# Patient Record
Sex: MALE | Race: WHITE | HISPANIC OR LATINO | Employment: FULL TIME | URBAN - METROPOLITAN AREA
[De-identification: names, ages, dates, MRNs, and addresses within clinical notes are randomized per-mention and may not be internally consistent; named-entity substitution may affect disease eponyms.]

---

## 2018-02-07 ENCOUNTER — HOSPITAL ENCOUNTER (EMERGENCY)
Facility: MEDICAL CENTER | Age: 23
End: 2018-02-07

## 2018-02-07 VITALS
OXYGEN SATURATION: 95 % | BODY MASS INDEX: 42.66 KG/M2 | RESPIRATION RATE: 18 BRPM | DIASTOLIC BLOOD PRESSURE: 57 MMHG | WEIGHT: 315 LBS | HEIGHT: 72 IN | HEART RATE: 140 BPM | TEMPERATURE: 97 F | SYSTOLIC BLOOD PRESSURE: 172 MMHG

## 2018-02-07 PROCEDURE — 302449 STATCHG TRIAGE ONLY (STATISTIC)

## 2018-02-07 ASSESSMENT — PAIN SCALES - GENERAL: PAINLEVEL_OUTOF10: 7

## 2018-02-08 NOTE — ED TRIAGE NOTES
"Sammy Hardin  22 y.o.  Chief Complaint   Patient presents with   • GLF   • Knee Pain     RIGHT     Patient to triage via wheelchair for above. States that he was bowling and tripped and fell towards his left side. Tried to catch his balance with his right leg - heard a crack and leg collapsed. Currently unable to bear weight on R knee. Denies tinging/numbness to RLE. Patient states \"I think I broke my leg.\"    Denies hitting head. - LOC.     Triage process explained to patient, apologized for wait time, and returned to lobby.  "

## 2019-05-31 ENCOUNTER — HOSPITAL ENCOUNTER (EMERGENCY)
Facility: MEDICAL CENTER | Age: 24
End: 2019-05-31
Attending: EMERGENCY MEDICINE

## 2019-05-31 VITALS
SYSTOLIC BLOOD PRESSURE: 138 MMHG | OXYGEN SATURATION: 97 % | BODY MASS INDEX: 42.66 KG/M2 | RESPIRATION RATE: 16 BRPM | HEART RATE: 88 BPM | WEIGHT: 315 LBS | TEMPERATURE: 97.8 F | DIASTOLIC BLOOD PRESSURE: 89 MMHG | HEIGHT: 72 IN

## 2019-05-31 DIAGNOSIS — M54.16 LUMBAR RADICULOPATHY: ICD-10-CM

## 2019-05-31 PROCEDURE — 96372 THER/PROPH/DIAG INJ SC/IM: CPT

## 2019-05-31 PROCEDURE — 700102 HCHG RX REV CODE 250 W/ 637 OVERRIDE(OP): Performed by: EMERGENCY MEDICINE

## 2019-05-31 PROCEDURE — A9270 NON-COVERED ITEM OR SERVICE: HCPCS | Performed by: EMERGENCY MEDICINE

## 2019-05-31 PROCEDURE — 700111 HCHG RX REV CODE 636 W/ 250 OVERRIDE (IP): Performed by: EMERGENCY MEDICINE

## 2019-05-31 PROCEDURE — 99284 EMERGENCY DEPT VISIT MOD MDM: CPT

## 2019-05-31 RX ORDER — CYCLOBENZAPRINE HCL 10 MG
10 TABLET ORAL ONCE
Status: COMPLETED | OUTPATIENT
Start: 2019-05-31 | End: 2019-05-31

## 2019-05-31 RX ORDER — KETOROLAC TROMETHAMINE 30 MG/ML
60 INJECTION, SOLUTION INTRAMUSCULAR; INTRAVENOUS ONCE
Status: COMPLETED | OUTPATIENT
Start: 2019-05-31 | End: 2019-05-31

## 2019-05-31 RX ORDER — CYCLOBENZAPRINE HCL 10 MG
10 TABLET ORAL 3 TIMES DAILY PRN
Qty: 30 TAB | Refills: 0 | Status: SHIPPED | OUTPATIENT
Start: 2019-05-31 | End: 2019-06-14

## 2019-05-31 RX ORDER — METHYLPREDNISOLONE 4 MG/1
TABLET ORAL
Qty: 1 KIT | Refills: 0 | Status: SHIPPED | OUTPATIENT
Start: 2019-05-31 | End: 2019-06-14

## 2019-05-31 RX ADMIN — CYCLOBENZAPRINE HYDROCHLORIDE 10 MG: 10 TABLET, FILM COATED ORAL at 12:47

## 2019-05-31 RX ADMIN — KETOROLAC TROMETHAMINE 60 MG: 30 INJECTION, SOLUTION INTRAMUSCULAR; INTRAVENOUS at 12:44

## 2019-05-31 ASSESSMENT — PAIN DESCRIPTION - DESCRIPTORS: DESCRIPTORS: SHARP

## 2019-05-31 NOTE — ED TRIAGE NOTES
"Sammy Edmonds  Chief Complaint   Patient presents with   • Leg Pain     (B) knees and thighs,  x 2 weeks     Pt ambulatory to triage with above complaint.  VSS.  No acute distress noted.   Pt states increasing pain to (B) knees and thighs,  Making it painful to walk, also has had 2 episodes of waking up in extreme pain \"like legs are cramping\".    Pt returned to [facility], educated on triage process, and to inform staff of any changes or concerns.     "

## 2019-05-31 NOTE — ED NOTES
Pt discharged home. Explained discharge and medication instructions. Questions and comments addressed. Pt verbalized understanding of instructions. Pt advised to follow-up with PCP as needed or return to ED for any new or worsening of symptoms. Pt is ambulating well and steady on feet. VS stable. Pt's parents at bedside and will be driving pt home.

## 2019-05-31 NOTE — ED PROVIDER NOTES
"ED Provider Note    Scribed for Cuba Gilmore M.D. by Montserrat Brown. 5/31/2019  11:32 AM    Primary care provider: Pcp Pt States None  Means of arrival: Walk-in  History obtained from: Patient  History limited by: None    CHIEF COMPLAINT  Chief Complaint   Patient presents with   • Leg Pain     (B) knees and thighs,  x 2 weeks       HPI  Sammy Edmonds is a 23 y.o. male who presents to the Emergency Department for evaluation of right leg pain onset two weeks ago. The patient was doing no unusual activity at the time of onset. The patient states his pain is primarily located to his knee and thigh. His pain is exacerbated by ambulation and leaning down. The patient described his pain as \"zinging\". The patient also affirms two episodes of right leg cramping. The patient affirms hip pain as well. The patient's pain is not exacerbating by palpation. The patient denies any back pain, swelling, or redness.     REVIEW OF SYSTEMS  Pertinent positives include Right leg pain, cramping, and hip pain. Pertinent negatives include no back pain, swelling, or redness.      PAST MEDICAL HISTORY   has a past medical history of Morbid obesity (HCC).    SURGICAL HISTORY  patient denies any surgical history    SOCIAL HISTORY  Social History   Substance Use Topics   • Smoking status: Former Smoker     Packs/day: 0.25     Types: Cigarettes     Quit date: 12/28/2018   • Smokeless tobacco: Never Used   • Alcohol use Yes      Comment: twice weekly      History   Drug Use   • Types: Inhaled     Comment: occasional marijuana       FAMILY HISTORY  History reviewed. No pertinent family history.    CURRENT MEDICATIONS  Home Medications     Reviewed by Obdulia Boyd R.N. (Registered Nurse) on 05/31/19 at 1102  Med List Status: Complete   Medication Last Dose Status        Patient Umair Taking any Medications                       ALLERGIES  No Known Allergies    PHYSICAL EXAM  VITAL SIGNS: /92   Pulse (!) 106   Temp 36.2 " °C (97.1 °F) (Temporal)   Resp 14   Ht 1.829 m (6')   Wt (!) 155.3 kg (342 lb 6 oz)   SpO2 95%   BMI 46.43 kg/m²     Constitutional: Well developed, Morbidly obese, No distress, Non-toxic appearance.   HENT: Normocephalic, Atraumatic.  Oropharynx moist.   Eyes: PERRL, EOMI, Conjunctiva normal, No discharge.   CV: Good pulses  Thorax & Lungs: No respiratory distress.   Skin: Warm, Dry, No erythema, No rash.    Musculoskeletal: Tenderness to the bilateral gluteal and lumbar area. No midline tenderness or lumbar spine tenderness. Nontender along bilateral lower extremities. Muscle strength is 5/5. No major deformities noted.   Neurologic: Awake, alert. Moves all extremities spontaneously.  Psychiatric: Affect normal, Mood normal.    COURSE & MEDICAL DECISION MAKING  Nursing notes, VS, PMSFHx reviewed in chart.    11:32 AM - Patient seen and examined at bedside. I discussed with the patient that because he is nontender to palpation and movement of his leg itself I do not suspect muscle or bone related etiology of his symptoms. I informed the patient that I suspect his symptoms are spinal nerve pain. I discussed that obesity is a risk factor for this type of pain. I informed the patient of my plan to treat the patient with a muscle relaxer and a steroid to decrease inflammation. Patient verbalizes understanding and support with my plan of care. Patient will be treated with 60 mg Toradol and 10 mg Plexeril. The patient verbalizes they feel comfortable going home. The patient is stable for discharge at this time and will return for any new or worsening symptoms. I discussed plan for discharge and follow up as outlined below. Patient verbalizes understanding and support with my plan for discharge.     Decision Making:  Patient is morbidly obese with right lateral thigh pain primarily.  It is somewhat positional, I believe it is likely lumbar radiculopathy, will put the patient on a Medrol Dosepak, Flexeril.  We will  give the patient IM shot of Toradol, have the patient return with worsening symptoms, follow-up with a primary care physician as an outpatient.     The patient will return for new or worsening symptoms and is stable at the time of discharge.    The patient is referred to a primary physician for blood pressure management, diabetic screening, and for all other preventative health concerns.    DISPOSITION:  Patient will be discharged home in stable condition.    FOLLOW UP:  [facility], Emergency Dept  [address] [id number] [phone]    If symptoms worsen    [address] [id number] [phone]          OUTPATIENT MEDICATIONS:  New Prescriptions    CYCLOBENZAPRINE (FLEXERIL) 10 MG TAB    Take 1 Tab by mouth 3 times a day as needed.    METHYLPREDNISOLONE (MEDROL DOSEPAK) 4 MG TABLET THERAPY PACK    Use as directed         FINAL IMPRESSION  1. Lumbar radiculopathy          Montserrat RHOADES (Snehal), am scribing for, and in the presence of, Cuba Gilmore M.D..    Electronically signed by: Montserrat Brown (Snehal), 5/31/2019    Cuba RHOADES M.D. personally performed the services described in this documentation, as scribed by Montserrat Brown in my presence, and it is both accurate and complete.    The note accurately reflects work and decisions made by me.  Cuba Gilmore  5/31/2019  1:21 PM

## 2019-06-14 ENCOUNTER — APPOINTMENT (OUTPATIENT)
Dept: RADIOLOGY | Facility: MEDICAL CENTER | Age: 24
DRG: 519 | End: 2019-06-14
Attending: EMERGENCY MEDICINE

## 2019-06-14 ENCOUNTER — HOSPITAL ENCOUNTER (INPATIENT)
Facility: MEDICAL CENTER | Age: 24
LOS: 6 days | DRG: 519 | End: 2019-06-25
Attending: EMERGENCY MEDICINE | Admitting: HOSPITALIST

## 2019-06-14 DIAGNOSIS — R29.898 LEFT LEG WEAKNESS: ICD-10-CM

## 2019-06-14 DIAGNOSIS — M48.061 SPINAL STENOSIS OF LUMBAR REGION WITHOUT NEUROGENIC CLAUDICATION: ICD-10-CM

## 2019-06-14 DIAGNOSIS — M54.5 ACUTE BILATERAL LOW BACK PAIN, WITH SCIATICA PRESENCE UNSPECIFIED: ICD-10-CM

## 2019-06-14 DIAGNOSIS — R00.0 TACHYCARDIA: ICD-10-CM

## 2019-06-14 DIAGNOSIS — M54.16 LUMBAR RADICULOPATHY: ICD-10-CM

## 2019-06-14 LAB
AMPHET UR QL SCN: NEGATIVE
ANION GAP SERPL CALC-SCNC: 11 MMOL/L (ref 0–11.9)
BARBITURATES UR QL SCN: NEGATIVE
BASOPHILS # BLD AUTO: 0.4 % (ref 0–1.8)
BASOPHILS # BLD: 0.04 K/UL (ref 0–0.12)
BENZODIAZ UR QL SCN: NEGATIVE
BUN SERPL-MCNC: 10 MG/DL (ref 8–22)
BZE UR QL SCN: NEGATIVE
CALCIUM SERPL-MCNC: 9.8 MG/DL (ref 8.5–10.5)
CANNABINOIDS UR QL SCN: POSITIVE
CHLORIDE SERPL-SCNC: 103 MMOL/L (ref 96–112)
CK SERPL-CCNC: 321 U/L (ref 0–154)
CO2 SERPL-SCNC: 26 MMOL/L (ref 20–33)
CREAT SERPL-MCNC: 0.84 MG/DL (ref 0.5–1.4)
EOSINOPHIL # BLD AUTO: 0.15 K/UL (ref 0–0.51)
EOSINOPHIL NFR BLD: 1.6 % (ref 0–6.9)
ERYTHROCYTE [DISTWIDTH] IN BLOOD BY AUTOMATED COUNT: 40.9 FL (ref 35.9–50)
GLUCOSE SERPL-MCNC: 106 MG/DL (ref 65–99)
HCT VFR BLD AUTO: 50.4 % (ref 42–52)
HGB BLD-MCNC: 15.5 G/DL (ref 14–18)
IMM GRANULOCYTES # BLD AUTO: 0.02 K/UL (ref 0–0.11)
IMM GRANULOCYTES NFR BLD AUTO: 0.2 % (ref 0–0.9)
LYMPHOCYTES # BLD AUTO: 3.37 K/UL (ref 1–4.8)
LYMPHOCYTES NFR BLD: 34.9 % (ref 22–41)
MCH RBC QN AUTO: 26.6 PG (ref 27–33)
MCHC RBC AUTO-ENTMCNC: 30.8 G/DL (ref 33.7–35.3)
MCV RBC AUTO: 86.4 FL (ref 81.4–97.8)
METHADONE UR QL SCN: NEGATIVE
MONOCYTES # BLD AUTO: 0.86 K/UL (ref 0–0.85)
MONOCYTES NFR BLD AUTO: 8.9 % (ref 0–13.4)
NEUTROPHILS # BLD AUTO: 5.23 K/UL (ref 1.82–7.42)
NEUTROPHILS NFR BLD: 54 % (ref 44–72)
NRBC # BLD AUTO: 0 K/UL
NRBC BLD-RTO: 0 /100 WBC
OPIATES UR QL SCN: NEGATIVE
OXYCODONE UR QL SCN: POSITIVE
PCP UR QL SCN: NEGATIVE
PLATELET # BLD AUTO: 351 K/UL (ref 164–446)
PMV BLD AUTO: 8.9 FL (ref 9–12.9)
POTASSIUM SERPL-SCNC: 3.7 MMOL/L (ref 3.6–5.5)
PROPOXYPH UR QL SCN: NEGATIVE
RBC # BLD AUTO: 5.83 M/UL (ref 4.7–6.1)
SODIUM SERPL-SCNC: 140 MMOL/L (ref 135–145)
WBC # BLD AUTO: 9.7 K/UL (ref 4.8–10.8)

## 2019-06-14 PROCEDURE — 72148 MRI LUMBAR SPINE W/O DYE: CPT

## 2019-06-14 PROCEDURE — 85025 COMPLETE CBC W/AUTO DIFF WBC: CPT

## 2019-06-14 PROCEDURE — 80048 BASIC METABOLIC PNL TOTAL CA: CPT

## 2019-06-14 PROCEDURE — 96375 TX/PRO/DX INJ NEW DRUG ADDON: CPT

## 2019-06-14 PROCEDURE — 80307 DRUG TEST PRSMV CHEM ANLYZR: CPT

## 2019-06-14 PROCEDURE — 99285 EMERGENCY DEPT VISIT HI MDM: CPT

## 2019-06-14 PROCEDURE — 700105 HCHG RX REV CODE 258: Performed by: EMERGENCY MEDICINE

## 2019-06-14 PROCEDURE — 96376 TX/PRO/DX INJ SAME DRUG ADON: CPT

## 2019-06-14 PROCEDURE — 700111 HCHG RX REV CODE 636 W/ 250 OVERRIDE (IP): Performed by: EMERGENCY MEDICINE

## 2019-06-14 PROCEDURE — 36415 COLL VENOUS BLD VENIPUNCTURE: CPT

## 2019-06-14 PROCEDURE — 82550 ASSAY OF CK (CPK): CPT

## 2019-06-14 PROCEDURE — 72100 X-RAY EXAM L-S SPINE 2/3 VWS: CPT

## 2019-06-14 PROCEDURE — 96374 THER/PROPH/DIAG INJ IV PUSH: CPT

## 2019-06-14 RX ORDER — SODIUM CHLORIDE 9 MG/ML
1000 INJECTION, SOLUTION INTRAVENOUS ONCE
Status: COMPLETED | OUTPATIENT
Start: 2019-06-14 | End: 2019-06-14

## 2019-06-14 RX ORDER — NAPROXEN 500 MG/1
500 TABLET ORAL 2 TIMES DAILY WITH MEALS
Qty: 60 TAB | Refills: 0 | Status: SHIPPED | OUTPATIENT
Start: 2019-06-14 | End: 2019-06-25

## 2019-06-14 RX ORDER — ONDANSETRON 2 MG/ML
4 INJECTION INTRAMUSCULAR; INTRAVENOUS ONCE
Status: COMPLETED | OUTPATIENT
Start: 2019-06-14 | End: 2019-06-14

## 2019-06-14 RX ORDER — KETOROLAC TROMETHAMINE 30 MG/ML
30 INJECTION, SOLUTION INTRAMUSCULAR; INTRAVENOUS ONCE
Status: COMPLETED | OUTPATIENT
Start: 2019-06-14 | End: 2019-06-14

## 2019-06-14 RX ORDER — CYCLOBENZAPRINE HCL 10 MG
10 TABLET ORAL 3 TIMES DAILY PRN
Qty: 30 TAB | Refills: 0 | Status: SHIPPED | OUTPATIENT
Start: 2019-06-14 | End: 2019-06-25

## 2019-06-14 RX ORDER — HYDROCODONE BITARTRATE AND ACETAMINOPHEN 5; 325 MG/1; MG/1
1-2 TABLET ORAL EVERY 6 HOURS PRN
Qty: 15 TAB | Refills: 0 | Status: SHIPPED | OUTPATIENT
Start: 2019-06-14 | End: 2019-06-25

## 2019-06-14 RX ORDER — MORPHINE SULFATE 4 MG/ML
4 INJECTION, SOLUTION INTRAMUSCULAR; INTRAVENOUS ONCE
Status: COMPLETED | OUTPATIENT
Start: 2019-06-14 | End: 2019-06-14

## 2019-06-14 RX ORDER — MIDAZOLAM HYDROCHLORIDE 1 MG/ML
2 INJECTION INTRAMUSCULAR; INTRAVENOUS ONCE
Status: COMPLETED | OUTPATIENT
Start: 2019-06-14 | End: 2019-06-14

## 2019-06-14 RX ADMIN — ONDANSETRON 4 MG: 2 INJECTION INTRAMUSCULAR; INTRAVENOUS at 17:40

## 2019-06-14 RX ADMIN — MORPHINE SULFATE 4 MG: 4 INJECTION INTRAVENOUS at 17:40

## 2019-06-14 RX ADMIN — SODIUM CHLORIDE 1000 ML: 9 INJECTION, SOLUTION INTRAVENOUS at 21:36

## 2019-06-14 RX ADMIN — SODIUM CHLORIDE 1000 ML: 9 INJECTION, SOLUTION INTRAVENOUS at 15:25

## 2019-06-14 RX ADMIN — KETOROLAC TROMETHAMINE 30 MG: 30 INJECTION, SOLUTION INTRAMUSCULAR; INTRAVENOUS at 15:06

## 2019-06-14 RX ADMIN — MORPHINE SULFATE 4 MG: 4 INJECTION INTRAVENOUS at 19:01

## 2019-06-14 NOTE — ED PROVIDER NOTES
ED Provider Note    CHIEF COMPLAINT  Chief Complaint   Patient presents with   • Knee Pain   • Leg Pain   • Fall       HPI  Sammy Edmonds is a 23 y.o. male who presents with bilateral leg pain.  Worse that he has had pain in both of his thighs laterally and anteriorly for the last 2 weeks.  He does not recall any specific injury, but it just seems to get worse.  He describes shooting pain down his thighs laterally.  He denies weakness constantly in the legs, but he states that they will periodically just give out and he has had a fall to the ground.  Periodically will feel some popping in his right knee.  Pain is worsened with certain movements.  Is worse when he lays down flat at night.  He has not had a fever or chills.  No bowel or bladder dysfunction or saddle anesthesia.  No injection drug use.    REVIEW OF SYSTEMS  As per HPI, otherwise a 10 point review of systems is negative    PAST MEDICAL HISTORY  Past Medical History:   Diagnosis Date   • Morbid obesity (HCC)        SOCIAL HISTORY  Social History   Substance Use Topics   • Smoking status: Former Smoker     Packs/day: 0.25     Types: Cigarettes     Quit date: 12/28/2018   • Smokeless tobacco: Never Used   • Alcohol use Yes      Comment: twice weekly       SURGICAL HISTORY  History reviewed. No pertinent surgical history.    CURRENT MEDICATIONS  Home Medications     Reviewed by Mini Birch R.N. (Registered Nurse) on 06/14/19 at 1222  Med List Status: Complete   Medication Last Dose Status        Patient Umair Taking any Medications                       ALLERGIES  No Known Allergies    PHYSICAL EXAM  VITAL SIGNS: /86   Pulse (!) 131   Temp 36 °C (96.8 °F) (Temporal)   Resp 16   Wt (!) 155 kg (341 lb 11.4 oz)   SpO2 94%   BMI 46.34 kg/m²    Constitutional: Awake and alert  HENT:  Atraumatic, Normocephalic.Oropharynx dry mucus membranes, Nose normal inspection.   Eyes: Normal inspection  Neck: Supple  Cardiovascular: Normal heart  rate, Normal rhythm.  Symmetric peripheral pulses.   Thorax & Lungs: No respiratory distress, No wheezing, No rales, No rhonchi, No chest tenderness.   Abdomen: Bowel sounds normal, soft, non-distended, nontender, no mass  Skin: Warm, Dry, No rash.   Back: Mild tenderness lower lumbar spine bilaterally.  Extremities: Mild tenderness of the lateral thighs.  Neurologic: Neurologic: Alert & oriented x 3, Normal motor function, Normal sensory function, No focal deficits noted. Normal reflexes.  Normal Cranial Nerves.    RADIOLOGY/PROCEDURES  DX-LUMBAR SPINE-2 OR 3 VIEWS   Final Result      1.  There is no acute bony process or malalignment.   2.  Question of lower spinal stenosis possibly due to congenitally short pedicles.           Imaging is interpreted by radiologist    Labs:  Results for orders placed or performed during the hospital encounter of 06/14/19   CBC WITH DIFFERENTIAL   Result Value Ref Range    WBC 9.7 4.8 - 10.8 K/uL    RBC 5.83 4.70 - 6.10 M/uL    Hemoglobin 15.5 14.0 - 18.0 g/dL    Hematocrit 50.4 42.0 - 52.0 %    MCV 86.4 81.4 - 97.8 fL    MCH 26.6 (L) 27.0 - 33.0 pg    MCHC 30.8 (L) 33.7 - 35.3 g/dL    RDW 40.9 35.9 - 50.0 fL    Platelet Count 351 164 - 446 K/uL    MPV 8.9 (L) 9.0 - 12.9 fL    Neutrophils-Polys 54.00 44.00 - 72.00 %    Lymphocytes 34.90 22.00 - 41.00 %    Monocytes 8.90 0.00 - 13.40 %    Eosinophils 1.60 0.00 - 6.90 %    Basophils 0.40 0.00 - 1.80 %    Immature Granulocytes 0.20 0.00 - 0.90 %    Nucleated RBC 0.00 /100 WBC    Neutrophils (Absolute) 5.23 1.82 - 7.42 K/uL    Lymphs (Absolute) 3.37 1.00 - 4.80 K/uL    Monos (Absolute) 0.86 (H) 0.00 - 0.85 K/uL    Eos (Absolute) 0.15 0.00 - 0.51 K/uL    Baso (Absolute) 0.04 0.00 - 0.12 K/uL    Immature Granulocytes (abs) 0.02 0.00 - 0.11 K/uL    NRBC (Absolute) 0.00 K/uL   BASIC METABOLIC PANEL   Result Value Ref Range    Sodium 140 135 - 145 mmol/L    Potassium 3.7 3.6 - 5.5 mmol/L    Chloride 103 96 - 112 mmol/L    Co2 26 20 - 33  mmol/L    Glucose 106 (H) 65 - 99 mg/dL    Bun 10 8 - 22 mg/dL    Creatinine 0.84 0.50 - 1.40 mg/dL    Calcium 9.8 8.5 - 10.5 mg/dL    Anion Gap 11.0 0.0 - 11.9   CREATINE KINASE   Result Value Ref Range    CPK Total 321 (H) 0 - 154 U/L   ESTIMATED GFR   Result Value Ref Range    GFR If African American >60 >60 mL/min/1.73 m 2    GFR If Non African American >60 >60 mL/min/1.73 m 2       Medications   NS infusion 1,000 mL (not administered)   ketorolac (TORADOL) injection 30 mg (30 mg Intravenous Given 6/14/19 4306)       HYDRATION: Based on the patient's presentation of Tachycardia the patient was given IV fluids. IV Hydration was used because oral hydration was not as rapid as required. Upon recheck following hydration, the patient was Stable.    COURSE & MEDICAL DECISION MAKING  Patient presents with back pain and anterior thigh pain.  He describes that he has periodic weakness in his extremities.  He has no findings on his exam neurologic exam.  He was recently here with the same and reports that he has had symptoms for several weeks.  I obtained laboratory data and ordered an x-ray.  He has a mild elevation of the CPK of unclear significance.  X-ray was abnormal as above.    I consulted Dr. crenshaw and discussed the case.  He requested that the patient be able to get an MRI but he would like to see the patient in the office.  The patient does not have any insurance or means to an outpatient basis and therefore Dr. crenshaw recommended obtaining it today through the ER otherwise the patient would just have to be sent back for MRI.  I therefore ordered an MRI.    The patient was very anxious clinically.  He arrived quite tachycardic.  This was a narrow complex regular sinus tachycardia on the monitor.  Toradol was given for discomfort and he remained tachycardic I therefore ordered a liter of saline.    At this point MRI is pending.  The patient is feeling better, but with some ongoing discomfort.  I ordered morphine.   If MRI does not show any emergent cord compression the patient will be discharged to follow-up with Dr. crenshaw.  If remarkable abnormalities, Dr. Li will dictate further.  I have written prescriptions for Norco, naproxen, Flexeril.    FINAL IMPRESSION  1.  Lumbar radiculopathy      This dictation was created using voice recognition software. The accuracy of the dictation is limited to the abilities of the software.  The nursing notes were reviewed and certain aspects of this information were incorporated into this note.      Electronically signed by: Huy Maki, 6/14/2019 2:38 PM

## 2019-06-14 NOTE — ED TRIAGE NOTES
"Pt comes in complaining of bilateral knee pain and R upper leg pain. Pt seen here but getting worse. Pt reporting frequent falls because \"his legs give out\"  "

## 2019-06-15 ENCOUNTER — APPOINTMENT (OUTPATIENT)
Dept: RADIOLOGY | Facility: MEDICAL CENTER | Age: 24
DRG: 519 | End: 2019-06-15
Attending: NURSE PRACTITIONER

## 2019-06-15 PROBLEM — R00.0 SINUS TACHYCARDIA: Status: ACTIVE | Noted: 2019-06-15

## 2019-06-15 PROBLEM — M54.50 LOW BACK PAIN: Status: ACTIVE | Noted: 2019-06-15

## 2019-06-15 LAB
APTT PPP: 28.2 SEC (ref 24.7–36)
EKG IMPRESSION: NORMAL
TSH SERPL DL<=0.005 MIU/L-ACNC: 1.28 UIU/ML (ref 0.38–5.33)

## 2019-06-15 PROCEDURE — 99220 PR INITIAL OBSERVATION CARE,LEVL III: CPT | Performed by: HOSPITALIST

## 2019-06-15 PROCEDURE — 93005 ELECTROCARDIOGRAM TRACING: CPT | Performed by: EMERGENCY MEDICINE

## 2019-06-15 PROCEDURE — 700102 HCHG RX REV CODE 250 W/ 637 OVERRIDE(OP): Performed by: HOSPITALIST

## 2019-06-15 PROCEDURE — G0378 HOSPITAL OBSERVATION PER HR: HCPCS

## 2019-06-15 PROCEDURE — 36415 COLL VENOUS BLD VENIPUNCTURE: CPT

## 2019-06-15 PROCEDURE — 71046 X-RAY EXAM CHEST 2 VIEWS: CPT

## 2019-06-15 PROCEDURE — 85730 THROMBOPLASTIN TIME PARTIAL: CPT

## 2019-06-15 PROCEDURE — 84443 ASSAY THYROID STIM HORMONE: CPT

## 2019-06-15 PROCEDURE — 700102 HCHG RX REV CODE 250 W/ 637 OVERRIDE(OP): Performed by: NURSE PRACTITIONER

## 2019-06-15 PROCEDURE — A9270 NON-COVERED ITEM OR SERVICE: HCPCS | Performed by: NURSE PRACTITIONER

## 2019-06-15 PROCEDURE — 700105 HCHG RX REV CODE 258: Performed by: HOSPITALIST

## 2019-06-15 PROCEDURE — A9270 NON-COVERED ITEM OR SERVICE: HCPCS | Performed by: HOSPITALIST

## 2019-06-15 RX ORDER — BISACODYL 10 MG
10 SUPPOSITORY, RECTAL RECTAL
Status: DISCONTINUED | OUTPATIENT
Start: 2019-06-15 | End: 2019-06-17

## 2019-06-15 RX ORDER — PROMETHAZINE HYDROCHLORIDE 25 MG/1
12.5-25 TABLET ORAL EVERY 4 HOURS PRN
Status: DISCONTINUED | OUTPATIENT
Start: 2019-06-15 | End: 2019-06-25 | Stop reason: HOSPADM

## 2019-06-15 RX ORDER — AMOXICILLIN 250 MG
2 CAPSULE ORAL 2 TIMES DAILY
Status: DISCONTINUED | OUTPATIENT
Start: 2019-06-15 | End: 2019-06-17

## 2019-06-15 RX ORDER — ONDANSETRON 4 MG/1
4 TABLET, ORALLY DISINTEGRATING ORAL EVERY 4 HOURS PRN
Status: DISCONTINUED | OUTPATIENT
Start: 2019-06-15 | End: 2019-06-25 | Stop reason: HOSPADM

## 2019-06-15 RX ORDER — SODIUM CHLORIDE 9 MG/ML
INJECTION, SOLUTION INTRAVENOUS CONTINUOUS
Status: DISCONTINUED | OUTPATIENT
Start: 2019-06-15 | End: 2019-06-23

## 2019-06-15 RX ORDER — ACETAMINOPHEN 325 MG/1
650 TABLET ORAL EVERY 6 HOURS PRN
Status: DISCONTINUED | OUTPATIENT
Start: 2019-06-15 | End: 2019-06-17

## 2019-06-15 RX ORDER — PROMETHAZINE HYDROCHLORIDE 12.5 MG/1
12.5-25 SUPPOSITORY RECTAL EVERY 4 HOURS PRN
Status: DISCONTINUED | OUTPATIENT
Start: 2019-06-15 | End: 2019-06-25 | Stop reason: HOSPADM

## 2019-06-15 RX ORDER — POLYETHYLENE GLYCOL 3350 17 G/17G
1 POWDER, FOR SOLUTION ORAL
Status: DISCONTINUED | OUTPATIENT
Start: 2019-06-15 | End: 2019-06-17

## 2019-06-15 RX ORDER — ONDANSETRON 2 MG/ML
4 INJECTION INTRAMUSCULAR; INTRAVENOUS EVERY 4 HOURS PRN
Status: DISCONTINUED | OUTPATIENT
Start: 2019-06-15 | End: 2019-06-17

## 2019-06-15 RX ADMIN — SODIUM CHLORIDE: 9 INJECTION, SOLUTION INTRAVENOUS at 13:28

## 2019-06-15 RX ADMIN — MAGNESIUM HYDROXIDE 30 ML: 400 SUSPENSION ORAL at 13:57

## 2019-06-15 RX ADMIN — ACETAMINOPHEN 650 MG: 325 TABLET, FILM COATED ORAL at 21:12

## 2019-06-15 RX ADMIN — SODIUM CHLORIDE: 9 INJECTION, SOLUTION INTRAVENOUS at 02:10

## 2019-06-15 RX ADMIN — SENNOSIDES,DOCUSATE SODIUM 2 TABLET: 8.6; 5 TABLET, FILM COATED ORAL at 16:26

## 2019-06-15 ASSESSMENT — ENCOUNTER SYMPTOMS
SORE THROAT: 0
MYALGIAS: 0
BACK PAIN: 1
DEPRESSION: 0
VOMITING: 0
DIZZINESS: 0
FEVER: 0
PALPITATIONS: 0
NERVOUS/ANXIOUS: 0
DOUBLE VISION: 0
SHORTNESS OF BREATH: 0
FALLS: 1
CHILLS: 0
NAUSEA: 0
COUGH: 0
HEADACHES: 0
BLURRED VISION: 0

## 2019-06-15 ASSESSMENT — LIFESTYLE VARIABLES
EVER_SMOKED: NEVER
ALCOHOL_USE: NO

## 2019-06-15 ASSESSMENT — PATIENT HEALTH QUESTIONNAIRE - PHQ9
1. LITTLE INTEREST OR PLEASURE IN DOING THINGS: NOT AT ALL
SUM OF ALL RESPONSES TO PHQ9 QUESTIONS 1 AND 2: 0
2. FEELING DOWN, DEPRESSED, IRRITABLE, OR HOPELESS: NOT AT ALL
SUM OF ALL RESPONSES TO PHQ9 QUESTIONS 1 AND 2: 0
1. LITTLE INTEREST OR PLEASURE IN DOING THINGS: NOT AT ALL
2. FEELING DOWN, DEPRESSED, IRRITABLE, OR HOPELESS: NOT AT ALL

## 2019-06-15 ASSESSMENT — COPD QUESTIONNAIRES
IN THE PAST 12 MONTHS DO YOU DO LESS THAN YOU USED TO BECAUSE OF YOUR BREATHING PROBLEMS: DISAGREE/UNSURE
COPD SCREENING SCORE: 0
DO YOU EVER COUGH UP ANY MUCUS OR PHLEGM?: NO/ONLY WITH OCCASIONAL COLDS OR INFECTIONS
DURING THE PAST 4 WEEKS HOW MUCH DID YOU FEEL SHORT OF BREATH: NONE/LITTLE OF THE TIME
HAVE YOU SMOKED AT LEAST 100 CIGARETTES IN YOUR ENTIRE LIFE: NO/DON'T KNOW

## 2019-06-15 NOTE — CONSULTS
DATE OF SERVICE:  06/15/2019    HISTORY REASON FOR CONSULTATION:  Severe low back pain and leg pain in a   23-year-old male with multiple emergency room visits for the same.    CLINICAL HISTORY:  The patient is a 23-year-old right-handed male who works as   an  for the [employer].  The patient is 6 feet tall and weighs   339 pounds.  The patient states that approximately 1 month ago, he developed   horrible shooting pain in his low back, which has been unrelenting.  He has   tried anti-inflammatories and tincture of time.  Surprisingly, his MRI shows   severe critical stenosis with an element of congenital spinal stenosis with   disk herniation as well.  The patient is clearly a surgical candidate given   the severity of the MRI findings.    PHYSICAL EXAMINATION:  NEUROLOGIC:  The patient is awake, alert, and oriented x3.  He is in moderate   acute distress.  The patient has tenderness in his low back as would be   expected.  The patient has good strength in the legs and good sensation in the   legs.  The patient's cerebellar exam shows no appendicular tremor.    IMPRESSION:  Severe critical stenosis in a 23-year-old male who is morbidly   obese.  Unfortunately, the patient will not improve without surgery.  We   discussed options such as epidural steroid injections, further tincture of   time, physical therapy.  However, given the severity of the stenosis and MRI   findings, surgery is inevitable.  The patient would like to proceed with   surgery.  The earliest that I can set him up for surgery is Monday.  I will   discuss things further with our hospitalists who have been kind enough to   admit the patient.  We will consent the patient for surgery on Monday for   L2-S1 laminectomy and diskectomy without fusion.       ____________________________________     MD MATTHEW WILSON / JESSICA    DD:  06/15/2019 07:54:48  DT:  06/15/2019 10:46:38    D#:  [id number]  Job#:  [id number]

## 2019-06-15 NOTE — H&P
Hospital Medicine History & Physical Note    Date of Service  6/15/2019    Primary Care Physician  Pcp Pt States None    Consultants  Dr. crenshaw with neurosurgery    Code Status  Full code    Chief Complaint  Back pain, knee pain    History of Presenting Illness  23 y.o. male who presented 6/14/2019 with knee pain and back pain.  Patient reports that he has had the symptoms for a couple of weeks now but is much worse over the past few days.  He was seen in the hospital approximately 1 week ago and was treated with muscle relaxants and steroids.  Despite this he continues to have pain.  Imaging in the emergency department shows severe spinal stenosis.  He denies any bowel or bladder incontinence.  He denies any numbness or tingling.  He reports 2 or 3 falls secondary to pain. his only complaint is that of pain which improves with sitting or bending.  He was also found to have tachycardia with his heart rate was persistently in the 120s.    Review of Systems  Review of Systems   Constitutional: Negative for chills and fever.   HENT: Negative for hearing loss and sore throat.    Eyes: Negative for blurred vision and double vision.   Respiratory: Negative for cough and shortness of breath.    Cardiovascular: Negative for chest pain and palpitations.   Gastrointestinal: Negative for nausea and vomiting.   Genitourinary: Negative for dysuria and frequency.   Musculoskeletal: Positive for back pain and falls. Negative for myalgias.   Skin: Negative for itching and rash.   Neurological: Negative for dizziness and headaches.   Psychiatric/Behavioral: Negative for depression. The patient is not nervous/anxious.        Past Medical History   has a past medical history of Morbid obesity (HCC).    Surgical History   has no past surgical history on file.  He denies any prior surgeries    Family History  family history is not on file.  Has been reviewed and is not pertinent to his current presentation    Social History   reports  that he quit smoking about 5 months ago. His smoking use included Cigarettes. He smoked 0.25 packs per day. He has never used smokeless tobacco. He reports that he drinks alcohol. He reports that he uses drugs, including Inhaled.    Allergies  No Known Allergies    Medications  None       Physical Exam  Temp:  [36 °C (96.8 °F)-37 °C (98.6 °F)] 37 °C (98.6 °F)  Pulse:  [103-131] 121  Resp:  [16-21] 18  BP: (107-135)/(64-86) 107/64  SpO2:  [89 %-95 %] 90 %    Physical Exam   Constitutional: He is oriented to person, place, and time. He appears well-developed and well-nourished.   Morbidly obese   HENT:   Head: Normocephalic and atraumatic.   Eyes: Pupils are equal, round, and reactive to light. Conjunctivae and EOM are normal.   Neck: Normal range of motion. Neck supple.   Cardiovascular: Regular rhythm.  Tachycardia present.    No murmur heard.  Pulmonary/Chest: Effort normal and breath sounds normal. No respiratory distress.   Abdominal: Soft. Bowel sounds are normal. He exhibits no distension.   Musculoskeletal: He exhibits no edema or tenderness.   Decreased range of motion bilateral lower extremities greater on the right secondary to pain  Positive straight leg raise bilaterally   Neurological: He is alert and oriented to person, place, and time.   Skin: Skin is warm and dry.   Psychiatric: He has a normal mood and affect. His behavior is normal.   Nursing note and vitals reviewed.      Laboratory:  Recent Labs      06/14/19   1341   WBC  9.7   RBC  5.83   HEMOGLOBIN  15.5   HEMATOCRIT  50.4   MCV  86.4   MCH  26.6*   MCHC  30.8*   RDW  40.9   PLATELETCT  351   MPV  8.9*     Recent Labs      06/14/19   1341   SODIUM  140   POTASSIUM  3.7   CHLORIDE  103   CO2  26   GLUCOSE  106*   BUN  10   CREATININE  0.84   CALCIUM  9.8     Recent Labs      06/14/19   1341   GLUCOSE  106*                 No results for input(s): TROPONINI in the last 72 hours.    Urinalysis:    No results found     Imaging:  MR-LUMBAR SPINE-W/O    Final Result      1.  Congenital short pedicles      2.  L4-5 severe spinal stenosis due to a combination of factors and short pedicles      3.  Moderate-severe spinal stenosis at L2-3      4.  Mild-moderate spinal stenosis at L3-4      5.  Foraminal stenoses at L2-3, L3-4, L4-5, and L5-S1      6.  Multilevel annular disc bulge, facet arthropathy, and ligament hypertrophy         DX-LUMBAR SPINE-2 OR 3 VIEWS   Final Result      1.  There is no acute bony process or malalignment.   2.  Question of lower spinal stenosis possibly due to congenitally short pedicles.            Assessment/Plan:  I anticipate this patient is appropriate for observation status at this time.    Sinus tachycardia- (present on admission)   Assessment & Plan    Telemetry monitoring  IV fluids  Check TSH  Sinus tachycardia  Asymptomatic     Low back pain- (present on admission)   Assessment & Plan    Has severe spinal stenosis on imaging  Dr. crenshaw with the neurosurgery to consult  Pain control  PT OT  Fall precautions         VTE prophylaxis: scds

## 2019-06-15 NOTE — PROGRESS NOTES
Pt arrived to unit via wheelchair telemonitored ST with ACLS RN at 0130. Pt oriented to room, unit, and plan of care. Tele-monitor placed.SR. Admit profile and assessment completed. ST at 112 otherwise VSS. AOx4, calm demeanor. 2/10 leg pain at this time, only during movement no tx needed at this time. Unlabored and even breathing RA. Swallow eval passed, regular diet. Skin intact. Labs drawn and sent to lab. All questions answered at this time. Call light within reach; fall precautions in place. MD notifed of patients arrival.

## 2019-06-15 NOTE — PROGRESS NOTES
Assessment completed.  Pt A&Ox4.  Respirations even, unlabored on room air.  Pt denies any pain at this time.  Sinus tachycardia noted on telemetry monitor, pt asymptomatic. POC discussed: awaiting transfer, PT&OT, planning for laminectomy on 6/17.  Communication board updated.  IVF infusing per MAR.   Call light and belongings within reach.  Needs met, will continue to monitor

## 2019-06-15 NOTE — PROGRESS NOTES
24 y/o male admitted with low back pain.  Found to have severe spinal stenosis on MRI.  Neurosurgery consulted.  Planning for laminectomy on 6/17.  Transfer to neurosurgery unit.  Continue pain control.  PT/OT.

## 2019-06-15 NOTE — CONSULTS
Patient seen and examined this a.m.  I will schedule the patient for lumbar laminectomy on Monday.  Risks, benefits, and options were discussed.  The patient is eager to proceed with back surgery.  I will kindly ask the hospitalist to help with the admission.  I appreciate nursing and hospitalist help.  I dictated the note.

## 2019-06-15 NOTE — ASSESSMENT & PLAN NOTE
Persistent ;Telemetry monitoring, however afebrile  continue IV fluids  TSH has been normal, no signs are stable  EKG during admission reviewed by me personally showed sinus tach no ST or T wave abnormalities noted.  Asymptomatic  Repeat EKG showing sinus tachycardia  Echo is still pending however I ordered it 2 days ago  Infectious causes r/o  Blood cultures pending

## 2019-06-15 NOTE — ED PROVIDER NOTES
ED Provider Note    7:58 PM  Patient signed out to me by Dr. Maki pending MRI results.    MR-LUMBAR SPINE-W/O   Final Result      1.  Congenital short pedicles      2.  L4-5 severe spinal stenosis due to a combination of factors and short pedicles      3.  Moderate-severe spinal stenosis at L2-3      4.  Mild-moderate spinal stenosis at L3-4      5.  Foraminal stenoses at L2-3, L3-4, L4-5, and L5-S1      6.  Multilevel annular disc bulge, facet arthropathy, and ligament hypertrophy         DX-LUMBAR SPINE-2 OR 3 VIEWS   Final Result      1.  There is no acute bony process or malalignment.   2.  Question of lower spinal stenosis possibly due to congenitally short pedicles.          HYDRATION: Based on the patient's presentation of Tachycardia the patient was given IV fluids. IV Hydration was used because oral hydration was not as rapid as required. Upon recheck following hydration, the patient was unchanged.     I spoke with Dr. crenshaw and reviewed MRI results with him.  At this point as long as his pain is controlled he can follow-up as an outpatient.  I evaluated the patient and talk to the patient and he has not severe pain while he is resting.  He feels that he is able to manage his pain at home and will follow up with Dr. crenshaw as an outpatient.    12:00 AM  Patient reevaluated he is persistently tachycardic.  His pain is well controlled at this time.  He was given IV fluids for tachycardia but has not resolved.  He has been persistently tachycardic since he has been here almost 12 hours now.  Given this I do think he should be admitted for this persistent tachycardia.  It is unclear the etiology and in reviewing prior records it does not appear that he has been this tachycardic in the past.    I will speak with the on-call hospitalist for admission.    Patient will be admitted to the hospital service in stable condition.    The note accurately reflects work and decisions made by me.  Jennifer Ruggiero  6/15/2019   12:01 AM

## 2019-06-15 NOTE — ASSESSMENT & PLAN NOTE
Had severe spinal stenosis on imaging  Dr. crenshaw with the neurosurgery : L4-5 severe spinal stenosis due to a combination of factors and short pedicles, moderate-severe spinal stenosis at L2-3,  Mild-moderate spinal stenosis at L3-4, 5.  Foraminal stenoses at L2-3, L3-4, L4-5, and L5-S1  S/p LUMBAR 4 - SACRAL 1 LAMINECTOMY AND DISCECTOMY   Pain control  PT Ot recs   fall precautions

## 2019-06-16 ENCOUNTER — APPOINTMENT (OUTPATIENT)
Dept: RADIOLOGY | Facility: MEDICAL CENTER | Age: 24
DRG: 519 | End: 2019-06-16
Attending: HOSPITALIST

## 2019-06-16 PROBLEM — M48.061 SPINAL STENOSIS OF LUMBAR REGION WITHOUT NEUROGENIC CLAUDICATION: Status: ACTIVE | Noted: 2019-06-16

## 2019-06-16 LAB
ANION GAP SERPL CALC-SCNC: 10 MMOL/L (ref 0–11.9)
BASOPHILS # BLD AUTO: 0.4 % (ref 0–1.8)
BASOPHILS # BLD: 0.04 K/UL (ref 0–0.12)
BUN SERPL-MCNC: 9 MG/DL (ref 8–22)
CALCIUM SERPL-MCNC: 9.5 MG/DL (ref 8.5–10.5)
CHLORIDE SERPL-SCNC: 105 MMOL/L (ref 96–112)
CO2 SERPL-SCNC: 25 MMOL/L (ref 20–33)
CREAT SERPL-MCNC: 0.64 MG/DL (ref 0.5–1.4)
EOSINOPHIL # BLD AUTO: 0.23 K/UL (ref 0–0.51)
EOSINOPHIL NFR BLD: 2.4 % (ref 0–6.9)
ERYTHROCYTE [DISTWIDTH] IN BLOOD BY AUTOMATED COUNT: 38.5 FL (ref 35.9–50)
GLUCOSE SERPL-MCNC: 108 MG/DL (ref 65–99)
HCT VFR BLD AUTO: 44.7 % (ref 42–52)
HGB BLD-MCNC: 14.4 G/DL (ref 14–18)
IMM GRANULOCYTES # BLD AUTO: 0.03 K/UL (ref 0–0.11)
IMM GRANULOCYTES NFR BLD AUTO: 0.3 % (ref 0–0.9)
INR PPP: 0.99 (ref 0.87–1.13)
LYMPHOCYTES # BLD AUTO: 3.65 K/UL (ref 1–4.8)
LYMPHOCYTES NFR BLD: 37.6 % (ref 22–41)
MCH RBC QN AUTO: 26.6 PG (ref 27–33)
MCHC RBC AUTO-ENTMCNC: 32.2 G/DL (ref 33.7–35.3)
MCV RBC AUTO: 82.6 FL (ref 81.4–97.8)
MONOCYTES # BLD AUTO: 0.71 K/UL (ref 0–0.85)
MONOCYTES NFR BLD AUTO: 7.3 % (ref 0–13.4)
NEUTROPHILS # BLD AUTO: 5.04 K/UL (ref 1.82–7.42)
NEUTROPHILS NFR BLD: 52 % (ref 44–72)
NRBC # BLD AUTO: 0 K/UL
NRBC BLD-RTO: 0 /100 WBC
PLATELET # BLD AUTO: 311 K/UL (ref 164–446)
PMV BLD AUTO: 8.9 FL (ref 9–12.9)
POTASSIUM SERPL-SCNC: 3.8 MMOL/L (ref 3.6–5.5)
PROTHROMBIN TIME: 13.3 SEC (ref 12–14.6)
RBC # BLD AUTO: 5.41 M/UL (ref 4.7–6.1)
SODIUM SERPL-SCNC: 140 MMOL/L (ref 135–145)
WBC # BLD AUTO: 9.7 K/UL (ref 4.8–10.8)

## 2019-06-16 PROCEDURE — 85610 PROTHROMBIN TIME: CPT

## 2019-06-16 PROCEDURE — A9270 NON-COVERED ITEM OR SERVICE: HCPCS | Performed by: HOSPITALIST

## 2019-06-16 PROCEDURE — 36415 COLL VENOUS BLD VENIPUNCTURE: CPT

## 2019-06-16 PROCEDURE — 80048 BASIC METABOLIC PNL TOTAL CA: CPT

## 2019-06-16 PROCEDURE — 85025 COMPLETE CBC W/AUTO DIFF WBC: CPT

## 2019-06-16 PROCEDURE — G0378 HOSPITAL OBSERVATION PER HR: HCPCS

## 2019-06-16 PROCEDURE — 700102 HCHG RX REV CODE 250 W/ 637 OVERRIDE(OP): Performed by: HOSPITALIST

## 2019-06-16 PROCEDURE — 99226 PR SUBSEQUENT OBSERVATION CARE,LEVEL III: CPT | Performed by: HOSPITALIST

## 2019-06-16 RX ADMIN — ACETAMINOPHEN 650 MG: 325 TABLET, FILM COATED ORAL at 20:14

## 2019-06-16 ASSESSMENT — COGNITIVE AND FUNCTIONAL STATUS - GENERAL
SUGGESTED CMS G CODE MODIFIER MOBILITY: CI
SUGGESTED CMS G CODE MODIFIER DAILY ACTIVITY: CH
CLIMB 3 TO 5 STEPS WITH RAILING: A LITTLE
MOBILITY SCORE: 23
DAILY ACTIVITIY SCORE: 24

## 2019-06-16 ASSESSMENT — LIFESTYLE VARIABLES: SUBSTANCE_ABUSE: 0

## 2019-06-16 ASSESSMENT — ENCOUNTER SYMPTOMS
SEIZURES: 0
SPUTUM PRODUCTION: 0
WEAKNESS: 1
CHILLS: 0
SINUS PAIN: 0
ABDOMINAL PAIN: 0
MYALGIAS: 1
PHOTOPHOBIA: 0
FALLS: 1
COUGH: 0
DIZZINESS: 0
HEADACHES: 0
FEVER: 0
SHORTNESS OF BREATH: 0
DEPRESSION: 0
BACK PAIN: 1
TINGLING: 1
CLAUDICATION: 0
LOSS OF CONSCIOUSNESS: 0
FOCAL WEAKNESS: 0
NAUSEA: 0
PALPITATIONS: 0
VOMITING: 0
DOUBLE VISION: 0

## 2019-06-16 ASSESSMENT — PATIENT HEALTH QUESTIONNAIRE - PHQ9
SUM OF ALL RESPONSES TO PHQ9 QUESTIONS 1 AND 2: 0
2. FEELING DOWN, DEPRESSED, IRRITABLE, OR HOPELESS: NOT AT ALL
1. LITTLE INTEREST OR PLEASURE IN DOING THINGS: NOT AT ALL

## 2019-06-16 NOTE — PROGRESS NOTES
1718- report given by day RN, POC discussed, pt received awake and alert sitting up in bed, no distress, complaints of pain with movement, scd's on, hourly rounding in place

## 2019-06-16 NOTE — PROGRESS NOTES
Neurosurgery Progress Note    Subjective:  Able to walk short distances, the further he walks unbearable pain in (b) lateral thighs and knees, then radiating circumferentially below knees  Not taking narcotics    Exam:  NM and NS intact, no bowel or bladder dysfunction per patient and chart review, had 3 BM's   remainder of exam WNL    BP  Min: 137/82  Max: 149/72  Pulse  Av  Min: 100  Max: 115  Resp  Av.8  Min: 16  Max: 18  Temp  Av.7 °C (98 °F)  Min: 36.2 °C (97.1 °F)  Max: 37.1 °C (98.8 °F)  SpO2  Av.2 %  Min: 95 %  Max: 99 %    No Data Recorded    Recent Labs      19   1341  19   0308   WBC  9.7  9.7   RBC  5.83  5.41   HEMOGLOBIN  15.5  14.4   HEMATOCRIT  50.4  44.7   MCV  86.4  82.6   MCH  26.6*  26.6*   MCHC  30.8*  32.2*   RDW  40.9  38.5   PLATELETCT  351  311   MPV  8.9*  8.9*     Recent Labs      19   1341  19   0308   SODIUM  140  140   POTASSIUM  3.7  3.8   CHLORIDE  103  105   CO2  26  25   GLUCOSE  106*  108*   BUN  10  9   CREATININE  0.84  0.64   CALCIUM  9.8  9.5     Recent Labs      06/15/19   1635  19   0308   APTT  28.2   --    INR   --   0.99           Intake/Output       06/15/19 0700 - 19 0659 19 0700 - 19 0659       Total  Total       Intake    P.O.  --  50 50  280  -- 280    P.O. -- 50 50 280 -- 280    Total Intake -- 50 50 280 -- 280       Output    Urine  --  -- --  --  -- --    Number of Times Voided -- 1 x 1 x -- -- --    Stool  --  -- --  --  -- --    Number of Times Stooled 2 x 1 x 3 x -- -- --    Total Output -- -- -- -- -- --       Net I/O     -- 50 50 280 -- 280            Intake/Output Summary (Last 24 hours) at 19 1027  Last data filed at 19 0800   Gross per 24 hour   Intake              330 ml   Output                0 ml   Net              330 ml            • senna-docusate  2 Tab BID    And   • polyethylene glycol/lytes  1 Packet QDAY PRN    And   • bisacodyl   10 mg QDAY PRN   • NS   Continuous   • acetaminophen  650 mg Q6HRS PRN   • ondansetron  4 mg Q4HRS PRN   • ondansetron  4 mg Q4HRS PRN   • promethazine  12.5-25 mg Q4HRS PRN   • promethazine  12.5-25 mg Q4HRS PRN   • prochlorperazine  5-10 mg Q4HRS PRN   • magnesium hydroxide  30 mL AFTER BREAKFAST       Assessment and Plan:  Hospital day # 3, severe (b) radiculopathy, low back pain    L4-5 severe spinal stenosis due to a combination of factors and short pedicles, moderate-severe spinal stenosis at L2-3,  Mild-moderate spinal stenosis at L3-4, 5.  Foraminal stenoses at L2-3, L3-4, L4-5, and L5-S1    Prophylactic anticoagulation: yes         Start date/time: today , stop after today's dose     Intermittent sinus tachycardia per : ECG  W/o ST or T wave abnormalities,  TSH wnl,     poor venous acces: had gauge 20 heplock placed with US per IR: may need 2nd line prior surgery, defer to anesthesiology    Needs L2-S1 laminectomy, preop work up satisfactory except sinus tachycardia, surgical planning in progress for tomorrow, current time around 13:00 to 14:00 PM    risks of anesthesia, surgery d/w patient: including MI, stroke, allergic reaction, pneumonia, infection, hemorrhage, CSF leak, nerve injury with paralysis, need for further surgery, pain, DVT, PE    Questions answered: patient eager to proceed    NPO after midnight,   D/w Dr. Ruelas

## 2019-06-16 NOTE — CARE PLAN
Problem: Infection  Goal: Will remain free from infection  Outcome: PROGRESSING AS EXPECTED  Reinforced teaching about call light usage and hand hygiene importance

## 2019-06-16 NOTE — THERAPY
OT orders received. Per chart, pt with plans for laminectomy on 6/17. Will hold OT eval until post-op.    Debby Beltran, OTR/L  Pager: [phone]

## 2019-06-16 NOTE — PROGRESS NOTES
Surgery patient?: No  Date of surgery:   Ambulated 50 ft on day of surgery? (N/A if today is not date of surgery): yes  Number of times ambulated 50 feet or greater today: 1  Patient has been up to chair, edge of bed or HOB 90 degrees for all meals?: yes  Goal met? (goal is ambulating at least 50 feet 2 times on day shift, one time on night shift): yes  If patient did not meet mobility goal, why?:

## 2019-06-16 NOTE — CARE PLAN
Problem: Safety  Goal: Will remain free from injury  Outcome: PROGRESSING AS EXPECTED  Bed in lowest position and locked, pt educated about need to call and complies

## 2019-06-16 NOTE — DIETARY
NUTRITION SERVICES: BMI - Pt with BMI >40 (=BMI is 45.68 based on admit wt of 152.8 kg per stand up scale on 6/14), class III (extreme) obesity. Weight loss counseling not appropriate in acute care setting. RECOMMEND - Referral to outpatient nutrition services for weight management after D/C. RD available PRN.

## 2019-06-16 NOTE — PROGRESS NOTES
[facility] Medicine Daily Progress Note    Date of Service  6/16/2019    Chief Complaint  23 y.o. male admitted 6/14/2019 with severe spinal stenosis    Hospital Course    23 y.o. male who presented 6/14/2019 with knee pain and back pain.  Patient reports that he has had the symptoms for a couple of weeks now but is much worse over the past few days.  He was seen in the hospital approximately 1 week ago and was treated with muscle relaxants and steroids.  Despite this he continues to have pain.  Imaging in the emergency department shows severe spinal stenosis.  He denies any bowel or bladder incontinence.  He denies any numbness or tingling.  He reports 2 or 3 falls secondary to pain. his only complaint is that of pain which improves with sitting or bending.  He was also found to have tachycardia with his heart rate was persistently in the 120s.      Interval Problem Update  Patient seen and examined this morning he is on a bed to the bathroom.  When asked about his back pain he says it is severe 9 out of 10 without any pain meds.  No nausea no vomiting.  He denies any bowel or bladder dysfunction or incontinence.  States he is able to walk short distances however pain can be unbearable at times to his thighs and knees and then radiating down to his feet    Consultants/Specialty  Neurosurgery    Code Status  Full code    Disposition  Possible OR tomorrow    Review of Systems  Review of Systems   Constitutional: Positive for malaise/fatigue. Negative for chills and fever.   HENT: Negative for ear discharge, hearing loss and sinus pain.    Eyes: Negative for double vision and photophobia.   Respiratory: Negative for cough, sputum production and shortness of breath.    Cardiovascular: Negative for chest pain, palpitations and claudication.   Gastrointestinal: Negative for abdominal pain, nausea and vomiting.   Genitourinary: Negative for dysuria, hematuria and urgency.   Musculoskeletal: Positive for back pain, falls and  myalgias.   Skin: Negative for itching.   Neurological: Positive for tingling and weakness. Negative for dizziness, focal weakness, seizures, loss of consciousness and headaches.   Psychiatric/Behavioral: Negative for depression, substance abuse and suicidal ideas.        Physical Exam  Temp:  [36.2 °C (97.1 °F)-37.1 °C (98.8 °F)] 36.7 °C (98.1 °F)  Pulse:  [100-115] 100  Resp:  [16-18] 16  BP: (137-149)/(72-99) 144/83  SpO2:  [95 %-99 %] 95 %    Physical Exam   Constitutional: He is oriented to person, place, and time. He appears well-developed and well-nourished. No distress.   HENT:   Head: Normocephalic and atraumatic.   Mouth/Throat: Oropharynx is clear and moist. No oropharyngeal exudate.   Eyes: Pupils are equal, round, and reactive to light. Conjunctivae and EOM are normal. No scleral icterus.   Neck: Normal range of motion. Neck supple. No JVD present. No thyromegaly present.   Cardiovascular: Regular rhythm.    Tachycardia   Pulmonary/Chest: Effort normal and breath sounds normal. No respiratory distress. He has no rales.   Abdominal: Soft. Bowel sounds are normal. He exhibits no distension. There is no tenderness.   Musculoskeletal: He exhibits no edema or tenderness.   Limited range of motion secondary to acute pain   Neurological: He is alert and oriented to person, place, and time. He has normal reflexes. He displays normal reflexes. No cranial nerve deficit. He exhibits normal muscle tone. Coordination normal.   Skin: Skin is warm and dry. No rash noted. No erythema.   Psychiatric: He has a normal mood and affect.       Fluids    Intake/Output Summary (Last 24 hours) at 06/16/19 1153  Last data filed at 06/16/19 0800   Gross per 24 hour   Intake              330 ml   Output                0 ml   Net              330 ml       Laboratory  Recent Labs      06/14/19   1341  06/16/19   0308   WBC  9.7  9.7   RBC  5.83  5.41   HEMOGLOBIN  15.5  14.4   HEMATOCRIT  50.4  44.7   MCV  86.4  82.6   MCH  26.6*   26.6*   MCHC  30.8*  32.2*   RDW  40.9  38.5   PLATELETCT  351  311   MPV  8.9*  8.9*     Recent Labs      06/14/19   1341  06/16/19   0308   SODIUM  140  140   POTASSIUM  3.7  3.8   CHLORIDE  103  105   CO2  26  25   GLUCOSE  106*  108*   BUN  10  9   CREATININE  0.84  0.64   CALCIUM  9.8  9.5     Recent Labs      06/15/19   1635  06/16/19   0308   APTT  28.2   --    INR   --   0.99               Imaging  IR-US GUIDED PIV   Final Result    Ultrasound-guided PERIPHERAL IV INSERTION performed by    qualified nursing staff as above.            DX-CHEST-2 VIEWS   Final Result      No acute cardiopulmonary process is identified.      MR-LUMBAR SPINE-W/O   Final Result      1.  Congenital short pedicles      2.  L4-5 severe spinal stenosis due to a combination of factors and short pedicles      3.  Moderate-severe spinal stenosis at L2-3      4.  Mild-moderate spinal stenosis at L3-4      5.  Foraminal stenoses at L2-3, L3-4, L4-5, and L5-S1      6.  Multilevel annular disc bulge, facet arthropathy, and ligament hypertrophy         DX-LUMBAR SPINE-2 OR 3 VIEWS   Final Result      1.  There is no acute bony process or malalignment.   2.  Question of lower spinal stenosis possibly due to congenitally short pedicles.           Assessment/Plan  * Spinal stenosis of lumbar region without neurogenic claudication   Assessment & Plan    Noted to be severe on imaging and symptomatic therefore neurosurgery has been consulted and plan to take him to the OR tomorrow possibly for L2-S1 laminectomy  At this time we will try to control his pain with IV pain meds and monitor  He will need PT OT after his surgery.     Sinus tachycardia- (present on admission)   Assessment & Plan    Telemetry monitoring, continues to have sinus tachycardia  At this time continue IV fluids  TSH has been normal, no signs are stable  EKG during admission reviewed by me personally showed sinus tach no ST or T wave abnormalities noted.  Asymptomatic     Low  back pain- (present on admission)   Assessment & Plan    Has severe spinal stenosis on imaging  Dr. crenshaw with the neurosurgery : L4-5 severe spinal stenosis due to a combination of factors and short pedicles, moderate-severe spinal stenosis at L2-3,  Mild-moderate spinal stenosis at L3-4, 5.  Foraminal stenoses at L2-3, L3-4, L4-5, and L5-S1  He will need L2-S1 laminectomy plans for the OR tomorrow per neurosurgery  Pain control  PT OT  Fall precautions          VTE prophylaxis: ambulating

## 2019-06-16 NOTE — PROGRESS NOTES
1:16 AM         []Hide copied text  RN MOBILITY NOTE     Surgery patient?: NO  Date of surgery: N/A  Ambulated 50 ft on day of surgery? (N/A if today is not date of surgery):   Number of times ambulated 50 feet or greater today: 3  Patient has been up to chair, edge of bed or HOB 90 degrees for all meals?: yes  Goal met? (goal is ambulating at least 50 feet 2 times on day shift, one time on night shift): yes  If patient did not meet mobility goal, why?:

## 2019-06-16 NOTE — ASSESSMENT & PLAN NOTE
Noted to be severe on imaging and symptomatic therefore neurosurgery has been consulted now s/p LUMBAR 4 - SACRAL 1 LAMINECTOMY AND DISCECTOMY   At this time we will try to control his pain with IV pain meds and monitor  Pt/ot pending now for dc planning    Had some numbness and weakness in LLE  MRI C and L spine obtained- evaluated by NSG  POD # 2  L4-S1 laminectomy, aborted L2-L4 laminectomies r/t intraop loss of neuromonitoring signals     Post op LLE proximal weakness, pain with flexion: improving, possibly from positioning and compression of thigh/nerve  intraop loss of signals, transient c/o UE N/T: MRI cervical spine benign per report, numbness resolved

## 2019-06-17 ENCOUNTER — APPOINTMENT (OUTPATIENT)
Dept: RADIOLOGY | Facility: MEDICAL CENTER | Age: 24
DRG: 519 | End: 2019-06-17
Attending: NEUROLOGICAL SURGERY

## 2019-06-17 ENCOUNTER — ANESTHESIA EVENT (OUTPATIENT)
Dept: SURGERY | Facility: MEDICAL CENTER | Age: 24
DRG: 519 | End: 2019-06-17

## 2019-06-17 ENCOUNTER — ANESTHESIA (OUTPATIENT)
Dept: SURGERY | Facility: MEDICAL CENTER | Age: 24
DRG: 519 | End: 2019-06-17

## 2019-06-17 PROCEDURE — 160035 HCHG PACU - 1ST 60 MINS PHASE I: Performed by: NEUROLOGICAL SURGERY

## 2019-06-17 PROCEDURE — 700111 HCHG RX REV CODE 636 W/ 250 OVERRIDE (IP): Performed by: PHYSICIAN ASSISTANT

## 2019-06-17 PROCEDURE — A9270 NON-COVERED ITEM OR SERVICE: HCPCS | Performed by: INTERNAL MEDICINE

## 2019-06-17 PROCEDURE — 160048 HCHG OR STATISTICAL LEVEL 1-5: Performed by: NEUROLOGICAL SURGERY

## 2019-06-17 PROCEDURE — 99226 PR SUBSEQUENT OBSERVATION CARE,LEVEL III: CPT | Performed by: HOSPITALIST

## 2019-06-17 PROCEDURE — 95925 SOMATOSENSORY TESTING: CPT | Performed by: NEUROLOGICAL SURGERY

## 2019-06-17 PROCEDURE — 160041 HCHG SURGERY MINUTES - EA ADDL 1 MIN LEVEL 4: Performed by: NEUROLOGICAL SURGERY

## 2019-06-17 PROCEDURE — 95861 NEEDLE EMG 2 EXTREMITIES: CPT | Performed by: NEUROLOGICAL SURGERY

## 2019-06-17 PROCEDURE — 501838 HCHG SUTURE GENERAL: Performed by: NEUROLOGICAL SURGERY

## 2019-06-17 PROCEDURE — 700111 HCHG RX REV CODE 636 W/ 250 OVERRIDE (IP): Performed by: ANESTHESIOLOGY

## 2019-06-17 PROCEDURE — 700102 HCHG RX REV CODE 250 W/ 637 OVERRIDE(OP): Performed by: INTERNAL MEDICINE

## 2019-06-17 PROCEDURE — 700101 HCHG RX REV CODE 250: Performed by: ANESTHESIOLOGY

## 2019-06-17 PROCEDURE — G0378 HOSPITAL OBSERVATION PER HR: HCPCS

## 2019-06-17 PROCEDURE — 160009 HCHG ANES TIME/MIN: Performed by: NEUROLOGICAL SURGERY

## 2019-06-17 PROCEDURE — 500331 HCHG COTTONOID, SURG PATTIE: Performed by: NEUROLOGICAL SURGERY

## 2019-06-17 PROCEDURE — A9270 NON-COVERED ITEM OR SERVICE: HCPCS | Performed by: ANESTHESIOLOGY

## 2019-06-17 PROCEDURE — 96365 THER/PROPH/DIAG IV INF INIT: CPT

## 2019-06-17 PROCEDURE — 00NY0ZZ RELEASE LUMBAR SPINAL CORD, OPEN APPROACH: ICD-10-PCS | Performed by: NEUROLOGICAL SURGERY

## 2019-06-17 PROCEDURE — 72020 X-RAY EXAM OF SPINE 1 VIEW: CPT

## 2019-06-17 PROCEDURE — 500885 HCHG PACK, JACKSON TABLE: Performed by: NEUROLOGICAL SURGERY

## 2019-06-17 PROCEDURE — 700102 HCHG RX REV CODE 250 W/ 637 OVERRIDE(OP): Performed by: PHYSICIAN ASSISTANT

## 2019-06-17 PROCEDURE — 95937 NEUROMUSCULAR JUNCTION TEST: CPT | Performed by: NEUROLOGICAL SURGERY

## 2019-06-17 PROCEDURE — 700105 HCHG RX REV CODE 258: Performed by: PHYSICIAN ASSISTANT

## 2019-06-17 PROCEDURE — 160002 HCHG RECOVERY MINUTES (STAT): Performed by: NEUROLOGICAL SURGERY

## 2019-06-17 PROCEDURE — 700102 HCHG RX REV CODE 250 W/ 637 OVERRIDE(OP): Performed by: ANESTHESIOLOGY

## 2019-06-17 PROCEDURE — 160029 HCHG SURGERY MINUTES - 1ST 30 MINS LEVEL 4: Performed by: NEUROLOGICAL SURGERY

## 2019-06-17 PROCEDURE — 95940 IONM IN OPERATNG ROOM 15 MIN: CPT | Performed by: NEUROLOGICAL SURGERY

## 2019-06-17 PROCEDURE — 500367 HCHG DRAIN KIT, HEMOVAC: Performed by: NEUROLOGICAL SURGERY

## 2019-06-17 PROCEDURE — A6222 GAUZE <=16 IN NO W/SAL W/O B: HCPCS | Performed by: NEUROLOGICAL SURGERY

## 2019-06-17 PROCEDURE — 4A11X4G MONITORING OF PERIPHERAL NERVOUS ELECTRICAL ACTIVITY, INTRAOPERATIVE, EXTERNAL APPROACH: ICD-10-PCS | Performed by: NEUROLOGICAL SURGERY

## 2019-06-17 PROCEDURE — 160036 HCHG PACU - EA ADDL 30 MINS PHASE I: Performed by: NEUROLOGICAL SURGERY

## 2019-06-17 PROCEDURE — 700111 HCHG RX REV CODE 636 W/ 250 OVERRIDE (IP): Performed by: HOSPITALIST

## 2019-06-17 PROCEDURE — 01NB0ZZ RELEASE LUMBAR NERVE, OPEN APPROACH: ICD-10-PCS | Performed by: NEUROLOGICAL SURGERY

## 2019-06-17 PROCEDURE — A9270 NON-COVERED ITEM OR SERVICE: HCPCS | Performed by: PHYSICIAN ASSISTANT

## 2019-06-17 PROCEDURE — 700105 HCHG RX REV CODE 258: Performed by: ANESTHESIOLOGY

## 2019-06-17 PROCEDURE — 500444 HCHG HEMOSTAT, SURGICEL 2X3: Performed by: NEUROLOGICAL SURGERY

## 2019-06-17 RX ORDER — BUPIVACAINE HYDROCHLORIDE AND EPINEPHRINE 5; 5 MG/ML; UG/ML
INJECTION, SOLUTION EPIDURAL; INTRACAUDAL; PERINEURAL
Status: DISCONTINUED | OUTPATIENT
Start: 2019-06-17 | End: 2019-06-17 | Stop reason: HOSPADM

## 2019-06-17 RX ORDER — HYDROMORPHONE HYDROCHLORIDE 1 MG/ML
0.2 INJECTION, SOLUTION INTRAMUSCULAR; INTRAVENOUS; SUBCUTANEOUS
Status: DISCONTINUED | OUTPATIENT
Start: 2019-06-17 | End: 2019-06-17 | Stop reason: HOSPADM

## 2019-06-17 RX ORDER — SODIUM CHLORIDE, SODIUM LACTATE, POTASSIUM CHLORIDE, CALCIUM CHLORIDE 600; 310; 30; 20 MG/100ML; MG/100ML; MG/100ML; MG/100ML
INJECTION, SOLUTION INTRAVENOUS
Status: DISCONTINUED | OUTPATIENT
Start: 2019-06-17 | End: 2019-06-17 | Stop reason: SURG

## 2019-06-17 RX ORDER — DIPHENHYDRAMINE HYDROCHLORIDE 50 MG/ML
12.5 INJECTION INTRAMUSCULAR; INTRAVENOUS
Status: DISCONTINUED | OUTPATIENT
Start: 2019-06-17 | End: 2019-06-17 | Stop reason: HOSPADM

## 2019-06-17 RX ORDER — OXYCODONE HCL 5 MG/5 ML
10 SOLUTION, ORAL ORAL
Status: COMPLETED | OUTPATIENT
Start: 2019-06-17 | End: 2019-06-17

## 2019-06-17 RX ORDER — HYDROMORPHONE HYDROCHLORIDE 1 MG/ML
0.4 INJECTION, SOLUTION INTRAMUSCULAR; INTRAVENOUS; SUBCUTANEOUS
Status: DISCONTINUED | OUTPATIENT
Start: 2019-06-17 | End: 2019-06-17 | Stop reason: HOSPADM

## 2019-06-17 RX ORDER — CEFAZOLIN SODIUM 2 G/100ML
2 INJECTION, SOLUTION INTRAVENOUS EVERY 8 HOURS
Status: COMPLETED | OUTPATIENT
Start: 2019-06-17 | End: 2019-06-18

## 2019-06-17 RX ORDER — SODIUM CHLORIDE 9 MG/ML
INJECTION, SOLUTION INTRAVENOUS CONTINUOUS
Status: DISCONTINUED | OUTPATIENT
Start: 2019-06-17 | End: 2019-06-23

## 2019-06-17 RX ORDER — POLYETHYLENE GLYCOL 3350 17 G/17G
1 POWDER, FOR SOLUTION ORAL 2 TIMES DAILY PRN
Status: DISCONTINUED | OUTPATIENT
Start: 2019-06-17 | End: 2019-06-21

## 2019-06-17 RX ORDER — OXYCODONE HCL 5 MG/5 ML
5 SOLUTION, ORAL ORAL
Status: COMPLETED | OUTPATIENT
Start: 2019-06-17 | End: 2019-06-17

## 2019-06-17 RX ORDER — ONDANSETRON 2 MG/ML
4 INJECTION INTRAMUSCULAR; INTRAVENOUS
Status: DISCONTINUED | OUTPATIENT
Start: 2019-06-17 | End: 2019-06-17 | Stop reason: HOSPADM

## 2019-06-17 RX ORDER — CEFAZOLIN SODIUM 1 G/3ML
INJECTION, POWDER, FOR SOLUTION INTRAMUSCULAR; INTRAVENOUS PRN
Status: DISCONTINUED | OUTPATIENT
Start: 2019-06-17 | End: 2019-06-17 | Stop reason: SURG

## 2019-06-17 RX ORDER — OXYCODONE HYDROCHLORIDE 5 MG/1
5 TABLET ORAL EVERY 4 HOURS PRN
Status: DISCONTINUED | OUTPATIENT
Start: 2019-06-17 | End: 2019-06-25 | Stop reason: HOSPADM

## 2019-06-17 RX ORDER — GABAPENTIN 300 MG/1
300 CAPSULE ORAL ONCE
Status: COMPLETED | OUTPATIENT
Start: 2019-06-17 | End: 2019-06-17

## 2019-06-17 RX ORDER — HYDROMORPHONE HYDROCHLORIDE 1 MG/ML
0.1 INJECTION, SOLUTION INTRAMUSCULAR; INTRAVENOUS; SUBCUTANEOUS
Status: DISCONTINUED | OUTPATIENT
Start: 2019-06-17 | End: 2019-06-17 | Stop reason: HOSPADM

## 2019-06-17 RX ORDER — OXYCODONE HYDROCHLORIDE 10 MG/1
10 TABLET ORAL EVERY 4 HOURS PRN
Status: DISCONTINUED | OUTPATIENT
Start: 2019-06-17 | End: 2019-06-25 | Stop reason: HOSPADM

## 2019-06-17 RX ORDER — BACITRACIN 50000 [IU]/1
INJECTION, POWDER, FOR SOLUTION INTRAMUSCULAR
Status: DISCONTINUED | OUTPATIENT
Start: 2019-06-17 | End: 2019-06-17 | Stop reason: HOSPADM

## 2019-06-17 RX ORDER — ACETAMINOPHEN 500 MG
1000 TABLET ORAL ONCE
Status: COMPLETED | OUTPATIENT
Start: 2019-06-17 | End: 2019-06-17

## 2019-06-17 RX ORDER — ACETAMINOPHEN 500 MG
500 TABLET ORAL EVERY 6 HOURS PRN
Status: DISCONTINUED | OUTPATIENT
Start: 2019-06-17 | End: 2019-06-25 | Stop reason: HOSPADM

## 2019-06-17 RX ORDER — CALCIUM CARBONATE 500 MG/1
500 TABLET, CHEWABLE ORAL 2 TIMES DAILY
Status: DISCONTINUED | OUTPATIENT
Start: 2019-06-17 | End: 2019-06-25 | Stop reason: HOSPADM

## 2019-06-17 RX ORDER — SODIUM CHLORIDE, SODIUM LACTATE, POTASSIUM CHLORIDE, AND CALCIUM CHLORIDE .6; .31; .03; .02 G/100ML; G/100ML; G/100ML; G/100ML
IRRIGANT IRRIGATION
Status: DISCONTINUED | OUTPATIENT
Start: 2019-06-17 | End: 2019-06-17 | Stop reason: HOSPADM

## 2019-06-17 RX ORDER — HALOPERIDOL 5 MG/ML
1 INJECTION INTRAMUSCULAR
Status: DISCONTINUED | OUTPATIENT
Start: 2019-06-17 | End: 2019-06-17 | Stop reason: HOSPADM

## 2019-06-17 RX ORDER — CYCLOBENZAPRINE HCL 10 MG
10 TABLET ORAL EVERY 8 HOURS PRN
Status: DISCONTINUED | OUTPATIENT
Start: 2019-06-17 | End: 2019-06-25 | Stop reason: HOSPADM

## 2019-06-17 RX ORDER — LABETALOL HYDROCHLORIDE 5 MG/ML
INJECTION, SOLUTION INTRAVENOUS PRN
Status: DISCONTINUED | OUTPATIENT
Start: 2019-06-17 | End: 2019-06-17 | Stop reason: SURG

## 2019-06-17 RX ORDER — SODIUM CHLORIDE, SODIUM LACTATE, POTASSIUM CHLORIDE, CALCIUM CHLORIDE 600; 310; 30; 20 MG/100ML; MG/100ML; MG/100ML; MG/100ML
INJECTION, SOLUTION INTRAVENOUS CONTINUOUS
Status: DISCONTINUED | OUTPATIENT
Start: 2019-06-17 | End: 2019-06-17 | Stop reason: HOSPADM

## 2019-06-17 RX ORDER — LABETALOL HYDROCHLORIDE 5 MG/ML
10 INJECTION, SOLUTION INTRAVENOUS
Status: DISCONTINUED | OUTPATIENT
Start: 2019-06-17 | End: 2019-06-17 | Stop reason: HOSPADM

## 2019-06-17 RX ORDER — ONDANSETRON 2 MG/ML
4 INJECTION INTRAMUSCULAR; INTRAVENOUS EVERY 4 HOURS PRN
Status: DISCONTINUED | OUTPATIENT
Start: 2019-06-17 | End: 2019-06-25 | Stop reason: HOSPADM

## 2019-06-17 RX ORDER — DIPHENHYDRAMINE HYDROCHLORIDE 50 MG/ML
25 INJECTION INTRAMUSCULAR; INTRAVENOUS EVERY 6 HOURS PRN
Status: DISCONTINUED | OUTPATIENT
Start: 2019-06-17 | End: 2019-06-25 | Stop reason: HOSPADM

## 2019-06-17 RX ADMIN — LABETALOL HYDROCHLORIDE 10 MG: 5 INJECTION, SOLUTION INTRAVENOUS at 15:10

## 2019-06-17 RX ADMIN — OXYCODONE HYDROCHLORIDE 10 MG: 10 TABLET ORAL at 21:04

## 2019-06-17 RX ADMIN — ROCURONIUM BROMIDE 50 MG: 10 INJECTION, SOLUTION INTRAVENOUS at 14:31

## 2019-06-17 RX ADMIN — PROPOFOL 120 MCG/KG/MIN: 10 INJECTION, EMULSION INTRAVENOUS at 16:10

## 2019-06-17 RX ADMIN — OXYCODONE HYDROCHLORIDE 10 MG: 5 SOLUTION ORAL at 17:26

## 2019-06-17 RX ADMIN — CEFAZOLIN 3 G: 330 INJECTION, POWDER, FOR SOLUTION INTRAMUSCULAR; INTRAVENOUS at 14:31

## 2019-06-17 RX ADMIN — HYDROMORPHONE HYDROCHLORIDE 0.4 MG: 1 INJECTION, SOLUTION INTRAMUSCULAR; INTRAVENOUS; SUBCUTANEOUS at 18:26

## 2019-06-17 RX ADMIN — LABETALOL HYDROCHLORIDE 10 MG: 5 INJECTION, SOLUTION INTRAVENOUS at 18:13

## 2019-06-17 RX ADMIN — LABETALOL HYDROCHLORIDE 10 MG: 5 INJECTION, SOLUTION INTRAVENOUS at 18:22

## 2019-06-17 RX ADMIN — HYDROMORPHONE HYDROCHLORIDE 0.2 MG: 1 INJECTION, SOLUTION INTRAMUSCULAR; INTRAVENOUS; SUBCUTANEOUS at 17:43

## 2019-06-17 RX ADMIN — SODIUM CHLORIDE: 9 INJECTION, SOLUTION INTRAVENOUS at 20:28

## 2019-06-17 RX ADMIN — FENTANYL CITRATE 25 MCG: 50 INJECTION INTRAMUSCULAR; INTRAVENOUS at 17:28

## 2019-06-17 RX ADMIN — FENTANYL CITRATE 25 MCG: 50 INJECTION INTRAMUSCULAR; INTRAVENOUS at 18:18

## 2019-06-17 RX ADMIN — FENTANYL CITRATE 100 MCG: 50 INJECTION, SOLUTION INTRAMUSCULAR; INTRAVENOUS at 14:55

## 2019-06-17 RX ADMIN — SODIUM CHLORIDE, POTASSIUM CHLORIDE, SODIUM LACTATE AND CALCIUM CHLORIDE: 600; 310; 30; 20 INJECTION, SOLUTION INTRAVENOUS at 14:31

## 2019-06-17 RX ADMIN — CEFAZOLIN SODIUM 2 G: 2 INJECTION, SOLUTION INTRAVENOUS at 21:12

## 2019-06-17 RX ADMIN — MIDAZOLAM HYDROCHLORIDE 2 MG: 1 INJECTION, SOLUTION INTRAMUSCULAR; INTRAVENOUS at 14:31

## 2019-06-17 RX ADMIN — ACETAMINOPHEN 1000 MG: 500 TABLET ORAL at 13:44

## 2019-06-17 RX ADMIN — GABAPENTIN 300 MG: 300 CAPSULE ORAL at 13:44

## 2019-06-17 RX ADMIN — CYCLOBENZAPRINE 10 MG: 10 TABLET, FILM COATED ORAL at 20:26

## 2019-06-17 RX ADMIN — ANTACID TABLETS 500 MG: 500 TABLET, CHEWABLE ORAL at 20:26

## 2019-06-17 RX ADMIN — PROPOFOL 180 MG: 10 INJECTION, EMULSION INTRAVENOUS at 14:31

## 2019-06-17 RX ADMIN — FENTANYL CITRATE 50 MCG: 50 INJECTION INTRAMUSCULAR; INTRAVENOUS at 17:50

## 2019-06-17 RX ADMIN — SODIUM CHLORIDE, POTASSIUM CHLORIDE, SODIUM LACTATE AND CALCIUM CHLORIDE: 600; 310; 30; 20 INJECTION, SOLUTION INTRAVENOUS at 15:20

## 2019-06-17 RX ADMIN — FENTANYL CITRATE 100 MCG: 50 INJECTION, SOLUTION INTRAMUSCULAR; INTRAVENOUS at 14:31

## 2019-06-17 RX ADMIN — HYDROMORPHONE HYDROCHLORIDE 0.4 MG: 1 INJECTION, SOLUTION INTRAMUSCULAR; INTRAVENOUS; SUBCUTANEOUS at 17:57

## 2019-06-17 RX ADMIN — ONDANSETRON 4 MG: 2 INJECTION INTRAMUSCULAR; INTRAVENOUS at 14:31

## 2019-06-17 ASSESSMENT — ENCOUNTER SYMPTOMS
WEAKNESS: 1
PHOTOPHOBIA: 0
SHORTNESS OF BREATH: 0
BACK PAIN: 1
CHILLS: 0
FEVER: 0
TINGLING: 1
DOUBLE VISION: 0
COUGH: 0
SEIZURES: 0
NAUSEA: 0
ABDOMINAL PAIN: 0
HEADACHES: 0
CLAUDICATION: 0
MYALGIAS: 1
VOMITING: 0
PALPITATIONS: 0
DEPRESSION: 0
SPUTUM PRODUCTION: 0
SINUS PAIN: 0
DIZZINESS: 0
LOSS OF CONSCIOUSNESS: 0
FOCAL WEAKNESS: 0
FALLS: 1

## 2019-06-17 ASSESSMENT — LIFESTYLE VARIABLES: SUBSTANCE_ABUSE: 0

## 2019-06-17 ASSESSMENT — PAIN SCALES - GENERAL: PAIN_LEVEL: 4

## 2019-06-17 NOTE — PROGRESS NOTES
SBAR received from ROBERTO CARLOS Demarco. Pt yennifer, denies needs. A&O per report. RA. NPO for Laminectomy. Up ad tato. POC: pain management; laminectomy

## 2019-06-17 NOTE — CARE PLAN
Problem: Safety  Goal: Will remain free from injury  Outcome: PROGRESSING AS EXPECTED      Problem: Pain Management  Goal: Pain level will decrease to patient's comfort goal  Outcome: PROGRESSING SLOWER THAN EXPECTED      Problem: Knowledge Deficit  Goal: Knowledge of disease process/condition, treatment plan, diagnostic tests, and medications will improve  Outcome: PROGRESSING AS EXPECTED

## 2019-06-17 NOTE — PROGRESS NOTES
[facility] Medicine Daily Progress Note    Date of Service  6/17/2019    Chief Complaint  23 y.o. male admitted 6/14/2019 with severe spinal stenosis    Hospital Course    23 y.o. male who presented 6/14/2019 with knee pain and back pain.  Patient reports that he has had the symptoms for a couple of weeks now but is much worse over the past few days.  He was seen in the hospital approximately 1 week ago and was treated with muscle relaxants and steroids.  Despite this he continues to have pain.  Imaging in the emergency department shows severe spinal stenosis.  He denies any bowel or bladder incontinence.  He denies any numbness or tingling.  He reports 2 or 3 falls secondary to pain. his only complaint is that of pain which improves with sitting or bending.  He was also found to have tachycardia with his heart rate was persistently in the 120s.      Interval Problem Update  Patient seen and examined this morning he is on a bed to the bathroom.  When asked about his back pain he says it is severe 9 out of 10 without any pain meds.  No nausea no vomiting.  He denies any bowel or bladder dysfunction or incontinence.  States he is able to walk short distances however pain can be unbearable at times to his thighs and knees and then radiating down to his feet    6/17: seen and examined this morning he is quite calm and laying in bed.  He states that he starts having pain with movement or sitting up however if he is laying still he does not have any significant pain.  Denies any bowel or bladder incontinence.    Consultants/Specialty  Neurosurgery    Code Status  Full code    Disposition  Or today then pt/ot eval for dc planning    Review of Systems  Review of Systems   Constitutional: Positive for malaise/fatigue. Negative for chills and fever.   HENT: Negative for ear discharge, hearing loss and sinus pain.    Eyes: Negative for double vision and photophobia.   Respiratory: Negative for cough, sputum production and  shortness of breath.    Cardiovascular: Negative for chest pain, palpitations and claudication.   Gastrointestinal: Negative for abdominal pain, nausea and vomiting.   Genitourinary: Negative for dysuria, hematuria and urgency.   Musculoskeletal: Positive for back pain, falls and myalgias.   Skin: Negative for itching.   Neurological: Positive for tingling and weakness. Negative for dizziness, focal weakness, seizures, loss of consciousness and headaches.   Psychiatric/Behavioral: Negative for depression, substance abuse and suicidal ideas.        Physical Exam  Temp:  [36.3 °C (97.3 °F)-36.7 °C (98.1 °F)] 36.6 °C (97.8 °F)  Pulse:  [] 103  Resp:  [16-18] 16  BP: (127-155)/(80-92) 127/81  SpO2:  [93 %-98 %] 98 %    Physical Exam   Constitutional: He is oriented to person, place, and time. He appears well-developed and well-nourished. No distress.   HENT:   Head: Normocephalic and atraumatic.   Mouth/Throat: Oropharynx is clear and moist. No oropharyngeal exudate.   Eyes: Pupils are equal, round, and reactive to light. Conjunctivae and EOM are normal. No scleral icterus.   Neck: Normal range of motion. Neck supple. No JVD present. No thyromegaly present.   Cardiovascular: Regular rhythm.    Tachycardia   Pulmonary/Chest: Effort normal and breath sounds normal. No respiratory distress. He has no rales.   Abdominal: Soft. Bowel sounds are normal. He exhibits no distension. There is no tenderness.   Musculoskeletal: He exhibits no edema or tenderness.   Limited range of motion secondary to acute pain   Neurological: He is alert and oriented to person, place, and time. He has normal reflexes. No cranial nerve deficit. He exhibits normal muscle tone. Coordination normal.   Skin: Skin is warm and dry. No rash noted. No erythema.   Psychiatric: He has a normal mood and affect.       Fluids    Intake/Output Summary (Last 24 hours) at 06/17/19 1444  Last data filed at 06/17/19 1357   Gross per 24 hour   Intake               280 ml   Output              900 ml   Net             -620 ml       Laboratory  Recent Labs      06/16/19   0308   WBC  9.7   RBC  5.41   HEMOGLOBIN  14.4   HEMATOCRIT  44.7   MCV  82.6   MCH  26.6*   MCHC  32.2*   RDW  38.5   PLATELETCT  311   MPV  8.9*     Recent Labs      06/16/19   0308   SODIUM  140   POTASSIUM  3.8   CHLORIDE  105   CO2  25   GLUCOSE  108*   BUN  9   CREATININE  0.64   CALCIUM  9.5     Recent Labs      06/15/19   1635  06/16/19   0308   APTT  28.2   --    INR   --   0.99               Imaging  IR-US GUIDED PIV   Final Result    Ultrasound-guided PERIPHERAL IV INSERTION performed by    qualified nursing staff as above.            DX-CHEST-2 VIEWS   Final Result      No acute cardiopulmonary process is identified.      MR-LUMBAR SPINE-W/O   Final Result      1.  Congenital short pedicles      2.  L4-5 severe spinal stenosis due to a combination of factors and short pedicles      3.  Moderate-severe spinal stenosis at L2-3      4.  Mild-moderate spinal stenosis at L3-4      5.  Foraminal stenoses at L2-3, L3-4, L4-5, and L5-S1      6.  Multilevel annular disc bulge, facet arthropathy, and ligament hypertrophy         DX-LUMBAR SPINE-2 OR 3 VIEWS   Final Result      1.  There is no acute bony process or malalignment.   2.  Question of lower spinal stenosis possibly due to congenitally short pedicles.      DX-PORTABLE FLUORO > 1 HOUR    (Results Pending)   DX-SPINE-ANY ONE VIEW    (Results Pending)        Assessment/Plan  * Spinal stenosis of lumbar region without neurogenic claudication   Assessment & Plan    Noted to be severe on imaging and symptomatic therefore neurosurgery has been consulted and plan to take him to the OR tomorrow possibly for L2-S1 laminectomy  At this time we will try to control his pain with IV pain meds and monitor  He will need PT OT after his surgery.     Sinus tachycardia- (present on admission)   Assessment & Plan    Persistent ;Telemetry monitoring  continue  IV fluids  TSH has been normal, no signs are stable  EKG during admission reviewed by me personally showed sinus tach no ST or T wave abnormalities noted.  Asymptomatic     Low back pain- (present on admission)   Assessment & Plan    Has severe spinal stenosis on imaging  Dr. crenshaw with the neurosurgery : L4-5 severe spinal stenosis due to a combination of factors and short pedicles, moderate-severe spinal stenosis at L2-3,  Mild-moderate spinal stenosis at L3-4, 5.  Foraminal stenoses at L2-3, L3-4, L4-5, and L5-S1  OR today  Pain control  PT OT after OR   fall precautions          VTE prophylaxis: ambulating

## 2019-06-17 NOTE — PROGRESS NOTES
"Pt found in room yelling for help, pt stated \" I slipped from the water on the floor from the shower.\" pt denied hitting his head. Or having any injuries.   "

## 2019-06-17 NOTE — PROGRESS NOTES
RN MOBILITY NOTE     Surgery patient?: NO  Date of surgery: N/A  Ambulated 50 ft on day of surgery? (N/A if today is not date of surgery):   Number of times ambulated 50 feet or greater today: 3  Patient has been up to chair, edge of bed or HOB 90 degrees for all meals?: yes  Goal met? (goal is ambulating at least 50 feet 2 times on day shift, one time on night shift): yes  If patient did not meet mobility goal, why?:    69y F with PMH of asthma on steroids, CVA (no residual deficits), pulmonary HTN, HLD, GERD, ulcerative colitis, fatty pancreas, impaired glucose tolerance, anxiety, and depression who presented on 7/26/2018 with AMS, rigors, and urinary frequency. Of note, recent hospitalization for a right femoral neck fracture secondary to osteoporosis s/p hemiarthroplasty on 6/22/2018. Pt has been progressively more agitated, confused, and weak, also with c/o new onset low back pain and left hip pain.

## 2019-06-17 NOTE — ANESTHESIA PROCEDURE NOTES
Airway  Date/Time: 6/17/2019 2:38 PM  Performed by: SYDNI GUTIERRES  Authorized by: SYDNI GUTIERRES     Location:  OR  Urgency:  Elective  Indications for Airway Management:  Anesthesia  Spontaneous Ventilation: absent    Sedation Level:  Deep  Preoxygenated: Yes    Patient Position:  Sniffing  Final Airway Type:  Endotracheal airway  Final Endotracheal Airway:  ETT  Cuffed: Yes    Technique Used for Successful ETT Placement:  Direct laryngoscopy  Insertion Site:  Oral  Blade Type:  Torres  Laryngoscope Blade/Videolaryngoscope Blade Size:  2  ETT Size (mm):  8.0  Measured from:  Teeth  ETT to Teeth (cm):  23  Placement Verified by: auscultation and capnometry    Cormack-Lehane Classification:  Grade I - full view of glottis  Number of Attempts at Approach:  1

## 2019-06-17 NOTE — PROGRESS NOTES
Neurosurgery Progress Note    Subjective:  Able to walk short distances, the further he walks unbearable pain in (b) lateral thighs and knees, then radiating circumferentially below knees  C/o numbness inferior to patella no radicular complaints   Denies bowel/bladder dysfunction     Exam:  VSS  A&Ox4  Obese   NM: 5/5 hip flexion, knee extension, knee flexion, plantar flexion, dorsiflexion, EHL  Sensation intact and equal throughout all four extremities. L2-S1 intact   Abdomen: soft, non-tender  Non-labored breathing on room air, normal respiratory effort      BP  Min: 127/81  Max: 155/87  Pulse  Av  Min: 97  Max: 120  Resp  Av.6  Min: 16  Max: 18  Temp  Av.6 °C (97.8 °F)  Min: 36.3 °C (97.3 °F)  Max: 36.7 °C (98.1 °F)  SpO2  Av.8 %  Min: 93 %  Max: 98 %    No Data Recorded    Recent Labs      19   0308   WBC  9.7   RBC  5.41   HEMOGLOBIN  14.4   HEMATOCRIT  44.7   MCV  82.6   MCH  26.6*   MCHC  32.2*   RDW  38.5   PLATELETCT  311   MPV  8.9*     Recent Labs      19   0308   SODIUM  140   POTASSIUM  3.8   CHLORIDE  105   CO2  25   GLUCOSE  108*   BUN  9   CREATININE  0.64   CALCIUM  9.5     Recent Labs      06/15/19   1635  19   0308   APTT  28.2   --    INR   --   0.99           Intake/Output       19 0700 - 19 0659 19 0700 - 19 0659      [id number] 2146-6433 Total 5056-8894 9010-2849 Total       Intake    P.O.  840  -- 840  0  -- 0    P.O. 840 -- 840 0 -- 0    Total Intake 840 -- 840 0 -- 0       Output    Urine  --  -- --  900  -- 900    Number of Times Voided -- -- -- 1 x -- 1 x    Urine Void (mL) -- -- -- 900 -- 900    Stool  --  -- --  --  -- --    Number of Times Stooled 3 x -- 3 x -- -- --    Total Output -- -- -- 900 -- 900       Net I/O     840 -- 840 -900 -- -900            Intake/Output Summary (Last 24 hours) at 19 1412  Last data filed at 19 1357   Gross per 24 hour   Intake              280 ml   Output              900 ml   Net              -620 ml            • [MAR Hold] senna-docusate  2 Tab BID    And   • [MAR Hold] polyethylene glycol/lytes  1 Packet QDAY PRN    And   • [MAR Hold] bisacodyl  10 mg QDAY PRN   • NS   Continuous   • [MAR Hold] acetaminophen  650 mg Q6HRS PRN   • [MAR Hold] ondansetron  4 mg Q4HRS PRN   • [MAR Hold] ondansetron  4 mg Q4HRS PRN   • [MAR Hold] promethazine  12.5-25 mg Q4HRS PRN   • [MAR Hold] promethazine  12.5-25 mg Q4HRS PRN   • [MAR Hold] prochlorperazine  5-10 mg Q4HRS PRN       Assessment and Plan:  Hospital day #4 severe (b) radiculopathy, low back pain    L4-5 severe spinal stenosis due to a combination of factors and short pedicles, moderate-severe spinal stenosis at L2-3,  Mild-moderate spinal stenosis at L3-4, 5.  Foraminal stenosis at L2-3, L3-4, L4-5, and L5-S1    Prophylactic anticoagulation: no     Intermittent sinus tachycardia per : ECG  W/o ST or T wave abnormalities,  TSH wnl,     risks of anesthesia, surgery d/w patient: including MI, stroke, allergic reaction, pneumonia, infection, hemorrhage, CSF leak, nerve injury with paralysis, need for further surgery, pain, DVT, PE.   Risks, benefits, alternatives to surgery discussed. Patient eager to proceed with L2-S1 laminectomy  Will need PT/OT clearance prior to d/c home     Questions answered: patient eager to proceed    D/w Dr. Ruelas

## 2019-06-18 ENCOUNTER — APPOINTMENT (OUTPATIENT)
Dept: RADIOLOGY | Facility: MEDICAL CENTER | Age: 24
DRG: 519 | End: 2019-06-18
Attending: PHYSICIAN ASSISTANT

## 2019-06-18 PROBLEM — R20.0 LEFT SIDED NUMBNESS: Status: ACTIVE | Noted: 2019-06-18

## 2019-06-18 PROBLEM — R53.1 LEFT-SIDED WEAKNESS: Status: ACTIVE | Noted: 2019-06-18

## 2019-06-18 PROBLEM — R29.898 RIGHT LEG WEAKNESS: Status: ACTIVE | Noted: 2019-06-18

## 2019-06-18 LAB
ANION GAP SERPL CALC-SCNC: 10 MMOL/L (ref 0–11.9)
BUN SERPL-MCNC: 8 MG/DL (ref 8–22)
CALCIUM SERPL-MCNC: 8.9 MG/DL (ref 8.5–10.5)
CHLORIDE SERPL-SCNC: 106 MMOL/L (ref 96–112)
CO2 SERPL-SCNC: 24 MMOL/L (ref 20–33)
CREAT SERPL-MCNC: 0.68 MG/DL (ref 0.5–1.4)
ERYTHROCYTE [DISTWIDTH] IN BLOOD BY AUTOMATED COUNT: 40.5 FL (ref 35.9–50)
GLUCOSE SERPL-MCNC: 114 MG/DL (ref 65–99)
HCT VFR BLD AUTO: 44.6 % (ref 42–52)
HGB BLD-MCNC: 14.8 G/DL (ref 14–18)
MCH RBC QN AUTO: 28 PG (ref 27–33)
MCHC RBC AUTO-ENTMCNC: 33.2 G/DL (ref 33.7–35.3)
MCV RBC AUTO: 84.5 FL (ref 81.4–97.8)
PLATELET # BLD AUTO: 317 K/UL (ref 164–446)
PMV BLD AUTO: 9.1 FL (ref 9–12.9)
POTASSIUM SERPL-SCNC: 3.5 MMOL/L (ref 3.6–5.5)
RBC # BLD AUTO: 5.28 M/UL (ref 4.7–6.1)
SODIUM SERPL-SCNC: 140 MMOL/L (ref 135–145)
WBC # BLD AUTO: 13.5 K/UL (ref 4.8–10.8)

## 2019-06-18 PROCEDURE — A9270 NON-COVERED ITEM OR SERVICE: HCPCS | Performed by: HOSPITALIST

## 2019-06-18 PROCEDURE — A9270 NON-COVERED ITEM OR SERVICE: HCPCS | Performed by: INTERNAL MEDICINE

## 2019-06-18 PROCEDURE — 36415 COLL VENOUS BLD VENIPUNCTURE: CPT

## 2019-06-18 PROCEDURE — 99226 PR SUBSEQUENT OBSERVATION CARE,LEVEL III: CPT | Performed by: HOSPITALIST

## 2019-06-18 PROCEDURE — 700102 HCHG RX REV CODE 250 W/ 637 OVERRIDE(OP): Performed by: PHYSICIAN ASSISTANT

## 2019-06-18 PROCEDURE — 80048 BASIC METABOLIC PNL TOTAL CA: CPT

## 2019-06-18 PROCEDURE — 96366 THER/PROPH/DIAG IV INF ADDON: CPT

## 2019-06-18 PROCEDURE — 85027 COMPLETE CBC AUTOMATED: CPT

## 2019-06-18 PROCEDURE — A9270 NON-COVERED ITEM OR SERVICE: HCPCS | Performed by: PHYSICIAN ASSISTANT

## 2019-06-18 PROCEDURE — 700102 HCHG RX REV CODE 250 W/ 637 OVERRIDE(OP): Performed by: HOSPITALIST

## 2019-06-18 PROCEDURE — 700102 HCHG RX REV CODE 250 W/ 637 OVERRIDE(OP): Performed by: INTERNAL MEDICINE

## 2019-06-18 PROCEDURE — 700111 HCHG RX REV CODE 636 W/ 250 OVERRIDE (IP): Performed by: PHYSICIAN ASSISTANT

## 2019-06-18 PROCEDURE — 700105 HCHG RX REV CODE 258: Performed by: PHYSICIAN ASSISTANT

## 2019-06-18 PROCEDURE — 96375 TX/PRO/DX INJ NEW DRUG ADDON: CPT

## 2019-06-18 PROCEDURE — 96376 TX/PRO/DX INJ SAME DRUG ADON: CPT

## 2019-06-18 PROCEDURE — G0378 HOSPITAL OBSERVATION PER HR: HCPCS

## 2019-06-18 RX ORDER — POTASSIUM CHLORIDE 20 MEQ/1
40 TABLET, EXTENDED RELEASE ORAL DAILY
Status: DISCONTINUED | OUTPATIENT
Start: 2019-06-18 | End: 2019-06-25 | Stop reason: HOSPADM

## 2019-06-18 RX ORDER — MORPHINE SULFATE 4 MG/ML
4 INJECTION, SOLUTION INTRAMUSCULAR; INTRAVENOUS
Status: COMPLETED | OUTPATIENT
Start: 2019-06-18 | End: 2019-06-18

## 2019-06-18 RX ORDER — LORAZEPAM 2 MG/ML
1 INJECTION INTRAMUSCULAR
Status: COMPLETED | OUTPATIENT
Start: 2019-06-18 | End: 2019-06-18

## 2019-06-18 RX ADMIN — LORAZEPAM 1 MG: 2 INJECTION INTRAMUSCULAR; INTRAVENOUS at 12:02

## 2019-06-18 RX ADMIN — MORPHINE SULFATE 4 MG: 4 INJECTION INTRAVENOUS at 15:15

## 2019-06-18 RX ADMIN — CYCLOBENZAPRINE 10 MG: 10 TABLET, FILM COATED ORAL at 05:56

## 2019-06-18 RX ADMIN — CEFAZOLIN SODIUM 2 G: 2 INJECTION, SOLUTION INTRAVENOUS at 05:53

## 2019-06-18 RX ADMIN — ACETAMINOPHEN 500 MG: 500 TABLET ORAL at 16:26

## 2019-06-18 RX ADMIN — ANTACID TABLETS 500 MG: 500 TABLET, CHEWABLE ORAL at 16:27

## 2019-06-18 RX ADMIN — VITAMIN D, TAB 1000IU (100/BT) 5000 UNITS: 25 TAB at 05:53

## 2019-06-18 RX ADMIN — CYCLOBENZAPRINE 10 MG: 10 TABLET, FILM COATED ORAL at 16:23

## 2019-06-18 RX ADMIN — ANTACID TABLETS 500 MG: 500 TABLET, CHEWABLE ORAL at 05:53

## 2019-06-18 RX ADMIN — OXYCODONE HYDROCHLORIDE 10 MG: 10 TABLET ORAL at 16:27

## 2019-06-18 RX ADMIN — OXYCODONE HYDROCHLORIDE 10 MG: 10 TABLET ORAL at 02:40

## 2019-06-18 RX ADMIN — SODIUM CHLORIDE: 9 INJECTION, SOLUTION INTRAVENOUS at 15:20

## 2019-06-18 RX ADMIN — OXYCODONE HYDROCHLORIDE 10 MG: 10 TABLET ORAL at 11:56

## 2019-06-18 RX ADMIN — ACETAMINOPHEN 500 MG: 500 TABLET ORAL at 09:58

## 2019-06-18 RX ADMIN — OXYCODONE HYDROCHLORIDE 10 MG: 10 TABLET ORAL at 06:36

## 2019-06-18 RX ADMIN — POTASSIUM CHLORIDE 40 MEQ: 1500 TABLET, EXTENDED RELEASE ORAL at 09:57

## 2019-06-18 RX ADMIN — CEFAZOLIN SODIUM 2 G: 2 INJECTION, SOLUTION INTRAVENOUS at 14:02

## 2019-06-18 ASSESSMENT — ENCOUNTER SYMPTOMS
HEADACHES: 0
FOCAL WEAKNESS: 1
DEPRESSION: 0
CHILLS: 0
FEVER: 0
PALPITATIONS: 0
SPUTUM PRODUCTION: 0
BACK PAIN: 1
DOUBLE VISION: 0
FALLS: 1
SHORTNESS OF BREATH: 0
SINUS PAIN: 0
WEAKNESS: 1
TINGLING: 1
DIZZINESS: 0
MYALGIAS: 1
PHOTOPHOBIA: 0
CLAUDICATION: 0
COUGH: 0
NAUSEA: 0
SEIZURES: 0
LOSS OF CONSCIOUSNESS: 0
VOMITING: 0
ABDOMINAL PAIN: 0

## 2019-06-18 ASSESSMENT — LIFESTYLE VARIABLES: SUBSTANCE_ABUSE: 0

## 2019-06-18 NOTE — ANESTHESIA TIME REPORT
Anesthesia Start and Stop Event Times     Date Time Event    6/17/2019 1431 Anesthesia Start     1715 Anesthesia Stop        Responsible Staff  06/17/19    Name Role Begin End    Ethan Potts M.D. Anesth 1431 1715        Preop Diagnosis (Free Text):  Pre-op Diagnosis     Severe critical lumbar stenosis         Preop Diagnosis (Codes):  Diagnosis Information     Diagnosis Code(s):         Post op Diagnosis  Back pain      Premium Reason  A. 3PM - 7AM    Comments:

## 2019-06-18 NOTE — ANESTHESIA QCDR
2019 [facility] Clinical Data Registry (for Quality Improvement)     Postoperative nausea/vomiting risk protocol (Adult = 18 yrs and Pediatric 3-17 yrs)- (430 and 463)  General inhalation anesthetic (NOT TIVA) with PONV risk factors: No  Provision of anti-emetic therapy with at least 2 different classes of agents: N/A  Patient DID NOT receive anti-emetic therapy and reason is documented in Medical Record: N/A    Multimodal Pain Management- (AQI59)  Patient undergoing Elective Surgery (i.e. Outpatient, or ASC, or Prescheduled Surgery prior to Hospital Admission): Yes  Use of Multimodal Pain Management, two or more drugs and/or interventions, NOT including systemic opioids: Yes   Exception: Documented allergy to multiple classes of analgesics:  N/A    PACU assessment of acute postoperative pain prior to Anesthesia Care End- Applies to Patients Age = 18- (ABG7)  Initial PACU pain score is which of the following: < 7/10  Patient unable to report pain score: N/A    Post-anesthetic transfer of care checklist/protocol to PACU/ICU- (426 and 427)  Upon conclusion of case, patient transferred to which of the following locations: PACU/Non-ICU  Use of transfer checklist/protocol: Yes  Exclusion: Service Performed in Patient Hospital Room (and thus did not require transfer): N/A    PACU Reintubation- (AQI31)  General anesthesia requiring endotracheal intubation (ETT) along with subsequent extubation in OR or PACU: No  Required reintubation in the PACU: N/A  Extubation was a planned trial documented in the medical record prior to removal of the original airway device: N/A    Unplanned admission to ICU related to anesthesia service up through end of PACU care- (MD51)  Unplanned admission to ICU (not initially anticipated at anesthesia start time): No

## 2019-06-18 NOTE — OR NURSING
Jeannine at bedside to complete neuro assessment. Pt withdrawing from painful stimuli, she stated that it was okay to give medications now. Dr Ruelas at bedside to re-assess pt and verified pt's neuro assessment was appropriate. Pt has some numbness on BL feet but is able to move feet and toes appropriately and does feel pressure in that area. Pt also complaining of numbness in left knee. Pt is complaining of severe pain, medicated per MAR when appropriate.  Updated pt's father, Lawrence, on pt's status and let him know that pt will be going back to the same room S151.

## 2019-06-18 NOTE — OR SURGEON
Immediate Post OP Note    PreOp Diagnosis: L4,5 severe stenosis, disc herniation, L2,3 mod to severe stenosis, L5,S1 and L2,3 moderate stenosis, morbid obesity, and intractable pain with two ER visists    PostOp Diagnosis: same    Procedure(s):  LUMBAR 4 - SACRAL 1 LAMINECTOMY AND DISCECTOMY - Wound Class: Clean with Drain    Surgeon(s):  Samir Ruelas M.D.    Anesthesiologist/Type of Anesthesia:  Anesthesiologist: Ethan Potts M.D./General    Surgical Staff:  Assistant: Zeinab Paez P.A.-C.  Circulator: Lorri Thao RJESSE  Relief Circulator: Brenda Palafox R.N.  Relief Scrub: Romario Dai R.N.  Scrub Person: Haley Lopez  Radiology Technologist: Waldo Chaidez    Specimens removed if any:  * No specimens in log *    Estimated Blood Loss: < 200 cc    Findings: severe stenosis at L4,5, loss of ssep's in the right arm and right leg, L2/3/4 laminectomies aborted    Complications: loss of ssep's right arm and right leg despite repositioning by anesthesia and trouble shooting by NMA technician and remote neurologist.        6/17/2019 5:22 PM Samir Ruelas M.D.

## 2019-06-18 NOTE — OP REPORT
DATE OF SERVICE:  06/17/2019    PREOPERATIVE DIAGNOSES:  1.  L4-L5 severe stenosis with disk herniation and L2-L3 moderate-to-severe   stenosis and L5-S1 moderate-to-severe stenosis with morbid obesity and   intractable pain with 2 ER visits and admission for intractable pain.  2.  Congenital spinal stenosis.    POSTOPERATIVE DIAGNOSES:  1.  L4-L5 severe stenosis with disk herniation and L2-L3 moderate-to-severe   stenosis and L5-S1 moderate-to-severe stenosis with morbid obesity and   intractable pain with 2 ER visits and admission for intractable pain.  2.  Congenital spinal stenosis.    PRINCIPAL PROCEDURE PERFORMED:  L4 and L5 and S1 decompressive laminectomy,   bilateral medial facetectomies and bilateral foraminotomies with aborted L2-L3   and L3-L4 laminectomies.    SURGEON:  Samir Ruelas MD    ASSISTANT:  Zeinab Paez PA-C    ANESTHESIA:  Procedure was performed under general anesthesia.    ANESTHESIOLOGIST:  Ethan Potts MD    Please note that a 22 modifier was added given the patient's BMI of 46.34.  He   is 6 feet tall and he weighs 155 kilograms.  This is 341 pounds and 11.4   ounces.    IV FLUIDS:  Please see the anesthesia record.    URINE OUTPUT:  Please see the anesthesia record.    ESTIMATED BLOOD LOSS:  Please see the anesthesia record.    DISPOSITION:  The patient will be extubated and brought to the recovery room.    COMPLICATIONS:  Include loss of SSEPs in the right arm and right leg despite   repositioning by anesthesia and troubleshooting by the NMA technician and a   remote neurologist.    CLINICAL HISTORY:  The patient is a 23-year-old male who presents with severe   back pain and intractable shooting leg pain.  His MRI showed significant   severe findings.  He had presented twice to the emergency room within a month   because of these intractable symptoms.  We discussed risks, benefits, options.    The patient had severe critical stenosis at L4-L5.  Given the patient's    congenital spinal stenosis and his morbid obesity and his young age of 23, I   did not feel that the patient would improve without surgery.  Risks, benefits,   and options including surgical versus nonsurgical options were discussed.    The patient signed a written informed consent.  He was brought to the   operating room and placed under general anesthesia.  Needle electrodes were   placed for baseline neuro monitoring signals.    DESCRIPTION OF PROCEDURE:  The patient was turned prone atop a radiolucent OSI   table.  Please note that every step involved in the procedure required extra   time and expertise including just positioning the patient.  This required   extra staff and extra time and extra care.  All pressure points were padded.    The back was shaved, prepped and draped in the usual sterile fashion.  Local   anesthetic was infiltrated in the skin after a timeout.  A midline skin   incision was centered over L2-S1.  The thoracolumbar fascia was incised.  Deep   retractors were used just to get to the fascia; 100 mm blades were required   on the Versa-Trac retractor to dissect the soft tissues.  Intraoperative   fluoroscopy demonstrated the correct level.  I removed the inferior spinous   process of L2 and the spinous processes of L3, L4, L5 and S1.  I created   gutters just medial to the facet complexes at L2, L3, L4, L5, and S1 and I   thinned down the lamina with an AM-8 drill bit.  I started decompressive   laminectomies on the right at L4-L5 and on the left at L4-L5.  However, we   started to loose signals in the right arm and the right leg.  Given the   severity of the stenosis, I continued while the anesthesiologist and the   neuromonitoring tech trouble shot and repositioned the arms.  Also, guidance   was given from the remote monitoring neurologist.  Ultimately, with complete   loss of signals and with no obvious technical issue, after I completed   bilateral decompressive laminectomies,  bilateral medial facetectomies and   bilateral foraminotomies at L4, L5 and S1 to decompress bilateral L4, L5 and   S1 nerve roots, I aborted the rest of the decompression due to the patient's   safety.  The wound was closed in anatomic layers and a sterile dressing was   applied.  The patient was extubated and brought to the recovery room.  The   patient will be examined and a separate note will be filed.       ____________________________________     MD MATTHEW WILSON / JESSICA    DD:  06/17/2019 17:29:50  DT:  06/17/2019 17:44:31    D#:  [id number]  Job#:  [id number]

## 2019-06-18 NOTE — PROGRESS NOTES
Report received from ROBERTO CARLOS Lawler. Pt c/o pain-medicated in PACU per eMAR. Numbness bilateral feet and L knee. Will give report to Bettie AZEVEDO.

## 2019-06-18 NOTE — THERAPY
OT eval attempted. Pt awaiting MRI due to onset of R sided numbness. RN requesting hold eval. Will attempt back tomorrow.

## 2019-06-18 NOTE — OP REPORT
DATE OF SERVICE:  06/14/2019    PREOPERATIVE DIAGNOSES:  1.  L4-L5 severe stenosis with disk herniation and L2-L3 moderate-to-severe   stenosis and L5-S1 moderate-to-severe stenosis with morbid obesity and   intractable pain with 2 ER visits and admission for intractable pain.  2.  Congenital spinal stenosis.    POSTOPERATIVE DIAGNOSES:  1.  L4-L5 severe stenosis with disk herniation and L2-L3 moderate-to-severe   stenosis and L5-S1 moderate-to-severe stenosis with morbid obesity and   intractable pain with 2 ER visits and admission for intractable pain.  2.  Congenital spinal stenosis.    PRINCIPAL PROCEDURE PERFORMED:  L4 and L5 and S1 decompressive laminectomy,   bilateral medial facetectomies and bilateral foraminotomies with aborted L2-L3   and L3-L4 laminectomies.    SURGEON:  Samir Ruelas MD    ASSISTANT:  Zeinab Paez PA-C    ANESTHESIA:  Procedure was performed under general anesthesia.    ANESTHESIOLOGIST:  Ethan Potts MD    Please note that a 22 modifier was added given the patient's BMI of 46.34.  He   is 6 feet tall and he weighs 155 kilograms.  This is 341 pounds and 11.4   ounces.    IV FLUIDS:  Please see the anesthesia record.    URINE OUTPUT:  Please see the anesthesia record.    ESTIMATED BLOOD LOSS:  Please see the anesthesia record.    DISPOSITION:  The patient will be extubated and brought to the recovery room.    COMPLICATIONS:  Include loss of SSEPs in the right arm and right leg despite   repositioning by anesthesia and troubleshooting by the NMA technician and a   remote neurologist.    CLINICAL HISTORY:  The patient is a 23-year-old male who presents with severe   back pain and intractable shooting leg pain.  His MRI showed significant   severe findings.  He had presented twice to the emergency room within a month   because of these intractable symptoms.  We discussed risks, benefits, options.    The patient had severe critical stenosis at L4-L5.  Given the patient's    congenital spinal stenosis and his morbid obesity and his young age of 23, I   did not feel that the patient would improve without surgery.  Risks, benefits,   and options including surgical versus nonsurgical options were discussed.    The patient signed a written informed consent.  He was brought to the   operating room and placed under general anesthesia.  Needle electrodes were   placed for baseline neuro monitoring signals.    DESCRIPTION OF PROCEDURE:  The patient was turned prone atop a radiolucent OSI   table.  Please note that every step involved in the procedure required extra   time and expertise including just positioning the patient.  This required   extra staff and extra time and extra care.  All pressure points were padded.    The back was shaved, prepped and draped in the usual sterile fashion.  Local   anesthetic was infiltrated in the skin after a timeout.  A midline skin   incision was centered over L2-S1.  The thoracolumbar fascia was incised.  Deep   retractors were used just to get to the fascia; 100 mm blades were required   on the Versa-Trac retractor to dissect the soft tissues.  Intraoperative   fluoroscopy demonstrated the correct level.  I removed the inferior spinous   process of L2 and the spinous processes of L3, L4, L5 and S1.  I created   gutters just medial to the facet complexes at L2, L3, L4, L5, and S1 and I   thinned down the lamina with an AM-8 drill bit.  I started decompressive   laminectomies on the right at L4-L5 and on the left at L4-L5.  However, we   started to loose signals in the right arm and the right leg.  Given the   severity of the stenosis, I continued while the anesthesiologist and the   neuromonitoring tech trouble shot and repositioned the arms.  Also, guidance   was given from the remote monitoring neurologist.  Ultimately, with complete   loss of signals and with no obvious technical issue, after I completed   bilateral decompressive laminectomies,  bilateral medial facetectomies and   bilateral foraminotomies at L4, L5 and S1 to decompress bilateral L4, L5 and   S1 nerve roots, I aborted the rest of the decompression due to the patient's   safety.  The wound was closed in anatomic layers and a sterile dressing was   applied.  The patient was extubated and brought to the recovery room.  The   patient will be examined and a separate note will be filed.       ____________________________________     MD MATTHEW WILSON / JESSICA    DD:  06/17/2019 17:29:50  DT:  06/17/2019 17:44:31    D#:  [id number]  Job#:  [id number]

## 2019-06-18 NOTE — ASSESSMENT & PLAN NOTE
POD # 2  L4-S1 laminectomy, aborted L2-L4 laminectomies r/t intraop loss of neuromonitoring signals     Post op LLE proximal weakness, pain with flexion: improving, possibly from positioning and compression of thigh/nerve  intraop loss of signals, transient c/o UE N/T: MRI cervical spine benign per report, numbness resolved

## 2019-06-18 NOTE — ASSESSMENT & PLAN NOTE
R/o cervical stenosis  Will obtain MRI C spine  He is s/p LUMBAR 4 - SACRAL 1 LAMINECTOMY AND DISCECTOMY   monitor

## 2019-06-18 NOTE — ANESTHESIA POSTPROCEDURE EVALUATION
Patient: Sammy Edmonds    Procedure Summary     Date:  06/17/19 Room / Location:  [facility] OR 07 / SURGERY [facility]    Anesthesia Start:  1431 Anesthesia Stop:  1715    Procedure:  LUMBAR 2 - SACRAL 1 LAMINECTOMY AND DISCECTOMY (N/A Spine Lumbar) Diagnosis:  (Severe critical lumbar stenosis )    Surgeon:  Samir Ruelas M.D. Responsible Provider:  Ethan Potts M.D.    Anesthesia Type:  general ASA Status:  3          Final Anesthesia Type: general  Last vitals  BP   Blood Pressure: 127/81, NIBP: 108/61    Temp   36.6 °C (97.8 °F)    Pulse   Pulse: (!) 105, Heart Rate (Monitored): (!) 105   Resp   13    SpO2   97 %      Anesthesia Post Evaluation    Patient location during evaluation: PACU  Patient participation: complete - patient participated (limited neuro exam performed, A & O x2, HODGES, motor function grossly intact, intact to light touch and pain in all extremities. detailed neuro exam deferred to Dr. Ruelas.)  Level of consciousness: awake and alert  Pain score: 4    Airway patency: patent  Anesthetic complications: no  Cardiovascular status: hemodynamically stable  Respiratory status: acceptable  Hydration status: euvolemic    PONV: none           Nurse Pain Score: 0 (NPRS)

## 2019-06-18 NOTE — PROGRESS NOTES
[facility] Medicine Daily Progress Note    Date of Service  6/18/2019    Chief Complaint  23 y.o. male admitted 6/14/2019 with severe spinal stenosis    Hospital Course    23 y.o. male who presented 6/14/2019 with knee pain and back pain.  Patient reports that he has had the symptoms for a couple of weeks now but is much worse over the past few days.  He was seen in the hospital approximately 1 week ago and was treated with muscle relaxants and steroids.  Despite this he continues to have pain.  Imaging in the emergency department shows severe spinal stenosis.  He denies any bowel or bladder incontinence.  He denies any numbness or tingling.  He reports 2 or 3 falls secondary to pain. his only complaint is that of pain which improves with sitting or bending.  He was also found to have tachycardia with his heart rate was persistently in the 120s.      Interval Problem Update  Patient seen and examined this morning he is on a bed to the bathroom.  When asked about his back pain he says it is severe 9 out of 10 without any pain meds.  No nausea no vomiting.  He denies any bowel or bladder dysfunction or incontinence.  States he is able to walk short distances however pain can be unbearable at times to his thighs and knees and then radiating down to his feet    6/17: seen and examined this morning he is quite calm and laying in bed.  He states that he starts having pain with movement or sitting up however if he is laying still he does not have any significant pain.  Denies any bowel or bladder incontinence.    6/18: seen and examined today, he is c/o lle weakness with movement and the right sided numbness all the way to abd region and neck  Otherwise pain is controlled.   vss except tachycardia persists    Obtain MRI c and L spine STAT per NSG    Consultants/Specialty  Neurosurgery    Code Status  Full code    Disposition  Mri and pt/ot    Review of Systems  Review of Systems   Constitutional: Positive for  malaise/fatigue. Negative for chills and fever.   HENT: Negative for ear discharge, hearing loss and sinus pain.    Eyes: Negative for double vision and photophobia.   Respiratory: Negative for cough, sputum production and shortness of breath.    Cardiovascular: Negative for chest pain, palpitations and claudication.   Gastrointestinal: Negative for abdominal pain, nausea and vomiting.   Genitourinary: Negative for dysuria, hematuria and urgency.   Musculoskeletal: Positive for back pain, falls and myalgias.   Skin: Negative for itching.   Neurological: Positive for tingling, focal weakness and weakness. Negative for dizziness, seizures, loss of consciousness and headaches.   Psychiatric/Behavioral: Negative for depression, substance abuse and suicidal ideas.        Physical Exam  Temp:  [36.1 °C (97 °F)-36.7 °C (98 °F)] 36.6 °C (97.8 °F)  Pulse:  [100-116] 114  Resp:  [7-37] 18  BP: (129-141)/(79-87) 135/85  SpO2:  [93 %-99 %] 98 %    Physical Exam   Constitutional: He is oriented to person, place, and time. He appears well-developed and well-nourished. No distress.   HENT:   Head: Normocephalic and atraumatic.   Mouth/Throat: Oropharynx is clear and moist. No oropharyngeal exudate.   Eyes: Pupils are equal, round, and reactive to light. Conjunctivae and EOM are normal. No scleral icterus.   Neck: Normal range of motion. Neck supple. No JVD present. No thyromegaly present.   Cardiovascular: Regular rhythm.    Tachycardia   Pulmonary/Chest: Effort normal and breath sounds normal. No respiratory distress. He has no rales.   Abdominal: Soft. Bowel sounds are normal. He exhibits no distension. There is no tenderness.   Musculoskeletal: He exhibits no edema or tenderness.   Limited range of motion secondary to acute pain   Neurological: He is alert and oriented to person, place, and time. No cranial nerve deficit. He exhibits normal muscle tone. Coordination normal.   Left leg weakness  Right sided numbness all the way  up to neck region   Skin: Skin is warm and dry. No rash noted. No erythema.   Psychiatric: He has a normal mood and affect.       Fluids    Intake/Output Summary (Last 24 hours) at 06/18/19 1357  Last data filed at 06/18/19 1200   Gross per 24 hour   Intake             1450 ml   Output             1805 ml   Net             -355 ml       Laboratory  Recent Labs      06/16/19   0308  06/18/19   0240   WBC  9.7  13.5*   RBC  5.41  5.28   HEMOGLOBIN  14.4  14.8   HEMATOCRIT  44.7  44.6   MCV  82.6  84.5   MCH  26.6*  28.0   MCHC  32.2*  33.2*   RDW  38.5  40.5   PLATELETCT  311  317   MPV  8.9*  9.1     Recent Labs      06/16/19   0308  06/18/19   0240   SODIUM  140  140   POTASSIUM  3.8  3.5*   CHLORIDE  105  106   CO2  25  24   GLUCOSE  108*  114*   BUN  9  8   CREATININE  0.64  0.68   CALCIUM  9.5  8.9     Recent Labs      06/15/19   1635  06/16/19   0308   APTT  28.2   --    INR   --   0.99               Imaging  DX-PORTABLE FLUORO > 1 HOUR   Final Result         Portable fluoroscopy utilized for 3 seconds.      DX-SPINE-ANY ONE VIEW   Final Result      Limited intraoperative image showing localization of the L4-5 level.      IR-US GUIDED PIV   Final Result    Ultrasound-guided PERIPHERAL IV INSERTION performed by    qualified nursing staff as above.            DX-CHEST-2 VIEWS   Final Result      No acute cardiopulmonary process is identified.      MR-LUMBAR SPINE-W/O   Final Result      1.  Congenital short pedicles      2.  L4-5 severe spinal stenosis due to a combination of factors and short pedicles      3.  Moderate-severe spinal stenosis at L2-3      4.  Mild-moderate spinal stenosis at L3-4      5.  Foraminal stenoses at L2-3, L3-4, L4-5, and L5-S1      6.  Multilevel annular disc bulge, facet arthropathy, and ligament hypertrophy         DX-LUMBAR SPINE-2 OR 3 VIEWS   Final Result      1.  There is no acute bony process or malalignment.   2.  Question of lower spinal stenosis possibly due to congenitally  short pedicles.      MR-CERVICAL SPINE-W/O    (Results Pending)   MR-LUMBAR SPINE-WITH & W/O    (Results Pending)        Assessment/Plan  * Spinal stenosis of lumbar region without neurogenic claudication   Assessment & Plan    Noted to be severe on imaging and symptomatic therefore neurosurgery has been consulted now s/p LUMBAR 4 - SACRAL 1 LAMINECTOMY AND DISCECTOMY   At this time we will try to control his pain with IV pain meds and monitor  Pt/ot pending now for dc planning     Left sided numbness   Assessment & Plan    R/o cervical stenosis  Will obtain MRI C spine  He is s/p LUMBAR 4 - SACRAL 1 LAMINECTOMY AND DISCECTOMY   monitor     Left leg weakness   Assessment & Plan    S/p LUMBAR 4 - SACRAL 1 LAMINECTOMY AND DISCECTOMY   Now having left sided weakness, he can move his left leg however unable to lift it   Will obtain MRI c and lumbar spine      Sinus tachycardia- (present on admission)   Assessment & Plan    Persistent ;Telemetry monitoring, however afebrile  continue IV fluids  TSH has been normal, no signs are stable  EKG during admission reviewed by me personally showed sinus tach no ST or T wave abnormalities noted.  Asymptomatic     Low back pain- (present on admission)   Assessment & Plan    Has severe spinal stenosis on imaging  Dr. crenshaw with the neurosurgery : L4-5 severe spinal stenosis due to a combination of factors and short pedicles, moderate-severe spinal stenosis at L2-3,  Mild-moderate spinal stenosis at L3-4, 5.  Foraminal stenoses at L2-3, L3-4, L4-5, and L5-S1  S/p LUMBAR 4 - SACRAL 1 LAMINECTOMY AND DISCECTOMY   Pain control  PT Ot recs   fall precautions          VTE prophylaxis: ambulating

## 2019-06-18 NOTE — PROGRESS NOTES
Patient was given PRN Oxycodone and PRN Ativan before going down for MRI, MRI  Tech called that patient was in severe pain, Dr Samir Ruelas team was paged for more possible pain med, waiting a call back, patient back to the floor, MRI to be completed later.

## 2019-06-18 NOTE — PROGRESS NOTES
Neurosurgery Progress Note    Subjective:  Not OOB bed this AM  Voiding  C/o numbness and tingling in RLE, RUE, LUE, entire right side of body including abdomen. States numbness and tingling is new since surgery yesterday but improved overnight, still has ongoing residual complaints  C/o new LLE weakness states he has difficulty mobilizing LLE in bed   Denies bowel/bladder dysfunction     Exam:  VSS  A&Ox4  Obese   NM: 5/5 hip flexion, knee extension, knee flexion, plantar flexion, dorsiflexion, EHL RLE  LUE: 5/5 EHL, TA, DF, 4- hamstring, 3-/5 quad and IP, able to flex knee and hip in bed but unable to hold antigravity quad/IP   Sensation intact and equal throughout all four extremities. L2-S1 intact, able to differentiate between sharp/dull stimuli, proprioception   + Henson's L  No ankle clonus   5/5 strength UEs bilaterally except 4+ right bicep     BP  Min: 127/81  Max: 141/81  Pulse  Av.2  Min: 100  Max: 116  Resp  Av.8  Min: 7  Max: 37  Temp  Av.4 °C (97.5 °F)  Min: 36.1 °C (97 °F)  Max: 36.7 °C (98 °F)  SpO2  Av %  Min: 93 %  Max: 99 %    No Data Recorded    Recent Labs      19   0308  19   0240   WBC  9.7  13.5*   RBC  5.41  5.28   HEMOGLOBIN  14.4  14.8   HEMATOCRIT  44.7  44.6   MCV  82.6  84.5   MCH  26.6*  28.0   MCHC  32.2*  33.2*   RDW  38.5  40.5   PLATELETCT  311  317   MPV  8.9*  9.1     Recent Labs      19   0308  19   0240   SODIUM  140  140   POTASSIUM  3.8  3.5*   CHLORIDE  105  106   CO2  25  24   GLUCOSE  108*  114*   BUN  9  8   CREATININE  0.64  0.68   CALCIUM  9.5  8.9     Recent Labs      06/15/19   1635  19   0308   APTT  28.2   --    INR   --   0.99           Intake/Output       19 0700 - 19 0659 06/700 - 19 Total  Total       Intake    P.O.  0  -- 0  --  -- --    P.O. 0 -- 0 -- -- --    I.V.  1450  -- 1450  --  -- --    Volume (mL) (lactated ringers infusion)  1450 -- 1450 -- -- --    Total Intake 1450 -- 1450 -- -- --       Output    Urine  900  600 1500  --  -- --    Number of Times Voided 1 x 1 x 2 x -- -- --    Urine Void (mL)  -- -- --    Drains  --  155 155  --  -- --    Output (mL) (Closed/Suction Drain Back Hemovac) -- 155 155 -- -- --    Blood  200  -- 200  --  -- --    Est. Blood Loss 200 -- 200 -- -- --    Total Output 2650 517 3060 -- -- --       Net I/O     350 -755 -405 -- -- --            Intake/Output Summary (Last 24 hours) at 06/18/19 0904  Last data filed at 06/18/19 0430   Gross per 24 hour   Intake             1450 ml   Output             1855 ml   Net             -405 ml            • potassium chloride SA  40 mEq DAILY   • Pharmacy Consult Request  1 Each PHARMACY TO DOSE   • MD ALERT...DO NOT ADMINISTER NSAIDS or ASPIRIN unless ORDERED By Neurosurgery  1 Each PRN   • polyethylene glycol/lytes  1 Packet BID PRN   • magnesium hydroxide  30 mL QDAY PRN   • NS   Continuous   • acetaminophen  500 mg Q6HRS PRN   • ceFAZolin  2 g Q8HR   • diphenhydrAMINE  25 mg Q6HRS PRN   • cyclobenzaprine  10 mg Q8HRS PRN   • vitamin D  5,000 Units DAILY   • calcium carbonate  500 mg BID   • benzocaine-menthol  1 Lozenge Q2HRS PRN   • ondansetron  4 mg Q4HRS PRN   • oxyCODONE immediate-release  5 mg Q4HRS PRN    Or   • oxyCODONE immediate-release  10 mg Q4HRS PRN   • NS   Continuous   • ondansetron  4 mg Q4HRS PRN   • promethazine  12.5-25 mg Q4HRS PRN   • promethazine  12.5-25 mg Q4HRS PRN   • prochlorperazine  5-10 mg Q4HRS PRN       Assessment and Plan:  Hospital day #5 severe LE radiculopathy, low back pain  POD #1 L4-S1 laminectomy, aborted L2-L4 laminectomies   Ordered stat MRI C-spine to examine for cervical myelopathy due to loss of signals intra-op and new complaints of RUE/LE numbness, LLE weakness    D/c drain when meets criteria   Ambulate, work with therapies   Continue incentive spirometry Q1H  Continue pain control  Continue stool softeners to  encourage BM    No Lovenox or DVT Ppx until 24 hours s/p drain d/c     D/w Dr. Ruelas

## 2019-06-19 ENCOUNTER — APPOINTMENT (OUTPATIENT)
Dept: RADIOLOGY | Facility: MEDICAL CENTER | Age: 24
DRG: 519 | End: 2019-06-19
Attending: PHYSICIAN ASSISTANT

## 2019-06-19 ENCOUNTER — ANESTHESIA (OUTPATIENT)
Dept: RADIOLOGY | Facility: MEDICAL CENTER | Age: 24
End: 2019-06-19

## 2019-06-19 ENCOUNTER — ANESTHESIA EVENT (OUTPATIENT)
Dept: RADIOLOGY | Facility: MEDICAL CENTER | Age: 24
End: 2019-06-19

## 2019-06-19 ENCOUNTER — APPOINTMENT (OUTPATIENT)
Dept: RADIOLOGY | Facility: MEDICAL CENTER | Age: 24
DRG: 519 | End: 2019-06-19
Attending: HOSPITALIST

## 2019-06-19 PROBLEM — R65.10 SIRS (SYSTEMIC INFLAMMATORY RESPONSE SYNDROME) (HCC): Status: ACTIVE | Noted: 2019-06-19

## 2019-06-19 LAB
ANION GAP SERPL CALC-SCNC: 8 MMOL/L (ref 0–11.9)
APPEARANCE UR: CLEAR
BILIRUB UR QL STRIP.AUTO: NEGATIVE
BUN SERPL-MCNC: 7 MG/DL (ref 8–22)
CALCIUM SERPL-MCNC: 9.1 MG/DL (ref 8.5–10.5)
CHLORIDE SERPL-SCNC: 103 MMOL/L (ref 96–112)
CO2 SERPL-SCNC: 25 MMOL/L (ref 20–33)
COLOR UR: YELLOW
CREAT SERPL-MCNC: 0.64 MG/DL (ref 0.5–1.4)
EKG IMPRESSION: NORMAL
ERYTHROCYTE [DISTWIDTH] IN BLOOD BY AUTOMATED COUNT: 40.5 FL (ref 35.9–50)
GLUCOSE SERPL-MCNC: 105 MG/DL (ref 65–99)
GLUCOSE UR STRIP.AUTO-MCNC: NEGATIVE MG/DL
HCT VFR BLD AUTO: 42.4 % (ref 42–52)
HGB BLD-MCNC: 13.7 G/DL (ref 14–18)
KETONES UR STRIP.AUTO-MCNC: NEGATIVE MG/DL
LEUKOCYTE ESTERASE UR QL STRIP.AUTO: NEGATIVE
MCH RBC QN AUTO: 26.9 PG (ref 27–33)
MCHC RBC AUTO-ENTMCNC: 32.3 G/DL (ref 33.7–35.3)
MCV RBC AUTO: 83.1 FL (ref 81.4–97.8)
MICRO URNS: NORMAL
NITRITE UR QL STRIP.AUTO: NEGATIVE
PH UR STRIP.AUTO: 5.5 [PH]
PLATELET # BLD AUTO: 297 K/UL (ref 164–446)
PMV BLD AUTO: 9.1 FL (ref 9–12.9)
POTASSIUM SERPL-SCNC: 3.9 MMOL/L (ref 3.6–5.5)
PROT UR QL STRIP: NEGATIVE MG/DL
RBC # BLD AUTO: 5.1 M/UL (ref 4.7–6.1)
RBC UR QL AUTO: NEGATIVE
SODIUM SERPL-SCNC: 136 MMOL/L (ref 135–145)
SP GR UR STRIP.AUTO: 1.01
UROBILINOGEN UR STRIP.AUTO-MCNC: 0.2 MG/DL
WBC # BLD AUTO: 14.1 K/UL (ref 4.8–10.8)

## 2019-06-19 PROCEDURE — 93010 ELECTROCARDIOGRAM REPORT: CPT | Performed by: INTERNAL MEDICINE

## 2019-06-19 PROCEDURE — A9585 GADOBUTROL INJECTION: HCPCS | Performed by: PHYSICIAN ASSISTANT

## 2019-06-19 PROCEDURE — 80048 BASIC METABOLIC PNL TOTAL CA: CPT

## 2019-06-19 PROCEDURE — 700105 HCHG RX REV CODE 258: Performed by: ANESTHESIOLOGY

## 2019-06-19 PROCEDURE — 36415 COLL VENOUS BLD VENIPUNCTURE: CPT

## 2019-06-19 PROCEDURE — 81003 URINALYSIS AUTO W/O SCOPE: CPT

## 2019-06-19 PROCEDURE — 700117 HCHG RX CONTRAST REV CODE 255: Performed by: PHYSICIAN ASSISTANT

## 2019-06-19 PROCEDURE — 160002 HCHG RECOVERY MINUTES (STAT)

## 2019-06-19 PROCEDURE — 96376 TX/PRO/DX INJ SAME DRUG ADON: CPT

## 2019-06-19 PROCEDURE — 700111 HCHG RX REV CODE 636 W/ 250 OVERRIDE (IP)

## 2019-06-19 PROCEDURE — 99232 SBSQ HOSP IP/OBS MODERATE 35: CPT | Performed by: HOSPITALIST

## 2019-06-19 PROCEDURE — 700102 HCHG RX REV CODE 250 W/ 637 OVERRIDE(OP): Performed by: HOSPITALIST

## 2019-06-19 PROCEDURE — 72158 MRI LUMBAR SPINE W/O & W/DYE: CPT

## 2019-06-19 PROCEDURE — 71045 X-RAY EXAM CHEST 1 VIEW: CPT

## 2019-06-19 PROCEDURE — 700102 HCHG RX REV CODE 250 W/ 637 OVERRIDE(OP): Performed by: PHYSICIAN ASSISTANT

## 2019-06-19 PROCEDURE — 87040 BLOOD CULTURE FOR BACTERIA: CPT | Mod: 91

## 2019-06-19 PROCEDURE — A9270 NON-COVERED ITEM OR SERVICE: HCPCS | Performed by: INTERNAL MEDICINE

## 2019-06-19 PROCEDURE — 72156 MRI NECK SPINE W/O & W/DYE: CPT

## 2019-06-19 PROCEDURE — 93005 ELECTROCARDIOGRAM TRACING: CPT | Performed by: HOSPITALIST

## 2019-06-19 PROCEDURE — 85027 COMPLETE CBC AUTOMATED: CPT

## 2019-06-19 PROCEDURE — A9270 NON-COVERED ITEM OR SERVICE: HCPCS | Performed by: HOSPITALIST

## 2019-06-19 PROCEDURE — 700111 HCHG RX REV CODE 636 W/ 250 OVERRIDE (IP): Performed by: NURSE PRACTITIONER

## 2019-06-19 PROCEDURE — 700102 HCHG RX REV CODE 250 W/ 637 OVERRIDE(OP): Performed by: INTERNAL MEDICINE

## 2019-06-19 PROCEDURE — 700105 HCHG RX REV CODE 258: Performed by: PHYSICIAN ASSISTANT

## 2019-06-19 PROCEDURE — 770001 HCHG ROOM/CARE - MED/SURG/GYN PRIV*

## 2019-06-19 PROCEDURE — A9270 NON-COVERED ITEM OR SERVICE: HCPCS | Performed by: PHYSICIAN ASSISTANT

## 2019-06-19 RX ORDER — OXYCODONE HYDROCHLORIDE AND ACETAMINOPHEN 5; 325 MG/1; MG/1
2 TABLET ORAL
Status: DISCONTINUED | OUTPATIENT
Start: 2019-06-19 | End: 2019-06-19 | Stop reason: HOSPADM

## 2019-06-19 RX ORDER — SODIUM CHLORIDE, SODIUM LACTATE, POTASSIUM CHLORIDE, CALCIUM CHLORIDE 600; 310; 30; 20 MG/100ML; MG/100ML; MG/100ML; MG/100ML
INJECTION, SOLUTION INTRAVENOUS CONTINUOUS
Status: DISCONTINUED | OUTPATIENT
Start: 2019-06-19 | End: 2019-06-23

## 2019-06-19 RX ORDER — GADOBUTROL 604.72 MG/ML
15 INJECTION INTRAVENOUS ONCE
Status: COMPLETED | OUTPATIENT
Start: 2019-06-19 | End: 2019-06-19

## 2019-06-19 RX ORDER — OXYCODONE HYDROCHLORIDE AND ACETAMINOPHEN 5; 325 MG/1; MG/1
1 TABLET ORAL
Status: DISCONTINUED | OUTPATIENT
Start: 2019-06-19 | End: 2019-06-19 | Stop reason: HOSPADM

## 2019-06-19 RX ORDER — HALOPERIDOL 5 MG/ML
1 INJECTION INTRAMUSCULAR
Status: DISCONTINUED | OUTPATIENT
Start: 2019-06-19 | End: 2019-06-19 | Stop reason: HOSPADM

## 2019-06-19 RX ORDER — MIDAZOLAM HYDROCHLORIDE 1 MG/ML
1 INJECTION INTRAMUSCULAR; INTRAVENOUS
Status: DISCONTINUED | OUTPATIENT
Start: 2019-06-19 | End: 2019-06-19 | Stop reason: HOSPADM

## 2019-06-19 RX ORDER — KETOROLAC TROMETHAMINE 30 MG/ML
30 INJECTION, SOLUTION INTRAMUSCULAR; INTRAVENOUS EVERY 6 HOURS
Status: COMPLETED | OUTPATIENT
Start: 2019-06-19 | End: 2019-06-19

## 2019-06-19 RX ADMIN — KETOROLAC TROMETHAMINE 30 MG: 30 INJECTION, SOLUTION INTRAMUSCULAR; INTRAVENOUS at 11:21

## 2019-06-19 RX ADMIN — ANTACID TABLETS 500 MG: 500 TABLET, CHEWABLE ORAL at 18:09

## 2019-06-19 RX ADMIN — OXYCODONE HYDROCHLORIDE 10 MG: 10 TABLET ORAL at 23:06

## 2019-06-19 RX ADMIN — GADOBUTROL 15 ML: 604.72 INJECTION INTRAVENOUS at 16:52

## 2019-06-19 RX ADMIN — VITAMIN D, TAB 1000IU (100/BT) 5000 UNITS: 25 TAB at 05:55

## 2019-06-19 RX ADMIN — SODIUM CHLORIDE: 9 INJECTION, SOLUTION INTRAVENOUS at 00:58

## 2019-06-19 RX ADMIN — OXYCODONE HYDROCHLORIDE 10 MG: 10 TABLET ORAL at 05:55

## 2019-06-19 RX ADMIN — OXYCODONE HYDROCHLORIDE 10 MG: 10 TABLET ORAL at 18:09

## 2019-06-19 RX ADMIN — OXYCODONE HYDROCHLORIDE 10 MG: 10 TABLET ORAL at 00:53

## 2019-06-19 RX ADMIN — KETOROLAC TROMETHAMINE 30 MG: 30 INJECTION, SOLUTION INTRAMUSCULAR; INTRAVENOUS at 18:06

## 2019-06-19 RX ADMIN — POTASSIUM CHLORIDE 40 MEQ: 1500 TABLET, EXTENDED RELEASE ORAL at 06:00

## 2019-06-19 RX ADMIN — SODIUM CHLORIDE, POTASSIUM CHLORIDE, SODIUM LACTATE AND CALCIUM CHLORIDE: 600; 310; 30; 20 INJECTION, SOLUTION INTRAVENOUS at 18:27

## 2019-06-19 RX ADMIN — SODIUM CHLORIDE: 9 INJECTION, SOLUTION INTRAVENOUS at 12:34

## 2019-06-19 RX ADMIN — ANTACID TABLETS 500 MG: 500 TABLET, CHEWABLE ORAL at 05:56

## 2019-06-19 ASSESSMENT — ENCOUNTER SYMPTOMS
COUGH: 0
SEIZURES: 0
VOMITING: 0
PALPITATIONS: 0
FALLS: 1
LOSS OF CONSCIOUSNESS: 0
HEADACHES: 0
PHOTOPHOBIA: 0
TINGLING: 1
SPUTUM PRODUCTION: 0
FEVER: 0
DIZZINESS: 0
FOCAL WEAKNESS: 1
NAUSEA: 0
SHORTNESS OF BREATH: 0
DOUBLE VISION: 0
ABDOMINAL PAIN: 0
DEPRESSION: 0
SINUS PAIN: 0
MYALGIAS: 1
CHILLS: 0
BACK PAIN: 1
CLAUDICATION: 0
WEAKNESS: 1

## 2019-06-19 ASSESSMENT — LIFESTYLE VARIABLES: SUBSTANCE_ABUSE: 0

## 2019-06-19 NOTE — CARE PLAN
Problem: Safety  Goal: Will remain free from falls  Outcome: PROGRESSING AS EXPECTED  Rounded on pt every hour, nonskid socks, bed in lowest position, side rails up, call light within reach

## 2019-06-19 NOTE — PROGRESS NOTES
[facility] Medicine Daily Progress Note    Date of Service  6/19/2019    Chief Complaint  23 y.o. male admitted 6/14/2019 with severe spinal stenosis    Hospital Course    23 y.o. male who presented 6/14/2019 with knee pain and back pain.  Patient reports that he has had the symptoms for a couple of weeks now but is much worse over the past few days.  He was seen in the hospital approximately 1 week ago and was treated with muscle relaxants and steroids.  Despite this he continues to have pain.  Imaging in the emergency department shows severe spinal stenosis.  He denies any bowel or bladder incontinence.  He denies any numbness or tingling.  He reports 2 or 3 falls secondary to pain. his only complaint is that of pain which improves with sitting or bending.  He was also found to have tachycardia with his heart rate was persistently in the 120s.      Interval Problem Update  6/19: seen and examined this morning, states he did not sleep well last night because of all the nursing checks. He still c/o LLE weakness and numbness on right side   Pain is controlled however    He was suppose to get MRI C and L spine STAT yesterday but did not?  Consultants/Specialty  Neurosurgery    Code Status  Full code    Disposition  Mri pending then pt/ot    Review of Systems  Review of Systems   Constitutional: Positive for malaise/fatigue. Negative for chills and fever.   HENT: Negative for ear discharge, hearing loss and sinus pain.    Eyes: Negative for double vision and photophobia.   Respiratory: Negative for cough, sputum production and shortness of breath.    Cardiovascular: Negative for chest pain, palpitations and claudication.   Gastrointestinal: Negative for abdominal pain, nausea and vomiting.   Genitourinary: Negative for dysuria, hematuria and urgency.   Musculoskeletal: Positive for back pain, falls and myalgias.   Skin: Negative for itching.   Neurological: Positive for tingling, focal weakness (lle) and weakness.  Negative for dizziness, seizures, loss of consciousness and headaches.   Psychiatric/Behavioral: Negative for depression, substance abuse and suicidal ideas.        Physical Exam  Temp:  [36.6 °C (97.8 °F)-36.8 °C (98.2 °F)] 36.6 °C (97.8 °F)  Pulse:  [110-120] 120  Resp:  [16-19] 19  BP: (120-139)/(78-87) 120/78  SpO2:  [95 %-98 %] 96 %    Physical Exam   Constitutional: He is oriented to person, place, and time. He appears well-developed and well-nourished. No distress.   HENT:   Head: Normocephalic and atraumatic.   Mouth/Throat: Oropharynx is clear and moist. No oropharyngeal exudate.   Eyes: Pupils are equal, round, and reactive to light. Conjunctivae and EOM are normal. No scleral icterus.   Neck: Normal range of motion. Neck supple. No JVD present. No thyromegaly present.   Cardiovascular: Regular rhythm.    Tachycardia   Pulmonary/Chest: Effort normal and breath sounds normal. No respiratory distress. He has no rales.   Abdominal: Soft. Bowel sounds are normal. He exhibits no distension. There is no tenderness.   Musculoskeletal: He exhibits no edema or tenderness.   Limited range of motion secondary to acute pain   Neurological: He is alert and oriented to person, place, and time. No cranial nerve deficit. He exhibits normal muscle tone. Coordination normal.   Left leg weakness, unable to lift of the bed, is able to move it and bend leg  Right sided numbness all the way up to neck region   Skin: Skin is warm and dry. No rash noted. No erythema.   Psychiatric: He has a normal mood and affect.       Fluids    Intake/Output Summary (Last 24 hours) at 06/19/19 1126  Last data filed at 06/19/19 0800   Gross per 24 hour   Intake              240 ml   Output              630 ml   Net             -390 ml       Laboratory  Recent Labs      06/18/19   0240  06/19/19   0406   WBC  13.5*  14.1*   RBC  5.28  5.10   HEMOGLOBIN  14.8  13.7*   HEMATOCRIT  44.6  42.4   MCV  84.5  83.1   MCH  28.0  26.9*   MCHC  33.2*  32.3*    RDW  40.5  40.5   PLATELETCT  317  297   MPV  9.1  9.1     Recent Labs      06/18/19   0240  06/19/19   0406   SODIUM  140  136   POTASSIUM  3.5*  3.9   CHLORIDE  106  103   CO2  24  25   GLUCOSE  114*  105*   BUN  8  7*   CREATININE  0.68  0.64   CALCIUM  8.9  9.1                   Imaging  DX-PORTABLE FLUORO > 1 HOUR   Final Result         Portable fluoroscopy utilized for 3 seconds.      DX-SPINE-ANY ONE VIEW   Final Result      Limited intraoperative image showing localization of the L4-5 level.      IR-US GUIDED PIV   Final Result    Ultrasound-guided PERIPHERAL IV INSERTION performed by    qualified nursing staff as above.            DX-CHEST-2 VIEWS   Final Result      No acute cardiopulmonary process is identified.      MR-LUMBAR SPINE-W/O   Final Result      1.  Congenital short pedicles      2.  L4-5 severe spinal stenosis due to a combination of factors and short pedicles      3.  Moderate-severe spinal stenosis at L2-3      4.  Mild-moderate spinal stenosis at L3-4      5.  Foraminal stenoses at L2-3, L3-4, L4-5, and L5-S1      6.  Multilevel annular disc bulge, facet arthropathy, and ligament hypertrophy         DX-LUMBAR SPINE-2 OR 3 VIEWS   Final Result      1.  There is no acute bony process or malalignment.   2.  Question of lower spinal stenosis possibly due to congenitally short pedicles.      MR-LUMBAR SPINE-WITH & W/O    (Results Pending)   DX-CHEST-PORTABLE (1 VIEW)    (Results Pending)   MR-CERVICAL SPINE-WITH & W/O    (Results Pending)        Assessment/Plan  * Spinal stenosis of lumbar region without neurogenic claudication   Assessment & Plan    Noted to be severe on imaging and symptomatic therefore neurosurgery has been consulted now s/p LUMBAR 4 - SACRAL 1 LAMINECTOMY AND DISCECTOMY   At this time we will try to control his pain with IV pain meds and monitor  Pt/ot pending now for dc planning     SIRS (systemic inflammatory response syndrome) ([facility])   Assessment & Plan    Patient is  tachycardic with , and now has a white count, although I believe the white count is reactive from surgery. I will obtain and r/o infectious causes at this time  Obtain UA, CXR, Blood cultures  I can't think of any infectious source from his history and physical     Left sided numbness   Assessment & Plan    R/o cervical stenosis  Will obtain MRI C spine  He is s/p LUMBAR 4 - SACRAL 1 LAMINECTOMY AND DISCECTOMY   monitor     Left leg weakness   Assessment & Plan    S/p LUMBAR 4 - SACRAL 1 LAMINECTOMY AND DISCECTOMY   Now having left sided weakness, he can move his left leg however unable to lift it   Will obtain MRI c and lumbar spine      Sinus tachycardia- (present on admission)   Assessment & Plan    Persistent ;Telemetry monitoring, however afebrile  continue IV fluids  TSH has been normal, no signs are stable  EKG during admission reviewed by me personally showed sinus tach no ST or T wave abnormalities noted.  Asymptomatic  Has a whit count now, likely reactive from surgery but will obtain another EKG  Ordering Echo  Labs, UA, CXR, blood cultures     Low back pain- (present on admission)   Assessment & Plan    Has severe spinal stenosis on imaging  Dr. crenshaw with the neurosurgery : L4-5 severe spinal stenosis due to a combination of factors and short pedicles, moderate-severe spinal stenosis at L2-3,  Mild-moderate spinal stenosis at L3-4, 5.  Foraminal stenoses at L2-3, L3-4, L4-5, and L5-S1  S/p LUMBAR 4 - SACRAL 1 LAMINECTOMY AND DISCECTOMY   Pain control  PT Ot recs   fall precautions          VTE prophylaxis: ambulating    MRI C/L spine pending  Ongoing workup  SIRS+ now  Ordering above labs and workup  Echo  Blood cultures

## 2019-06-19 NOTE — PROGRESS NOTES
Neurosurgery Progress Note    Subjective:  Back pain, and difficulty bearing weight with left leg    Exam:  RLE: 5/5  LLE: hip flexion 3/5, quad 4/5 DF/PF 5/5    BP  Min: 120/78  Max: 139/87  Pulse  Av.5  Min: 110  Max: 120  Resp  Av.8  Min: 16  Max: 19  Temp  Av.7 °C (98 °F)  Min: 36.6 °C (97.8 °F)  Max: 36.8 °C (98.2 °F)  SpO2  Av.3 %  Min: 95 %  Max: 98 %    No Data Recorded    Recent Labs      19   0240  19   0406   WBC  13.5*  14.1*   RBC  5.28  5.10   HEMOGLOBIN  14.8  13.7*   HEMATOCRIT  44.6  42.4   MCV  84.5  83.1   MCH  28.0  26.9*   MCHC  33.2*  32.3*   RDW  40.5  40.5   PLATELETCT  317  297   MPV  9.1  9.1     Recent Labs      19   0240  19   0406   SODIUM  140  136   POTASSIUM  3.5*  3.9   CHLORIDE  106  103   CO2  24  25   GLUCOSE  114*  105*   BUN  8  7*   CREATININE  0.68  0.64   CALCIUM  8.9  9.1               Intake/Output       19 07 - 19 0659 19 07 - 1959      [id number] [id number] Total 0-0659 Total       Intake    P.O.  240  -- 240  --  -- --    P.O. 240 -- 240 -- -- --    Total Intake 240 -- 240 -- -- --       Output    Urine  800  -- 800  550  -- 550    Number of Times Voided -- -- -- 1 x -- 1 x    Urine Void (mL) 800 -- 800 550 -- 550    Drains  50  30 80  --  -- --    Output (mL) (Closed/Suction Drain Back Hemovac) 50 30 80 -- -- --    Total Output 850 30 880 550 -- 550       Net I/O     -610 -30 -640 -550 -- -550            Intake/Output Summary (Last 24 hours) at 19 0942  Last data filed at 19 0800   Gross per 24 hour   Intake              240 ml   Output             1430 ml   Net            -1190 ml            • potassium chloride SA  40 mEq DAILY   • Pharmacy Consult Request  1 Each PHARMACY TO DOSE   • MD ALERT...DO NOT ADMINISTER NSAIDS or ASPIRIN unless ORDERED By Neurosurgery  1 Each PRN   • polyethylene glycol/lytes  1 Packet BID PRN   • magnesium hydroxide  30 mL QDAY PRN   • NS    Continuous   • acetaminophen  500 mg Q6HRS PRN   • diphenhydrAMINE  25 mg Q6HRS PRN   • cyclobenzaprine  10 mg Q8HRS PRN   • vitamin D  5,000 Units DAILY   • calcium carbonate  500 mg BID   • benzocaine-menthol  1 Lozenge Q2HRS PRN   • ondansetron  4 mg Q4HRS PRN   • oxyCODONE immediate-release  5 mg Q4HRS PRN    Or   • oxyCODONE immediate-release  10 mg Q4HRS PRN   • NS   Continuous   • ondansetron  4 mg Q4HRS PRN   • promethazine  12.5-25 mg Q4HRS PRN   • promethazine  12.5-25 mg Q4HRS PRN   • prochlorperazine  5-10 mg Q4HRS PRN       Assessment and Plan:  Hopital day # 6  POD #1 L4-S1 laminectomy, aborted L2-L4 laminectomies   LLE weakness, numbness, pain with flexion: try Toradol  intraop loss of signals, transient c/o UE N/T: MRI cervical spine to be completed, ordered stat yesterday    D/w Dr. Ruelas

## 2019-06-19 NOTE — ASSESSMENT & PLAN NOTE
Patient is still tachycardic with , white count 14.5 stable as yesterday, although I believe the white count is reactive from surgery.  I obtained infectious workup which has been neg thus far including UA, CXR, Blood cultures pending  I can't think of any infectious source from his history and physical

## 2019-06-19 NOTE — CARE PLAN
Problem: Psychosocial Needs:  Goal: Level of anxiety will decrease  Outcome: PROGRESSING AS EXPECTED  Encourage patient's family to visit to help reduce anxiety.

## 2019-06-19 NOTE — DISCHARGE PLANNING
Anticipated Discharge Disposition:   TBD    Action:   Discussed with IDT :MD indicated that pt is not medically cleared yet. MRI is pending and as per CN, pt is scheduled for the MRI at 1630.   PT/OT eval and recommendations pending.     Barriers to Discharge:  Medical clearance  PT/OT recommendations.    Plan:   Follow up with MD and PT/OT  RN kindly notify CM if there are any discharge concerns.

## 2019-06-19 NOTE — THERAPY
Pt is still awaiting stat MRI for cervical and thoracic 2/2 acute onset of RUE/LE numbness post op. Will continue to await neuro POC prior to initiating OT eval, RN aware and verbalized pt is in significant pain with slightest movement in bed.

## 2019-06-19 NOTE — DISCHARGE PLANNING
Anticipated Discharge Disposition:   Home with help from father    Action:    Spoke to patient.  He stated he lives with his father. He was independent with ADLs and IADLs.   He works at [employer].  He declined health insurance.  His monthly income is $2,500/month.  He does not qualify for Medicaid.    Barriers to Discharge:    Hemovac  Neurosurgery clearance  Medical clearance    Plan:    Assist with disposition as needed.    Care Transition Team Assessment    Information Source  Orientation : Oriented x 4  Information Given By: Patient  Informant's Name: Sammy Edmonds  Who is responsible for making decisions for patient? : Patient    Readmission Evaluation  Is this a readmission?: Yes - unplanned readmission    Elopement Risk  Legal Hold: No  Ambulatory or Self Mobile in Wheelchair: Yes  Disoriented: No  Psychiatric Symptoms: None  History of Wandering: No  Elopement this Admit: No  Vocalizing Wanting to Leave: No  Displays Behaviors, Body Language Wanting to Leave: No-Not at Risk for Elopement  Elopement Risk: Not at Risk for Elopement    Interdisciplinary Discharge Planning  Does Admitting Nurse Feel This Could be a Complex Discharge?: No  Lives with - Patient's Self Care Capacity: Parents  Patient or legal guardian wants to designate a caregiver (see row info): No  Support Systems: Family Member(s), Parent  Housing / Facility: 2 Story Apartment / Condo  Do You Take your Prescribed Medications Regularly: Yes  Able to Return to Previous ADL's: Yes  Mobility Issues: Yes  Prior Services: None  Patient Expects to be Discharged to:: home  Assistance Needed: No  Durable Medical Equipment: Not Applicable    Discharge Preparedness  What is your plan after discharge?: Home with help  What are your discharge supports?: Parent  Prior Functional Level: Ambulatory, Independent with Activities of Daily Living, Drives Self, Independent with Medication Management  Difficulity with ADLs: Bathing, Dressing,  Toileting, Walking  Difficulity with IADLs: Cooking, Driving, Laundry, Shopping    Functional Assesment  Prior Functional Level: Ambulatory, Independent with Activities of Daily Living, Drives Self, Independent with Medication Management    Finances  Financial Barriers to Discharge: No  Prescription Coverage: No    Vision / Hearing Impairment  Vision Impairment : Yes  Right Eye Vision: Impaired, Wears Glasses  Left Eye Vision: Impaired, Wears Glasses  Hearing Impairment : No         Advance Directive  Advance Directive?: None    Domestic Abuse  Have you ever been the victim of abuse or violence?: No  Physical Abuse or Sexual Abuse: No  Verbal Abuse or Emotional Abuse: No  Possible Abuse Reported to:: Not Applicable         Discharge Risks or Barriers  Discharge risks or barriers?: No PCP, Uninsured / underinsured    Anticipated Discharge Information  Anticipated discharge disposition: Home  Discharge Address: [address] [place] [address] [address] [place] [id number]  Discharge Contact Phone Number: [phone]

## 2019-06-19 NOTE — OR NURSING
"1700 Pt over from procedure post MRI with anaesthesia. Pt awakens to voice, denies nausea, denies pain other than \"i'm hot\" VSS, cool pack applied to forehead.   1719 tolerating orals  1724 Pt awake and alert, denies pain, moving all extremities, criteria met   1726 Report called to ROBERTO CARLOS Avendano  7320 Pt transferred to [facility] with transport with all belongings without incident.   "

## 2019-06-20 PROBLEM — R20.0 LEFT SIDED NUMBNESS: Status: RESOLVED | Noted: 2019-06-18 | Resolved: 2019-06-20

## 2019-06-20 LAB
BASOPHILS # BLD AUTO: 0.3 % (ref 0–1.8)
BASOPHILS # BLD: 0.04 K/UL (ref 0–0.12)
EOSINOPHIL # BLD AUTO: 0.16 K/UL (ref 0–0.51)
EOSINOPHIL NFR BLD: 1.1 % (ref 0–6.9)
ERYTHROCYTE [DISTWIDTH] IN BLOOD BY AUTOMATED COUNT: 41.1 FL (ref 35.9–50)
HCT VFR BLD AUTO: 43.1 % (ref 42–52)
HGB BLD-MCNC: 13.8 G/DL (ref 14–18)
IMM GRANULOCYTES # BLD AUTO: 0.07 K/UL (ref 0–0.11)
IMM GRANULOCYTES NFR BLD AUTO: 0.5 % (ref 0–0.9)
LYMPHOCYTES # BLD AUTO: 2.38 K/UL (ref 1–4.8)
LYMPHOCYTES NFR BLD: 16.4 % (ref 22–41)
MCH RBC QN AUTO: 27.1 PG (ref 27–33)
MCHC RBC AUTO-ENTMCNC: 32 G/DL (ref 33.7–35.3)
MCV RBC AUTO: 84.7 FL (ref 81.4–97.8)
MONOCYTES # BLD AUTO: 0.74 K/UL (ref 0–0.85)
MONOCYTES NFR BLD AUTO: 5.1 % (ref 0–13.4)
NEUTROPHILS # BLD AUTO: 11.1 K/UL (ref 1.82–7.42)
NEUTROPHILS NFR BLD: 76.6 % (ref 44–72)
NRBC # BLD AUTO: 0 K/UL
NRBC BLD-RTO: 0 /100 WBC
PLATELET # BLD AUTO: 311 K/UL (ref 164–446)
PMV BLD AUTO: 9 FL (ref 9–12.9)
RBC # BLD AUTO: 5.09 M/UL (ref 4.7–6.1)
WBC # BLD AUTO: 14.5 K/UL (ref 4.8–10.8)

## 2019-06-20 PROCEDURE — A9270 NON-COVERED ITEM OR SERVICE: HCPCS | Performed by: NURSE PRACTITIONER

## 2019-06-20 PROCEDURE — 700102 HCHG RX REV CODE 250 W/ 637 OVERRIDE(OP): Performed by: INTERNAL MEDICINE

## 2019-06-20 PROCEDURE — 97162 PT EVAL MOD COMPLEX 30 MIN: CPT

## 2019-06-20 PROCEDURE — 99233 SBSQ HOSP IP/OBS HIGH 50: CPT | Performed by: HOSPITALIST

## 2019-06-20 PROCEDURE — A9270 NON-COVERED ITEM OR SERVICE: HCPCS | Performed by: PHYSICIAN ASSISTANT

## 2019-06-20 PROCEDURE — 97166 OT EVAL MOD COMPLEX 45 MIN: CPT

## 2019-06-20 PROCEDURE — 770001 HCHG ROOM/CARE - MED/SURG/GYN PRIV*

## 2019-06-20 PROCEDURE — 700102 HCHG RX REV CODE 250 W/ 637 OVERRIDE(OP): Performed by: HOSPITALIST

## 2019-06-20 PROCEDURE — 36415 COLL VENOUS BLD VENIPUNCTURE: CPT

## 2019-06-20 PROCEDURE — A9270 NON-COVERED ITEM OR SERVICE: HCPCS | Performed by: HOSPITALIST

## 2019-06-20 PROCEDURE — A9270 NON-COVERED ITEM OR SERVICE: HCPCS | Performed by: INTERNAL MEDICINE

## 2019-06-20 PROCEDURE — 700102 HCHG RX REV CODE 250 W/ 637 OVERRIDE(OP): Performed by: PHYSICIAN ASSISTANT

## 2019-06-20 PROCEDURE — 85025 COMPLETE CBC W/AUTO DIFF WBC: CPT

## 2019-06-20 PROCEDURE — 700102 HCHG RX REV CODE 250 W/ 637 OVERRIDE(OP): Performed by: NURSE PRACTITIONER

## 2019-06-20 RX ADMIN — ANTACID TABLETS 500 MG: 500 TABLET, CHEWABLE ORAL at 05:11

## 2019-06-20 RX ADMIN — CYCLOBENZAPRINE 10 MG: 10 TABLET, FILM COATED ORAL at 09:35

## 2019-06-20 RX ADMIN — OXYCODONE HYDROCHLORIDE 10 MG: 10 TABLET ORAL at 09:34

## 2019-06-20 RX ADMIN — OXYCODONE HYDROCHLORIDE 10 MG: 10 TABLET ORAL at 17:40

## 2019-06-20 RX ADMIN — MAGNESIUM CITRATE 148 ML: 1.75 LIQUID ORAL at 10:00

## 2019-06-20 RX ADMIN — OXYCODONE HYDROCHLORIDE 10 MG: 10 TABLET ORAL at 13:37

## 2019-06-20 RX ADMIN — VITAMIN D, TAB 1000IU (100/BT) 5000 UNITS: 25 TAB at 05:12

## 2019-06-20 RX ADMIN — OXYCODONE HYDROCHLORIDE 10 MG: 10 TABLET ORAL at 05:11

## 2019-06-20 RX ADMIN — POTASSIUM CHLORIDE 40 MEQ: 1500 TABLET, EXTENDED RELEASE ORAL at 05:12

## 2019-06-20 RX ADMIN — ANTACID TABLETS 500 MG: 500 TABLET, CHEWABLE ORAL at 17:40

## 2019-06-20 RX ADMIN — CYCLOBENZAPRINE 10 MG: 10 TABLET, FILM COATED ORAL at 17:40

## 2019-06-20 RX ADMIN — OXYCODONE HYDROCHLORIDE 10 MG: 10 TABLET ORAL at 20:51

## 2019-06-20 ASSESSMENT — ENCOUNTER SYMPTOMS
DOUBLE VISION: 0
FOCAL WEAKNESS: 1
FEVER: 0
HEADACHES: 0
FALLS: 0
DEPRESSION: 0
WEAKNESS: 1
NAUSEA: 0
SPUTUM PRODUCTION: 0
SHORTNESS OF BREATH: 0
DIZZINESS: 0
SEIZURES: 0
PHOTOPHOBIA: 0
SINUS PAIN: 0
ABDOMINAL PAIN: 0
CLAUDICATION: 0
TINGLING: 1
LOSS OF CONSCIOUSNESS: 0
MYALGIAS: 0
CHILLS: 0
COUGH: 0
BACK PAIN: 1
VOMITING: 0
PALPITATIONS: 0

## 2019-06-20 ASSESSMENT — COGNITIVE AND FUNCTIONAL STATUS - GENERAL
MOVING TO AND FROM BED TO CHAIR: UNABLE
MOBILITY SCORE: 6
HELP NEEDED FOR BATHING: A LOT
SUGGESTED CMS G CODE MODIFIER DAILY ACTIVITY: CK
CLIMB 3 TO 5 STEPS WITH RAILING: TOTAL
WALKING IN HOSPITAL ROOM: TOTAL
TURNING FROM BACK TO SIDE WHILE IN FLAT BAD: UNABLE
SUGGESTED CMS G CODE MODIFIER MOBILITY: CN
DRESSING REGULAR LOWER BODY CLOTHING: A LOT
DAILY ACTIVITIY SCORE: 16
TOILETING: A LOT
MOVING FROM LYING ON BACK TO SITTING ON SIDE OF FLAT BED: UNABLE
STANDING UP FROM CHAIR USING ARMS: TOTAL
PERSONAL GROOMING: A LITTLE
DRESSING REGULAR UPPER BODY CLOTHING: A LITTLE

## 2019-06-20 ASSESSMENT — ACTIVITIES OF DAILY LIVING (ADL): TOILETING: INDEPENDENT

## 2019-06-20 ASSESSMENT — LIFESTYLE VARIABLES: SUBSTANCE_ABUSE: 0

## 2019-06-20 ASSESSMENT — PATIENT HEALTH QUESTIONNAIRE - PHQ9
SUM OF ALL RESPONSES TO PHQ9 QUESTIONS 1 AND 2: 0
1. LITTLE INTEREST OR PLEASURE IN DOING THINGS: NOT AT ALL

## 2019-06-20 ASSESSMENT — GAIT ASSESSMENTS: GAIT LEVEL OF ASSIST: UNABLE TO PARTICIPATE

## 2019-06-20 NOTE — DISCHARGE PLANNING
Anticipated Discharge Disposition:   Possible need for SNF or IRF    Action:   Message sent to Lalo and Jose at IRF if pt would qualify for their program. ALTON noted that pt is maximum assist per PT. Also noted that pt does not have insurance at this time.     Asked MD for physiatry order.   Asked Lalo if possible ALEJANDRA    Barriers to Discharge:   Pt has no insurance -medical financial hardship  Medical clearance  PT recommends placement/skilled PT    Plan:   Follow up with PT tomorrow   Talk to pt about work insurance.

## 2019-06-20 NOTE — PROGRESS NOTES
Neurosurgery Progress Note    Subjective:  Feeling better overall, no UE numbness, Left leg numbness with improved strength    Exam:  Supine in bed:  RLE: 5/5  LLE 4/5, quad 4/5 limited by back pain, DF/PF 5/5    Incision: CDI    MRI C spine clear per report, unable to view images    MRI LS spine: (unable to view images)     Moderate residual central canal stenosis at the L2-3 and L5-S1 level secondary to residual facet arthropathy.    Moderate central disc extrusion the L5-S1 level markedly indenting the ventral surface of thecal sac similar to preoperative appearance.    Minimal to moderate mild multilevel lumbar spondylotic change.     Moderate disc bulging into the inferior aspect of the right-sided neural foramen at the L4-5 level abutting the exiting nerve root.      BP  Min: 120/78  Max: 142/94  Pulse  Av.5  Min: 117  Max: 138  Resp  Av.9  Min: 14  Max: 20  Temp  Av.8 °C (98.2 °F)  Min: 36.4 °C (97.5 °F)  Max: 37.1 °C (98.7 °F)  SpO2  Av.5 %  Min: 95 %  Max: 100 %    No Data Recorded    Recent Labs      19   0240  19   0406   WBC  13.5*  14.1*   RBC  5.28  5.10   HEMOGLOBIN  14.8  13.7*   HEMATOCRIT  44.6  42.4   MCV  84.5  83.1   MCH  28.0  26.9*   MCHC  33.2*  32.3*   RDW  40.5  40.5   PLATELETCT  317  297   MPV  9.1  9.1     Recent Labs      19   0240  19   0406   SODIUM  140  136   POTASSIUM  3.5*  3.9   CHLORIDE  106  103   CO2  24  25   GLUCOSE  114*  105*   BUN  8  7*   CREATININE  0.68  0.64   CALCIUM  8.9  9.1               Intake/Output       19 - 1959 19 - 1959       Total  Total       Intake    Total Intake -- -- -- -- -- --       Output    Urine  1170  -- 1170  --  -- --    Number of Times Voided 1 x 5 x 6 x -- -- --    Urine Void (mL) 1170 -- 1170 -- -- --    Drains  10  -- 10  --  -- --    Output (mL) ([REMOVED] Closed/Suction Drain Back Hemovac) 10 -- 10 -- -- --    Total  Output 1180 -- 1180 -- -- --       Net I/O     -1180 -- -1180 -- -- --            Intake/Output Summary (Last 24 hours) at 06/20/19 0931  Last data filed at 06/19/19 1315   Gross per 24 hour   Intake                0 ml   Output              630 ml   Net             -630 ml            • LR   Continuous   • potassium chloride SA  40 mEq DAILY   • Pharmacy Consult Request  1 Each PHARMACY TO DOSE   • MD ALERT...DO NOT ADMINISTER NSAIDS or ASPIRIN unless ORDERED By Neurosurgery  1 Each PRN   • polyethylene glycol/lytes  1 Packet BID PRN   • magnesium hydroxide  30 mL QDAY PRN   • NS   Continuous   • acetaminophen  500 mg Q6HRS PRN   • diphenhydrAMINE  25 mg Q6HRS PRN   • cyclobenzaprine  10 mg Q8HRS PRN   • vitamin D  5,000 Units DAILY   • calcium carbonate  500 mg BID   • benzocaine-menthol  1 Lozenge Q2HRS PRN   • ondansetron  4 mg Q4HRS PRN   • oxyCODONE immediate-release  5 mg Q4HRS PRN    Or   • oxyCODONE immediate-release  10 mg Q4HRS PRN   • NS   Continuous   • ondansetron  4 mg Q4HRS PRN   • promethazine  12.5-25 mg Q4HRS PRN   • promethazine  12.5-25 mg Q4HRS PRN   • prochlorperazine  5-10 mg Q4HRS PRN       Ass[facility] and Plan:      [facility]  L4-S1 laminectomy, aborted L2-L4 laminectomies r/t intraop loss of neuromonitoring signals    Post op LLE proximal weakness, pain with flexion: improving, possibly partially r/t positioning and compression of thigh/femoral nerve, no postoperative complication per MRI report, residual stenosis as expected given limtations of surgery due to reasons above    intraop loss of signals, transient c/o UE N/T: MRI cervical spine benign per report, numbness resolved      DVT prophylaxis: start 24 hrs after drain removal.    Mobilize, PT, OT eval.     D/w Dr. Ruelas

## 2019-06-20 NOTE — PROGRESS NOTES
Hospital Medicine Daily Progress Note    Date of Service  6/20/2019    Chief Complaint  23 y.o. male admitted 6/14/2019 with severe spinal stenosis    Hospital Course    23 y.o. male who presented 6/14/2019 with knee pain and back pain.  Patient reports that he has had the symptoms for a couple of weeks now but is much worse over the past few days.  He was seen in the hospital approximately 1 week ago and was treated with muscle relaxants and steroids.  Despite this he continues to have pain.  Imaging in the emergency department shows severe spinal stenosis.  He denies any bowel or bladder incontinence.  He denies any numbness or tingling.  He reports 2 or 3 falls secondary to pain. his only complaint is that of pain which improves with sitting or bending.  He was also found to have tachycardia with his heart rate was persistently in the 120s.      Interval Problem Update  6/19: seen and examined this morning, states he did not sleep well last night because of all the nursing checks. He still c/o LLE weakness and numbness on right side   Pain is controlled however    He was suppose to get MRI C and L spine STAT yesterday but did not?    6/20: seen and examined, sates his numbness is totally gone.LLE is still weak but improving however unable to ambulate at all, he is able to lift it now, although now his left leg is totally numb, otherwise no bowel or urine retention or incontinence  Imaging and labs have been reviewed in detail by me  Still tachycardic- echo and blood cultures pending, otherwise infectious workup is neg    Consultants/Specialty  Neurosurgery    Code Status  Full code    Disposition   pt/ot    Review of Systems  Review of Systems   Constitutional: Positive for malaise/fatigue. Negative for chills and fever.   HENT: Negative for ear discharge, hearing loss and sinus pain.    Eyes: Negative for double vision and photophobia.   Respiratory: Negative for cough, sputum production and shortness of breath.     Cardiovascular: Negative for chest pain, palpitations and claudication.   Gastrointestinal: Negative for abdominal pain, nausea and vomiting.   Genitourinary: Negative for dysuria, hematuria and urgency.   Musculoskeletal: Positive for back pain. Negative for falls and myalgias.   Skin: Negative for itching.   Neurological: Positive for tingling, focal weakness (lle) and weakness. Negative for dizziness, seizures, loss of consciousness and headaches.   Psychiatric/Behavioral: Negative for depression, substance abuse and suicidal ideas.        Physical Exam  Temp:  [36.4 °C (97.5 °F)-37.1 °C (98.7 °F)] 36.5 °C (97.7 °F)  Pulse:  [117-138] 120  Resp:  [14-20] 18  BP: (120-142)/(72-94) 141/81  SpO2:  [94 %-100 %] 94 %    Physical Exam   Constitutional: He is oriented to person, place, and time. He appears well-developed and well-nourished. No distress.   HENT:   Head: Normocephalic and atraumatic.   Mouth/Throat: Oropharynx is clear and moist. No oropharyngeal exudate.   Eyes: Pupils are equal, round, and reactive to light. Conjunctivae and EOM are normal. No scleral icterus.   Neck: Normal range of motion. Neck supple. No JVD present. No thyromegaly present.   Cardiovascular: Regular rhythm.    Tachycardia   Pulmonary/Chest: Effort normal and breath sounds normal. No respiratory distress. He has no rales.   Abdominal: Soft. Bowel sounds are normal. He exhibits no distension. There is no tenderness.   Musculoskeletal: He exhibits no edema or tenderness.   Limited range of motion secondary to acute pain  Unable to ambulate now   Neurological: He is alert and oriented to person, place, and time. No cranial nerve deficit. He exhibits normal muscle tone. Coordination normal.   Left leg weakness improving, now with numbness   Skin: Skin is warm and dry. No rash noted. No erythema.   Psychiatric: He has a normal mood and affect.       Fluids    Intake/Output Summary (Last 24 hours) at 06/20/19 1234  Last data filed at  06/20/19 0700   Gross per 24 hour   Intake              500 ml   Output              620 ml   Net             -120 ml       Laboratory  Recent Labs      06/18/19   0240  06/19/19   0406  06/20/19   1051   WBC  13.5*  14.1*  14.5*   RBC  5.28  5.10  5.09   HEMOGLOBIN  14.8  13.7*  13.8*   HEMATOCRIT  44.6  42.4  43.1   MCV  84.5  83.1  84.7   MCH  28.0  26.9*  27.1   MCHC  33.2*  32.3*  32.0*   RDW  40.5  40.5  41.1   PLATELETCT  317  297  311   MPV  9.1  9.1  9.0     Recent Labs      06/18/19   0240  06/19/19   0406   SODIUM  140  136   POTASSIUM  3.5*  3.9   CHLORIDE  106  103   CO2  24  25   GLUCOSE  114*  105*   BUN  8  7*   CREATININE  0.68  0.64   CALCIUM  8.9  9.1                   Imaging  MR-LUMBAR SPINE-WITH & W/O   Final Result         1.  Recent extensive laminectomy from the L2-3 level to the sacrum.      2.  Moderate residual central canal stenosis at the L2-3 and L5-S1 level secondary to residual facet arthropathy.      3.  Moderate central disc extrusion the L5-S1 level markedly indenting the ventral surface of thecal sac similar to preoperative appearance.      4.  Minimal to moderate mild multilevel lumbar spondylotic change.      5.  Moderate disc bulging into the inferior aspect of the right-sided neural foramen at the L4-5 level abutting the exiting nerve root.      MR-CERVICAL SPINE-WITH & W/O   Final Result         1. MRI OF THE CERVICAL SPINE WITHOUT AND WITH CONTRAST WITHIN NORMAL LIMITS.      DX-CHEST-PORTABLE (1 VIEW)   Final Result      No acute cardiac or pulmonary abnormality is noted. Shallow inspiration.      DX-PORTABLE FLUORO > 1 HOUR   Final Result         Portable fluoroscopy utilized for 3 seconds.      DX-SPINE-ANY ONE VIEW   Final Result      Limited intraoperative image showing localization of the L4-5 level.      IR-US GUIDED PIV   Final Result    Ultrasound-guided PERIPHERAL IV INSERTION performed by    qualified nursing staff as above.            DX-CHEST-2 VIEWS   Final  Result      No acute cardiopulmonary process is identified.      MR-LUMBAR SPINE-W/O   Final Result      1.  Congenital short pedicles      2.  L4-5 severe spinal stenosis due to a combination of factors and short pedicles      3.  Moderate-severe spinal stenosis at L2-3      4.  Mild-moderate spinal stenosis at L3-4      5.  Foraminal stenoses at L2-3, L3-4, L4-5, and L5-S1      6.  Multilevel annular disc bulge, facet arthropathy, and ligament hypertrophy         DX-LUMBAR SPINE-2 OR 3 VIEWS   Final Result      1.  There is no acute bony process or malalignment.   2.  Question of lower spinal stenosis possibly due to congenitally short pedicles.      EC-ECHOCARDIOGRAM COMPLETE W/ CONT    (Results Pending)        Assessment/Plan  * Spinal stenosis of lumbar region without neurogenic claudication   Assessment & Plan    Noted to be severe on imaging and symptomatic therefore neurosurgery has been consulted now s/p LUMBAR 4 - SACRAL 1 LAMINECTOMY AND DISCECTOMY   At this time we will try to control his pain with IV pain meds and monitor  Pt/ot pending now for dc planning    Had some numbness and weakness in LLE  MRI C and L spine obtained- evaluated by NSG  POD # 2  L4-S1 laminectomy, aborted L2-L4 laminectomies r/t intraop loss of neuromonitoring signals     Post op LLE proximal weakness, pain with flexion: improving, possibly from positioning and compression of thigh/nerve  intraop loss of signals, transient c/o UE N/T: MRI cervical spine benign per report, numbness resolved         SIRS (systemic inflammatory response syndrome) (HCC)   Assessment & Plan    Patient is still tachycardic with , white count 14.5 stable as yesterday, although I believe the white count is reactive from surgery.  I obtained infectious workup which has been neg thus far including UA, CXR, Blood cultures pending  I can't think of any infectious source from his history and physical     Left leg weakness   Assessment & Plan      POD # 2   L4-S1 laminectomy, aborted L2-L4 laminectomies r/t intraop loss of neuromonitoring signals     Post op LLE proximal weakness, pain with flexion: improving, possibly from positioning and compression of thigh/nerve  intraop loss of signals, transient c/o UE N/T: MRI cervical spine benign per report, numbness resolved      Not ambulating, NSG likely to perform another surgery given above symptoms     Sinus tachycardia- (present on admission)   Assessment & Plan    Persistent ;Telemetry monitoring, however afebrile  continue IV fluids  TSH has been normal, no signs are stable  EKG during admission reviewed by me personally showed sinus tach no ST or T wave abnormalities noted.  Asymptomatic  Repeat EKG showing sinus tachycardia  Echo is still pending however I ordered it 2 days ago  Infectious causes r/o  Blood cultures pending       Low back pain- (present on admission)   Assessment & Plan    Had severe spinal stenosis on imaging  Dr. crenshaw with the neurosurgery : L4-5 severe spinal stenosis due to a combination of factors and short pedicles, moderate-severe spinal stenosis at L2-3,  Mild-moderate spinal stenosis at L3-4, 5.  Foraminal stenoses at L2-3, L3-4, L4-5, and L5-S1  S/p LUMBAR 4 - SACRAL 1 LAMINECTOMY AND DISCECTOMY   Pain control  PT Ot recs   fall precautions          VTE prophylaxis: ambulating    Discussed plan of care with patient, nursing and NSG today; likely another surgery by NSG given loss if intraop signals and decision to abort the procedure before  Pt/ot pending  Echo and blood cultures pending

## 2019-06-20 NOTE — THERAPY
"Physical Therapy Evaluation completed.   Bed Mobility:  Supine to Sit: Maximal Assist  Transfers: Sit to Stand: Maximal Assist (x2; essntially total A)  Gait: Level Of Assist: Unable to Participate  Plan of Care: Will benefit from Physical Therapy 5 times per week  Discharge Recommendations: Equipment: Will Continue to Assess for Equipment Needs. See below    Pt presents to PT with impaired functional strength, balance, gait, motor control, and general locomotion associated with recent lumbar surgery. He wsa able to demonstrate bed mobility with max A and use of bed features as well as sit<>stands with max A x2/total A. He was unable to effectively weightshift/initiate LE clearance for pre-gait activities. He will benefit from continued acute PT while here and would currently require continued skilled PT/placement prior to medical dc to home given current objective findings, age, I PLOF, current co-morbidities, environmental barriers (including flight of stairs to enter home), and limited social supports to assist with current level needed (max A x2/total A to stand). Will continue to visit.     See \"Rehab Therapy-Acute\" Patient Summary Report for complete documentation.     "

## 2019-06-20 NOTE — PROGRESS NOTES
Report given by day rn, poc discussed, pt received awake and alert sitting up in bed, no complaints of pain or distress, bed alarm on locked and in lowest position, hourly rounding in place

## 2019-06-20 NOTE — CARE PLAN
Problem: Safety  Goal: Will remain free from injury  Outcome: PROGRESSING AS EXPECTED  Call light reinforced and within reach, pt dad at bedside but instructed pt to call for assistance with staff as well, bed alarm on locked and I lowest position     Problem: Skin Integrity  Goal: Risk for impaired skin integrity will decrease  Outcome: PROGRESSING AS EXPECTED  Pt educated about the importance of turning q2hrs and reminded during hourly rounding

## 2019-06-20 NOTE — PROGRESS NOTES
RN MOBILITY NOTE     Surgery patient?: yes  Date of surgery: 6/17  Ambulated 50 ft on day of surgery? (N/A if today is not date of surgery): n/a  Number of times ambulated 50 feet or greater today: 0  Patient has been up to chair, edge of bed or HOB 90 degrees for all meals?: yes  Goal met? (goal is ambulating at least 50 feet 2 times on day shift, one time on night shift): no  If patient did not meet mobility goal, why?: pt unable to ambulate d/t pain and weakness

## 2019-06-20 NOTE — PROGRESS NOTES
Pt aaox4.  in use. Educated on IS. HODGES/LLE 4/5. Tingling to LLE. Up w/ x2 assist. Pt c/o back pain, Oxy and muscle relaxer given with + results. +BS, +flatus. No BM- pt drinking prune juice and coffee this AM- states if doesn't work willing to take scheduled mag citrate. Good appetite. Voiding w/o difficulty via urinal. Reviewed poc with pt-verbalized understanding. Bed alarm in use. Call light in reach.

## 2019-06-20 NOTE — THERAPY
"Occupational Therapy Evaluation completed.   Functional Status:  Mod A supine to sit with HOB raised slightly.  Max A LB dressing.  Pt stood with max A x2 for 2 attempts.  Pt unable to take any steps or stand much longer than ~15 seconds.  Pt left EOB post session.  Plan of Care: Will benefit from Occupational Therapy 4 times per week  Discharge Recommendations:  Equipment: Will Continue to Assess for Equipment Needs. Recommend post-acute placement for additional occupational therapy services prior to discharge home. Patient can tolerate post-acute therapies at a 5x/week frequency.    Pt is 22 y/o M seen for OT evaluation. Pt admitted with back pain. Pt found to have severe stenosis and is now s/p lumbar laminectomy. Pt has had tachycardia since sx. Pt provided education on spinal precautions. Pt able to get to EOB but currently only able to stand with max A x2. Pt with significantly limited functional mobility and self-care at this time. Pt will continue to benefit from acute OT services.      See \"Rehab Therapy-Acute\" Patient Summary Report for complete documentation.    "

## 2019-06-21 ENCOUNTER — APPOINTMENT (OUTPATIENT)
Dept: RADIOLOGY | Facility: MEDICAL CENTER | Age: 24
DRG: 519 | End: 2019-06-21
Attending: HOSPITALIST

## 2019-06-21 ENCOUNTER — APPOINTMENT (OUTPATIENT)
Dept: CARDIOLOGY | Facility: MEDICAL CENTER | Age: 24
DRG: 519 | End: 2019-06-21
Attending: HOSPITALIST

## 2019-06-21 LAB
ANION GAP SERPL CALC-SCNC: 14 MMOL/L (ref 0–11.9)
BASOPHILS # BLD AUTO: 0.3 % (ref 0–1.8)
BASOPHILS # BLD: 0.05 K/UL (ref 0–0.12)
BUN SERPL-MCNC: 11 MG/DL (ref 8–22)
CALCIUM SERPL-MCNC: 10 MG/DL (ref 8.5–10.5)
CHLORIDE SERPL-SCNC: 100 MMOL/L (ref 96–112)
CO2 SERPL-SCNC: 22 MMOL/L (ref 20–33)
CREAT SERPL-MCNC: 0.76 MG/DL (ref 0.5–1.4)
EOSINOPHIL # BLD AUTO: 0.17 K/UL (ref 0–0.51)
EOSINOPHIL NFR BLD: 1.1 % (ref 0–6.9)
ERYTHROCYTE [DISTWIDTH] IN BLOOD BY AUTOMATED COUNT: 40 FL (ref 35.9–50)
GLUCOSE SERPL-MCNC: 148 MG/DL (ref 65–99)
HCT VFR BLD AUTO: 45.1 % (ref 42–52)
HGB BLD-MCNC: 14.8 G/DL (ref 14–18)
IMM GRANULOCYTES # BLD AUTO: 0.05 K/UL (ref 0–0.11)
IMM GRANULOCYTES NFR BLD AUTO: 0.3 % (ref 0–0.9)
LV EJECT FRACT  99904: 60
LV EJECT FRACT MOD 4C 99902: 51.81
LYMPHOCYTES # BLD AUTO: 2.38 K/UL (ref 1–4.8)
LYMPHOCYTES NFR BLD: 14.9 % (ref 22–41)
MCH RBC QN AUTO: 27.6 PG (ref 27–33)
MCHC RBC AUTO-ENTMCNC: 32.8 G/DL (ref 33.7–35.3)
MCV RBC AUTO: 84 FL (ref 81.4–97.8)
MONOCYTES # BLD AUTO: 0.7 K/UL (ref 0–0.85)
MONOCYTES NFR BLD AUTO: 4.4 % (ref 0–13.4)
NEUTROPHILS # BLD AUTO: 12.64 K/UL (ref 1.82–7.42)
NEUTROPHILS NFR BLD: 79 % (ref 44–72)
NRBC # BLD AUTO: 0 K/UL
NRBC BLD-RTO: 0 /100 WBC
PLATELET # BLD AUTO: 388 K/UL (ref 164–446)
PMV BLD AUTO: 9.1 FL (ref 9–12.9)
POTASSIUM SERPL-SCNC: 4.1 MMOL/L (ref 3.6–5.5)
RBC # BLD AUTO: 5.37 M/UL (ref 4.7–6.1)
SODIUM SERPL-SCNC: 136 MMOL/L (ref 135–145)
WBC # BLD AUTO: 16 K/UL (ref 4.8–10.8)

## 2019-06-21 PROCEDURE — 93306 TTE W/DOPPLER COMPLETE: CPT | Mod: 26 | Performed by: INTERNAL MEDICINE

## 2019-06-21 PROCEDURE — 700102 HCHG RX REV CODE 250 W/ 637 OVERRIDE(OP): Performed by: INTERNAL MEDICINE

## 2019-06-21 PROCEDURE — 97530 THERAPEUTIC ACTIVITIES: CPT

## 2019-06-21 PROCEDURE — 770020 HCHG ROOM/CARE - TELE (206)

## 2019-06-21 PROCEDURE — 93306 TTE W/DOPPLER COMPLETE: CPT

## 2019-06-21 PROCEDURE — 80048 BASIC METABOLIC PNL TOTAL CA: CPT

## 2019-06-21 PROCEDURE — 99233 SBSQ HOSP IP/OBS HIGH 50: CPT | Performed by: HOSPITALIST

## 2019-06-21 PROCEDURE — 700102 HCHG RX REV CODE 250 W/ 637 OVERRIDE(OP): Performed by: NURSE PRACTITIONER

## 2019-06-21 PROCEDURE — A9270 NON-COVERED ITEM OR SERVICE: HCPCS | Performed by: NURSE PRACTITIONER

## 2019-06-21 PROCEDURE — A9270 NON-COVERED ITEM OR SERVICE: HCPCS | Performed by: HOSPITALIST

## 2019-06-21 PROCEDURE — A9270 NON-COVERED ITEM OR SERVICE: HCPCS | Performed by: PHYSICIAN ASSISTANT

## 2019-06-21 PROCEDURE — 700102 HCHG RX REV CODE 250 W/ 637 OVERRIDE(OP): Performed by: HOSPITALIST

## 2019-06-21 PROCEDURE — 85025 COMPLETE CBC W/AUTO DIFF WBC: CPT

## 2019-06-21 PROCEDURE — 700102 HCHG RX REV CODE 250 W/ 637 OVERRIDE(OP): Performed by: PHYSICIAN ASSISTANT

## 2019-06-21 PROCEDURE — 700111 HCHG RX REV CODE 636 W/ 250 OVERRIDE (IP): Performed by: NURSE PRACTITIONER

## 2019-06-21 PROCEDURE — A9270 NON-COVERED ITEM OR SERVICE: HCPCS | Performed by: INTERNAL MEDICINE

## 2019-06-21 PROCEDURE — 700117 HCHG RX CONTRAST REV CODE 255: Performed by: HOSPITALIST

## 2019-06-21 RX ORDER — POLYETHYLENE GLYCOL 3350 17 G/17G
1 POWDER, FOR SOLUTION ORAL DAILY
Status: DISCONTINUED | OUTPATIENT
Start: 2019-06-21 | End: 2019-06-25 | Stop reason: HOSPADM

## 2019-06-21 RX ORDER — METOPROLOL SUCCINATE 25 MG/1
25 TABLET, EXTENDED RELEASE ORAL
Status: DISCONTINUED | OUTPATIENT
Start: 2019-06-21 | End: 2019-06-25 | Stop reason: HOSPADM

## 2019-06-21 RX ADMIN — MAGNESIUM HYDROXIDE 30 ML: 400 SUSPENSION ORAL at 08:39

## 2019-06-21 RX ADMIN — ANTACID TABLETS 500 MG: 500 TABLET, CHEWABLE ORAL at 04:32

## 2019-06-21 RX ADMIN — POTASSIUM CHLORIDE 40 MEQ: 1500 TABLET, EXTENDED RELEASE ORAL at 04:32

## 2019-06-21 RX ADMIN — METOPROLOL SUCCINATE 25 MG: 25 TABLET, EXTENDED RELEASE ORAL at 10:21

## 2019-06-21 RX ADMIN — HUMAN ALBUMIN MICROSPHERES AND PERFLUTREN 1.5 ML: 10; .22 INJECTION, SOLUTION INTRAVENOUS at 11:45

## 2019-06-21 RX ADMIN — ANTACID TABLETS 500 MG: 500 TABLET, CHEWABLE ORAL at 17:34

## 2019-06-21 RX ADMIN — OXYCODONE HYDROCHLORIDE 10 MG: 10 TABLET ORAL at 04:32

## 2019-06-21 RX ADMIN — POLYETHYLENE GLYCOL 3350 1 PACKET: 17 POWDER, FOR SOLUTION ORAL at 09:19

## 2019-06-21 RX ADMIN — VITAMIN D, TAB 1000IU (100/BT) 5000 UNITS: 25 TAB at 04:32

## 2019-06-21 RX ADMIN — ENOXAPARIN SODIUM 40 MG: 100 INJECTION SUBCUTANEOUS at 09:18

## 2019-06-21 ASSESSMENT — PATIENT HEALTH QUESTIONNAIRE - PHQ9
2. FEELING DOWN, DEPRESSED, IRRITABLE, OR HOPELESS: NOT AT ALL
SUM OF ALL RESPONSES TO PHQ9 QUESTIONS 1 AND 2: 0
1. LITTLE INTEREST OR PLEASURE IN DOING THINGS: NOT AT ALL

## 2019-06-21 ASSESSMENT — ENCOUNTER SYMPTOMS
SINUS PAIN: 0
FALLS: 0
SHORTNESS OF BREATH: 0
PALPITATIONS: 0
WEAKNESS: 1
FOCAL WEAKNESS: 1
NAUSEA: 0
VOMITING: 0
SPUTUM PRODUCTION: 0
DIZZINESS: 0
TINGLING: 1
ABDOMINAL PAIN: 0
DOUBLE VISION: 0
MYALGIAS: 0
PHOTOPHOBIA: 0
HEADACHES: 0
CHILLS: 0
FEVER: 0
SEIZURES: 0
BACK PAIN: 1
COUGH: 0
CLAUDICATION: 0
DEPRESSION: 0
LOSS OF CONSCIOUSNESS: 0

## 2019-06-21 ASSESSMENT — COGNITIVE AND FUNCTIONAL STATUS - GENERAL
SUGGESTED CMS G CODE MODIFIER MOBILITY: CL
MOVING TO AND FROM BED TO CHAIR: A LOT
TOILETING: A LOT
PERSONAL GROOMING: A LITTLE
DRESSING REGULAR LOWER BODY CLOTHING: A LOT
TURNING FROM BACK TO SIDE WHILE IN FLAT BAD: A LOT
MOVING FROM LYING ON BACK TO SITTING ON SIDE OF FLAT BED: A LOT
DAILY ACTIVITIY SCORE: 16
SUGGESTED CMS G CODE MODIFIER DAILY ACTIVITY: CK
STANDING UP FROM CHAIR USING ARMS: A LOT
DRESSING REGULAR UPPER BODY CLOTHING: A LITTLE
CLIMB 3 TO 5 STEPS WITH RAILING: TOTAL
MOBILITY SCORE: 11
WALKING IN HOSPITAL ROOM: A LOT
HELP NEEDED FOR BATHING: A LOT

## 2019-06-21 ASSESSMENT — LIFESTYLE VARIABLES: SUBSTANCE_ABUSE: 0

## 2019-06-21 ASSESSMENT — GAIT ASSESSMENTS: GAIT LEVEL OF ASSIST: UNABLE TO PARTICIPATE

## 2019-06-21 NOTE — CARE PLAN
Problem: Pain Management  Goal: Pain level will decrease to patient's comfort goal    Intervention: Educate and implement non-pharmacologic comfort measures. Examples: relaxation, distration, play therapy, activity therapy, massage, etc.  Pain regimen followed, pt encouraged to call when in pain throughout the night to keep on top of it, also encouraged pt to find other ways to cope with pain such as ice or distraction or relaxation techniques which he will try       Problem: Infection  Goal: Will remain free from infection  Outcome: PROGRESSING AS EXPECTED  Pt educated about the importance of hand hygiene by staff and any visitors, demonstrated understanding

## 2019-06-21 NOTE — PROGRESS NOTES
RN MOBILITY NOTE     Surgery patient?: yes  Date of surgery: 6/17  Ambulated 50 ft on day of surgery? (N/A if today is not date of surgery): n/a  Number of times ambulated 50 feet or greater today: 0  Patient has been up to chair, edge of bed or HOB 90 degrees for all meals?: yes  Goal met? (goal is ambulating at least 50 feet 2 times on day shift, one time on night shift): no  If patient did not meet mobility goal, why?: pt max assist, unable to ambulate d/t pain and weakness in legs

## 2019-06-21 NOTE — PROGRESS NOTES
Neurosurgery Progress Note    Subjective:  Unable to walk r/t proximal weakness left leg, circumferential  Numbness slightly improved, right leg paresthesia only with attempted weight bearing yesterday, then resolved, no saddle anesthesia    Exam:  Incision intact, dry  At edge of bed  RLE 5/5  LLE I: almost 3/5 active flexion, can hold at 4/5, quad 4+/5 rest 5/5, altered sensation to light touch anterior/lateral thigh, less distally,   No edema      BP  Min: 137/91  Max: 145/80  Pulse  Av.7  Min: 120  Max: 126  Resp  Av  Min: 18  Max: 18  Temp  Av.2 °C (97.2 °F)  Min: 36 °C (96.8 °F)  Max: 36.4 °C (97.5 °F)  SpO2  Av.3 %  Min: 94 %  Max: 97 %    No Data Recorded    Recent Labs      19   0406  19   1051   WBC  14.1*  14.5*   RBC  5.10  5.09   HEMOGLOBIN  13.7*  13.8*   HEMATOCRIT  42.4  43.1   MCV  83.1  84.7   MCH  26.9*  27.1   MCHC  32.3*  32.0*   RDW  40.5  41.1   PLATELETCT  297  311   MPV  9.1  9.0     Recent Labs      19   0406   SODIUM  136   POTASSIUM  3.9   CHLORIDE  103   CO2  25   GLUCOSE  105*   BUN  7*   CREATININE  0.64   CALCIUM  9.1               Intake/Output       19 0700 - 19 0659 19 0700 - 19 0659      1900-0659 Total  0260-3471 Total       Intake    P.O.  1220  100 1320  --  -- --    P.O. 7049 873 9208 -- -- --    Total Intake 4508 300 3538 -- -- --       Output    Urine  550  1000 1550  --  -- --    Number of Times Voided 3 x -- 3 x -- -- --    Urine Void (mL) 550 1000 1550 -- -- --    Stool  --  -- --  --  -- --    Number of Times Stooled -- -- -- 1 x -- 1 x    Total Output 550 1000 1550 -- -- --       Net I/O     670 -900 -230 -- -- --            Intake/Output Summary (Last 24 hours) at 19 0877  Last data filed at 19 0500   Gross per 24 hour   Intake              820 ml   Output             1550 ml   Net             -730 ml            • enoxaparin (LOVENOX) injection  40 mg DAILY   • LR   Continuous    • potassium chloride SA  40 mEq DAILY   • Pharmacy Consult Request  1 Each PHARMACY TO DOSE   • MD ALERT...DO NOT ADMINISTER NSAIDS or ASPIRIN unless ORDERED By Neurosurgery  1 Each PRN   • polyethylene glycol/lytes  1 Packet BID PRN   • magnesium hydroxide  30 mL QDAY PRN   • NS   Continuous   • acetaminophen  500 mg Q6HRS PRN   • diphenhydrAMINE  25 mg Q6HRS PRN   • cyclobenzaprine  10 mg Q8HRS PRN   • vitamin D  5,000 Units DAILY   • calcium carbonate  500 mg BID   • benzocaine-menthol  1 Lozenge Q2HRS PRN   • ondansetron  4 mg Q4HRS PRN   • oxyCODONE immediate-release  5 mg Q4HRS PRN    Or   • oxyCODONE immediate-release  10 mg Q4HRS PRN   • NS   Continuous   • ondansetron  4 mg Q4HRS PRN   • promethazine  12.5-25 mg Q4HRS PRN   • promethazine  12.5-25 mg Q4HRS PRN   • prochlorperazine  5-10 mg Q4HRS PRN       Assessment and Plan:    Hopital day # 8  POD # 3  L4-S1 laminectomy, aborted L2-L4 laminectomies r/t intraop loss of neuromonitoring signals     intraop loss of signals, transient c/o UE N/T: MRI cervical spine clear as reviewed with Dr. Ruelas     Post op LLE proximal weakness, pain with flexion, paresthesia: improving, residual LS multilevel stenosis pn post op MRI LS as expected given limited surgery as summarized above     DVT prophylaxis: Lovenox started    Persistent tachycardia: average , TSH wnl, as per hospitalist team, pain controlled, echocardiogram pending as per hospitalist    Continue to mobilize, PT, OT working with patient,   May need further surgery, patient agrees if indicated.    D/w Dr. Ruelas

## 2019-06-21 NOTE — PROGRESS NOTES
Hospital Medicine Daily Progress Note    Date of Service  6/21/2019    Chief Complaint  23 y.o. male admitted 6/14/2019 with severe spinal stenosis    Hospital Course    23 y.o. male who presented 6/14/2019 with knee pain and back pain.  Patient reports that he has had the symptoms for a couple of weeks now but is much worse over the past few days.  He was seen in the hospital approximately 1 week ago and was treated with muscle relaxants and steroids.  Despite this he continues to have pain.  Imaging in the emergency department shows severe spinal stenosis.  He denies any bowel or bladder incontinence.  He denies any numbness or tingling.  He reports 2 or 3 falls secondary to pain. his only complaint is that of pain which improves with sitting or bending.  He was also found to have tachycardia with his heart rate was persistently in the 120s.      Interval Problem Update  6/19: seen and examined this morning, states he did not sleep well last night because of all the nursing checks. He still c/o LLE weakness and numbness on right side   Pain is controlled however    He was suppose to get MRI C and L spine STAT yesterday but did not?    6/20: seen and examined, sates his numbness is totally gone.LLE is still weak but improving, he is able to lift it now, although now his left leg is totally numb, otherwise no bowel or urine retention or incontinence  Imaging and labs have been reviewed in detail by me  Still tachycardic- echo and blood cultures pending, otherwise infectious workup is neg      6/21: seen and examined this morning, he is still weak in LE and uable to ambulate, family at bedside, he was updated that he might need additional surgery during this hospital stay, patient agreeable. I discussed in detail with patient and his family today    Persistently tachycardic 130 now,   White 16k  bcx neg so far    Consultants/Specialty  Neurosurgery    Code Status  Full code    Disposition   pt/ot  Needs additional  NSG    Review of Systems  Review of Systems   Constitutional: Positive for malaise/fatigue. Negative for chills and fever.   HENT: Negative for ear discharge, hearing loss and sinus pain.    Eyes: Negative for double vision and photophobia.   Respiratory: Negative for cough, sputum production and shortness of breath.    Cardiovascular: Negative for chest pain, palpitations and claudication.   Gastrointestinal: Negative for abdominal pain, nausea and vomiting.   Genitourinary: Negative for dysuria, hematuria and urgency.   Musculoskeletal: Positive for back pain. Negative for falls and myalgias.   Skin: Negative for itching.   Neurological: Positive for tingling, focal weakness (b/l LE) and weakness. Negative for dizziness, seizures, loss of consciousness and headaches.   Psychiatric/Behavioral: Negative for depression, substance abuse and suicidal ideas.        Physical Exam  Temp:  [36 °C (96.8 °F)-36.4 °C (97.5 °F)] 36.2 °C (97.1 °F)  Pulse:  [120-131] 130  Resp:  [18] 18  BP: (129-148)/(71-91) 129/71  SpO2:  [93 %-97 %] 94 %    Physical Exam   Constitutional: He is oriented to person, place, and time. He appears well-developed and well-nourished. No distress.   HENT:   Head: Normocephalic and atraumatic.   Mouth/Throat: Oropharynx is clear and moist. No oropharyngeal exudate.   Eyes: Pupils are equal, round, and reactive to light. Conjunctivae and EOM are normal. No scleral icterus.   Neck: Normal range of motion. Neck supple. No JVD present. No thyromegaly present.   Cardiovascular: Regular rhythm.    Tachycardia   Pulmonary/Chest: Effort normal and breath sounds normal. No respiratory distress. He has no rales.   Abdominal: Soft. Bowel sounds are normal. He exhibits no distension. There is no tenderness.   Musculoskeletal: He exhibits no edema or tenderness.   Limited range of motion secondary to acute pain   Neurological: He is alert and oriented to person, place, and time. No cranial nerve deficit. He  exhibits normal muscle tone. Coordination normal.   Left leg weakness improving however still unable to ambulate, requires 4 people max assist   Skin: Skin is warm and dry. No rash noted. No erythema.   Psychiatric: He has a normal mood and affect.       Fluids    Intake/Output Summary (Last 24 hours) at 06/21/19 1519  Last data filed at 06/21/19 0500   Gross per 24 hour   Intake              600 ml   Output             1550 ml   Net             -950 ml       Laboratory  Recent Labs      06/19/19   0406  06/20/19   1051  06/21/19   0949   WBC  14.1*  14.5*  16.0*   RBC  5.10  5.09  5.37   HEMOGLOBIN  13.7*  13.8*  14.8   HEMATOCRIT  42.4  43.1  45.1   MCV  83.1  84.7  84.0   MCH  26.9*  27.1  27.6   MCHC  32.3*  32.0*  32.8*   RDW  40.5  41.1  40.0   PLATELETCT  297  311  388   MPV  9.1  9.0  9.1     Recent Labs      06/19/19   0406  06/21/19   0949   SODIUM  136  136   POTASSIUM  3.9  4.1   CHLORIDE  103  100   CO2  25  22   GLUCOSE  105*  148*   BUN  7*  11   CREATININE  0.64  0.76   CALCIUM  9.1  10.0                   Imaging  EC-ECHOCARDIOGRAM COMPLETE W/ CONT   Final Result      IR-US GUIDED PIV   Final Result    Ultrasound-guided PERIPHERAL IV INSERTION performed by    qualified nursing staff as above.            MR-LUMBAR SPINE-WITH & W/O   Final Result         1.  Recent extensive laminectomy from the L2-3 level to the sacrum.      2.  Moderate residual central canal stenosis at the L2-3 and L5-S1 level secondary to residual facet arthropathy.      3.  Moderate central disc extrusion the L5-S1 level markedly indenting the ventral surface of thecal sac similar to preoperative appearance.      4.  Minimal to moderate mild multilevel lumbar spondylotic change.      5.  Moderate disc bulging into the inferior aspect of the right-sided neural foramen at the L4-5 level abutting the exiting nerve root.      MR-CERVICAL SPINE-WITH & W/O   Final Result         1. MRI OF THE CERVICAL SPINE WITHOUT AND WITH CONTRAST  WITHIN NORMAL LIMITS.      DX-CHEST-PORTABLE (1 VIEW)   Final Result      No acute cardiac or pulmonary abnormality is noted. Shallow inspiration.      DX-PORTABLE FLUORO > 1 HOUR   Final Result         Portable fluoroscopy utilized for 3 seconds.      DX-SPINE-ANY ONE VIEW   Final Result      Limited intraoperative image showing localization of the L4-5 level.      IR-US GUIDED PIV   Final Result    Ultrasound-guided PERIPHERAL IV INSERTION performed by    qualified nursing staff as above.            DX-CHEST-2 VIEWS   Final Result      No acute cardiopulmonary process is identified.      MR-LUMBAR SPINE-W/O   Final Result      1.  Congenital short pedicles      2.  L4-5 severe spinal stenosis due to a combination of factors and short pedicles      3.  Moderate-severe spinal stenosis at L2-3      4.  Mild-moderate spinal stenosis at L3-4      5.  Foraminal stenoses at L2-3, L3-4, L4-5, and L5-S1      6.  Multilevel annular disc bulge, facet arthropathy, and ligament hypertrophy         DX-LUMBAR SPINE-2 OR 3 VIEWS   Final Result      1.  There is no acute bony process or malalignment.   2.  Question of lower spinal stenosis possibly due to congenitally short pedicles.           Assessment/Plan  * Spinal stenosis of lumbar region without neurogenic claudication   Assessment & Plan    Noted to be severe on imaging and symptomatic therefore neurosurgery has been consulted now s/p LUMBAR 4 - SACRAL 1 LAMINECTOMY AND DISCECTOMY   At this time we will try to control his pain with IV pain meds and monitor  Pt/ot pending now for dc planning    Had some numbness and weakness in LLE  MRI C and L spine obtained- evaluated by NSG  POD # 2  L4-S1 laminectomy, aborted L2-L4 laminectomies r/t intraop loss of neuromonitoring signals per NSG he may need to undergo further surgical procedure and he is in agreement to it. He is unable to ambulate at all now. High risk patient     Post op LLE proximal weakness, pain with flexion:  improving, possibly from positioning and compression of thigh/nerve  intraop loss of signals, transient c/o UE N/T: MRI cervical spine benign per report, numbness resolved, weakness persists         SIRS (systemic inflammatory response syndrome) (HCC)   Assessment & Plan    Patient is still tachycardic with , now even more , asymptomatic, I have ordered an EKG, start metoprolol and monitor    WBC even higher 16K, reactive? No infectious cause has been found  I obtained infectious workup which has been neg thus far including UA, CXR, Blood cultures NGTD       Left leg weakness   Assessment & Plan    Persists, unable to ambulate, he is max 4 ppl assist  POD # 2  L4-S1 laminectomy, aborted L2-L4 laminectomies r/t intraop loss of neuromonitoring signals     Post op LLE proximal weakness, pain with flexion: improving, possibly from positioning and compression of thigh/nerve  intraop loss of signals, transient c/o UE N/T: MRI cervical spine benign per report, numbness resolved      Will need to go back to surgery per NSG during this hospitalization     Sinus tachycardia- (present on admission)   Assessment & Plan    Persistent ; orderedTelemetry monitoring, however afebrile  continue IV fluids  TSH has been normal, no signs are stable  EKG during admission reviewed by me personally showed sinus tach no ST or T wave abnormalities noted.  Asymptomatic  Repeat EKG showing sinus tachycardia  Echo noted to be normal LVEF 60%  Infectious causes r/o  Blood cultures NGTD     Low back pain- (present on admission)   Assessment & Plan    Had severe spinal stenosis on imaging  Dr. crenshaw with the neurosurgery : L4-5 severe spinal stenosis due to a combination of factors and short pedicles, moderate-severe spinal stenosis at L2-3,  Mild-moderate spinal stenosis at L3-4, 5.  Foraminal stenoses at L2-3, L3-4, L4-5, and L5-S1  S/p LUMBAR 4 - SACRAL 1 LAMINECTOMY AND DISCECTOMY   Pain control  PT Ot recs   fall  precautions          VTE prophylaxis: ambulating    Discussed plan of care with patient, nursing and NSG today, unable to ambulate totally now, NSG had to abort the surgery due loss of intraop signals. Will need to go back in to operate    HR continues to rise, I added metoprolol, cardiac tele. ekg and echo are normal  Infectious workup neg    High Complex patient

## 2019-06-21 NOTE — THERAPY
"Physical Therapy Treatment completed.   Transfers: Sit to Stand: Moderate Assist (from raised bed->FWW w/hand rail support). Supervision for sit pivot transfers bed<->BSC  Gait: Level Of Assist: Unable to Participate @ this time.     Plan of Care: Will benefit from Physical Therapy 5 times per week  Discharge Recommendations: Equipment: Will Continue to Assess for Equipment Needs. Post-acute therapy Discharge to a transitional care facility for continued skilled therapy services.     See \"Rehab Therapy-Acute\" Patient Summary Report for complete documentation.       "

## 2019-06-21 NOTE — PROGRESS NOTES
Report given by day RN, POC discussed, pt received awake and alert sitting on commode, no distress, bed alarm on locked and in lowest position, hourly rounding in place.

## 2019-06-21 NOTE — DISCHARGE PLANNING
Anticipated Discharge Disposition:   SNF vs IRF    Action:   Discussed with IDT :  CN stated that pt requires 4 person assistance to get out of bed and pt is unable to ambulate. Dr. Mayfield wants clarification as to when pt would have second surgery.     CM talked to Arlyn FINK. Dr. Ruelas will decide when pt will have follow up surgery and will be during this hospitalization.     Barriers to Discharge:   Pending surgery  Pt is non-ambulatory which will require pt to possibly need placement in acute rehab.     Plan:   Follow up with MD  Follow up with IRF  Discuss with CM leaders.

## 2019-06-22 PROCEDURE — 97116 GAIT TRAINING THERAPY: CPT

## 2019-06-22 PROCEDURE — A9270 NON-COVERED ITEM OR SERVICE: HCPCS | Performed by: PHYSICIAN ASSISTANT

## 2019-06-22 PROCEDURE — 700111 HCHG RX REV CODE 636 W/ 250 OVERRIDE (IP): Performed by: NURSE PRACTITIONER

## 2019-06-22 PROCEDURE — 700102 HCHG RX REV CODE 250 W/ 637 OVERRIDE(OP): Performed by: HOSPITALIST

## 2019-06-22 PROCEDURE — 770020 HCHG ROOM/CARE - TELE (206)

## 2019-06-22 PROCEDURE — 99232 SBSQ HOSP IP/OBS MODERATE 35: CPT | Performed by: HOSPITALIST

## 2019-06-22 PROCEDURE — A9270 NON-COVERED ITEM OR SERVICE: HCPCS | Performed by: HOSPITALIST

## 2019-06-22 PROCEDURE — 700102 HCHG RX REV CODE 250 W/ 637 OVERRIDE(OP): Performed by: PHYSICIAN ASSISTANT

## 2019-06-22 PROCEDURE — 97530 THERAPEUTIC ACTIVITIES: CPT

## 2019-06-22 RX ADMIN — METOPROLOL SUCCINATE 25 MG: 25 TABLET, EXTENDED RELEASE ORAL at 04:38

## 2019-06-22 RX ADMIN — ANTACID TABLETS 500 MG: 500 TABLET, CHEWABLE ORAL at 16:19

## 2019-06-22 RX ADMIN — POTASSIUM CHLORIDE 40 MEQ: 1500 TABLET, EXTENDED RELEASE ORAL at 04:38

## 2019-06-22 RX ADMIN — ENOXAPARIN SODIUM 40 MG: 100 INJECTION SUBCUTANEOUS at 04:38

## 2019-06-22 RX ADMIN — VITAMIN D, TAB 1000IU (100/BT) 5000 UNITS: 25 TAB at 04:38

## 2019-06-22 RX ADMIN — ANTACID TABLETS 500 MG: 500 TABLET, CHEWABLE ORAL at 04:38

## 2019-06-22 ASSESSMENT — COGNITIVE AND FUNCTIONAL STATUS - GENERAL
MOVING FROM LYING ON BACK TO SITTING ON SIDE OF FLAT BED: A LOT
CLIMB 3 TO 5 STEPS WITH RAILING: TOTAL
TURNING FROM BACK TO SIDE WHILE IN FLAT BAD: A LOT
SUGGESTED CMS G CODE MODIFIER MOBILITY: CL
MOVING TO AND FROM BED TO CHAIR: A LOT
WALKING IN HOSPITAL ROOM: A LOT
STANDING UP FROM CHAIR USING ARMS: A LOT
MOBILITY SCORE: 11

## 2019-06-22 ASSESSMENT — GAIT ASSESSMENTS
GAIT LEVEL OF ASSIST: MODERATE ASSIST
ASSISTIVE DEVICE: FRONT WHEEL WALKER
DISTANCE (FEET): 40
DEVIATION: BRADYKINETIC;SHUFFLED GAIT

## 2019-06-22 ASSESSMENT — ENCOUNTER SYMPTOMS
FALLS: 0
SHORTNESS OF BREATH: 0
PALPITATIONS: 0
FEVER: 0
SEIZURES: 0
DOUBLE VISION: 0
HEADACHES: 0
SINUS PAIN: 0
SPUTUM PRODUCTION: 0
TINGLING: 0
ABDOMINAL PAIN: 0
FOCAL WEAKNESS: 1
WEAKNESS: 1
CLAUDICATION: 0
DEPRESSION: 0
NAUSEA: 0
LOSS OF CONSCIOUSNESS: 0
DIZZINESS: 0
CHILLS: 0
BACK PAIN: 1
PHOTOPHOBIA: 0
MYALGIAS: 0
VOMITING: 0
COUGH: 0

## 2019-06-22 ASSESSMENT — LIFESTYLE VARIABLES: SUBSTANCE_ABUSE: 0

## 2019-06-22 NOTE — THERAPY
"Occupational Therapy Treatment completed with focus on ADLs and ADL transfers.  Functional Status:  Min A supine to sit.  Max A LB dressing.  Pt able to stand x3 attempts with bed height raised and min A.  Pt able to take a few steps forward/backward using bariatric FWW.  Pt returned to bed with min A.  Plan of Care: Will benefit from Occupational Therapy 4 times per week  Discharge Recommendations:  Equipment Will Continue to Assess for Equipment Needs. Recommend post-acute placement for additional occupational therapy services prior to discharge home. Patient can tolerate post-acute therapies at a 5x/week frequency.    Pt seen for OT tx. Pt with improved functional mobility today and is motivated to improve. Pt continues to require assist with self-care. Pt may have another back surgery. Pt will continue to benefit from acute OT services.      See \"Rehab Therapy-Acute\" Patient Summary Report for complete documentation.   "

## 2019-06-22 NOTE — THERAPY
"Physical Therapy Treatment completed.   Bed Mobility:  Supine to Sit: Minimal Assist  Transfers: Sit to Stand: Minimal Assist  Gait: Level Of Assist: Moderate Assist with Front-Wheel Walker       Plan of Care: Will benefit from Physical Therapy 5 times per week  Discharge Recommendations: Equipment: Will Continue to Assess for Equipment Needs.    See \"Rehab Therapy-Acute\" Patient Summary Report for complete documentation.     Pt was pleasant and motivated to participate in therapy session. Pt is gradually progressing with all therapy. Reporting minimal pain with activity. Performed sit pivot with use of bed features despite encouragement and education on log rolling technique. He was able to complete sit to stands with Theo for lift off and stability. He was able to ambualte 40ft with modA for fww management, balance and chair follow for safety. He presents with short shuffling steps, heavy reliance on BUE with fww but no knee buckling noted. Pt educated on energy conservation as he was planning on showering after therapy. He will benefit from continued acute skilled therapy to progress his mobility while in house.   "

## 2019-06-22 NOTE — PROGRESS NOTES
Neurosurgery Progress Note    Subjective:  Unable to walk r/t proximal weakness left leg, circumferential    Numbness improved c/o LUE 1, 2 digit numbness  No saddle anesthesia  Voiding  OOB and ambulatory in room with walker       Exam:  Incision intact, dry  At edge of bed  RLE 5/5  LLE: IP 3/5 active flexion, quad 4-/5 rest 5/5  Incision CDI, margins well approximated no drainage   No edema in LEs      BP  Min: 114/75  Max: 145/86  Pulse  Av.4  Min: 100  Max: 130  Resp  Av  Min: 18  Max: 18  Temp  Av.6 °C (97.9 °F)  Min: 36.2 °C (97.1 °F)  Max: 36.8 °C (98.3 °F)  SpO2  Av.4 %  Min: 91 %  Max: 95 %    No Data Recorded    Recent Labs      19   1051  19   0949   WBC  14.5*  16.0*   RBC  5.09  5.37   HEMOGLOBIN  13.8*  14.8   HEMATOCRIT  43.1  45.1   MCV  84.7  84.0   MCH  27.1  27.6   MCHC  32.0*  32.8*   RDW  41.1  40.0   PLATELETCT  311  388   MPV  9.0  9.1     Recent Labs      19   0949   SODIUM  136   POTASSIUM  4.1   CHLORIDE  100   CO2  22   GLUCOSE  148*   BUN  11   CREATININE  0.76   CALCIUM  10.0               Intake/Output       19 0700 - 19 0659 19 0700 - 19 0659       Total  Total       Intake    Total Intake -- -- -- -- -- --       Output    Urine  --  -- --  800  -- 800    Urine Void (mL) -- -- -- 800 -- 800    Stool  --  -- --  --  -- --    Number of Times Stooled 5 x 5 x 10 x -- -- --    Total Output -- -- -- 800 -- 800       Net I/O     -- -- -- -800 -- -800            Intake/Output Summary (Last 24 hours) at 19 1042  Last data filed at 19 1038   Gross per 24 hour   Intake                0 ml   Output              800 ml   Net             -800 ml            • enoxaparin (LOVENOX) injection  40 mg DAILY   • polyethylene glycol/lytes  1 Packet DAILY   • metoprolol SR  25 mg Q DAY   • LR   Continuous   • potassium chloride SA  40 mEq DAILY   • Pharmacy Consult Request  1 Each PHARMACY TO  DOSE   • MD ALERT...DO NOT ADMINISTER NSAIDS or ASPIRIN unless ORDERED By Neurosurgery  1 Each PRN   • magnesium hydroxide  30 mL QDAY PRN   • NS   Continuous   • acetaminophen  500 mg Q6HRS PRN   • diphenhydrAMINE  25 mg Q6HRS PRN   • cyclobenzaprine  10 mg Q8HRS PRN   • vitamin D  5,000 Units DAILY   • calcium carbonate  500 mg BID   • benzocaine-menthol  1 Lozenge Q2HRS PRN   • ondansetron  4 mg Q4HRS PRN   • oxyCODONE immediate-release  5 mg Q4HRS PRN    Or   • oxyCODONE immediate-release  10 mg Q4HRS PRN   • NS   Continuous   • ondansetron  4 mg Q4HRS PRN   • promethazine  12.5-25 mg Q4HRS PRN   • promethazine  12.5-25 mg Q4HRS PRN   • prochlorperazine  5-10 mg Q4HRS PRN       Assessment and Plan:  Hopital day #9  POD # 4  L4-S1 laminectomy, aborted L2-L4 laminectomies r/t intraop loss of neuromonitoring signals     intraop loss of signals, transient c/o UE N/T: MRI cervical spine clear as reviewed with Dr. Ruelas     Post op LLE proximal weakness, pain with flexion, paresthesia: improving, residual LS multilevel stenosis pn post op MRI LS as expected given limited surgery as summarized above     DVT prophylaxis: Lovenox started    Continue to mobilize, PT, OT working with patient,   No plans for further neurosurgery during this hospital admit. At this point patient making slow improvements and I recommend rehab placement for LE weakness, generalized deconditioning. D/w pt he may need more surgery in the future but not this hospital stay. Would recommend weight loss prior to further elective surgery.     D/w Dr. Ruelas  OK to d/c to rehab when medically cleared

## 2019-06-22 NOTE — CARE PLAN
Problem: Pain Management  Goal: Pain level will decrease to patient's comfort goal  Outcome: PROGRESSING AS EXPECTED      Problem: Psychosocial Needs:  Goal: Level of anxiety will decrease  Outcome: PROGRESSING AS EXPECTED      Problem: Mobility  Goal: Risk for activity intolerance will decrease  Outcome: PROGRESSING AS EXPECTED

## 2019-06-23 LAB — UFH PPP CHRO-ACNC: <0.1 U/ML

## 2019-06-23 PROCEDURE — 700102 HCHG RX REV CODE 250 W/ 637 OVERRIDE(OP): Performed by: PHYSICIAN ASSISTANT

## 2019-06-23 PROCEDURE — A9270 NON-COVERED ITEM OR SERVICE: HCPCS | Performed by: HOSPITALIST

## 2019-06-23 PROCEDURE — 700111 HCHG RX REV CODE 636 W/ 250 OVERRIDE (IP): Performed by: HOSPITALIST

## 2019-06-23 PROCEDURE — 85520 HEPARIN ASSAY: CPT

## 2019-06-23 PROCEDURE — 99232 SBSQ HOSP IP/OBS MODERATE 35: CPT | Performed by: HOSPITALIST

## 2019-06-23 PROCEDURE — 700111 HCHG RX REV CODE 636 W/ 250 OVERRIDE (IP): Performed by: NURSE PRACTITIONER

## 2019-06-23 PROCEDURE — 770020 HCHG ROOM/CARE - TELE (206)

## 2019-06-23 PROCEDURE — 700102 HCHG RX REV CODE 250 W/ 637 OVERRIDE(OP): Performed by: NURSE PRACTITIONER

## 2019-06-23 PROCEDURE — 700102 HCHG RX REV CODE 250 W/ 637 OVERRIDE(OP): Performed by: HOSPITALIST

## 2019-06-23 PROCEDURE — 36415 COLL VENOUS BLD VENIPUNCTURE: CPT

## 2019-06-23 PROCEDURE — A9270 NON-COVERED ITEM OR SERVICE: HCPCS | Performed by: NURSE PRACTITIONER

## 2019-06-23 PROCEDURE — A9270 NON-COVERED ITEM OR SERVICE: HCPCS | Performed by: PHYSICIAN ASSISTANT

## 2019-06-23 RX ADMIN — ANTACID TABLETS 500 MG: 500 TABLET, CHEWABLE ORAL at 05:19

## 2019-06-23 RX ADMIN — MAGNESIUM HYDROXIDE 30 ML: 400 SUSPENSION ORAL at 12:09

## 2019-06-23 RX ADMIN — METOPROLOL SUCCINATE 25 MG: 25 TABLET, EXTENDED RELEASE ORAL at 05:20

## 2019-06-23 RX ADMIN — POTASSIUM CHLORIDE 40 MEQ: 1500 TABLET, EXTENDED RELEASE ORAL at 05:20

## 2019-06-23 RX ADMIN — ENOXAPARIN SODIUM 40 MG: 100 INJECTION SUBCUTANEOUS at 05:21

## 2019-06-23 RX ADMIN — VITAMIN D, TAB 1000IU (100/BT) 5000 UNITS: 25 TAB at 05:19

## 2019-06-23 RX ADMIN — ENOXAPARIN SODIUM 40 MG: 100 INJECTION SUBCUTANEOUS at 17:40

## 2019-06-23 RX ADMIN — POLYETHYLENE GLYCOL 3350 1 PACKET: 17 POWDER, FOR SOLUTION ORAL at 05:19

## 2019-06-23 RX ADMIN — ANTACID TABLETS 500 MG: 500 TABLET, CHEWABLE ORAL at 17:38

## 2019-06-23 ASSESSMENT — ENCOUNTER SYMPTOMS
DEPRESSION: 0
SPUTUM PRODUCTION: 0
BACK PAIN: 1
CLAUDICATION: 0
FALLS: 0
CHILLS: 0
DOUBLE VISION: 0
DIZZINESS: 0
SEIZURES: 0
VOMITING: 0
WEAKNESS: 1
COUGH: 0
HEADACHES: 0
TINGLING: 0
SHORTNESS OF BREATH: 0
PALPITATIONS: 0
FOCAL WEAKNESS: 1
LOSS OF CONSCIOUSNESS: 0
FEVER: 0
ABDOMINAL PAIN: 0
NAUSEA: 0
PHOTOPHOBIA: 0
MYALGIAS: 0
SINUS PAIN: 0

## 2019-06-23 ASSESSMENT — LIFESTYLE VARIABLES: SUBSTANCE_ABUSE: 0

## 2019-06-23 NOTE — PROGRESS NOTES
Hospital Medicine Daily Progress Note    Date of Service  6/23/2019    Chief Complaint  23 y.o. male admitted 6/14/2019 with severe spinal stenosis    Hospital Course    23 y.o. male who presented 6/14/2019 with knee pain and back pain.  Patient reports that he has had the symptoms for a couple of weeks now but is much worse over the past few days.  He was seen in the hospital approximately 1 week ago and was treated with muscle relaxants and steroids.  Despite this he continues to have pain.  Imaging in the emergency department shows severe spinal stenosis.  He denies any bowel or bladder incontinence.  He denies any numbness or tingling.  He reports 2 or 3 falls secondary to pain. his only complaint is that of pain which improves with sitting or bending.  He was also found to have tachycardia with his heart rate was persistently in the 120s.      Interval Problem Update  6/19: seen and examined this morning, states he did not sleep well last night because of all the nursing checks. He still c/o LLE weakness and numbness on right side   Pain is controlled however    He was suppose to get MRI C and L spine STAT yesterday but did not?    6/20: seen and examined, states his numbness is totally gone.LLE is still weak but improving, he is able to lift it now, although now his left leg is totally numb, otherwise no bowel or urine retention or incontinence  Imaging and labs have been reviewed in detail by me  Still tachycardic- echo and blood cultures pending, otherwise infectious workup is neg      6/21: seen and examined this morning, he is still weak in LE and uable to ambulate, family at bedside, he was updated that he might need additional surgery during this hospital stay, patient agreeable. I discussed in detail with patient and his family today    Persistently tachycardic 130 now,   White 16k  bcx neg so far    6/22: seen and examined this morning, patient states his numbness and tingling is completely resolved.  He is having some LE weakness still. No plans for further neurosurgery during this hospital admit. At this point patient making slow improvements and I recommend rehab placement for LE weakness, generalized deconditioning. D/w pt he may need more surgery in the future but not this hospital stay    6/23: seen and examined this morning, patient states he is tried and sleepy, did not sleep well last night, did ambulate yesterday with pt  Numbness improved c/o LLE 1, 2 digit numbness  No saddle anesthesia  Voiding, passing gas  Tolerating diet  Consultants/Specialty  Neurosurgery    Code Status  Full code    Disposition   patient has no insurance, unsure if he will be able to go to SNF  Continue pt/ot here for now    Review of Systems  Review of Systems   Constitutional: Positive for malaise/fatigue. Negative for chills and fever.   HENT: Negative for ear discharge, hearing loss and sinus pain.    Eyes: Negative for double vision and photophobia.   Respiratory: Negative for cough, sputum production and shortness of breath.    Cardiovascular: Negative for chest pain, palpitations and claudication.   Gastrointestinal: Negative for abdominal pain, nausea and vomiting.   Genitourinary: Negative for dysuria, hematuria and urgency.   Musculoskeletal: Positive for back pain. Negative for falls and myalgias.   Skin: Negative for itching.   Neurological: Positive for focal weakness (b/l LE L>R improving) and weakness. Negative for dizziness, tingling, seizures, loss of consciousness and headaches.   Psychiatric/Behavioral: Negative for depression, substance abuse and suicidal ideas.        Physical Exam  Temp:  [36.6 °C (97.8 °F)-36.9 °C (98.4 °F)] 36.6 °C (97.9 °F)  Pulse:  [107-116] 107  Resp:  [18] 18  BP: (114-152)/(68-95) 145/91  SpO2:  [92 %-95 %] 95 %    Physical Exam   Constitutional: He is oriented to person, place, and time. He appears well-developed and well-nourished. No distress.   HENT:   Head: Normocephalic and  atraumatic.   Mouth/Throat: Oropharynx is clear and moist. No oropharyngeal exudate.   Eyes: Pupils are equal, round, and reactive to light. Conjunctivae and EOM are normal. No scleral icterus.   Neck: Normal range of motion. Neck supple. No JVD present. No thyromegaly present.   Cardiovascular: Regular rhythm.    Tachycardia    Pulmonary/Chest: Effort normal and breath sounds normal. No respiratory distress. He has no rales.   Abdominal: Soft. Bowel sounds are normal. He exhibits no distension. There is no tenderness.   Musculoskeletal: He exhibits no edema or tenderness.   Limited range of motion secondary to acute pain   Neurological: He is alert and oriented to person, place, and time. No cranial nerve deficit. He exhibits normal muscle tone. Coordination normal.   Left leg weakness but improving 3/5 strength   Skin: Skin is warm and dry. No rash noted. No erythema.   Psychiatric: He has a normal mood and affect.       Fluids    Intake/Output Summary (Last 24 hours) at 06/23/19 1216  Last data filed at 06/23/19 0800   Gross per 24 hour   Intake              950 ml   Output              500 ml   Net              450 ml       Laboratory  Recent Labs      06/21/19   0949   WBC  16.0*   RBC  5.37   HEMOGLOBIN  14.8   HEMATOCRIT  45.1   MCV  84.0   MCH  27.6   MCHC  32.8*   RDW  40.0   PLATELETCT  388   MPV  9.1     Recent Labs      06/21/19   0949   SODIUM  136   POTASSIUM  4.1   CHLORIDE  100   CO2  22   GLUCOSE  148*   BUN  11   CREATININE  0.76   CALCIUM  10.0                   Imaging  EC-ECHOCARDIOGRAM COMPLETE W/ CONT   Final Result      IR-US GUIDED PIV   Final Result    Ultrasound-guided PERIPHERAL IV INSERTION performed by    qualified nursing staff as above.            MR-LUMBAR SPINE-WITH & W/O   Final Result         1.  Recent extensive laminectomy from the L2-3 level to the sacrum.      2.  Moderate residual central canal stenosis at the L2-3 and L5-S1 level secondary to residual facet  arthropathy.      3.  Moderate central disc extrusion the L5-S1 level markedly indenting the ventral surface of thecal sac similar to preoperative appearance.      4.  Minimal to moderate mild multilevel lumbar spondylotic change.      5.  Moderate disc bulging into the inferior aspect of the right-sided neural foramen at the L4-5 level abutting the exiting nerve root.      MR-CERVICAL SPINE-WITH & W/O   Final Result         1. MRI OF THE CERVICAL SPINE WITHOUT AND WITH CONTRAST WITHIN NORMAL LIMITS.      DX-CHEST-PORTABLE (1 VIEW)   Final Result      No acute cardiac or pulmonary abnormality is noted. Shallow inspiration.      DX-PORTABLE FLUORO > 1 HOUR   Final Result         Portable fluoroscopy utilized for 3 seconds.      DX-SPINE-ANY ONE VIEW   Final Result      Limited intraoperative image showing localization of the L4-5 level.      IR-US GUIDED PIV   Final Result    Ultrasound-guided PERIPHERAL IV INSERTION performed by    qualified nursing staff as above.            DX-CHEST-2 VIEWS   Final Result      No acute cardiopulmonary process is identified.      MR-LUMBAR SPINE-W/O   Final Result      1.  Congenital short pedicles      2.  L4-5 severe spinal stenosis due to a combination of factors and short pedicles      3.  Moderate-severe spinal stenosis at L2-3      4.  Mild-moderate spinal stenosis at L3-4      5.  Foraminal stenoses at L2-3, L3-4, L4-5, and L5-S1      6.  Multilevel annular disc bulge, facet arthropathy, and ligament hypertrophy         DX-LUMBAR SPINE-2 OR 3 VIEWS   Final Result      1.  There is no acute bony process or malalignment.   2.  Question of lower spinal stenosis possibly due to congenitally short pedicles.           Assessment/Plan  * Spinal stenosis of lumbar region without neurogenic claudication   Assessment & Plan    Noted to be severe on imaging and symptomatic therefore neurosurgery has been consulted now s/p LUMBAR 4 - SACRAL 1 LAMINECTOMY AND DISCECTOMY   At this time we  will try to control his pain with IV pain meds and monitor  Pt/ot pending now for dc planning    Had some numbness and weakness in LLE  MRI C and L spine obtained- evaluated by NSG  s/p  L4-S1 laminectomy, aborted L2-L4 laminectomies r/t intraop loss of neuromonitoring signals per NSG, stat imaging obtained post surgery unconcerning, he is able to ambulate now however still significantly weak       Post op LLE proximal weakness,improving, working with PT/OT.   NSG following  No insurance therefore will need to rehab here.         SIRS (systemic inflammatory response syndrome) (HCC)   Assessment & Plan    Tachycardia improving, asymptomatic,   On BB    bp 145/91  Ekg; sinus tachycardia  WBC16K; reactive? No infectious cause has been found  I obtained infectious workup which has been neg thus far including UA, CXR, Blood cultures NGTD    He is not septic at this time       Left leg weakness   Assessment & Plan    Persists, however improving, numbness has resolved, weakness improving, working with pt.ot    S/p L4-S1 laminectomy, aborted L2-L4 laminectomies r/t intraop loss of neuromonitoring signals     Continue working with Pt/ot     Sinus tachycardia- (present on admission)   Assessment & Plan    Persistent but improved,Telemetry monitoring, however afebrile  Continue bb  TSH has been normal, no signs are stable  EKG : sinus tachycardia.  Asymptomatic  Repeat EKG showing sinus tachycardia  Echo noted to be normal LVEF 60%  Infectious causes r/o  Blood cultures NGTD  Not septic     Low back pain- (present on admission)   Assessment & Plan    Had severe spinal stenosis on imaging  Dr. crenshaw with the neurosurgery : L4-5 severe spinal stenosis due to a combination of factors and short pedicles, moderate-severe spinal stenosis at L2-3,  Mild-moderate spinal stenosis at L3-4, 5.  Foraminal stenoses at L2-3, L3-4, L4-5, and L5-S1  S/p LUMBAR 4 - SACRAL 1 LAMINECTOMY AND DISCECTOMY   Pain control  PT Ot recs   fall  precautions          VTE prophylaxis: ambulating    No plans for further surgery per neurosurgery during this hospital admit.making improvements, no insurance therefore unlikely to go to rehab, will need to do acute pt/ot here

## 2019-06-23 NOTE — PROGRESS NOTES
Bedside report recieved, assumed care.   Pt is A&Ox4, denies pain, denies numbness/tingling. Reports muscle spasms in LE. Voiding in urinal.   Plan of care discussed. All needs met at this time.   Bed locked and in bed in low position, call light and belongings within reach, upper rails up. Treaded socks on.

## 2019-06-23 NOTE — CARE PLAN
Problem: Pain Management  Goal: Pain level will decrease to patient's comfort goal  Outcome: PROGRESSING AS EXPECTED  Pt denies pain at this time. Reports muscle spasms in LE but declines any medication.     Problem: Mobility  Goal: Risk for activity intolerance will decrease  Outcome: PROGRESSING AS EXPECTED  Pt ambulated short distance this morning, became fatigued. Limited by muscle spasms.

## 2019-06-23 NOTE — PROGRESS NOTES
Neurosurgery Progress Note    Subjective:  Ambulatory yesterday   Numbness improved c/o LLE 1, 2 digit numbness  No saddle anesthesia  Voiding, passing gas  OOB and ambulatory in room with walker       Exam:  Incision intact, dry  At edge of bed  RLE 5/5  LLE: IP 3/5 active flexion, quad 4-/5 rest 5/5  Incision CDI, margins well approximated no drainage   No edema in LEs      BP  Min: 114/68  Max: 161/94  Pulse  Av.2  Min: 107  Max: 118  Resp  Av  Min: 18  Max: 18  Temp  Av.7 °C (98 °F)  Min: 36.6 °C (97.8 °F)  Max: 36.9 °C (98.4 °F)  SpO2  Av %  Min: 92 %  Max: 95 %    No Data Recorded    Recent Labs      19   0949   WBC  16.0*   RBC  5.37   HEMOGLOBIN  14.8   HEMATOCRIT  45.1   MCV  84.0   MCH  27.6   MCHC  32.8*   RDW  40.0   PLATELETCT  388   MPV  9.1     Recent Labs      19   0949   SODIUM  136   POTASSIUM  4.1   CHLORIDE  100   CO2  22   GLUCOSE  148*   BUN  11   CREATININE  0.76   CALCIUM  10.0               Intake/Output       19 0700 - 19 0659 19 0700 - 19 0659      [id number] Total 6269-8993 3176-1407 Total       Intake    P.O.  360  590 950  240  -- 240    P.O. 360 590 950 240 -- 240    Total Intake 360 590 950 240 -- 240       Output    Urine  800  -- 800  500  -- 500    Number of Times Voided -- -- -- 1 x -- 1 x    Urine Void (mL) 800 -- 800 500 -- 500    Total Output 800 -- 800 500 -- 500       Net I/O     -440 590 150 -260 -- -260            Intake/Output Summary (Last 24 hours) at 19 1151  Last data filed at 19 0800   Gross per 24 hour   Intake              950 ml   Output              500 ml   Net              450 ml            • enoxaparin (LOVENOX) injection  40 mg DAILY   • polyethylene glycol/lytes  1 Packet DAILY   • metoprolol SR  25 mg Q DAY   • LR   Continuous   • potassium chloride SA  40 mEq DAILY   • Pharmacy Consult Request  1 Each PHARMACY TO DOSE   • MD ALERT...DO NOT ADMINISTER NSAIDS or ASPIRIN unless  ORDERED By Neurosurgery  1 Each PRN   • magnesium hydroxide  30 mL QDAY PRN   • NS   Continuous   • acetaminophen  500 mg Q6HRS PRN   • diphenhydrAMINE  25 mg Q6HRS PRN   • cyclobenzaprine  10 mg Q8HRS PRN   • vitamin D  5,000 Units DAILY   • calcium carbonate  500 mg BID   • benzocaine-menthol  1 Lozenge Q2HRS PRN   • ondansetron  4 mg Q4HRS PRN   • oxyCODONE immediate-release  5 mg Q4HRS PRN    Or   • oxyCODONE immediate-release  10 mg Q4HRS PRN   • NS   Continuous   • ondansetron  4 mg Q4HRS PRN   • promethazine  12.5-25 mg Q4HRS PRN   • promethazine  12.5-25 mg Q4HRS PRN   • prochlorperazine  5-10 mg Q4HRS PRN       Assessment and Plan:  Hopital day #10  POD #5  L4-S1 laminectomy, aborted L2-L4 laminectomies r/t intraop loss of neuromonitoring signals     intraop loss of signals, transient c/o UE N/T: MRI cervical spine clear as reviewed with Dr. Reulas     Post op LLE proximal weakness, pain with flexion, paresthesia: improving, residual LS multilevel stenosis pn post op MRI LS as expected given limited surgery as summarized above     DVT prophylaxis: Lovenox started    Continue to mobilize, PT, OT working with patient,   No plans for further neurosurgery during this hospital admit. At this point patient making slow improvements and I recommend rehab placement for LE weakness, generalized deconditioning. D/w pt he may need more surgery in the future but not this hospital stay. Would recommend weight loss prior to further elective surgery.      D/w Dr. Ruelas  Rehab consult ordered

## 2019-06-24 LAB
BACTERIA BLD CULT: NORMAL
BACTERIA BLD CULT: NORMAL
SIGNIFICANT IND 70042: NORMAL
SIGNIFICANT IND 70042: NORMAL
SITE SITE: NORMAL
SITE SITE: NORMAL
SOURCE SOURCE: NORMAL
SOURCE SOURCE: NORMAL

## 2019-06-24 PROCEDURE — 97530 THERAPEUTIC ACTIVITIES: CPT

## 2019-06-24 PROCEDURE — 97116 GAIT TRAINING THERAPY: CPT

## 2019-06-24 PROCEDURE — A9270 NON-COVERED ITEM OR SERVICE: HCPCS | Performed by: PHYSICIAN ASSISTANT

## 2019-06-24 PROCEDURE — A9270 NON-COVERED ITEM OR SERVICE: HCPCS | Performed by: HOSPITALIST

## 2019-06-24 PROCEDURE — 99232 SBSQ HOSP IP/OBS MODERATE 35: CPT | Performed by: HOSPITALIST

## 2019-06-24 PROCEDURE — 700102 HCHG RX REV CODE 250 W/ 637 OVERRIDE(OP): Performed by: NURSE PRACTITIONER

## 2019-06-24 PROCEDURE — 700102 HCHG RX REV CODE 250 W/ 637 OVERRIDE(OP): Performed by: HOSPITALIST

## 2019-06-24 PROCEDURE — A9270 NON-COVERED ITEM OR SERVICE: HCPCS | Performed by: NURSE PRACTITIONER

## 2019-06-24 PROCEDURE — 700111 HCHG RX REV CODE 636 W/ 250 OVERRIDE (IP): Performed by: HOSPITALIST

## 2019-06-24 PROCEDURE — 700102 HCHG RX REV CODE 250 W/ 637 OVERRIDE(OP): Performed by: PHYSICIAN ASSISTANT

## 2019-06-24 PROCEDURE — 770020 HCHG ROOM/CARE - TELE (206)

## 2019-06-24 RX ADMIN — ENOXAPARIN SODIUM 40 MG: 100 INJECTION SUBCUTANEOUS at 04:58

## 2019-06-24 RX ADMIN — METOPROLOL SUCCINATE 25 MG: 25 TABLET, EXTENDED RELEASE ORAL at 04:56

## 2019-06-24 RX ADMIN — ANTACID TABLETS 500 MG: 500 TABLET, CHEWABLE ORAL at 04:56

## 2019-06-24 RX ADMIN — VITAMIN D, TAB 1000IU (100/BT) 5000 UNITS: 25 TAB at 04:56

## 2019-06-24 RX ADMIN — POLYETHYLENE GLYCOL 3350 1 PACKET: 17 POWDER, FOR SOLUTION ORAL at 04:56

## 2019-06-24 RX ADMIN — ANTACID TABLETS 500 MG: 500 TABLET, CHEWABLE ORAL at 17:03

## 2019-06-24 RX ADMIN — ENOXAPARIN SODIUM 40 MG: 100 INJECTION SUBCUTANEOUS at 17:03

## 2019-06-24 RX ADMIN — POTASSIUM CHLORIDE 40 MEQ: 1500 TABLET, EXTENDED RELEASE ORAL at 04:58

## 2019-06-24 ASSESSMENT — ENCOUNTER SYMPTOMS
FALLS: 0
DOUBLE VISION: 0
MYALGIAS: 0
TINGLING: 0
PALPITATIONS: 0
COUGH: 0
CLAUDICATION: 0
SEIZURES: 0
HEADACHES: 0
DEPRESSION: 0
SINUS PAIN: 0
NAUSEA: 0
VOMITING: 0
BACK PAIN: 1
FOCAL WEAKNESS: 0
PHOTOPHOBIA: 0
CHILLS: 0
DIZZINESS: 0
FEVER: 0
SHORTNESS OF BREATH: 0
WEAKNESS: 1
SPUTUM PRODUCTION: 0
ABDOMINAL PAIN: 0
LOSS OF CONSCIOUSNESS: 0

## 2019-06-24 ASSESSMENT — GAIT ASSESSMENTS
DEVIATION: BRADYKINETIC;SHUFFLED GAIT
ASSISTIVE DEVICE: FRONT WHEEL WALKER
GAIT LEVEL OF ASSIST: MINIMAL ASSIST
DISTANCE (FEET): 100

## 2019-06-24 ASSESSMENT — LIFESTYLE VARIABLES: SUBSTANCE_ABUSE: 0

## 2019-06-24 ASSESSMENT — COGNITIVE AND FUNCTIONAL STATUS - GENERAL
MOBILITY SCORE: 18
WALKING IN HOSPITAL ROOM: A LITTLE
SUGGESTED CMS G CODE MODIFIER MOBILITY: CK
MOVING FROM LYING ON BACK TO SITTING ON SIDE OF FLAT BED: A LITTLE
STANDING UP FROM CHAIR USING ARMS: A LITTLE
CLIMB 3 TO 5 STEPS WITH RAILING: A LITTLE
TURNING FROM BACK TO SIDE WHILE IN FLAT BAD: A LITTLE
MOVING TO AND FROM BED TO CHAIR: A LITTLE

## 2019-06-24 NOTE — DISCHARGE PLANNING
Anticipated Discharge Disposition:   IRF vs Homehealth    Action:   Rounding done. Per pt, second surgery will not be done so pt hopeful to go home however he still has mobility problems and he lives in a second story home with about 14 steps. Discussed with Dr. Barone who is willing to look into case. Per Ophelia Maki will need PT/OT notes so bedside RN notified. Pt said that his employer is trying to work out insurance issues.     Needs: possibly need FWW and an ALEJANDRA with homehealth vs ALEJANDRA with IRF.     Barriers to Discharge:   Surgical clearance  Acceptance by Dr. Barone and needs for ALEJANDRA approval.     Plan:   Follow up with Dr. Barone and CM leaders  Follow up with pt.

## 2019-06-24 NOTE — DISCHARGE PLANNING
note:  (late entry fpr 06/21/2019)   Talked to pt about insurance. Pt said that he could not apply for health insurance because when it was offerred to him by his employer, he had refused it. He tried called his HR to apply when he was admitted but they denied his request. He does not qualify for Medicaid since he is over income for $2500 a month.     Addendum:  Ask Jose May and Ophelia at [facility] to review case.    Addendum:  Talked to Jose about possible referral to IRF. He would like a new referral sent after second surgery and updated PT/OT notes post surgery.

## 2019-06-24 NOTE — PROGRESS NOTES
Pt A/Ox4. Reports minimal to no pain at this time. Reports numbness in his left toes which he states is an improvement from previous. Sx site C/D/I, BERTO. No complaints at this time. Call light within reach. No complaints at this time. Pt ambulated to bathroom w/ FWW, needs reinforcement on spinal precautions.

## 2019-06-24 NOTE — DISCHARGE PLANNING
Aware of PMR referral from Zeinab Paez PA-C. POD #7 L4-S1 laminectomy and discectomy. Would appreciate PT/OT updates to reflect current mobility/funcitonal status. No Physiatry consult ordered per protocol at this time. TCC following. Thank you for the referral.

## 2019-06-24 NOTE — PROGRESS NOTES
Hospital Medicine Daily Progress Note    Date of Service  6/24/2019    Chief Complaint  23 y.o. male admitted 6/14/2019 with severe spinal stenosis    Hospital Course    23 y.o. male who presented 6/14/2019 with knee pain and back pain.  Patient reports that he has had the symptoms for a couple of weeks now but is much worse over the past few days.  He was seen in the hospital approximately 1 week ago and was treated with muscle relaxants and steroids.  Despite this he continues to have pain.  Imaging in the emergency department shows severe spinal stenosis.  He denies any bowel or bladder incontinence.  He denies any numbness or tingling.  He reports 2 or 3 falls secondary to pain. his only complaint is that of pain which improves with sitting or bending.  He was also found to have tachycardia with his heart rate was persistently in the 120s.      Interval Problem Update  6/19: seen and examined this morning, states he did not sleep well last night because of all the nursing checks. He still c/o LLE weakness and numbness on right side   Pain is controlled however    He was suppose to get MRI C and L spine STAT yesterday but did not?    6/20: seen and examined, states his numbness is totally gone.LLE is still weak but improving, he is able to lift it now, although now his left leg is totally numb, otherwise no bowel or urine retention or incontinence  Imaging and labs have been reviewed in detail by me  Still tachycardic- echo and blood cultures pending, otherwise infectious workup is neg      6/21: seen and examined this morning, he is still weak in LE and uable to ambulate, family at bedside, he was updated that he might need additional surgery during this hospital stay, patient agreeable. I discussed in detail with patient and his family today    Persistently tachycardic 130 now,   White 16k  bcx neg so far    6/22: seen and examined this morning, patient states his numbness and tingling is completely resolved.  He is having some LE weakness still. No plans for further neurosurgery during this hospital admit. At this point patient making slow improvements and I recommend rehab placement for LE weakness, generalized deconditioning. D/w pt he may need more surgery in the future but not this hospital stay    6/23: seen and examined this morning, patient states he is tried and sleepy, did not sleep well last night, did ambulate yesterday with pt  Numbness improved c/o LLE 1, 2 digit numbness  No saddle anesthesia  Voiding, passing gas  Tolerating diet    6/24: seen and examined this morning, doing well, thinks he can go home, however he has stairs to get to his apartment and even in his apt. He denies any further numbness, weakness is improving however still needs a walker.   Otherwise denies any c/o    Consultants/Specialty  Neurosurgery    Code Status  Full code    Disposition   patient has no insurance, unsure if he will be able to go to SNF  Continue pt/ot here for now; may be able to go home with home health if he continues to work with pt  sw to send ALEJANDRA for IRF    Review of Systems  Review of Systems   Constitutional: Positive for malaise/fatigue. Negative for chills and fever.   HENT: Negative for ear discharge, hearing loss and sinus pain.    Eyes: Negative for double vision and photophobia.   Respiratory: Negative for cough, sputum production and shortness of breath.    Cardiovascular: Negative for chest pain, palpitations and claudication.   Gastrointestinal: Negative for abdominal pain, nausea and vomiting.   Genitourinary: Negative for dysuria, hematuria and urgency.   Musculoskeletal: Positive for back pain. Negative for falls and myalgias.   Skin: Negative for itching.   Neurological: Positive for weakness. Negative for dizziness, tingling, focal weakness, seizures, loss of consciousness and headaches.   Psychiatric/Behavioral: Negative for depression, substance abuse and suicidal ideas.        Physical  Exam  Temp:  [36.8 °C (98.3 °F)-36.9 °C (98.5 °F)] 36.8 °C (98.3 °F)  Pulse:  [] 110  Resp:  [16-18] 18  BP: (121-134)/(73-87) 121/75  SpO2:  [95 %-96 %] 95 %    Physical Exam   Constitutional: He is oriented to person, place, and time. He appears well-developed and well-nourished. No distress.   HENT:   Head: Normocephalic and atraumatic.   Mouth/Throat: Oropharynx is clear and moist. No oropharyngeal exudate.   Eyes: Pupils are equal, round, and reactive to light. Conjunctivae and EOM are normal. No scleral icterus.   Neck: Normal range of motion. Neck supple. No JVD present. No thyromegaly present.   Cardiovascular: Regular rhythm.    Tachycardia    Pulmonary/Chest: Effort normal and breath sounds normal. No respiratory distress. He has no rales.   Abdominal: Soft. Bowel sounds are normal. He exhibits no distension. There is no tenderness.   Musculoskeletal: He exhibits no edema or tenderness.   Limited range of motion secondary to acute pain   Neurological: He is alert and oriented to person, place, and time. No cranial nerve deficit. He exhibits normal muscle tone. Coordination normal.   Left leg weakness but improving 3/5 strength   Skin: Skin is warm and dry. No rash noted. No erythema.   Psychiatric: He has a normal mood and affect.       Fluids    Intake/Output Summary (Last 24 hours) at 06/24/19 1548  Last data filed at 06/23/19 1800   Gross per 24 hour   Intake              240 ml   Output                0 ml   Net              240 ml       Laboratory                        Imaging  EC-ECHOCARDIOGRAM COMPLETE W/ CONT   Final Result      IR-US GUIDED PIV   Final Result    Ultrasound-guided PERIPHERAL IV INSERTION performed by    qualified nursing staff as above.            MR-LUMBAR SPINE-WITH & W/O   Final Result         1.  Recent extensive laminectomy from the L2-3 level to the sacrum.      2.  Moderate residual central canal stenosis at the L2-3 and L5-S1 level secondary to residual facet  arthropathy.      3.  Moderate central disc extrusion the L5-S1 level markedly indenting the ventral surface of thecal sac similar to preoperative appearance.      4.  Minimal to moderate mild multilevel lumbar spondylotic change.      5.  Moderate disc bulging into the inferior aspect of the right-sided neural foramen at the L4-5 level abutting the exiting nerve root.      MR-CERVICAL SPINE-WITH & W/O   Final Result         1. MRI OF THE CERVICAL SPINE WITHOUT AND WITH CONTRAST WITHIN NORMAL LIMITS.      DX-CHEST-PORTABLE (1 VIEW)   Final Result      No acute cardiac or pulmonary abnormality is noted. Shallow inspiration.      DX-PORTABLE FLUORO > 1 HOUR   Final Result         Portable fluoroscopy utilized for 3 seconds.      DX-SPINE-ANY ONE VIEW   Final Result      Limited intraoperative image showing localization of the L4-5 level.      IR-US GUIDED PIV   Final Result    Ultrasound-guided PERIPHERAL IV INSERTION performed by    qualified nursing staff as above.            DX-CHEST-2 VIEWS   Final Result      No acute cardiopulmonary process is identified.      MR-LUMBAR SPINE-W/O   Final Result      1.  Congenital short pedicles      2.  L4-5 severe spinal stenosis due to a combination of factors and short pedicles      3.  Moderate-severe spinal stenosis at L2-3      4.  Mild-moderate spinal stenosis at L3-4      5.  Foraminal stenoses at L2-3, L3-4, L4-5, and L5-S1      6.  Multilevel annular disc bulge, facet arthropathy, and ligament hypertrophy         DX-LUMBAR SPINE-2 OR 3 VIEWS   Final Result      1.  There is no acute bony process or malalignment.   2.  Question of lower spinal stenosis possibly due to congenitally short pedicles.           Assessment/Plan  * Spinal stenosis of lumbar region without neurogenic claudication   Assessment & Plan    Noted to be severe on imaging and symptomatic therefore neurosurgery has been consulted now s/p LUMBAR 4 - SACRAL 1 LAMINECTOMY AND DISCECTOMY   At this time we  will try to control his pain with IV pain meds and monitor  Pt/ot pending now for dc planning    Had some numbness and weakness in LLE ( improving)  MRI C and L spine obtained- evaluated by NSG  s/p  L4-S1 laminectomy, aborted L2-L4 laminectomies r/t intraop loss of neuromonitoring signals per NSG, stat imaging obtained post surgery unconcerning, he is able to ambulate now however still significantly weak       Post op LLE proximal weakness,improving, working with PT/OT.   NSG following  No insurance therefore will need to rehab here. Vs ALEJANDRA sent for IRF        SIRS (systemic inflammatory response syndrome) ([facility])   Assessment & Plan    Tachycardia improving, asymptomatic, ; I suspect he is severly deconditioned  On BB  Ekg; sinus tachycardia  WBC16K; reactive? No infectious cause has been found  I obtained infectious workup which has been neg thus far including UA, CXR, Blood cultures NGTD    He is not septic at this time       Left leg weakness   Assessment & Plan    Persists, however improving, numbness has resolved, weakness improving, working with pt.ot    S/p L4-S1 laminectomy, aborted L2-L4 laminectomies r/t intraop loss of neuromonitoring signals     Continue working with Pt/ot     Sinus tachycardia- (present on admission)   Assessment & Plan    Persistent but improved,Telemetry monitoring, however afebrile; I suspect he is severely deconditioned  Continue bb  TSH has been normal, no signs are stable  EKG : sinus tachycardia.  Asymptomatic  Repeat EKG showing sinus tachycardia  Echo noted to be normal LVEF 60%  Infectious causes r/o  Blood cultures NGTD  Not septic     Low back pain- (present on admission)   Assessment & Plan    Had severe spinal stenosis on imaging  Dr. crenshaw with the neurosurgery : L4-5 severe spinal stenosis due to a combination of factors and short pedicles, moderate-severe spinal stenosis at L2-3,  Mild-moderate spinal stenosis at L3-4, 5.  Foraminal stenoses at L2-3, L3-4, L4-5,  and L5-S1  S/p LUMBAR 4 - SACRAL 1 LAMINECTOMY AND DISCECTOMY   Pain control  PT Ot recs   fall precautions    improving          VTE prophylaxis: ambulating    No plans for further surgery per neurosurgery during this hospital admit.making improvements, no insurance therefore need to get ALEJANDRA for IRF, vs HH after working with PT

## 2019-06-24 NOTE — THERAPY
Physical Therapy Treatment completed.   Bed Mobility:  Supine to Sit: Supervised  Transfers: Sit to Stand: Supervised (from EOB->FWW)  Gait: Level Of Assist: Minimal Assist with bariatric FWW     Plan of Care: Will benefit from Physical Therapy 5 times per week  Discharge Recommendations: Equipment: Bariatric FWW. Recommend post acute therapy.

## 2019-06-24 NOTE — CARE PLAN
Problem: Infection  Goal: Will remain free from infection  Outcome: PROGRESSING AS EXPECTED  Patient educated regarding infection control including hand hygiene, personal cares. Patient educated regarding s/s of infection including pain, fever, heat and redness at incision site. Patient encouraged to discuss any concerns with RN or care team. Patient verbalized understanding.

## 2019-06-24 NOTE — PROGRESS NOTES
Neurosurgery Progress Note    Subjective:  Ambulatory yesterday   Numbness improved c/o LLE 1, 2 digit numbness  No saddle anesthesia, no new pain, tingling, numbness, weakness   Voiding, passing gas  Ambulatory yesterday   Eating breakfast at OOB    Exam:  Incision intact, dry  At edge of bed  RLE 5/5  LLE: IP 3/5 active flexion, quad 4-/5 rest 5/5  Incision CDI, margins well approximated no drainage   No edema in LEs    BP  Min: 126/81  Max: 134/87  Pulse  Av.7  Min: 99  Max: 123  Resp  Av  Min: 16  Max: 18  Temp  Av.9 °C (98.4 °F)  Min: 36.8 °C (98.3 °F)  Max: 36.9 °C (98.5 °F)  SpO2  Av.7 %  Min: 95 %  Max: 96 %    No Data Recorded    Recent Labs      19   0949   WBC  16.0*   RBC  5.37   HEMOGLOBIN  14.8   HEMATOCRIT  45.1   MCV  84.0   MCH  27.6   MCHC  32.8*   RDW  40.0   PLATELETCT  388   MPV  9.1     Recent Labs      19   0949   SODIUM  136   POTASSIUM  4.1   CHLORIDE  100   CO2  22   GLUCOSE  148*   BUN  11   CREATININE  0.76   CALCIUM  10.0               Intake/Output       19 0700 - 19 0659 19 07 - 19 0659      [id number] 3500-6283 Total 3512-4215 2595-0135 Total       Intake    P.O.  480  -- 480  --  -- --    P.O. 480 -- 480 -- -- --    Total Intake 480 -- 480 -- -- --       Output    Urine  500  -- 500  --  -- --    Number of Times Voided 3 x -- 3 x -- -- --    Urine Void (mL) 500 -- 500 -- -- --    Stool  --  -- --  --  -- --    Number of Times Stooled 2 x 0 x 2 x -- -- --    Total Output 500 -- 500 -- -- --       Net I/O     -20 -- -20 -- -- --            Intake/Output Summary (Last 24 hours) at 19 0858  Last data filed at 19 1800   Gross per 24 hour   Intake              240 ml   Output                0 ml   Net              240 ml            • enoxaparin (LOVENOX) injection  40 mg Q12HRS   • polyethylene glycol/lytes  1 Packet DAILY   • metoprolol SR  25 mg Q DAY   • potassium chloride SA  40 mEq DAILY   • Pharmacy Consult Request   1 Each PHARMACY TO DOSE   • MD ALERT...DO NOT ADMINISTER NSAIDS or ASPIRIN unless ORDERED By Neurosurgery  1 Each PRN   • magnesium hydroxide  30 mL QDAY PRN   • acetaminophen  500 mg Q6HRS PRN   • diphenhydrAMINE  25 mg Q6HRS PRN   • cyclobenzaprine  10 mg Q8HRS PRN   • vitamin D  5,000 Units DAILY   • calcium carbonate  500 mg BID   • benzocaine-menthol  1 Lozenge Q2HRS PRN   • ondansetron  4 mg Q4HRS PRN   • oxyCODONE immediate-release  5 mg Q4HRS PRN    Or   • oxyCODONE immediate-release  10 mg Q4HRS PRN   • ondansetron  4 mg Q4HRS PRN   • promethazine  12.5-25 mg Q4HRS PRN   • promethazine  12.5-25 mg Q4HRS PRN   • prochlorperazine  5-10 mg Q4HRS PRN       Assessment and Plan:  Hopital day #11  POD #6  L4-S1 laminectomy, aborted L2-L4 laminectomies r/t intraop loss of neuromonitoring signals     Re: intraop loss of signals, transient c/o UE N/T: MRI cervical spine clear as reviewed with Dr. Ruelas       Post op LLE proximal weakness, pain with flexion, paresthesia: improving, residual LS multilevel stenosis pn post op MRI LS as expected given limited surgery as summarized above     Chem DVT prophylaxis: Lovenox started    Continue to mobilize, PT, OT working with patient,     No plans for further neurosurgery during this hospital admit.   Continued however slow improvements rehab placement recommended and prev ordered for LE weakness, generalized deconditioning. Pt aware he may require additional spine surgery in the future but not this hospital stay. Strogly recommend weight loss prior to further elective surgery.    Rehab consult previously ordered/pending     D/w Dr. Ruelas  Patient agrees with treatment plan.   Case discussed with Dr. Ruelas.

## 2019-06-24 NOTE — PROGRESS NOTES
Telemetry Summary  Rhythm:  Sinus tachycardia  HR:  100-115  MS:  0.16  QRS:  0.10  QT:  0.34  Ectopy:NA

## 2019-06-24 NOTE — CARE PLAN
Problem: Safety  Goal: Will remain free from falls  Outcome: PROGRESSING SLOWER THAN EXPECTED  Patient educated regarding fall risks. Environmental hazards reviewed such as SCDs, spills, items in path. Patient directed to use call light for assistance before getting out of bed or ambulating. Patient verbalized understanding.     Intervention: Assess risk factors for falls   06/23/19 2050   OTHER   Mobility Status Assessment 1-1 Healthcare Provider Required for Assistance with Ambulation & Transfer   History of fall 2   Date of Last Fall 06/16/19   Pt Calls for Assistance Yes     Intervention: Implement fall precautions   06/23/19 2050   OTHER   Environmental Precautions Treaded Slipper Socks on Patient;Personal Belongings, Wastebasket, Call Bell etc. in Easy Reach;Report Given to Other Health Care Providers Regarding Fall Risk;Bed in Low Position;Mobility Assessed & Appropriate Sign Placed   Bedrails Bedrails Closest to Bathroom Down   Chair/Bed Strip Alarm Yes - Alarm On   Bed Alarm Yes - Alarm On

## 2019-06-25 ENCOUNTER — PATIENT OUTREACH (OUTPATIENT)
Dept: HEALTH INFORMATION MANAGEMENT | Facility: OTHER | Age: 24
End: 2019-06-25

## 2019-06-25 VITALS
HEIGHT: 72 IN | OXYGEN SATURATION: 95 % | BODY MASS INDEX: 42.66 KG/M2 | DIASTOLIC BLOOD PRESSURE: 74 MMHG | RESPIRATION RATE: 17 BRPM | TEMPERATURE: 97 F | WEIGHT: 315 LBS | HEART RATE: 118 BPM | SYSTOLIC BLOOD PRESSURE: 122 MMHG

## 2019-06-25 PROBLEM — R65.10 SIRS (SYSTEMIC INFLAMMATORY RESPONSE SYNDROME) (HCC): Status: RESOLVED | Noted: 2019-06-19 | Resolved: 2019-06-25

## 2019-06-25 LAB
ANION GAP SERPL CALC-SCNC: 11 MMOL/L (ref 0–11.9)
BUN SERPL-MCNC: 12 MG/DL (ref 8–22)
CALCIUM SERPL-MCNC: 9.9 MG/DL (ref 8.5–10.5)
CHLORIDE SERPL-SCNC: 104 MMOL/L (ref 96–112)
CO2 SERPL-SCNC: 23 MMOL/L (ref 20–33)
CREAT SERPL-MCNC: 0.75 MG/DL (ref 0.5–1.4)
GLUCOSE SERPL-MCNC: 101 MG/DL (ref 65–99)
POTASSIUM SERPL-SCNC: 4.4 MMOL/L (ref 3.6–5.5)
SODIUM SERPL-SCNC: 138 MMOL/L (ref 135–145)

## 2019-06-25 PROCEDURE — A9270 NON-COVERED ITEM OR SERVICE: HCPCS | Performed by: HOSPITALIST

## 2019-06-25 PROCEDURE — 700102 HCHG RX REV CODE 250 W/ 637 OVERRIDE(OP): Performed by: PHYSICIAN ASSISTANT

## 2019-06-25 PROCEDURE — A9270 NON-COVERED ITEM OR SERVICE: HCPCS | Performed by: PHYSICIAN ASSISTANT

## 2019-06-25 PROCEDURE — 97535 SELF CARE MNGMENT TRAINING: CPT

## 2019-06-25 PROCEDURE — 99239 HOSP IP/OBS DSCHRG MGMT >30: CPT | Performed by: HOSPITALIST

## 2019-06-25 PROCEDURE — 700102 HCHG RX REV CODE 250 W/ 637 OVERRIDE(OP): Performed by: HOSPITALIST

## 2019-06-25 PROCEDURE — A9270 NON-COVERED ITEM OR SERVICE: HCPCS | Performed by: NURSE PRACTITIONER

## 2019-06-25 PROCEDURE — 700102 HCHG RX REV CODE 250 W/ 637 OVERRIDE(OP): Performed by: NURSE PRACTITIONER

## 2019-06-25 PROCEDURE — 97530 THERAPEUTIC ACTIVITIES: CPT

## 2019-06-25 PROCEDURE — 97116 GAIT TRAINING THERAPY: CPT

## 2019-06-25 PROCEDURE — 36415 COLL VENOUS BLD VENIPUNCTURE: CPT

## 2019-06-25 PROCEDURE — 80048 BASIC METABOLIC PNL TOTAL CA: CPT

## 2019-06-25 RX ORDER — METOPROLOL SUCCINATE 25 MG/1
25 TABLET, EXTENDED RELEASE ORAL DAILY
Qty: 30 TAB | Refills: 0 | Status: SHIPPED | OUTPATIENT
Start: 2019-06-26 | End: 2019-08-17

## 2019-06-25 RX ADMIN — VITAMIN D, TAB 1000IU (100/BT) 5000 UNITS: 25 TAB at 05:49

## 2019-06-25 RX ADMIN — POLYETHYLENE GLYCOL 3350 1 PACKET: 17 POWDER, FOR SOLUTION ORAL at 05:51

## 2019-06-25 RX ADMIN — ANTACID TABLETS 500 MG: 500 TABLET, CHEWABLE ORAL at 05:49

## 2019-06-25 RX ADMIN — POTASSIUM CHLORIDE 40 MEQ: 1500 TABLET, EXTENDED RELEASE ORAL at 05:51

## 2019-06-25 RX ADMIN — METOPROLOL SUCCINATE 25 MG: 25 TABLET, EXTENDED RELEASE ORAL at 05:49

## 2019-06-25 ASSESSMENT — COGNITIVE AND FUNCTIONAL STATUS - GENERAL
WALKING IN HOSPITAL ROOM: A LITTLE
SUGGESTED CMS G CODE MODIFIER DAILY ACTIVITY: CI
HELP NEEDED FOR BATHING: A LITTLE
MOBILITY SCORE: 18
MOVING TO AND FROM BED TO CHAIR: A LITTLE
MOVING FROM LYING ON BACK TO SITTING ON SIDE OF FLAT BED: A LITTLE
TURNING FROM BACK TO SIDE WHILE IN FLAT BAD: A LITTLE
DAILY ACTIVITIY SCORE: 23
SUGGESTED CMS G CODE MODIFIER MOBILITY: CK
CLIMB 3 TO 5 STEPS WITH RAILING: A LITTLE
STANDING UP FROM CHAIR USING ARMS: A LITTLE

## 2019-06-25 ASSESSMENT — GAIT ASSESSMENTS
ASSISTIVE DEVICE: FRONT WHEEL WALKER
GAIT LEVEL OF ASSIST: SUPERVISED
DISTANCE (FEET): 125

## 2019-06-25 NOTE — THERAPY
"Occupational Therapy Treatment completed with focus on ADLs, ADL transfers and patient education.  Functional Status:  Pt seen for OT tx. Up in chair upon arrival. Pt able to verbalize precautions during session. Also able to maintain precautions during ADLs and ADL transfers. Pt given compensatory strategies to assist w/ ADLs and ADL transfers. Pt able to complete LB dressing w/ extra time while maintaining spinal precautions. Discussed possible DME/AE needed for home to assist w/ ADLs and ADL transfers. Pt receptive to education.   Plan of Care: Will benefit from Occupational Therapy 4 times per week  Discharge Recommendations:  Equipment Will Continue to Assess for Equipment Needs.     See \"Rehab Therapy-Acute\" Patient Summary Report for complete documentation.   "

## 2019-06-25 NOTE — THERAPY
"Physical Therapy Treatment completed.   Bed Mobility:  Supine to Sit: Supervised  Transfers: Sit to Stand: Supervised (from EOB->FWW. )  Gait: Level Of Assist: Supervised with bariatric FWW    Plan of Care: Will benefit from Physical Therapy 5 times per week  Discharge Recommendations: Equipment: Bariatric FWW and HH PT/OT. See \"Rehab Therapy-Acute\" Patient Summary Report for complete documentation.       "

## 2019-06-25 NOTE — PROGRESS NOTES
Neurosurgery Progress Note    Subjective:  Ambulatory yesterday   Numbness improved c/o LLE 1, 2 digit numbness  No saddle anesthesia, no pain, tingling, numbness except as above, no new weakness   States he was able to walk up stairs with PT yesterday  Voiding, passing gas  Eager to d/c home rather than SNF/Rehab     Exam:  Incision intact, dry, margins well-approximated   NM: 5/5 hip flexion, knee extension, knee flexion, plantar flexion, dorsiflexion, EHL   Sensation intact and equal throughout all four extremities. Numbness 1,2 digit LLE  Abdomen: soft, non-tender, obese  Non-labored breathing on room air, normal respiratory effort  No LE edema, erythema, cyanosis, clubbing  Calves non-tender to compression bilat, SCDs on        BP  Min: 121/75  Max: 140/108  Pulse  Av  Min: 110  Max: 124  Resp  Av.7  Min: 16  Max: 18  Temp  Av.9 °C (98.5 °F)  Min: 36.7 °C (98.1 °F)  Max: 37.3 °C (99.1 °F)  SpO2  Av.3 %  Min: 94 %  Max: 95 %    No Data Recorded                      Intake/Output       19 - 19 0619 - 19 0659       Total  Total       Intake    Total Intake -- -- -- -- -- --       Output    Urine  300  400 700  --  -- --    Number of Times Voided 1 x 8 x 9 x -- -- --    Urine Void (mL) 300 400 700 -- -- --    Stool  --  -- --  --  -- --    Number of Times Stooled 1 x 1 x 2 x -- -- --    Total Output 300 400 700 -- -- --       Net I/O     -300 -400 -700 -- -- --            Intake/Output Summary (Last 24 hours) at 19 0807  Last data filed at 19 8435   Gross per 24 hour   Intake                0 ml   Output              700 ml   Net             -700 ml            • enoxaparin (LOVENOX) injection  40 mg Q12HRS   • polyethylene glycol/lytes  1 Packet DAILY   • metoprolol SR  25 mg Q DAY   • potassium chloride SA  40 mEq DAILY   • Pharmacy Consult Request  1 Each PHARMACY TO DOSE   • MD ALERT...DO NOT ADMINISTER  NSAIDS or ASPIRIN unless ORDERED By Neurosurgery  1 Each PRN   • magnesium hydroxide  30 mL QDAY PRN   • acetaminophen  500 mg Q6HRS PRN   • diphenhydrAMINE  25 mg Q6HRS PRN   • cyclobenzaprine  10 mg Q8HRS PRN   • vitamin D  5,000 Units DAILY   • calcium carbonate  500 mg BID   • benzocaine-menthol  1 Lozenge Q2HRS PRN   • ondansetron  4 mg Q4HRS PRN   • oxyCODONE immediate-release  5 mg Q4HRS PRN    Or   • oxyCODONE immediate-release  10 mg Q4HRS PRN   • ondansetron  4 mg Q4HRS PRN   • promethazine  12.5-25 mg Q4HRS PRN   • promethazine  12.5-25 mg Q4HRS PRN   • prochlorperazine  5-10 mg Q4HRS PRN       Assessment and Plan:  Hopital day #12  POD #7  L4-S1 laminectomy, aborted L2-L4 laminectomies r/t intraop loss of neuromonitoring signals     Re: intraop loss of signals, transient c/o UE N/T: MRI cervical spine clear as reviewed with Dr. Ruelas       Post op LLE proximal weakness, pain with flexion, paresthesia: improving, residual LS multilevel stenosis pn post op MRI LS as expected given limited surgery as summarized above     Chem DVT prophylaxis: Lovenox started    Continue to mobilize, PT, OT working with patient,     No plans for further neurosurgery during this hospital admit.   Continued however slow improvements rehab placement recommended and prev ordered for LE weakness, generalized deconditioning. Pt aware he may require additional spine surgery in the future but not this hospital stay. Strogly recommend weight loss prior to further elective surgery.      OK to d/c to rehab from neurosurgery standpoint, OK to d/c to home with HH if cleared by therapies   Pt will need 2 week f/u appt in office after d/c, gave contact information       Patient agrees with treatment plan.   Case discussed with Dr. Ruelas.

## 2019-06-25 NOTE — PROGRESS NOTES
Pt A/Ox4. Reports numbness/tingling left toes, baseline. PIV present and patent. No pain at this time. Pt expressing he wants to go home today, educated on POC and plan for therapy. Beckie, PT notified, will work w/ pt today and assess needs.

## 2019-06-25 NOTE — FACE TO FACE
Face to Face Note  -  Durable Medical Equipment    Andressa Ta M.D. - NPI: [id number]  I certify that this patient is under my care and that they have had a durable medical equipment(DME)face to face encounter by myself that meets the physician DME face-to-face encounter requirements with this patient on:    Date of encounter:   Patient:                    MRN:                       YOB: 2018  Sammy Edmonds  [id number]  1995     The encounter with the patient was in whole, or in part, for the following medical condition, which is the primary reason for durable medical equipment:  Post-Op Surgery    I certify that, based on my findings, the following durable medical equipment is medically necessary:  Walkers.    HOME O2 Saturation Measurements:(Values must be present for Home Oxygen orders)         ,     ,         My Clinical findings support the need for the above equipment due to:  Abnormal Gait    Supporting Symptoms: post laminectomy, incomplete, LE weakness     ------------------------------------------------------------------------------------------------------------------    Face to Face Supporting Documentation - Home Health    The encounter with this patient was in whole or in part the primary reason for home health admission.    Date of encounter:   Patient:                    MRN:                       YOB: 2018  Sammy Sahu Grey  [id number]  1995     Home health to see patient for:  Wound Care, Physical Therapy evaluation and treatment and Occupational therapy evaluation and treatment    Skilled need for:  Surgical Aftercare pt/ot/surgical site bandage    Skilled nursing interventions to include:  Wound Care    Homebound evidenced status by:  Need the aid of supportive devices such as crutches, canes, wheelchairs or walkers. Leaving home must require a considerable and taxing effort. There must exist a normal inability to leave the  home.    Community Physician to provide follow up care: Pcp Pt States None     Optional Interventions    Wound information & treatment:    Home Infusion Therapy orders:    Line/Drain/Airway:    I certify the face to face encounter for this home care referral meets the CMS requirements and the encounter/clinical assessment with the patient was, in whole, or in part, for the medical condition(s) listed above, which is the primary reason for home health care. Based on my clinical findings: the service(s) are medically necessary, support the need for home health care, and the homebound criteria are met.  I certify that this patient has had a face to face encounter by myself.  Andressa Ta M.D. - NPI: [id number]    *Debility, frailty and advanced age in the absence of an acute deterioration or exacerbation of a condition do not qualify a patient for home health.

## 2019-06-25 NOTE — DISCHARGE INSTRUCTIONS
Discharge Instructions    Discharged to home by car with relative. Discharged via wheelchair, hospital escort: Yes.  Special equipment needed: Walker    Be sure to schedule a follow-up appointment with your primary care doctor or any specialists as instructed.     Discharge Plan:   Diet Plan: Discussed  Activity Level: Discussed  Confirmed Follow up Appointment: Patient to Call and Schedule Appointment  Confirmed Symptoms Management: Discussed  Medication Reconciliation Updated: Yes  Influenza Vaccine Indication: Not indicated: Previously immunized this influenza season and > 8 years of age    I understand that a diet low in cholesterol, fat, and sodium is recommended for good health. Unless I have been given specific instructions below for another diet, I accept this instruction as my diet prescription.   Other diet: Low Fat    Special Instructions:    Dx:  Severe spinal stenosis; status post lumbar 4-sacral 1 laminectomy and discectomy  Condition:  Stable  Activity:  Continue walking daily with front wheel walker, guard rails for stairs  Meds:  Metoprolol bid  F/U:  [facility], call for appt; Dr. Ruelas in 2 weeks, call for appt  Return to ER if any falls, increased weakness, urinary/bowel incontinence, retention  · Is patient discharged on Warfarin / Coumadin?   No     Metoprolol tablets  What is this medicine?  METOPROLOL (me TOE proe lole) is a beta-blocker. Beta-blockers reduce the workload on the heart and help it to beat more regularly. This medicine is used to treat high blood pressure and to prevent chest pain. It is also used to after a heart attack and to prevent an additional heart attack from occurring.  This medicine may be used for other purposes; ask your health care provider or pharmacist if you have questions.  COMMON BRAND NAME(S): Lopressor  What should I tell my health care provider before I take this medicine?  They need to know if you have any of these  conditions:  -diabetes  -heart or vessel disease like slow heart rate, worsening heart failure, heart block, sick sinus syndrome or Raynaud's disease  -kidney disease  -liver disease  -lung or breathing disease, like asthma or emphysema  -pheochromocytoma  -thyroid disease  -an unusual or allergic reaction to metoprolol, other beta-blockers, medicines, foods, dyes, or preservatives  -pregnant or trying to get pregnant  -breast-feeding  How should I use this medicine?  Take this medicine by mouth with a drink of water. Follow the directions on the prescription label. Take this medicine immediately after meals. Take your doses at regular intervals. Do not take more medicine than directed. Do not stop taking this medicine suddenly. This could lead to serious heart-related effects.  Talk to your pediatrician regarding the use of this medicine in children. Special care may be needed.  Overdosage: If you think you have taken too much of this medicine contact a poison control center or emergency room at once.  NOTE: This medicine is only for you. Do not share this medicine with others.  What if I miss a dose?  If you miss a dose, take it as soon as you can. If it is almost time for your next dose, take only that dose. Do not take double or extra doses.  What may interact with this medicine?  This medicine may interact with the following medications:  -certain medicines for blood pressure, heart disease, irregular heart beat  -certain medicines for depression like monoamine oxidase (MAO) inhibitors, fluoxetine, or paroxetine  -clonidine  -dobutamine  -epinephrine  -isoproterenol  -reserpine  This list may not describe all possible interactions. Give your health care provider a list of all the medicines, herbs, non-prescription drugs, or dietary supplements you use. Also tell them if you smoke, drink alcohol, or use illegal drugs. Some items may interact with your medicine.  What should I watch for while using this  medicine?  Visit your doctor or health care professional for regular check ups. Contact your doctor right away if your symptoms worsen. Check your blood pressure and pulse rate regularly. Ask your health care professional what your blood pressure and pulse rate should be, and when you should contact them.  You may get drowsy or dizzy. Do not drive, use machinery, or do anything that needs mental alertness until you know how this medicine affects you. Do not sit or stand up quickly, especially if you are an older patient. This reduces the risk of dizzy or fainting spells. Contact your doctor if these symptoms continue. Alcohol may interfere with the effect of this medicine. Avoid alcoholic drinks.  What side effects may I notice from receiving this medicine?  Side effects that you should report to your doctor or health care professional as soon as possible:  -allergic reactions like skin rash, itching or hives  -cold or numb hands or feet  -depression  -difficulty breathing  -faint  -fever with sore throat  -irregular heartbeat, chest pain  -rapid weight gain  -swollen legs or ankles  Side effects that usually do not require medical attention (report to your doctor or health care professional if they continue or are bothersome):  -anxiety or nervousness  -change in sex drive or performance  -dry skin  -headache  -nightmares or trouble sleeping  -short term memory loss  -stomach upset or diarrhea  -unusually tired  This list may not describe all possible side effects. Call your doctor for medical advice about side effects. You may report side effects to FDA at 3-212-FDA-6603.  Where should I keep my medicine?  Keep out of the reach of children.  Store at room temperature between 15 and 30 degrees C (59 and 86 degrees F). Throw away any unused medicine after the expiration date.  NOTE: This sheet is a summary. It may not cover all possible information. If you have questions about this medicine, talk to your doctor,  pharmacist, or health care provider.  © 2018 [employer]/Gold Standard (2014-08-22 14:40:36)    Depression / Suicide Risk    As you are discharged from this [facility] facility, it is important to learn how to keep safe from harming yourself.    Recognize the warning signs:  · Abrupt changes in personality, positive or negative- including increase in energy   · Giving away possessions  · Change in eating patterns- significant weight changes-  positive or negative  · Change in sleeping patterns- unable to sleep or sleeping all the time   · Unwillingness or inability to communicate  · Depression  · Unusual sadness, discouragement and loneliness  · Talk of wanting to die  · Neglect of personal appearance   · Rebelliousness- reckless behavior  · Withdrawal from people/activities they love  · Confusion- inability to concentrate     If you or a loved one observes any of these behaviors or has concerns about self-harm, here's what you can do:  · Talk about it- your feelings and reasons for harming yourself  · Remove any means that you might use to hurt yourself (examples: pills, rope, extension cords, firearm)  · Get professional help from the community (Mental Health, Substance Abuse, psychological counseling)  · Do not be alone:Call your Safe Contact- someone whom you trust who will be there for you.  · Call your local CRISIS HOTLINE 697-0709 or 429-092-3390  · Call your local Children's Mobile Crisis Response Team [place] (786) 699-1675 or www.WebVisible  · Call the toll free National Suicide Prevention Hotlines   · National Suicide Prevention Lifeline 000-161-IQMX (2366)  · National Hope Line Network 800-SUICIDE (029-7490)

## 2019-06-26 NOTE — PROGRESS NOTES
Monitor Summary: SR/ST , with one occurrence tachycardia to 140 per strip from monitor room. D/c'd 1250

## 2019-06-26 NOTE — DISCHARGE SUMMARY
Discharge Summary    CHIEF COMPLAINT ON ADMISSION  Chief Complaint   Patient presents with   • Knee Pain   • Leg Pain   • Fall       Reason for Admission  knee pain     Admission Date  6/14/2019    CODE STATUS  FULL    HPI & HOSPITAL COURSE  This is a 23 y.o. male who presented 6/14/2019 with knee pain and back pain.  Patient reported that he had had the symptoms for a couple of weeks now but was much worse over the next few days.  He was seen in the hospital approximately 1 week prior and was treated with muscle relaxants and steroids.  Despite this he continued to have pain.  Imaging in the emergency department showed severe spinal stenosis.  He denied any bowel or bladder incontinence, numbness or tingling.  He reported 2 or 3 falls secondary to pain. His only complaint is that of pain which improved with sitting or bending.  He was also found to have tachycardia with his heart rate was persistently in the 120s.  He was seen by Dr. Ruelas, who performed a decompressive laminectomy, however this was aborted as they lost the neurosignaling during the surgery.  The patient actually improved quite well over the following week, gaining strength with walking every day.  He was able to climb a flight of stairs using the handrails, and worked well with physical therapy.  We wanted to get him home health on discharge, but he did not have insurance to support this service.  On day of dc, I did have him get up and walk with me, with his father present as well.  They were both very comfortable with him going home with his father's help.      He will need to follow up with Dr. Ruelas regarding further surgery.  He is also encouraged significant weight loss, which will help his symptoms.         Therefore, he is discharged in good and stable condition to home with close outpatient follow-up.    The patient met 2-midnight criteria for an inpatient stay at the time of discharge.    Discharge Date  6/25/2019    FOLLOW UP ITEMS POST  DISCHARGE  F/U:  [facility], call for appt; Dr. Ruelas in 2 weeks, call for appt  Return to ER if any falls, increased weakness, urinary/bowel incontinence, retention    DISCHARGE DIAGNOSES  Principal Problem:    Spinal stenosis of lumbar region without neurogenic claudication POA: Yes  Active Problems:    Low back pain POA: Yes    Sinus tachycardia POA: Yes    Left leg weakness POA: Yes  Resolved Problems:    Left sided numbness POA: Unknown    SIRS (systemic inflammatory response syndrome) (HCC) POA: Yes      FOLLOW UP  No future appointments.  Samir Ruelas M.D.  [id number] [address]  [phone]    Schedule an appointment as soon as possible for a visit   Call for outpatient appointment    [facility], Emergency Dept  [address] [facility] [address] [phone]    Return for worsening pain, weakness or other concerns    [address] [address] [phone]    Please call to schedule your appointment to establish with a Primary Care Physician. They do have a sliding fee scale based on income. Thank you     Pcp Pt States None            MEDICATIONS ON DISCHARGE     Medication List      START taking these medications      Instructions   metoprolol SR 25 MG Tb24  Commonly known as:  TOPROL XL   Take 1 Tab by mouth every day for 30 days.  Dose:  25 mg            Allergies  No Known Allergies    DIET  No orders of the defined types were placed in this encounter.      ACTIVITY  As tolerated.  Weight bearing as tolerated    CONSULTATIONS  Dr. Ruelas, neurosurgery    PROCEDURES  L4 and L5 and S1 decompressive laminectomy,   bilateral medial facetectomies and bilateral foraminotomies with aborted L2-L3   and L3-L4 laminectomies.    LABORATORY  Lab Results   Component Value Date    SODIUM 138 06/25/2019    POTASSIUM 4.4 06/25/2019    CHLORIDE 104 06/25/2019    CO2 23 06/25/2019    GLUCOSE 101 (H)  06/25/2019    BUN 12 06/25/2019    CREATININE 0.75 06/25/2019        Lab Results   Component Value Date    WBC 16.0 (H) 06/21/2019    HEMOGLOBIN 14.8 06/21/2019    HEMATOCRIT 45.1 06/21/2019    PLATELETCT 388 06/21/2019        Total time of the discharge process exceeds 43 minutes.

## 2019-07-05 ENCOUNTER — APPOINTMENT (OUTPATIENT)
Dept: RADIOLOGY | Facility: MEDICAL CENTER | Age: 24
DRG: 519 | End: 2019-07-05
Attending: EMERGENCY MEDICINE
Payer: COMMERCIAL

## 2019-07-05 ENCOUNTER — HOSPITAL ENCOUNTER (INPATIENT)
Facility: MEDICAL CENTER | Age: 24
LOS: 15 days | DRG: 519 | End: 2019-07-22
Attending: EMERGENCY MEDICINE | Admitting: INTERNAL MEDICINE
Payer: COMMERCIAL

## 2019-07-05 DIAGNOSIS — M48.062 SPINAL STENOSIS OF LUMBAR REGION WITH NEUROGENIC CLAUDICATION: ICD-10-CM

## 2019-07-05 DIAGNOSIS — R20.2 PARESTHESIAS: ICD-10-CM

## 2019-07-05 DIAGNOSIS — R20.0 NUMBNESS: ICD-10-CM

## 2019-07-05 PROBLEM — I10 ESSENTIAL HYPERTENSION: Status: ACTIVE | Noted: 2019-07-05

## 2019-07-05 LAB
ALBUMIN SERPL BCP-MCNC: 4 G/DL (ref 3.2–4.9)
ALBUMIN/GLOB SERPL: 1.2 G/DL
ALP SERPL-CCNC: 71 U/L (ref 30–99)
ALT SERPL-CCNC: 43 U/L (ref 2–50)
ANION GAP SERPL CALC-SCNC: 10 MMOL/L (ref 0–11.9)
AST SERPL-CCNC: 24 U/L (ref 12–45)
BASOPHILS # BLD AUTO: 0.3 % (ref 0–1.8)
BASOPHILS # BLD: 0.03 K/UL (ref 0–0.12)
BILIRUB SERPL-MCNC: 0.4 MG/DL (ref 0.1–1.5)
BUN SERPL-MCNC: 10 MG/DL (ref 8–22)
CALCIUM SERPL-MCNC: 9.7 MG/DL (ref 8.5–10.5)
CHLORIDE SERPL-SCNC: 104 MMOL/L (ref 96–112)
CO2 SERPL-SCNC: 26 MMOL/L (ref 20–33)
CREAT SERPL-MCNC: 0.6 MG/DL (ref 0.5–1.4)
CRP SERPL HS-MCNC: 2.26 MG/DL (ref 0–0.75)
EOSINOPHIL # BLD AUTO: 0.11 K/UL (ref 0–0.51)
EOSINOPHIL NFR BLD: 1.1 % (ref 0–6.9)
ERYTHROCYTE [DISTWIDTH] IN BLOOD BY AUTOMATED COUNT: 40 FL (ref 35.9–50)
ERYTHROCYTE [SEDIMENTATION RATE] IN BLOOD BY WESTERGREN METHOD: 41 MM/HOUR (ref 0–15)
GLOBULIN SER CALC-MCNC: 3.4 G/DL (ref 1.9–3.5)
GLUCOSE SERPL-MCNC: 95 MG/DL (ref 65–99)
HCT VFR BLD AUTO: 45.8 % (ref 42–52)
HGB BLD-MCNC: 15 G/DL (ref 14–18)
IMM GRANULOCYTES # BLD AUTO: 0.02 K/UL (ref 0–0.11)
IMM GRANULOCYTES NFR BLD AUTO: 0.2 % (ref 0–0.9)
LYMPHOCYTES # BLD AUTO: 2.29 K/UL (ref 1–4.8)
LYMPHOCYTES NFR BLD: 22.4 % (ref 22–41)
MCH RBC QN AUTO: 27.6 PG (ref 27–33)
MCHC RBC AUTO-ENTMCNC: 32.8 G/DL (ref 33.7–35.3)
MCV RBC AUTO: 84.3 FL (ref 81.4–97.8)
MONOCYTES # BLD AUTO: 0.57 K/UL (ref 0–0.85)
MONOCYTES NFR BLD AUTO: 5.6 % (ref 0–13.4)
NEUTROPHILS # BLD AUTO: 7.22 K/UL (ref 1.82–7.42)
NEUTROPHILS NFR BLD: 70.4 % (ref 44–72)
NRBC # BLD AUTO: 0 K/UL
NRBC BLD-RTO: 0 /100 WBC
PLATELET # BLD AUTO: 420 K/UL (ref 164–446)
PMV BLD AUTO: 8.6 FL (ref 9–12.9)
POTASSIUM SERPL-SCNC: 4 MMOL/L (ref 3.6–5.5)
PROT SERPL-MCNC: 7.4 G/DL (ref 6–8.2)
RBC # BLD AUTO: 5.43 M/UL (ref 4.7–6.1)
SODIUM SERPL-SCNC: 140 MMOL/L (ref 135–145)
WBC # BLD AUTO: 10.2 K/UL (ref 4.8–10.8)

## 2019-07-05 PROCEDURE — 80053 COMPREHEN METABOLIC PANEL: CPT

## 2019-07-05 PROCEDURE — 85652 RBC SED RATE AUTOMATED: CPT

## 2019-07-05 PROCEDURE — A9270 NON-COVERED ITEM OR SERVICE: HCPCS | Performed by: INTERNAL MEDICINE

## 2019-07-05 PROCEDURE — 36415 COLL VENOUS BLD VENIPUNCTURE: CPT

## 2019-07-05 PROCEDURE — 0SB20ZZ EXCISION OF LUMBAR VERTEBRAL DISC, OPEN APPROACH: ICD-10-PCS | Performed by: NEUROLOGICAL SURGERY

## 2019-07-05 PROCEDURE — 96375 TX/PRO/DX INJ NEW DRUG ADDON: CPT

## 2019-07-05 PROCEDURE — 86140 C-REACTIVE PROTEIN: CPT

## 2019-07-05 PROCEDURE — 96374 THER/PROPH/DIAG INJ IV PUSH: CPT

## 2019-07-05 PROCEDURE — 99285 EMERGENCY DEPT VISIT HI MDM: CPT

## 2019-07-05 PROCEDURE — 51798 US URINE CAPACITY MEASURE: CPT

## 2019-07-05 PROCEDURE — G0378 HOSPITAL OBSERVATION PER HR: HCPCS

## 2019-07-05 PROCEDURE — 01NB0ZZ RELEASE LUMBAR NERVE, OPEN APPROACH: ICD-10-PCS | Performed by: NEUROLOGICAL SURGERY

## 2019-07-05 PROCEDURE — 700111 HCHG RX REV CODE 636 W/ 250 OVERRIDE (IP): Performed by: EMERGENCY MEDICINE

## 2019-07-05 PROCEDURE — 700102 HCHG RX REV CODE 250 W/ 637 OVERRIDE(OP): Performed by: INTERNAL MEDICINE

## 2019-07-05 PROCEDURE — 85025 COMPLETE CBC W/AUTO DIFF WBC: CPT

## 2019-07-05 PROCEDURE — 99220 PR INITIAL OBSERVATION CARE,LEVL III: CPT | Performed by: INTERNAL MEDICINE

## 2019-07-05 RX ORDER — OXYCODONE HYDROCHLORIDE 5 MG/1
5 TABLET ORAL
Status: DISCONTINUED | OUTPATIENT
Start: 2019-07-05 | End: 2019-07-10

## 2019-07-05 RX ORDER — ACETAMINOPHEN 325 MG/1
650 TABLET ORAL EVERY 6 HOURS PRN
Status: DISCONTINUED | OUTPATIENT
Start: 2019-07-05 | End: 2019-07-12

## 2019-07-05 RX ORDER — AMOXICILLIN 250 MG
2 CAPSULE ORAL 2 TIMES DAILY
Status: DISCONTINUED | OUTPATIENT
Start: 2019-07-05 | End: 2019-07-22 | Stop reason: HOSPADM

## 2019-07-05 RX ORDER — CYCLOBENZAPRINE HCL 10 MG
10 TABLET ORAL 3 TIMES DAILY PRN
Status: DISCONTINUED | OUTPATIENT
Start: 2019-07-05 | End: 2019-07-10

## 2019-07-05 RX ORDER — DIAZEPAM 2 MG/1
1 TABLET ORAL EVERY 8 HOURS PRN
Status: DISCONTINUED | OUTPATIENT
Start: 2019-07-05 | End: 2019-07-10

## 2019-07-05 RX ORDER — METOPROLOL SUCCINATE 25 MG/1
25 TABLET, EXTENDED RELEASE ORAL DAILY
Status: DISCONTINUED | OUTPATIENT
Start: 2019-07-05 | End: 2019-07-22 | Stop reason: HOSPADM

## 2019-07-05 RX ORDER — GABAPENTIN 100 MG/1
100 CAPSULE ORAL 3 TIMES DAILY
Status: DISCONTINUED | OUTPATIENT
Start: 2019-07-05 | End: 2019-07-07

## 2019-07-05 RX ORDER — HYDROMORPHONE HYDROCHLORIDE 1 MG/ML
1 INJECTION, SOLUTION INTRAMUSCULAR; INTRAVENOUS; SUBCUTANEOUS ONCE
Status: COMPLETED | OUTPATIENT
Start: 2019-07-05 | End: 2019-07-05

## 2019-07-05 RX ORDER — POLYETHYLENE GLYCOL 3350 17 G/17G
1 POWDER, FOR SOLUTION ORAL
Status: DISCONTINUED | OUTPATIENT
Start: 2019-07-05 | End: 2019-07-22 | Stop reason: HOSPADM

## 2019-07-05 RX ORDER — BISACODYL 10 MG
10 SUPPOSITORY, RECTAL RECTAL
Status: DISCONTINUED | OUTPATIENT
Start: 2019-07-05 | End: 2019-07-22 | Stop reason: HOSPADM

## 2019-07-05 RX ORDER — OXYCODONE HYDROCHLORIDE 10 MG/1
10 TABLET ORAL
Status: DISCONTINUED | OUTPATIENT
Start: 2019-07-05 | End: 2019-07-10

## 2019-07-05 RX ORDER — HYDROMORPHONE HYDROCHLORIDE 1 MG/ML
0.5 INJECTION, SOLUTION INTRAMUSCULAR; INTRAVENOUS; SUBCUTANEOUS
Status: DISCONTINUED | OUTPATIENT
Start: 2019-07-05 | End: 2019-07-10

## 2019-07-05 RX ORDER — NAPROXEN 500 MG/1
500 TABLET ORAL 2 TIMES DAILY WITH MEALS
COMMUNITY
End: 2019-07-05

## 2019-07-05 RX ORDER — LORAZEPAM 2 MG/ML
2 INJECTION INTRAMUSCULAR ONCE
Status: COMPLETED | OUTPATIENT
Start: 2019-07-05 | End: 2019-07-05

## 2019-07-05 RX ORDER — CYCLOBENZAPRINE HCL 10 MG
10 TABLET ORAL 3 TIMES DAILY PRN
COMMUNITY
Start: 2019-07-26

## 2019-07-05 RX ADMIN — METOPROLOL SUCCINATE 25 MG: 25 TABLET, EXTENDED RELEASE ORAL at 18:13

## 2019-07-05 RX ADMIN — GABAPENTIN 100 MG: 100 CAPSULE ORAL at 18:13

## 2019-07-05 RX ADMIN — LORAZEPAM 2 MG: 2 INJECTION INTRAMUSCULAR; INTRAVENOUS at 13:34

## 2019-07-05 RX ADMIN — HYDROMORPHONE HYDROCHLORIDE 1 MG: 1 INJECTION, SOLUTION INTRAMUSCULAR; INTRAVENOUS; SUBCUTANEOUS at 14:13

## 2019-07-05 ASSESSMENT — ENCOUNTER SYMPTOMS
HEADACHES: 0
SEIZURES: 0
EYE REDNESS: 0
WEAKNESS: 0
ABDOMINAL PAIN: 0
WHEEZING: 0
CHILLS: 0
HEMOPTYSIS: 0
SHORTNESS OF BREATH: 0
FALLS: 0
CONSTIPATION: 0
EYE PAIN: 0
INSOMNIA: 0
DIARRHEA: 0
COUGH: 0
FOCAL WEAKNESS: 0
NAUSEA: 0
NERVOUS/ANXIOUS: 0
LOSS OF CONSCIOUSNESS: 0
DIZZINESS: 0
PALPITATIONS: 0
BACK PAIN: 1
MYALGIAS: 1
BLOOD IN STOOL: 0
TREMORS: 0
VOMITING: 0
FEVER: 0

## 2019-07-05 ASSESSMENT — COGNITIVE AND FUNCTIONAL STATUS - GENERAL
WALKING IN HOSPITAL ROOM: A LOT
MOVING FROM LYING ON BACK TO SITTING ON SIDE OF FLAT BED: A LOT
DRESSING REGULAR UPPER BODY CLOTHING: A LITTLE
DRESSING REGULAR LOWER BODY CLOTHING: A LITTLE
SUGGESTED CMS G CODE MODIFIER DAILY ACTIVITY: CK
TOILETING: A LITTLE
MOBILITY SCORE: 14
HELP NEEDED FOR BATHING: A LITTLE
STANDING UP FROM CHAIR USING ARMS: A LOT
PERSONAL GROOMING: A LITTLE
DAILY ACTIVITIY SCORE: 19
MOVING TO AND FROM BED TO CHAIR: A LOT
SUGGESTED CMS G CODE MODIFIER MOBILITY: CL
CLIMB 3 TO 5 STEPS WITH RAILING: A LOT

## 2019-07-05 ASSESSMENT — LIFESTYLE VARIABLES
HAVE PEOPLE ANNOYED YOU BY CRITICIZING YOUR DRINKING: NO
EVER_SMOKED: YES
TOTAL SCORE: 0
EVER HAD A DRINK FIRST THING IN THE MORNING TO STEADY YOUR NERVES TO GET RID OF A HANGOVER: NO
AVERAGE NUMBER OF DAYS PER WEEK YOU HAVE A DRINK CONTAINING ALCOHOL: 0
ALCOHOL_USE: YES
EVER FELT BAD OR GUILTY ABOUT YOUR DRINKING: NO
HAVE YOU EVER FELT YOU SHOULD CUT DOWN ON YOUR DRINKING: NO
ON A TYPICAL DAY WHEN YOU DRINK ALCOHOL HOW MANY DRINKS DO YOU HAVE: 3
TOTAL SCORE: 0
TOTAL SCORE: 0
HOW MANY TIMES IN THE PAST YEAR HAVE YOU HAD 5 OR MORE DRINKS IN A DAY: 4
CONSUMPTION TOTAL: POSITIVE

## 2019-07-05 ASSESSMENT — COPD QUESTIONNAIRES
DURING THE PAST 4 WEEKS HOW MUCH DID YOU FEEL SHORT OF BREATH: NONE/LITTLE OF THE TIME
HAVE YOU SMOKED AT LEAST 100 CIGARETTES IN YOUR ENTIRE LIFE: YES
DO YOU EVER COUGH UP ANY MUCUS OR PHLEGM?: NO/ONLY WITH OCCASIONAL COLDS OR INFECTIONS
IN THE PAST 12 MONTHS DO YOU DO LESS THAN YOU USED TO BECAUSE OF YOUR BREATHING PROBLEMS: DISAGREE/UNSURE

## 2019-07-05 ASSESSMENT — PATIENT HEALTH QUESTIONNAIRE - PHQ9
6. FEELING BAD ABOUT YOURSELF - OR THAT YOU ARE A FAILURE OR HAVE LET YOURSELF OR YOUR FAMILY DOWN: NOT AL ALL
1. LITTLE INTEREST OR PLEASURE IN DOING THINGS: SEVERAL DAYS
9. THOUGHTS THAT YOU WOULD BE BETTER OFF DEAD, OR OF HURTING YOURSELF: NOT AT ALL
SUM OF ALL RESPONSES TO PHQ QUESTIONS 1-9: 2
4. FEELING TIRED OR HAVING LITTLE ENERGY: NOT AT ALL
5. POOR APPETITE OR OVEREATING: NOT AT ALL
2. FEELING DOWN, DEPRESSED, IRRITABLE, OR HOPELESS: NOT AT ALL
SUM OF ALL RESPONSES TO PHQ9 QUESTIONS 1 AND 2: 1
8. MOVING OR SPEAKING SO SLOWLY THAT OTHER PEOPLE COULD HAVE NOTICED. OR THE OPPOSITE, BEING SO FIGETY OR RESTLESS THAT YOU HAVE BEEN MOVING AROUND A LOT MORE THAN USUAL: NOT AT ALL
7. TROUBLE CONCENTRATING ON THINGS, SUCH AS READING THE NEWSPAPER OR WATCHING TELEVISION: NOT AT ALL
3. TROUBLE FALLING OR STAYING ASLEEP OR SLEEPING TOO MUCH: SEVERAL DAYS

## 2019-07-05 NOTE — ED PROVIDER NOTES
ED Provider Note    ER PROVIDER NOTE      CHIEF COMPLAINT  Chief Complaint   Patient presents with   • Numbness     patient c/o bilateral leg numbness x 3 days. patient had lumbar laminectomy on 6/17/19.        HPI  Sammy Edmonds is a 23 y.o. male who presents to the emergency department complaining of bilateral leg numbness and weakness.  Patient reports he had a lumbar laminectomy on 17 June that was aborted  with Dr. Ruelas.  Since Tuesday he has had increased numbness and tingling to his legs and now is to the point where he has difficulty walking due to weakness and numbness.  He reports that it is primarily from the knee down, both legs, throughout all sides of the lower legs.  He denies any other focal weakness numbness or tingling.  He denies any bowel or bladder incontinence or retention.  He denies any fevers or chills.  No abdominal pain.  No back pain        REVIEW OF SYSTEMS  Pertinent positives include bilateral leg numbness and weakness. Pertinent negatives include no back pain. See HPI for details. All other systems reviewed and are negative.    PAST MEDICAL HISTORY   has a past medical history of Morbid obesity (HCC) and Tachycardia.    SURGICAL HISTORY   has a past surgical history that includes lumbar laminectomy diskectomy (N/A, 6/17/2019).    FAMILY HISTORY  History reviewed. No pertinent family history.    SOCIAL HISTORY  Social History     Social History   • Marital status: Single     Spouse name: N/A   • Number of children: N/A   • Years of education: N/A     Social History Main Topics   • Smoking status: Former Smoker     Packs/day: 0.25     Types: Cigarettes     Quit date: 12/28/2018   • Smokeless tobacco: Never Used   • Alcohol use Yes      Comment: twice weekly   • Drug use: Yes     Types: Inhaled      Comment: occasional marijuana   • Sexual activity: Not on file     Other Topics Concern   • Not on file     Social History Narrative   • No narrative on file      History   Drug Use  "  • Types: Inhaled     Comment: occasional marijuana       CURRENT MEDICATIONS  Home Medications     Reviewed by Josie Maguire (Pharmacy Tech) on 07/05/19 at 1412  Med List Status: Complete   Medication Last Dose Status   cyclobenzaprine (FLEXERIL) 10 MG Tab 7/4/2019 Active   metoprolol SR (TOPROL XL) 25 MG TABLET SR 24 HR 7/4/2019 Active   naproxen (NAPROSYN) 500 MG Tab 7/4/2019 Active                ALLERGIES  Allergies   Allergen Reactions   • Other Misc      Cat allergy- \"start sneezing\"         PHYSICAL EXAM  VITAL SIGNS: /84   Pulse (!) 120 Comment: R.N. notified  Temp 36.6 °C (97.9 °F) (Temporal)   Resp 18   Ht 1.829 m (6')   Wt (!) 158 kg (348 lb 5.2 oz)   SpO2 94%   BMI 47.24 kg/m²   Pulse ox interpretation: I interpret this pulse ox as normal.    Constitutional: Alert in no apparent distress.  HENT: No signs of trauma, Bilateral external ears normal, Nose normal.   Eyes: Pupils are equal and reactive, Conjunctiva normal, Non-icteric.   Neck: Normal range of motion, No tenderness, Supple, No stridor.   Lymphatic: No lymphadenopathy noted.   Cardiovascular: Regular rate and rhythm, no murmurs.   Thorax & Lungs: Normal breath sounds, No respiratory distress, No wheezing, No chest tenderness.   Abdomen: Bowel sounds normal, Soft, No tenderness, No masses, No pulsatile masses. No peritoneal signs.  Skin: Warm, Dry, No erythema, No rash.   Back: Midline lumbar surgical incision is clean dry intact without any surrounding erythema fluctuance or induration, no tenderness no bony tenderness, No CVA tenderness.   Extremities: Intact distal pulses, No edema, No tenderness, No cyanosis,   Musculoskeletal: Good range of motion in all major joints. No tenderness to palpation or major deformities noted.   Neurologic: Alert,  speech is appropriate or not slurred, upper extremities bilaterally exhibit no drift 5 out of 5 strength with bilateral bicep/tricep/, sensation intact to light touch " throughout upper extremities. Lower extremities strength 5 out of 5 thigh extension/flexion/abduction/adduction, knee extension/flexion, dorsiflexion plantar flexion 4/5 BL. No clonus.  2+ patella reflexes.  Sensation is diminished to light touch from knees down bilaterally on both lateral and medial aspects.  Patient unable to ambulate, gait not tested  Psychiatric: Affect normal, Judgment normal, Mood normal.     Rectal exam performed with appropriate sensation as well as normal rectal tone    Post void residual 30cc    DIAGNOSTIC STUDIES / PROCEDURES      LABS  Labs Reviewed   CBC WITH DIFFERENTIAL - Abnormal; Notable for the following:        Result Value    MCHC 32.8 (*)     MPV 8.6 (*)     All other components within normal limits   WESTERGREN SED RATE - Abnormal; Notable for the following:     Sed Rate Westergren 41 (*)     All other components within normal limits   CRP QUANTITIVE (NON-CARDIAC) - Abnormal; Notable for the following:     Stat C-Reactive Protein 2.26 (*)     All other components within normal limits   COMP METABOLIC PANEL   ESTIMATED GFR       All labs reviewed by me.    RADIOLOGY  MR-LUMBAR SPINE-WITH & W/O    (Results Pending)   MR-THORACIC SPINE-WITH & W/O    (Results Pending)     The radiologist's interpretation of all radiological studies have been reviewed by me.    COURSE & MEDICAL DECISION MAKING  Nursing notes, VS, PMSFHx reviewed in chart.    12:39 PM Patient seen and examined at bedside. Patient will be treated with Ativan as he reports he needs sedation for MRI. Ordered for labs, MRI to evaluate his symptoms.      Review of patient's records show spinal stenosis on prior MRI here, he then had surgery and lost sensation to arms and leg    12:55 PM  Paged Dr. Crenshaw    1:02 PM  Discussed case with Dr. crenshaw, is recommended MRI of his thoracic spine as well, will consult on the patient      Discussed case with Dr. Apple for admission    Patient was unable to tolerate MRI with  anxiolysis here, and will need further anesthesia as inpatient for MRI      Decision Making:  This is a 23 y.o. male presented with bilateral lower extremity weakness and numbness.  Patient has known spinal stenosis and had recent aborted procedure with some abnormalities during the procedure itself.  At this point other than the numbness and weakness to his legs from the knee down, he has no other focal deficits, he has normal rectal tone, no urinary retention as well and no saddle anesthesia.  Is no fevers or chills or findings suggestive of infectious process, MRI is pending at the time of admission  Patient is admitted to the medical service, for continued evaluation as well as physical therapy, neurosurgery consulting as well    Patient admitted in stable condition    FINAL IMPRESSION  1. Numbness    2. Paresthesias         The note accurately reflects work and decisions made by me.  Donell Tierney  7/5/2019  7:51 PM

## 2019-07-05 NOTE — ASSESSMENT & PLAN NOTE
With paresthesias, back pain  Severe ,debility.  Weakness and pain with slower than expected advancements, more motivated today, mild drainage that is clear from the lumbar area, but no clear s/s infection, monitor closely, work with PT otdya  -decadron has been weaned off now  -continue percocet PRN, multi modal pain therapy  As needed Flexeril  PT/OT evaluation and recommendations  Neurosurgery evaluated him and he underwent L2-S1 laminectomy on July 12, 2019.  -lovenox for DVT px  -trying to work on getting patient home versus REHAB now

## 2019-07-05 NOTE — ED TRIAGE NOTES
.Sammy Edmonds  .  Chief Complaint   Patient presents with   • Numbness     patient c/o bilateral leg numbness x 3 days. patient had lumbar laminectomy on 6/17/19.      Patient BIB [facility] to triage with above complaint. .Blood Pressure: 110/54, Pulse: (!) 102, Respiration: 14, Temperature: 36.1 °C (97 °F), Height: 182.9 cm (6'), Weight: (!) 158 kg (348 lb 5.2 oz), Pulse Oximetry: 96 %    Patient to lobby and instructed to inform staff of any needs.

## 2019-07-05 NOTE — H&P
Hospital Medicine History & Physical Note    Date of Service  7/5/2019    Primary Care Physician  Pcp Pt States None    Consultants  Dr. Ruelas, [facility] [place]    Code Status  full    Chief Complaint  Numbness (patient c/o bilateral leg numbness x 3 days. patient had lumbar laminectomy on 6/17/19. )        History of Presenting Illness  23 y.o. male who presented 7/5/2019 with Numbness (patient c/o bilateral leg numbness x 3 days. patient had lumbar laminectomy on 6/17/19. )  Recently discharged on 6/25 for lumbar stenoses s/p aborted laminectomy on 6/17 by Dr. Ruelas (due to lost neurosignaling) to home with walker. Doing well until last Tuesday, complained of paresthesia and weakness both legs starting at the feet then up to knee. HAs a result he felt it was hard to bear weight and hoild steady. Today he only had 2 steps with walker and his legs gave out. Not complianing of severe pain. Denied bowel or bladder incontinence. Denied fevers or chills.     At the ED, he is afebrile, tachycardic but hemodynamically stable. Labs relatively unremarkable. Dr. Ruelas consulted. When I saw him at ED, no acute distress. Mild lower extremity discomfort , LE weakness noted.     Review of Systems  Review of Systems   Constitutional: Negative for chills and fever.   HENT: Negative for congestion, hearing loss and nosebleeds.    Eyes: Negative for pain and redness.   Respiratory: Negative for cough, hemoptysis, shortness of breath and wheezing.    Cardiovascular: Negative for chest pain and palpitations.   Gastrointestinal: Negative for abdominal pain, blood in stool, constipation, diarrhea, nausea and vomiting.   Genitourinary: Negative for dysuria, frequency and hematuria.   Musculoskeletal: Positive for back pain and myalgias. Negative for falls and joint pain.   Skin: Negative for rash.   Neurological: Negative for dizziness, tremors, focal weakness, seizures, loss of consciousness, weakness and headaches.   Psychiatric/Behavioral: The  patient is not nervous/anxious and does not have insomnia.    All other systems reviewed and are negative.      Past Medical History   has a past medical history of Morbid obesity (HCC).  Hypertension  Surgical History   has a past surgical history that includes lumbar laminectomy diskectomy (N/A, 6/17/2019).     Family History  family history is not on file.   Father had back pain due to accident from work and had surgeries  Social History   reports that he quit smoking about 6 months ago. His smoking use included Cigarettes. He smoked 0.25 packs per day. He has never used smokeless tobacco. He reports that he drinks alcohol. He reports that he uses drugs, including Inhaled.    Allergies  No Known Allergies    Medications  Prior to Admission Medications   Prescriptions Last Dose Informant Patient Reported? Taking?   cyclobenzaprine (FLEXERIL) 10 MG Tab 7/4/2019 at PM Patient Yes Yes   Sig: Take 10 mg by mouth 3 times a day as needed.   metoprolol SR (TOPROL XL) 25 MG TABLET SR 24 HR 7/4/2019 at PM Patient No No   Sig: Take 1 Tab by mouth every day for 30 days.      Facility-Administered Medications: None       Physical Exam  Temp:  [36.1 °C (97 °F)] 36.1 °C (97 °F)  Pulse:  [] 118  Resp:  [12-19] 17  BP: (110)/(54) 110/54  SpO2:  [95 %-99 %] 99 %    Physical Exam   Constitutional: He appears well-developed.   HENT:   Head: Normocephalic and atraumatic.   Eyes: Conjunctivae are normal. No scleral icterus.   Neck: Normal range of motion. Neck supple.   Cardiovascular: Regular rhythm.  Exam reveals no gallop and no friction rub.    No murmur heard.  Mild tachycardia   Pulmonary/Chest: Effort normal and breath sounds normal. No respiratory distress. He has no wheezes. He has no rales.   Abdominal: Soft. Bowel sounds are normal. He exhibits no distension. There is no tenderness. There is no rebound and no guarding.   Musculoskeletal: He exhibits tenderness (Mild, numbness, bilateral LE.). He exhibits no edema.    Neurological: He is alert.   Skin: Skin is warm.   Psychiatric: He has a normal mood and affect. His behavior is normal.       Laboratory:  Recent Labs      07/05/19   1300   WBC  10.2   RBC  5.43   HEMOGLOBIN  15.0   HEMATOCRIT  45.8   MCV  84.3   MCH  27.6   MCHC  32.8*   RDW  40.0   PLATELETCT  420   MPV  8.6*     Recent Labs      07/05/19   1300   SODIUM  140   POTASSIUM  4.0   CHLORIDE  104   CO2  26   GLUCOSE  95   BUN  10   CREATININE  0.60   CALCIUM  9.7     Recent Labs      07/05/19   1300   ALTSGPT  43   ASTSGOT  24   ALKPHOSPHAT  71   TBILIRUBIN  0.4   GLUCOSE  95                 No results for input(s): TROPONINI in the last 72 hours.    Urinalysis:    No results found     Imaging:  MR-LUMBAR SPINE-WITH & W/O    (Results Pending)   MR-THORACIC SPINE-WITH & W/O    (Results Pending)         Assessment/Plan:  I anticipate this patient is appropriate for observation status at this time.    Spinal stenosis of lumbar region s/p aborted laminectomy with neurogenic claudication- (present on admission)   Assessment & Plan    Ordered pain control and bowel regimen  Ordered gabapentin  Thoracic and lumbar MR spine pending, may need Anesthesia.  Await Dr. Ruelas, Neurosurgery recommendations.     Essential hypertension   Assessment & Plan    Continue metoprolol         VTE prophylaxis: SCDs; await MRI to r/o hematoma and also determine if procedure to be done before starting pharmacologic DVT prophylaxis  Reviewed vitals, labs, imaging, staff notes.  Discussed assessment and plan with Sammy Edmonds  Discussed with ED physician.

## 2019-07-06 LAB
ANION GAP SERPL CALC-SCNC: 10 MMOL/L (ref 0–11.9)
BUN SERPL-MCNC: 10 MG/DL (ref 8–22)
CALCIUM SERPL-MCNC: 9.5 MG/DL (ref 8.5–10.5)
CHLORIDE SERPL-SCNC: 104 MMOL/L (ref 96–112)
CO2 SERPL-SCNC: 23 MMOL/L (ref 20–33)
CREAT SERPL-MCNC: 0.62 MG/DL (ref 0.5–1.4)
ERYTHROCYTE [DISTWIDTH] IN BLOOD BY AUTOMATED COUNT: 40.7 FL (ref 35.9–50)
GLUCOSE SERPL-MCNC: 109 MG/DL (ref 65–99)
HCT VFR BLD AUTO: 42.1 % (ref 42–52)
HGB BLD-MCNC: 13.8 G/DL (ref 14–18)
MCH RBC QN AUTO: 27.8 PG (ref 27–33)
MCHC RBC AUTO-ENTMCNC: 32.8 G/DL (ref 33.7–35.3)
MCV RBC AUTO: 84.9 FL (ref 81.4–97.8)
PLATELET # BLD AUTO: 410 K/UL (ref 164–446)
PMV BLD AUTO: 8.6 FL (ref 9–12.9)
POTASSIUM SERPL-SCNC: 3.9 MMOL/L (ref 3.6–5.5)
RBC # BLD AUTO: 4.96 M/UL (ref 4.7–6.1)
SODIUM SERPL-SCNC: 137 MMOL/L (ref 135–145)
WBC # BLD AUTO: 9.8 K/UL (ref 4.8–10.8)

## 2019-07-06 PROCEDURE — 85027 COMPLETE CBC AUTOMATED: CPT

## 2019-07-06 PROCEDURE — 99225 PR SUBSEQUENT OBSERVATION CARE,LEVEL II: CPT | Performed by: HOSPITALIST

## 2019-07-06 PROCEDURE — 80048 BASIC METABOLIC PNL TOTAL CA: CPT

## 2019-07-06 PROCEDURE — 700102 HCHG RX REV CODE 250 W/ 637 OVERRIDE(OP): Performed by: INTERNAL MEDICINE

## 2019-07-06 PROCEDURE — A9270 NON-COVERED ITEM OR SERVICE: HCPCS | Performed by: INTERNAL MEDICINE

## 2019-07-06 PROCEDURE — 36415 COLL VENOUS BLD VENIPUNCTURE: CPT

## 2019-07-06 PROCEDURE — G0378 HOSPITAL OBSERVATION PER HR: HCPCS

## 2019-07-06 RX ADMIN — SENNOSIDES, DOCUSATE SODIUM 2 TABLET: 50; 8.6 TABLET, FILM COATED ORAL at 18:33

## 2019-07-06 RX ADMIN — GABAPENTIN 100 MG: 100 CAPSULE ORAL at 06:10

## 2019-07-06 RX ADMIN — GABAPENTIN 100 MG: 100 CAPSULE ORAL at 18:34

## 2019-07-06 RX ADMIN — METOPROLOL SUCCINATE 25 MG: 25 TABLET, EXTENDED RELEASE ORAL at 06:10

## 2019-07-06 RX ADMIN — SENNOSIDES, DOCUSATE SODIUM 2 TABLET: 50; 8.6 TABLET, FILM COATED ORAL at 06:10

## 2019-07-06 RX ADMIN — GABAPENTIN 100 MG: 100 CAPSULE ORAL at 12:05

## 2019-07-06 ASSESSMENT — ENCOUNTER SYMPTOMS
VOMITING: 0
SPEECH CHANGE: 0
COUGH: 0
SHORTNESS OF BREATH: 0
BACK PAIN: 1
DIAPHORESIS: 0
FLANK PAIN: 0
PALPITATIONS: 0
WHEEZING: 0
DIARRHEA: 0
TREMORS: 0
FEVER: 0
WEAKNESS: 1
CHILLS: 0
SENSORY CHANGE: 1
NECK PAIN: 0
STRIDOR: 0
TINGLING: 1
ABDOMINAL PAIN: 0

## 2019-07-06 NOTE — PROGRESS NOTES
Pt AAOX4, weakness and numbness in AP, unable to ambulate due to this. Has diminished sensation up to the knee. Voiding without difficulty, pain under control. SCDs in use. Bed alarm on. Call light in reach.

## 2019-07-06 NOTE — CARE PLAN
Problem: Communication  Goal: The ability to communicate needs accurately and effectively will improve  Outcome: PROGRESSING AS EXPECTED  Pt oriented to call light and able to make needs known     Problem: Venous Thromboembolism (VTW)/Deep Vein Thrombosis (DVT) Prevention:  Goal: Patient will participate in Venous Thrombosis (VTE)/Deep Vein Thrombosis (DVT)Prevention Measures  Outcome: PROGRESSING AS EXPECTED  SCDs in place

## 2019-07-06 NOTE — PROGRESS NOTES
[facility] Medicine Daily Progress Note    Date of Service  7/6/2019    Chief Complaint  23 y.o. male admitted 7/5/2019 with bilateral lower extremity numbness.    Hospital Course    23-year-old male history of lumbar laminectomy 6/17/2019 per Dr. crenshaw with discharge 6/25 to home with use of walker admitted with bilateral lower extremity numbness and weakness x3 days.  Patient admitted for pain control, therapy and further work-up with planned evaluation by neurosurgery Dr. Crenshaw.    Interval Problem Update    Reports persistent numbness decreased sensation from his feet up to knee region.  Legs giving away with attempted ambulation.  Unable to do MRI due to severe anxiety and leg discomfort.  Planned procedure with anesthesia.    Consultants/Specialty  Neurosurgery to consult    Code Status  Full code    Disposition  Plan additional work-up, TBD    Review of Systems  Review of Systems   Constitutional: Negative for chills, diaphoresis, fever and malaise/fatigue.   HENT: Negative for congestion.    Respiratory: Negative for cough, shortness of breath, wheezing and stridor.    Cardiovascular: Negative for chest pain and palpitations.   Gastrointestinal: Negative for abdominal pain, diarrhea and vomiting.   Genitourinary: Negative for flank pain and hematuria.   Musculoskeletal: Positive for back pain. Negative for joint pain and neck pain.   Neurological: Positive for tingling, sensory change and weakness. Negative for tremors and speech change.        Physical Exam  Temp:  [36 °C (96.8 °F)-36.8 °C (98.3 °F)] 36.8 °C (98.3 °F)  Pulse:  [] 94  Resp:  [12-19] 19  BP: ()/(54-96) 147/96  SpO2:  [93 %-100 %] 94 %    Physical Exam   Constitutional: He is oriented to person, place, and time. No distress.   Morbidly obese   HENT:   Head: Normocephalic and atraumatic.   Nose: Nose normal.   Eyes: Conjunctivae and EOM are normal. No scleral icterus.   Neck: Normal range of motion.   Cardiovascular: Normal rate and  regular rhythm.    No murmur heard.  Pulmonary/Chest: Effort normal. No stridor. He has no wheezes. He has no rales.   Abdominal: Soft. He exhibits no distension. There is no tenderness.   Musculoskeletal: He exhibits tenderness. He exhibits no edema.   Generalized 2-3/5 lower extremity strength, limited by pain.   Neurological: He is alert and oriented to person, place, and time. No cranial nerve deficit.   Skin: Skin is warm and dry. He is not diaphoretic. No pallor.   Vitals reviewed.      Fluids    Intake/Output Summary (Last 24 hours) at 07/06/19 0833  Last data filed at 07/06/19 0100   Gross per 24 hour   Intake                0 ml   Output             1000 ml   Net            -1000 ml       Laboratory  Recent Labs      07/05/19   1300  07/06/19   0443   WBC  10.2  9.8   RBC  5.43  4.96   HEMOGLOBIN  15.0  13.8*   HEMATOCRIT  45.8  42.1   MCV  84.3  84.9   MCH  27.6  27.8   MCHC  32.8*  32.8*   RDW  40.0  40.7   PLATELETCT  420  410   MPV  8.6*  8.6*     Recent Labs      07/05/19   1300  07/06/19   0443   SODIUM  140  137   POTASSIUM  4.0  3.9   CHLORIDE  104  104   CO2  26  23   GLUCOSE  95  109*   BUN  10  10   CREATININE  0.60  0.62   CALCIUM  9.7  9.5                   Imaging  MR-LUMBAR SPINE-WITH & W/O    (Results Pending)   MR-THORACIC SPINE-WITH & W/O    (Results Pending)        Assessment/Plan  * Spinal stenosis of lumbar region s/p aborted laminectomy with neurogenic claudication- (present on admission)   Assessment & Plan    With paresthesias, back pain ,debility.    Pain control with PRN oxycodone, IV Dilaudid.    Continue Neuronti-  titrate up dose if tolerates  As needed Flexeril  Follow-up lumbar and thoracic MRI-will need arrangements with anesthesia.  Neurosurgery input.  PT eval     Essential hypertension   Assessment & Plan    Controlled  continue metoprolol  Monitor BP          VTE prophylaxis: SCDs

## 2019-07-06 NOTE — DIETARY
NUTRITION SERVICES: Pt with morbid obesity as evidenced by BMI >40 (47.24). Weight loss counseling not appropriate in acute care setting. Recommend referral to outpatient nutrition services for weight management after D/C, if pt desires.

## 2019-07-06 NOTE — PROGRESS NOTES
Consultation    Date of Service  7/6/2019    Referring Physician  Vahid Stewart M.D.    Consulting Physician  Gabe Seymour P.A.-C., Samir Ruelas MD    Reason for Consultation  Bilateral lower extremity numbness and weakness with sudden rapid progression. Pt. Previously taken to surgery by Dr. Ruelas for lumbar laminectomy however surgery was aborted due to loss of EMG/SSEP signals.     History of Presenting Illness  23 y.o. male who presented 7/5/2019 with the emergency department complaining of worsening bilateral leg numbness and weakness.   Pt. Previously taken to surgery by Dr. Ruelas for lumbar laminectomy however surgery was aborted due to loss of EMG/SSEP signals. .  Since 7/2/2019 patient reports increased numbness and tingling to his legs and now is to the point where he has difficulty walking due to weakness and numbness left greater than right.  He reports the numbness is diffuse in both legs however the weakness is primarily from the knee down, where his knees gave out on several occasions prior to presentation.  He denies any other focal weakness numbness or tingling.  He denies any bowel or bladder incontinence, saddle anesthesia.  He denies any constitutional symptoms. Denies recent infection or illness. Denies any actual low back pain.     Review of Systems  ROS    Past Medical History   has a past medical history of Morbid obesity (HCC) and Tachycardia.    Surgical History   has a past surgical history that includes lumbar laminectomy diskectomy (N/A, 6/17/2019). Which was aborted as noted above.    Family History  family history is not on file.    Social History   reports that he quit smoking about 6 months ago. His smoking use included Cigarettes. He smoked 0.25 packs per day. He has never used smokeless tobacco. He reports that he drinks alcohol. He reports that he uses drugs, including Inhaled.    Medications  Prior to Admission Medications   Prescriptions Last Dose Informant Patient Reported?  "Taking?   cyclobenzaprine (FLEXERIL) 10 MG Tab 7/4/2019 at PM Patient Yes Yes   Sig: Take 10 mg by mouth 3 times a day as needed.   metoprolol SR (TOPROL XL) 25 MG TABLET SR 24 HR 7/4/2019 at PM Patient No No   Sig: Take 1 Tab by mouth every day for 30 days.      Facility-Administered Medications: None       Allergies  Allergies   Allergen Reactions   • Other Misc      Cat allergy- \"start sneezing\"         Physical Exam  Temp:  [36 °C (96.8 °F)-36.8 °C (98.3 °F)] 36.3 °C (97.4 °F)  Pulse:  [] 109  Resp:  [12-19] 16  BP: ()/(54-96) 124/92  SpO2:  [93 %-100 %] 94 %    Physical Exam  VSS  A&Ox4, NAD  NM: 5/5 hip flexion, knee extension (4+/5 left), knee flexion, plantar flexion,   B/L dorsiflexion, EHL 2-3/5  Sensation intact and equal throughout all four extremities.   Abdomen: soft, non-tender  Non-labored breathing on room air, normal respiratory effort  Capillary refill in all four extremities <2 seconds, ped pulses intact  Diffuse nonpitting LE edema, no erythema, cyanosis, clubbing  Calves non-tender to compression bilat      Fluids       Laboratory  Recent Labs      07/05/19   1300  07/06/19   0443   WBC  10.2  9.8   RBC  5.43  4.96   HEMOGLOBIN  15.0  13.8*   HEMATOCRIT  45.8  42.1   MCV  84.3  84.9   MCH  27.6  27.8   MCHC  32.8*  32.8*   RDW  40.0  40.7   PLATELETCT  420  410   MPV  8.6*  8.6*     Recent Labs      07/05/19   1300  07/06/19   0443   SODIUM  140  137   POTASSIUM  4.0  3.9   CHLORIDE  104  104   CO2  26  23   GLUCOSE  95  109*   BUN  10  10   CREATININE  0.60  0.62   CALCIUM  9.7  9.5                     Imaging  MR-LUMBAR SPINE-WITH & W/O    (Results Pending)   MR-THORACIC SPINE-WITH & W/O    (Results Pending)       Assessment/Plan  MRI thoracic and lumbar with and without contrast recommended/needed,   Apparently pt. Aborted yesterday due to anxiety, will likely need anesthesia to complete.   Will await imaging results for further recommendations.   Pt. Has neuro deficits in " bilateral LE (Left>right more focal in B/L TA) with associated pedal edema (which is new by pt. Report)

## 2019-07-06 NOTE — PROGRESS NOTES
2 RN skin check complete: Surgical incision to lower back. Scratch to right leg. Otherwise skin intact.

## 2019-07-06 NOTE — CARE PLAN
Problem: Communication  Goal: The ability to communicate needs accurately and effectively will improve  Outcome: PROGRESSING AS EXPECTED  POC discussed with patient     Problem: Venous Thromboembolism (VTW)/Deep Vein Thrombosis (DVT) Prevention:  Goal: Patient will participate in Venous Thrombosis (VTE)/Deep Vein Thrombosis (DVT)Prevention Measures  Outcome: PROGRESSING AS EXPECTED  SCDs on.

## 2019-07-06 NOTE — THERAPY
consult received; Thoracic and lumbar MR spine pending; Await Dr. Ruelas, Neurosurgery recommendations; pt is known to this PT from recent admit

## 2019-07-07 PROCEDURE — 99232 SBSQ HOSP IP/OBS MODERATE 35: CPT | Performed by: HOSPITALIST

## 2019-07-07 PROCEDURE — A9270 NON-COVERED ITEM OR SERVICE: HCPCS | Performed by: INTERNAL MEDICINE

## 2019-07-07 PROCEDURE — 700102 HCHG RX REV CODE 250 W/ 637 OVERRIDE(OP): Performed by: INTERNAL MEDICINE

## 2019-07-07 PROCEDURE — 770006 HCHG ROOM/CARE - MED/SURG/GYN SEMI*

## 2019-07-07 RX ORDER — GABAPENTIN 300 MG/1
300 CAPSULE ORAL 3 TIMES DAILY
Status: DISCONTINUED | OUTPATIENT
Start: 2019-07-08 | End: 2019-07-10

## 2019-07-07 RX ADMIN — CYCLOBENZAPRINE 10 MG: 10 TABLET, FILM COATED ORAL at 05:15

## 2019-07-07 RX ADMIN — GABAPENTIN 100 MG: 100 CAPSULE ORAL at 11:10

## 2019-07-07 RX ADMIN — GABAPENTIN 100 MG: 100 CAPSULE ORAL at 17:42

## 2019-07-07 RX ADMIN — GABAPENTIN 100 MG: 100 CAPSULE ORAL at 05:04

## 2019-07-07 RX ADMIN — SENNOSIDES, DOCUSATE SODIUM 2 TABLET: 50; 8.6 TABLET, FILM COATED ORAL at 05:04

## 2019-07-07 RX ADMIN — METOPROLOL SUCCINATE 25 MG: 25 TABLET, EXTENDED RELEASE ORAL at 05:04

## 2019-07-07 ASSESSMENT — ENCOUNTER SYMPTOMS
PALPITATIONS: 0
SENSORY CHANGE: 1
DIARRHEA: 0
BACK PAIN: 1
ABDOMINAL PAIN: 0
WHEEZING: 0
CHILLS: 0
TINGLING: 1
SPEECH CHANGE: 0
TREMORS: 0
NECK PAIN: 0
SHORTNESS OF BREATH: 0
VOMITING: 0
DIAPHORESIS: 0
COUGH: 0
FEVER: 0
WEAKNESS: 1

## 2019-07-07 NOTE — PROGRESS NOTES
Assumed care. Patient resting in bed with NAD noted. Side rails up X2, Call light within reach, bed in lowest position. Will continue to monitor.

## 2019-07-07 NOTE — PROGRESS NOTES
Neurosurgery Progress Note    Subjective:  Improved overall today with regard to LE weakness per pt. Continues to deny LBP, saddle anesthesia, bowel or bladder incont. No new complaints. Has not had recommended MRIs, PT/OT holding off pending our recommendations and results of MRIs.     Exam:  VSS  A&Ox4, NAD  NM: 5/5 hip flexion, knee extension, knee flexion, plantar flexion,   B/L dorsiflexion, EHL 2-3/5  Sensation intact and equal throughout all four extremities.   Abdomen: soft, non-tender  Non-labored breathing on room air, normal respiratory effort  Capillary refill in all four extremities <2 seconds, ped pulses intact  Diffuse nonpitting LE edema, no erythema, cyanosis, clubbing  Calves non-tender to compression bilat    BP  Min: 127/73  Max: 169/102  Pulse  Av  Min: 100  Max: 115  Resp  Av.3  Min: 16  Max: 17  Temp  Av.6 °C (97.8 °F)  Min: 36.3 °C (97.3 °F)  Max: 36.9 °C (98.4 °F)  SpO2  Av.5 %  Min: 94 %  Max: 96 %    No Data Recorded    Recent Labs      19   1300  19   0443   WBC  10.2  9.8   RBC  5.43  4.96   HEMOGLOBIN  15.0  13.8*   HEMATOCRIT  45.8  42.1   MCV  84.3  84.9   MCH  27.6  27.8   MCHC  32.8*  32.8*   RDW  40.0  40.7   PLATELETCT  420  410   MPV  8.6*  8.6*     Recent Labs      19   1300  19   0443   SODIUM  140  137   POTASSIUM  4.0  3.9   CHLORIDE  104  104   CO2  26  23   GLUCOSE  95  109*   BUN  10  10   CREATININE  0.60  0.62   CALCIUM  9.7  9.5               Intake/Output       19 - 19 - 19 Total  Total       Intake    P.O.  --  240 240  --  -- --    P.O. -- 240 240 -- -- --    Total Intake -- 240 240 -- -- --       Output    Urine  550  400 950  --  -- --    Urine Void (mL) 550 400 950 -- -- --    Stool  --  -- --  --  -- --    Number of Times Stooled -- 1 x 1 x -- -- --    Total Output 550 400 950 -- -- --       Net I/O     -109 -656 -073 -- -- --             Intake/Output Summary (Last 24 hours) at 07/07/19 1149  Last data filed at 07/07/19 0000   Gross per 24 hour   Intake              240 ml   Output              950 ml   Net             -710 ml            • cyclobenzaprine  10 mg TID PRN   • metoprolol SR  25 mg DAILY   • senna-docusate  2 Tab BID    And   • polyethylene glycol/lytes  1 Packet QDAY PRN    And   • magnesium hydroxide  30 mL QDAY PRN    And   • bisacodyl  10 mg QDAY PRN   • acetaminophen  650 mg Q6HRS PRN   • Pharmacy Consult Request  1 Each PHARMACY TO DOSE    And   • oxyCODONE immediate-release  5 mg Q3HRS PRN    And   • oxyCODONE immediate-release  10 mg Q3HRS PRN    And   • HYDROmorphone  0.5 mg Q3HRS PRN   • diazePAM  1 mg Q8HRS PRN   • gabapentin  100 mg TID       Assessment and Plan:  Hospital day #3  Prophylactic anticoagulation: Ok for short acting chemical proph from NSG perspective until results of MRI   Needs full sedation  MRI thoracic and Lumbar with and without contrast ASAP (order modified)  Strength improved in LLE quad today, continued weak B/L TA.   Discussed with nursing  Ok for oob and work with therapies with bilateral AFO and assistance  Will await results of MRIs   Patient agrees with treatment plan.   Case discussed with Dr. Ruelas.

## 2019-07-07 NOTE — PROGRESS NOTES
[facility] Medicine Daily Progress Note    Date of Service  7/7/2019    Chief Complaint  23 y.o. male admitted 7/5/2019 with bilateral lower extremity numbness.    Hospital Course    23-year-old male history of lumbar laminectomy 6/17/2019 per Dr. crenshaw with discharge 6/25 to home with use of walker admitted with bilateral lower extremity numbness and weakness x3 days.  Patient admitted for pain control, therapy and further work-up with planned evaluation by neurosurgery Dr. Crenshaw.    Interval Problem Update    Numbness improved from now extending from mid shin to feet.  Stood with walker, unable to ambulate due to legs giving out.  Unable to do MRI until Monday with anesthesia    Consultants/Specialty  Neurosurgery to consult    Code Status  Full code    Disposition  Plan additional work-up, TBD    Review of Systems  Review of Systems   Constitutional: Negative for chills, diaphoresis, fever and malaise/fatigue.   Respiratory: Negative for cough, shortness of breath and wheezing.    Cardiovascular: Negative for chest pain, palpitations and leg swelling.   Gastrointestinal: Negative for abdominal pain, diarrhea and vomiting.   Musculoskeletal: Positive for back pain. Negative for joint pain and neck pain.   Skin: Negative for rash.   Neurological: Positive for tingling, sensory change and weakness. Negative for tremors and speech change.        Physical Exam  Temp:  [36.3 °C (97.3 °F)-36.9 °C (98.4 °F)] 36.3 °C (97.4 °F)  Pulse:  [100-115] 100  Resp:  [16-17] 16  BP: (127-169)/() 129/75  SpO2:  [94 %-96 %] 94 %    Physical Exam   Constitutional: He is oriented to person, place, and time. No distress.   Morbidly obese   HENT:   Head: Normocephalic and atraumatic.   Nose: Nose normal.   Eyes: Conjunctivae and EOM are normal. No scleral icterus.   Cardiovascular: Normal rate and regular rhythm.    No murmur heard.  Pulmonary/Chest: Effort normal. He has no wheezes. He has no rales.   Abdominal: Soft. He exhibits no  distension. There is no tenderness.   Musculoskeletal: He exhibits tenderness. He exhibits no edema.   Generalized 2-3/5 lower extremity strength, limited by pain.   Neurological: He is alert and oriented to person, place, and time.   Skin: Skin is warm and dry. He is not diaphoretic. No pallor.   Vitals reviewed.      Fluids    Intake/Output Summary (Last 24 hours) at 07/07/19 1443  Last data filed at 07/07/19 1200   Gross per 24 hour   Intake              720 ml   Output              800 ml   Net              -80 ml       Laboratory  Recent Labs      07/05/19   1300  07/06/19   0443   WBC  10.2  9.8   RBC  5.43  4.96   HEMOGLOBIN  15.0  13.8*   HEMATOCRIT  45.8  42.1   MCV  84.3  84.9   MCH  27.6  27.8   MCHC  32.8*  32.8*   RDW  40.0  40.7   PLATELETCT  420  410   MPV  8.6*  8.6*     Recent Labs      07/05/19   1300  07/06/19   0443   SODIUM  140  137   POTASSIUM  4.0  3.9   CHLORIDE  104  104   CO2  26  23   GLUCOSE  95  109*   BUN  10  10   CREATININE  0.60  0.62   CALCIUM  9.7  9.5                   Imaging  MR-LUMBAR SPINE-WITH & W/O    (Results Pending)   MR-THORACIC SPINE-WITH & W/O    (Results Pending)        Assessment/Plan  * Spinal stenosis of lumbar region s/p aborted laminectomy with neurogenic claudication- (present on admission)   Assessment & Plan    With paresthesias, back pain ,debility.  Unable to do MRI unless with anesthesia  Pain control with PRN oxycodone, IV Dilaudid.    Continue Neurontin-titrate up dose   As needed Flexeril  Neurosurgery input.  PT eval     Essential hypertension   Assessment & Plan    Controlled  continue metoprolol  Advised weight loss  Monitor BP          VTE prophylaxis: SCDs

## 2019-07-07 NOTE — THERAPY
Continue to hold PT evaluation pending MRI and neurosurgery POC once imaging completed.  Will re attempt as able/appropriate.      Alysa Carcamo  PT/DPT  Pager# [phone]

## 2019-07-07 NOTE — CARE PLAN
Problem: Knowledge Deficit  Goal: Knowledge of disease process/condition, treatment plan, diagnostic tests, and medications will improve    Intervention: Explain information regarding disease process/condition, treatment plan, diagnostic tests, and medications and document in education  Pt updated on POC. Demonstrated understanding by teach back. All questions answered.         Problem: Mobility  Goal: Risk for activity intolerance will decrease    Intervention: Encourage patient to increase activity level in collaboration with Interdisciplinary Team  Per Gabe PEÑA with neurosurg, OK for patient to work with PT prior to MRI or next POC. Patient wants to get up. Plan for RN to help to get to EOB. Alysa PT plans to see patient tomorrow.

## 2019-07-08 ENCOUNTER — PATIENT OUTREACH (OUTPATIENT)
Dept: HEALTH INFORMATION MANAGEMENT | Facility: OTHER | Age: 24
End: 2019-07-08

## 2019-07-08 ENCOUNTER — ANESTHESIA (OUTPATIENT)
Dept: RADIOLOGY | Facility: MEDICAL CENTER | Age: 24
DRG: 519 | End: 2019-07-08
Payer: COMMERCIAL

## 2019-07-08 ENCOUNTER — APPOINTMENT (OUTPATIENT)
Dept: RADIOLOGY | Facility: MEDICAL CENTER | Age: 24
DRG: 519 | End: 2019-07-08
Attending: PHYSICIAN ASSISTANT
Payer: COMMERCIAL

## 2019-07-08 ENCOUNTER — ANESTHESIA EVENT (OUTPATIENT)
Dept: RADIOLOGY | Facility: MEDICAL CENTER | Age: 24
DRG: 519 | End: 2019-07-08
Payer: COMMERCIAL

## 2019-07-08 PROCEDURE — A9585 GADOBUTROL INJECTION: HCPCS | Performed by: PHYSICIAN ASSISTANT

## 2019-07-08 PROCEDURE — 700105 HCHG RX REV CODE 258: Performed by: ANESTHESIOLOGY

## 2019-07-08 PROCEDURE — 160002 HCHG RECOVERY MINUTES (STAT)

## 2019-07-08 PROCEDURE — 700102 HCHG RX REV CODE 250 W/ 637 OVERRIDE(OP): Performed by: INTERNAL MEDICINE

## 2019-07-08 PROCEDURE — 700111 HCHG RX REV CODE 636 W/ 250 OVERRIDE (IP): Performed by: ANESTHESIOLOGY

## 2019-07-08 PROCEDURE — A9270 NON-COVERED ITEM OR SERVICE: HCPCS | Performed by: HOSPITALIST

## 2019-07-08 PROCEDURE — A9270 NON-COVERED ITEM OR SERVICE: HCPCS | Performed by: INTERNAL MEDICINE

## 2019-07-08 PROCEDURE — 97162 PT EVAL MOD COMPLEX 30 MIN: CPT

## 2019-07-08 PROCEDURE — 72158 MRI LUMBAR SPINE W/O & W/DYE: CPT

## 2019-07-08 PROCEDURE — 72157 MRI CHEST SPINE W/O & W/DYE: CPT

## 2019-07-08 PROCEDURE — 700101 HCHG RX REV CODE 250: Performed by: ANESTHESIOLOGY

## 2019-07-08 PROCEDURE — 700102 HCHG RX REV CODE 250 W/ 637 OVERRIDE(OP): Performed by: HOSPITALIST

## 2019-07-08 PROCEDURE — 99232 SBSQ HOSP IP/OBS MODERATE 35: CPT | Performed by: HOSPITALIST

## 2019-07-08 PROCEDURE — 770006 HCHG ROOM/CARE - MED/SURG/GYN SEMI*

## 2019-07-08 PROCEDURE — 700111 HCHG RX REV CODE 636 W/ 250 OVERRIDE (IP)

## 2019-07-08 PROCEDURE — 700117 HCHG RX CONTRAST REV CODE 255: Performed by: PHYSICIAN ASSISTANT

## 2019-07-08 RX ORDER — SODIUM CHLORIDE, SODIUM LACTATE, POTASSIUM CHLORIDE, CALCIUM CHLORIDE 600; 310; 30; 20 MG/100ML; MG/100ML; MG/100ML; MG/100ML
INJECTION, SOLUTION INTRAVENOUS CONTINUOUS
Status: DISCONTINUED | OUTPATIENT
Start: 2019-07-08 | End: 2019-07-12 | Stop reason: HOSPADM

## 2019-07-08 RX ORDER — HALOPERIDOL 5 MG/ML
1 INJECTION INTRAMUSCULAR
Status: DISCONTINUED | OUTPATIENT
Start: 2019-07-08 | End: 2019-07-08 | Stop reason: HOSPADM

## 2019-07-08 RX ORDER — SODIUM CHLORIDE, SODIUM LACTATE, POTASSIUM CHLORIDE, CALCIUM CHLORIDE 600; 310; 30; 20 MG/100ML; MG/100ML; MG/100ML; MG/100ML
INJECTION, SOLUTION INTRAVENOUS
Status: DISCONTINUED | OUTPATIENT
Start: 2019-07-08 | End: 2019-07-08 | Stop reason: SURG

## 2019-07-08 RX ORDER — OXYCODONE HCL 5 MG/5 ML
10 SOLUTION, ORAL ORAL
Status: DISCONTINUED | OUTPATIENT
Start: 2019-07-08 | End: 2019-07-08 | Stop reason: HOSPADM

## 2019-07-08 RX ORDER — OXYCODONE HCL 5 MG/5 ML
5 SOLUTION, ORAL ORAL
Status: DISCONTINUED | OUTPATIENT
Start: 2019-07-08 | End: 2019-07-08 | Stop reason: HOSPADM

## 2019-07-08 RX ORDER — DIPHENHYDRAMINE HYDROCHLORIDE 50 MG/ML
12.5 INJECTION INTRAMUSCULAR; INTRAVENOUS
Status: DISCONTINUED | OUTPATIENT
Start: 2019-07-08 | End: 2019-07-08 | Stop reason: HOSPADM

## 2019-07-08 RX ORDER — GADOBUTROL 604.72 MG/ML
15 INJECTION INTRAVENOUS ONCE
Status: COMPLETED | OUTPATIENT
Start: 2019-07-08 | End: 2019-07-08

## 2019-07-08 RX ORDER — ONDANSETRON 2 MG/ML
4 INJECTION INTRAMUSCULAR; INTRAVENOUS
Status: DISCONTINUED | OUTPATIENT
Start: 2019-07-08 | End: 2019-07-08 | Stop reason: HOSPADM

## 2019-07-08 RX ADMIN — CYCLOBENZAPRINE 10 MG: 10 TABLET, FILM COATED ORAL at 01:01

## 2019-07-08 RX ADMIN — SODIUM CHLORIDE, POTASSIUM CHLORIDE, SODIUM LACTATE AND CALCIUM CHLORIDE: 600; 310; 30; 20 INJECTION, SOLUTION INTRAVENOUS at 11:25

## 2019-07-08 RX ADMIN — GABAPENTIN 300 MG: 300 CAPSULE ORAL at 04:22

## 2019-07-08 RX ADMIN — GADOBUTROL 15 ML: 604.72 INJECTION INTRAVENOUS at 12:19

## 2019-07-08 RX ADMIN — GABAPENTIN 300 MG: 300 CAPSULE ORAL at 17:20

## 2019-07-08 RX ADMIN — LIDOCAINE HYDROCHLORIDE 40 MG: 20 INJECTION, SOLUTION INFILTRATION; PERINEURAL at 11:29

## 2019-07-08 RX ADMIN — PROPOFOL 200 MG: 10 INJECTION, EMULSION INTRAVENOUS at 11:29

## 2019-07-08 RX ADMIN — METOPROLOL SUCCINATE 25 MG: 25 TABLET, EXTENDED RELEASE ORAL at 04:22

## 2019-07-08 ASSESSMENT — COGNITIVE AND FUNCTIONAL STATUS - GENERAL
CLIMB 3 TO 5 STEPS WITH RAILING: TOTAL
WALKING IN HOSPITAL ROOM: TOTAL
MOVING FROM LYING ON BACK TO SITTING ON SIDE OF FLAT BED: A LITTLE
TURNING FROM BACK TO SIDE WHILE IN FLAT BAD: A LITTLE
SUGGESTED CMS G CODE MODIFIER MOBILITY: CL
STANDING UP FROM CHAIR USING ARMS: A LITTLE
MOBILITY SCORE: 14
MOVING TO AND FROM BED TO CHAIR: A LITTLE

## 2019-07-08 ASSESSMENT — ENCOUNTER SYMPTOMS
SHORTNESS OF BREATH: 0
DIARRHEA: 0
BACK PAIN: 1
TREMORS: 0
SPEECH CHANGE: 0
FEVER: 0
TINGLING: 1
VOMITING: 0
NECK PAIN: 0
COUGH: 0
DIZZINESS: 0
CHILLS: 0
DIAPHORESIS: 0
HEADACHES: 0
ABDOMINAL PAIN: 0
PALPITATIONS: 0
WEAKNESS: 1
SENSORY CHANGE: 1

## 2019-07-08 ASSESSMENT — GAIT ASSESSMENTS: GAIT LEVEL OF ASSIST: UNABLE TO PARTICIPATE

## 2019-07-08 ASSESSMENT — PAIN SCALES - GENERAL: PAIN_LEVEL: 2

## 2019-07-08 NOTE — CARE PLAN
Problem: Communication  Goal: The ability to communicate needs accurately and effectively will improve  Outcome: PROGRESSING AS EXPECTED  Pt A&Ox4 and calls appropriately using call light. Pt able to make needs known to staff.     Problem: Safety  Goal: Will remain free from injury  Outcome: PROGRESSING AS EXPECTED  [place] fall precautions in place. Bed locked and in lowest position. Bed alarm on. Call light within reach. No needs at this time.

## 2019-07-08 NOTE — DISCHARGE PLANNING
Care Transition Team Assessment    Information Source  Orientation : Oriented x 4  Information Given By: Patient  Informant's Name: Sammy  Who is responsible for making decisions for patient? : Patient    Readmission Evaluation  Is this a readmission?: No    Elopement Risk  Legal Hold: No  Ambulatory or Self Mobile in Wheelchair: No-Not an Elopement Risk  Elopement Risk: Not at Risk for Elopement    Interdisciplinary Discharge Planning  Does Admitting Nurse Feel This Could be a Complex Discharge?: No  Primary Care Physician: none  Lives with - Patient's Self Care Capacity: Parents (Dad)  Patient or legal guardian wants to designate a caregiver (see row info): No  Support Systems: Family Member(s)  Housing / Facility: 2 Story Apartment / Condo  Do You Take your Prescribed Medications Regularly: Yes  Able to Return to Previous ADL's: Future Time w/Therapy  Mobility Issues: Yes  Prior Services: None  Patient Expects to be Discharged to:: Home  Assistance Needed: No  Durable Medical Equipment: Not Applicable    Discharge Preparedness  What is your plan after discharge?: Home with help  What are your discharge supports?: Parent  Prior Functional Level: Independent with Activities of Daily Living, Independent with Medication Management    Functional Assesment  Prior Functional Level: Independent with Activities of Daily Living, Independent with Medication Management    Finances  Financial Barriers to Discharge: No  Prescription Coverage: Yes    Vision / Hearing Impairment  Vision Impairment : No  Hearing Impairment : No    Values / Beliefs / Concerns  Values / Beliefs Concerns : No    Advance Directive  Advance Directive?: None  Advance Directive offered?: AD Booklet given    Domestic Abuse  Have you ever been the victim of abuse or violence?: No  Physical Abuse or Sexual Abuse: No  Verbal Abuse or Emotional Abuse: No  Possible Abuse Reported to:: Not Applicable    Psychological Assessment  History of Substance Abuse:  None  History of Psychiatric Problems: No  Non-compliant with Treatment: No  Newly Diagnosed Illness: Yes    Discharge Risks or Barriers  Discharge risks or barriers?: No PCP    Anticipated Discharge Information  Anticipated discharge disposition: Home  Discharge Address: [address] [place] [address] [id number]  [place]   [address]  Discharge Contact Phone Number: [phone]

## 2019-07-08 NOTE — ANESTHESIA PREPROCEDURE EVALUATION
Relevant Problems   (+) Essential hypertension       Physical Exam    Airway   Mallampati: III  TM distance: >3 FB  Neck ROM: full       Cardiovascular - normal exam  Rhythm: regular  Rate: normal  (-) murmur     Dental - normal exam         Pulmonary - normal exam  Breath sounds clear to auscultation     Abdominal    Neurological - normal exam                 Anesthesia Plan    ASA 2       Plan - general       Airway plan will be LMA        Induction: intravenous    Postoperative Plan: Postoperative administration of opioids is intended.    Pertinent diagnostic labs and testing reviewed    Informed Consent:    Anesthetic plan and risks discussed with patient.    Use of blood products discussed with: patient whom consented to blood products.

## 2019-07-08 NOTE — THERAPY
"Pt is a 24 y/o male admitted with c/o numbness B LE. Pt with recent spine surgery on 6/17/19. Reports he was doing ok at home, limited ambulation in home due to difficulty on stairs. Pt presents to acute PT with impairments in functional mobility, balance, strength, gait and activity tolerance. Pt heavily reliant on bedrail to complete supine to sit. Difficult to formally assess strength due to limited effort with MMT at EOB. Pt expressed difficulty with sit to stand and requested bed height be raised; however, was able to complete from standard height with CGA from PT for safety. Pt reluctant to initiate steps to chair. Preferred to scoot seated at EOB and perform sit pivot transfer. Pt will benefit from acute PT interventions to address present impairments. Pt awaiting MRI under sedation today, will proceed with PT POC as appropriate pending medical POC post MRI.     Recommend OT evaluation as well    Physical Therapy Evaluation completed.   Bed Mobility:  Supine to Sit: Supervised (log roll with heavy reliance on bed rail)  Transfers: Sit to Stand: Contact Guard Assist (for safety)  Gait: Level Of Assist: Unable to Participate        Plan of Care: Will benefit from Physical Therapy 4 times per week  Discharge Recommendations: Equipment: Will Continue to Assess for Equipment Needs.   Post-acute therapy: Anticipate pt may require post acute placement pending medical POC and progress made in acute setting.    See \"Rehab Therapy-Acute\" Patient Summary Report for complete documentation.     "

## 2019-07-08 NOTE — ANESTHESIA POSTPROCEDURE EVALUATION
Patient: Sammy Edmonds    Procedure Summary     Date:  07/08/19 Room / Location:  [facility]    Anesthesia Start:  1125 Anesthesia Stop:  1238    Procedure:  MR-LUMBAR SPINE-WITH & W/O Diagnosis:      Scheduled Providers:   Responsible Provider:  Federico Sherman M.D.    Anesthesia Type:  general ASA Status:  2          Final Anesthesia Type: general  Last vitals  BP   Blood Pressure: 124/81    Temp   36.3 °C (97.3 °F)    Pulse   Pulse: 100   Resp   16    SpO2   93 %      Anesthesia Post Evaluation    Patient location during evaluation: PACU  Patient participation: complete - patient participated  Level of consciousness: awake  Pain score: 2    Airway patency: patent  Anesthetic complications: no  Cardiovascular status: hemodynamically stable  Respiratory status: acceptable  Hydration status: euvolemic    PONV: none           Nurse Pain Score: 0 (NPRS)

## 2019-07-08 NOTE — PROGRESS NOTES
Neurosurgery Progress Note    Subjective:  No changes overnight    Exam:  Sleepy, reports good sensation to legs  RLE: I/Q: 5/5, DF 2/4, PF 4/5  LLE: I: 4/5, Q:2/5, DF 2/4, PF 4/5  Sensation not tested, patient too sleepy to cooperate  Mild-modest LE edema    BP  Min: 124/81  Max: 146/83  Pulse  Av.3  Min: 100  Max: 119  Resp  Av  Min: 16  Max: 16  Temp  Av.4 °C (97.6 °F)  Min: 36.3 °C (97.3 °F)  Max: 36.7 °C (98.1 °F)  SpO2  Av %  Min: 93 %  Max: 97 %    No Data Recorded    Recent Labs      19   1300  19   0443   WBC  10.2  9.8   RBC  5.43  4.96   HEMOGLOBIN  15.0  13.8*   HEMATOCRIT  45.8  42.1   MCV  84.3  84.9   MCH  27.6  27.8   MCHC  32.8*  32.8*   RDW  40.0  40.7   PLATELETCT  420  410   MPV  8.6*  8.6*     Recent Labs      19   1300  19   0443   SODIUM  140  137   POTASSIUM  4.0  3.9   CHLORIDE  104  104   CO2  26  23   GLUCOSE  95  109*   BUN  10  10   CREATININE  0.60  0.62   CALCIUM  9.7  9.5               Intake/Output       19 - 1959 19 - 1959      1900-0659 Total 1900-0659 Total       Intake    P.O.  960  -- 960  --  -- --    P.O. 960 -- 960 -- -- --    Total Intake 960 -- 960 -- -- --       Output    Urine  800  950 1750  --  -- --    Number of Times Voided -- 2 x 2 x -- -- --    Urine Void (mL)  -- -- --    Total Output  -- -- --       Net I/O     160 -950 -790 -- -- --            Intake/Output Summary (Last 24 hours) at 19 0905  Last data filed at 19 0500   Gross per 24 hour   Intake              720 ml   Output             1350 ml   Net             -630 ml            • gabapentin  300 mg TID   • cyclobenzaprine  10 mg TID PRN   • metoprolol SR  25 mg DAILY   • senna-docusate  2 Tab BID    And   • polyethylene glycol/lytes  1 Packet QDAY PRN    And   • magnesium hydroxide  30 mL QDAY PRN    And   • bisacodyl  10 mg QDAY PRN   • acetaminophen  650 mg Q6HRS PRN    • Pharmacy Consult Request  1 Each PHARMACY TO DOSE    And   • oxyCODONE immediate-release  5 mg Q3HRS PRN    And   • oxyCODONE immediate-release  10 mg Q3HRS PRN    And   • HYDROmorphone  0.5 mg Q3HRS PRN   • diazePAM  1 mg Q8HRS PRN       Assessment and Plan:  Hospital day # 4: readmission for below knee paresthesia, weakness    HO L4-S1 decompression 6/17/19 aborted L2-4 decompression r/t intraop loss of neuromonitoring signals,  (MRI cervical spine benign)    Prophylactic anticoagulation: yes         Start date/time: ok to start at hospitalist discretion     Awaiting MRI of T and LS spine under anesthesia.    Continue therapies

## 2019-07-08 NOTE — OR NURSING
1240 Pt arrived to PPU from MRI. Pt agitated and yelling out on arrival to room. Pt reoriented. Warm blankets given and pt repositioned. No N/V. CMS intact. VSS.     1300 Pt fully awake, calm, A&O x 4.  Pt tolerating PO fluid intake. No C/O N/V.     1315 Report called to ROBERTO CARLOS Donald. Pt meets transfer criteria. & placed on transport. Pt repositioned for back pain.    1410 PO fluids given, snacks offered. Transport at bedside. Pt transferred to [facility]. No belongings with pt.

## 2019-07-08 NOTE — CARE PLAN
Problem: Safety  Goal: Will remain free from falls  Outcome: PROGRESSING AS EXPECTED    Intervention: Implement fall precautions  Patient and family educated on saftey precautions. Bed in low and locked position. Call light within reach. Room near nursing station. Patient encouraged to call staff for help before ambulating.      Problem: Infection  Goal: Will remain free from infection  Outcome: PROGRESSING AS EXPECTED    Intervention: Implement standard precautions and perform hand washing before and after patient contact  Hand hygiene performed before and after assessing patient. Scrub the hub prior to accessing IVs. Linens changed daily and PRN when soiled.

## 2019-07-08 NOTE — PROGRESS NOTES
Patient refused SCDs, educated regarding importance of intervention, will notify day shift RN and provider.

## 2019-07-08 NOTE — PROGRESS NOTES
[facility] Medicine Daily Progress Note    Date of Service  7/8/2019    Chief Complaint  23 y.o. male admitted 7/5/2019 with bilateral lower extremity numbness.    Hospital Course    23-year-old male history of lumbar laminectomy 6/17/2019 per Dr. crenshaw with discharge 6/25 to home with use of walker admitted with bilateral lower extremity numbness and weakness x3 days.  Patient admitted for pain control, therapy and further work-up with planned evaluation by neurosurgery Dr. Crenshaw.    Interval Problem Update    Mobility limited to brief standing.  Numbness lower exts about same. Plan Lumbar MRI w anesthesia.     Consultants/Specialty  Neurosurgery to consult    Code Status  Full code    Disposition  Plan additional work-up, TBD    Review of Systems  Review of Systems   Constitutional: Negative for chills, diaphoresis and fever.   Respiratory: Negative for cough and shortness of breath.    Cardiovascular: Negative for chest pain, palpitations and leg swelling.   Gastrointestinal: Negative for abdominal pain, diarrhea and vomiting.   Musculoskeletal: Positive for back pain. Negative for joint pain and neck pain.   Skin: Negative for rash.   Neurological: Positive for tingling, sensory change and weakness. Negative for dizziness, tremors, speech change and headaches.        Physical Exam  Temp:  [36.3 °C (97.3 °F)-36.7 °C (98.1 °F)] 36.4 °C (97.6 °F)  Pulse:  [100-119] 113  Resp:  [16] 16  BP: (124-146)/(81-83) 124/81  SpO2:  [93 %-97 %] 95 %    Physical Exam   Constitutional: He is oriented to person, place, and time. No distress.   HENT:   Head: Normocephalic and atraumatic.   Right Ear: External ear normal.   Left Ear: External ear normal.   Nose: Nose normal.   Eyes: Conjunctivae and EOM are normal. No scleral icterus.   Cardiovascular: Normal rate and regular rhythm.    No murmur heard.  Pulmonary/Chest: Effort normal. He has no wheezes. He has no rales.   Abdominal: Soft. He exhibits no distension. There is no  tenderness.   Morbidly obese    Musculoskeletal: He exhibits no edema.   Generalized 2-3/5 lower extremity strength, limited by pain.   Neurological: He is alert and oriented to person, place, and time. No cranial nerve deficit.   Skin: Skin is warm and dry. He is not diaphoretic. No pallor.   Psychiatric: He has a normal mood and affect. His behavior is normal.   Vitals reviewed.      Fluids    Intake/Output Summary (Last 24 hours) at 07/08/19 1308  Last data filed at 07/08/19 1238   Gross per 24 hour   Intake             1000 ml   Output             1350 ml   Net             -350 ml       Laboratory  Recent Labs      07/06/19   0443   WBC  9.8   RBC  4.96   HEMOGLOBIN  13.8*   HEMATOCRIT  42.1   MCV  84.9   MCH  27.8   MCHC  32.8*   RDW  40.7   PLATELETCT  410   MPV  8.6*     Recent Labs      07/06/19   0443   SODIUM  137   POTASSIUM  3.9   CHLORIDE  104   CO2  23   GLUCOSE  109*   BUN  10   CREATININE  0.62   CALCIUM  9.5                   Imaging  MR-LUMBAR SPINE-WITH & W/O    (Results Pending)   MR-THORACIC SPINE-WITH & W/O    (Results Pending)        Assessment/Plan  * Spinal stenosis of lumbar region s/p aborted laminectomy with neurogenic claudication- (present on admission)   Assessment & Plan    With paresthesias, back pain  Severe ,debility. No signif improvement.   Pain control with PRN oxycodone, IV Dilaudid.    Neurontin dose recently titrated up- monitor response.   As needed Flexeril  PT eval  Fu MRI with sedation today.  Dr Ruelas to eval for surgical intervention .     Essential hypertension   Assessment & Plan    Controlled  continue metoprolol  Wt loss counseling.   Monitor BP          VTE prophylaxis: SCDs    Discussed with multidisciplinary team plan of care.

## 2019-07-08 NOTE — DISCHARGE PLANNING
Anticipated Discharge Disposition:   Home; [facility]    Action:   Assessment completed.  Patient currently does not have PCP.  His insurance coverage will begin on 07/19/2019.     Barriers to Discharge:   Medical clearance.  Lack of PCP.    Plan:   Patient will be given information on [facility] to select PCP.

## 2019-07-08 NOTE — PROGRESS NOTES
Patient back to [facility] from [facility]. Patient resting comfortably in bed. No concerns at this time.

## 2019-07-08 NOTE — ANESTHESIA QCDR
2019 [facility] Clinical Data Registry (for Quality Improvement)     Postoperative nausea/vomiting risk protocol (Adult = 18 yrs and Pediatric 3-17 yrs)- (430 and 463)  General inhalation anesthetic (NOT TIVA) with PONV risk factors: Yes  Provision of anti-emetic therapy with at least 2 different classes of agents: No   Patient DID NOT receive anti-emetic therapy and reason is documented in Medical Record: No       Multimodal Pain Management- (AQI59)  Patient undergoing Elective Surgery (i.e. Outpatient, or ASC, or Prescheduled Surgery prior to Hospital Admission): Yes  Use of Multimodal Pain Management, two or more drugs and/or interventions, NOT including systemic opioids: No   Exception: Documented allergy to multiple classes of analgesics:         PACU assessment of acute postoperative pain prior to Anesthesia Care End- Applies to Patients Age = 18- (ABG7)  Initial PACU pain score is which of the following: < 7/10  Patient unable to report pain score: N/A    Post-anesthetic transfer of care checklist/protocol to PACU/ICU- (426 and 427)  Upon conclusion of case, patient transferred to which of the following locations: PACU/Non-ICU  Use of transfer checklist/protocol: Yes  Exclusion: Service Performed in Patient Hospital Room (and thus did not require transfer): N/A    PACU Reintubation- (AQI31)  General anesthesia requiring endotracheal intubation (ETT) along with subsequent extubation in OR or PACU: No  Required reintubation in the PACU: N/A  Extubation was a planned trial documented in the medical record prior to removal of the original airway device: N/A    Unplanned admission to ICU related to anesthesia service up through end of PACU care- (MD51)  Unplanned admission to ICU (not initially anticipated at anesthesia start time): No

## 2019-07-08 NOTE — ANESTHESIA TIME REPORT
Anesthesia Start and Stop Event Times     Date Time Event    7/8/2019 1125 Anesthesia Start     1238 Anesthesia Stop        Responsible Staff  07/08/19    Name Role Begin End    Federico Sherman M.D. Anesth 1157 1238        Preop Diagnosis (Free Text):  Pre-op Diagnosis             Preop Diagnosis (Codes):    Post op Diagnosis  Lumbar radiculopathy      Premium Reason  Non-Premium    Comments:

## 2019-07-09 PROCEDURE — A9270 NON-COVERED ITEM OR SERVICE: HCPCS | Performed by: HOSPITALIST

## 2019-07-09 PROCEDURE — 700102 HCHG RX REV CODE 250 W/ 637 OVERRIDE(OP): Performed by: HOSPITALIST

## 2019-07-09 PROCEDURE — 700111 HCHG RX REV CODE 636 W/ 250 OVERRIDE (IP): Performed by: INTERNAL MEDICINE

## 2019-07-09 PROCEDURE — 770006 HCHG ROOM/CARE - MED/SURG/GYN SEMI*

## 2019-07-09 PROCEDURE — A9270 NON-COVERED ITEM OR SERVICE: HCPCS | Performed by: INTERNAL MEDICINE

## 2019-07-09 PROCEDURE — 700102 HCHG RX REV CODE 250 W/ 637 OVERRIDE(OP): Performed by: INTERNAL MEDICINE

## 2019-07-09 PROCEDURE — 99232 SBSQ HOSP IP/OBS MODERATE 35: CPT | Performed by: INTERNAL MEDICINE

## 2019-07-09 RX ADMIN — DIAZEPAM 1 MG: 2 TABLET ORAL at 06:45

## 2019-07-09 RX ADMIN — POLYETHYLENE GLYCOL 3350 1 PACKET: 17 POWDER, FOR SOLUTION ORAL at 22:12

## 2019-07-09 RX ADMIN — GABAPENTIN 300 MG: 300 CAPSULE ORAL at 13:18

## 2019-07-09 RX ADMIN — CYCLOBENZAPRINE 10 MG: 10 TABLET, FILM COATED ORAL at 03:37

## 2019-07-09 RX ADMIN — METOPROLOL SUCCINATE 25 MG: 25 TABLET, EXTENDED RELEASE ORAL at 04:21

## 2019-07-09 RX ADMIN — SENNOSIDES, DOCUSATE SODIUM 2 TABLET: 50; 8.6 TABLET, FILM COATED ORAL at 04:22

## 2019-07-09 RX ADMIN — GABAPENTIN 300 MG: 300 CAPSULE ORAL at 04:22

## 2019-07-09 RX ADMIN — GABAPENTIN 300 MG: 300 CAPSULE ORAL at 17:20

## 2019-07-09 RX ADMIN — ENOXAPARIN SODIUM 40 MG: 100 INJECTION SUBCUTANEOUS at 17:20

## 2019-07-09 RX ADMIN — SENNOSIDES, DOCUSATE SODIUM 2 TABLET: 50; 8.6 TABLET, FILM COATED ORAL at 17:20

## 2019-07-09 ASSESSMENT — ENCOUNTER SYMPTOMS
BLURRED VISION: 0
SENSORY CHANGE: 1
PHOTOPHOBIA: 0
BACK PAIN: 1
DOUBLE VISION: 0
ABDOMINAL PAIN: 0
VOMITING: 0
SPUTUM PRODUCTION: 0
HALLUCINATIONS: 0
COUGH: 0
HEADACHES: 0
SHORTNESS OF BREATH: 0
CONSTIPATION: 0
ORTHOPNEA: 0
NAUSEA: 0
TINGLING: 0
DIZZINESS: 0
NECK PAIN: 0
CHILLS: 0
EYE PAIN: 0
TREMORS: 0
MYALGIAS: 0
SPEECH CHANGE: 0
FOCAL WEAKNESS: 1
FEVER: 0
PALPITATIONS: 0
DIARRHEA: 0
WEIGHT LOSS: 0
WEAKNESS: 1

## 2019-07-09 ASSESSMENT — LIFESTYLE VARIABLES: SUBSTANCE_ABUSE: 0

## 2019-07-09 NOTE — PROGRESS NOTES
Assumed care of pt at 0700.   Pt is A&Ox4.   Pt complains of pain to lower back, PRN medication in use.   Pt reports n/t to bilateral feet.   Pt is max assist to chair   Plan of care discussed.   Hourly rounding in place.

## 2019-07-09 NOTE — CARE PLAN
Problem: Safety  Goal: Will remain free from falls  Outcome: PROGRESSING AS EXPECTED  Treaded socks in place. Bed locked and in lowest position. Call light within reach.     Problem: Pain Management  Goal: Pain level will decrease to patient's comfort goal  Outcome: PROGRESSING AS EXPECTED  PRN medication in use.

## 2019-07-09 NOTE — PROGRESS NOTES
Neurosurgery Progress Note    Subjective:  Sat in chair yesterday, no new c/o    Exam:  Stable with (b) DF weakness 2/5, PF 4/5  Patient somnolent but appropriate, speech clear, no UE deficit, similar to yesterday  Incision healed    MRI T and LS spine:  LS seroma as expected,  Residual stenosis of non- surgical levels as expected, similar to preop MRI, no acute postsurgical complication      BP  Min: 119/65  Max: 142/90  Pulse  Av.2  Min: 88  Max: 133  Resp  Av  Min: 12  Max: 18  Temp  Av.4 °C (97.5 °F)  Min: 36.1 °C (97 °F)  Max: 36.8 °C (98.2 °F)  SpO2  Av.5 %  Min: 93 %  Max: 97 %    No Data Recorded                      Intake/Output       19 - 19 - 07/10/19 0659      [id number] 2570-1121 Total  Total       Intake    I.V.  520  -- 520  --  -- --    Propofol Volume 20 -- 20 -- -- --    Volume (mL) (lactated ringers infusion) 500 -- 500 -- -- --    Total Intake 520 -- 520 -- -- --       Output    Urine  600  300 900  --  -- --    Number of Times Voided 2 x 1 x 3 x -- -- --    Urine Void (mL) 600 300 900 -- -- --    Total Output 600 300 900 -- -- --       Net I/O     -80 -300 -380 -- -- --            Intake/Output Summary (Last 24 hours) at 19 0909  Last data filed at 19 2235   Gross per 24 hour   Intake              520 ml   Output              900 ml   Net             -380 ml            • LR   Continuous   • gabapentin  300 mg TID   • cyclobenzaprine  10 mg TID PRN   • metoprolol SR  25 mg DAILY   • senna-docusate  2 Tab BID    And   • polyethylene glycol/lytes  1 Packet QDAY PRN    And   • magnesium hydroxide  30 mL QDAY PRN    And   • bisacodyl  10 mg QDAY PRN   • acetaminophen  650 mg Q6HRS PRN   • Pharmacy Consult Request  1 Each PHARMACY TO DOSE    And   • oxyCODONE immediate-release  5 mg Q3HRS PRN    And   • oxyCODONE immediate-release  10 mg Q3HRS PRN    And   • HYDROmorphone  0.5 mg Q3HRS PRN   • diazePAM  1 mg Q8HRS  PRN       Assessment and Plan:  Hospital day # 5   readmission for below knee paresthesia, weakness: stable no further progression since admission     HO L4-S1 decompression 6/17/19 aborted L2-4 decompression r/t intraop loss of neuromonitoring signals,  (MRI cervical spine benign)     MRI T and LS spine: no postsurgical complication, no new or increased central,neuroforaminal stenosis  ]  Prophylactic anticoagulation: yes         Start date/time: ok to start at hospitalist discretion      Oversedation: recommend reducing narcotics, sedating agents.    Recommend continue aggressive PT, OT,   Dw Dr. Ruelas.    4:45 PM addendum    The MRIs of the thoracic and lumbar spine were reviewed.  The patient at some point will be a candidate for a revisit to the operating room to complete the surgery started on 6/17/2019.  However, the worst area of stenosis preop was at L4-5 and this is much better than before surgery.  I would recommend continued therapies and continued healing from the last surgery.  At some point, I will need to bring the patient back.  I really appreciate nursing, therapy, and hospitalist help.

## 2019-07-10 PROCEDURE — A9270 NON-COVERED ITEM OR SERVICE: HCPCS | Performed by: HOSPITALIST

## 2019-07-10 PROCEDURE — 99232 SBSQ HOSP IP/OBS MODERATE 35: CPT | Performed by: INTERNAL MEDICINE

## 2019-07-10 PROCEDURE — A9270 NON-COVERED ITEM OR SERVICE: HCPCS | Performed by: INTERNAL MEDICINE

## 2019-07-10 PROCEDURE — 700111 HCHG RX REV CODE 636 W/ 250 OVERRIDE (IP): Performed by: INTERNAL MEDICINE

## 2019-07-10 PROCEDURE — 700102 HCHG RX REV CODE 250 W/ 637 OVERRIDE(OP): Performed by: INTERNAL MEDICINE

## 2019-07-10 PROCEDURE — 770006 HCHG ROOM/CARE - MED/SURG/GYN SEMI*

## 2019-07-10 PROCEDURE — 700102 HCHG RX REV CODE 250 W/ 637 OVERRIDE(OP): Performed by: HOSPITALIST

## 2019-07-10 RX ORDER — OXYCODONE HYDROCHLORIDE 5 MG/1
5 TABLET ORAL
Status: DISCONTINUED | OUTPATIENT
Start: 2019-07-10 | End: 2019-07-12

## 2019-07-10 RX ORDER — HYDROMORPHONE HYDROCHLORIDE 1 MG/ML
0.2 INJECTION, SOLUTION INTRAMUSCULAR; INTRAVENOUS; SUBCUTANEOUS
Status: DISCONTINUED | OUTPATIENT
Start: 2019-07-10 | End: 2019-07-12

## 2019-07-10 RX ADMIN — ENOXAPARIN SODIUM 40 MG: 100 INJECTION SUBCUTANEOUS at 17:15

## 2019-07-10 RX ADMIN — METOPROLOL SUCCINATE 25 MG: 25 TABLET, EXTENDED RELEASE ORAL at 04:10

## 2019-07-10 RX ADMIN — ENOXAPARIN SODIUM 40 MG: 100 INJECTION SUBCUTANEOUS at 04:09

## 2019-07-10 RX ADMIN — GABAPENTIN 300 MG: 300 CAPSULE ORAL at 04:10

## 2019-07-10 RX ADMIN — CYCLOBENZAPRINE 10 MG: 10 TABLET, FILM COATED ORAL at 02:55

## 2019-07-10 RX ADMIN — SENNOSIDES, DOCUSATE SODIUM 2 TABLET: 50; 8.6 TABLET, FILM COATED ORAL at 04:10

## 2019-07-10 RX ADMIN — DIAZEPAM 1 MG: 2 TABLET ORAL at 04:09

## 2019-07-10 ASSESSMENT — ENCOUNTER SYMPTOMS
BLURRED VISION: 0
BACK PAIN: 1
HALLUCINATIONS: 0
DIZZINESS: 0
SENSORY CHANGE: 1
NAUSEA: 0
CONSTIPATION: 0
VOMITING: 0
SHORTNESS OF BREATH: 0
WEAKNESS: 1
DOUBLE VISION: 0
NECK PAIN: 0
ABDOMINAL PAIN: 0
PALPITATIONS: 0
PHOTOPHOBIA: 0
MYALGIAS: 0
SPUTUM PRODUCTION: 0
HEADACHES: 0
TINGLING: 0
CHILLS: 0
DIARRHEA: 0
EYE PAIN: 0
SPEECH CHANGE: 0
ORTHOPNEA: 0
WEIGHT LOSS: 0
FEVER: 0
FOCAL WEAKNESS: 1
TREMORS: 0
COUGH: 0

## 2019-07-10 ASSESSMENT — LIFESTYLE VARIABLES: SUBSTANCE_ABUSE: 0

## 2019-07-10 NOTE — PROGRESS NOTES
Neurosurgery Progress Note    Subjective:  No new c/o, remains somnolent    Exam:  Limited exam during past 72 hours r/t somnolence  DF/PF grossly unchanged    BP  Min: 119/72  Max: 140/78  Pulse  Av.3  Min: 98  Max: 128  Resp  Av.3  Min: 16  Max: 20  Temp  Av.4 °C (97.5 °F)  Min: 36.1 °C (96.9 °F)  Max: 36.8 °C (98.3 °F)  SpO2  Av.5 %  Min: 95 %  Max: 96 %    No Data Recorded                      Intake/Output       19 - 07/10/19 0659 07/10/19 0700 - 1959       Total 1900-0659 Total       Intake    P.O.  980  -- 980  --  -- --    P.O. 980 -- 980 -- -- --    Total Intake 980 -- 980 -- -- --       Output    Urine  950  450 1400  --  -- --    Urine Void (mL)  -- -- --    Stool  --  -- --  --  -- --    Number of Times Stooled -- 1 x 1 x -- -- --    Total Output  -- -- --       Net I/O     30 -450 -420 -- -- --            Intake/Output Summary (Last 24 hours) at 07/10/19 0858  Last data filed at 19 1900   Gross per 24 hour   Intake              740 ml   Output             1400 ml   Net             -660 ml            • enoxaparin (LOVENOX) injection  40 mg Q12HRS   • LR   Continuous   • gabapentin  300 mg TID   • cyclobenzaprine  10 mg TID PRN   • metoprolol SR  25 mg DAILY   • senna-docusate  2 Tab BID    And   • polyethylene glycol/lytes  1 Packet QDAY PRN    And   • magnesium hydroxide  30 mL QDAY PRN    And   • bisacodyl  10 mg QDAY PRN   • acetaminophen  650 mg Q6HRS PRN   • Pharmacy Consult Request  1 Each PHARMACY TO DOSE    And   • oxyCODONE immediate-release  5 mg Q3HRS PRN    And   • oxyCODONE immediate-release  10 mg Q3HRS PRN    And   • HYDROmorphone  0.5 mg Q3HRS PRN   • diazePAM  1 mg Q8HRS PRN       Assessment and Plan:  Hospital day # 6   readmission for below knee paresthesia, weakness: stable no further progression since admission     HO L4-S1 decompression 19 aborted L2-4 decompression r/t intraop  loss of neuromonitoring signals,  (MRI cervical spine benign)     MRI T and LS spine: no postsurgical complication, no new or increased central,neuroforaminal stenosis  ]  Prophylactic anticoagulation: yes         Start date/time: started, stop after dose on 7/11/19 in anticipation of possible surgery on 7/12/19      Oversedation vs lethargy: no oaboius metabolic cause:  will reduce narcotics, sedating agents. Patient has been unable to participate in examination.      Recommend continue aggressive PT, OT, until possible surgery on 7/12/19: please see separate note Dr. Ruelas.

## 2019-07-10 NOTE — CARE PLAN
Problem: Safety  Goal: Will remain free from injury  Outcome: PROGRESSING AS EXPECTED  Fall precautions in place - strip alarm on, call light within reach, nonslip footwear on, bed locked/ placed in lowest position, side rails up x3, hourly rounding.    Problem: Pain Management  Goal: Pain level will decrease to patient's comfort goal  Outcome: PROGRESSING AS EXPECTED  Monitor pain levels, medicate prn per mar

## 2019-07-10 NOTE — PROGRESS NOTES
Patient seen and examined.  Patient is losing strength in the left leg.  He has worsened when he was discharged from the hospital.  His MRI of the lumbar spine is stable.  The patient wants to proceed with a repeat surgery.  We discussed the risks.  We also discussed that I would have to abort the surgery again if he lost the signals from the neuro monitoring.  The patient states that he understands.  The patient states he cannot walk in his current condition.  I would recommend a L2-S1 laminectomy and redo laminectomy at L4-5.  The MRIs of the cervical and thoracic spine are reassuring.  We rediscussed risks, benefits, and options.  I will update the nurses and the hospitalist as to the timing of surgery.  Appreciate nursing and hospitalist help.

## 2019-07-10 NOTE — PROGRESS NOTES
Assumed care of pt at 0700.   Pt is A&Ox4.   Pt complains of pain to lower back, PRN medication in use.   Pt reports n/t to bilateral feet.   Pt is x1-2 assist to chair   Plan of care discussed.   Hourly rounding in place.

## 2019-07-10 NOTE — PROGRESS NOTES
Hospital Medicine Daily Progress Note    Date of Service  7/9/2019    Chief Complaint  23 y.o. male admitted 7/5/2019 with bilateral lower extremity numbness.    Hospital Course    23-year-old male history of lumbar laminectomy 6/17/2019 per Dr. crenshaw with discharge 6/25 to home with use of walker admitted with bilateral lower extremity numbness and weakness x3 days.  Patient admitted for pain control, therapy and further work-up with planned evaluation by neurosurgery Dr. Crenshaw.    Interval Problem Update    I evaluated and examined him at the bedside.  He reported bilateral lower extremity weakness more on the left lower extremity.  I discussed finding of MRI with him.  Neurosurgery evaluated him and made recommendations.  Continue PT/OT evaluation and recommendations.      Consultants/Specialty  Neurosurgery to consult    Code Status  Full code    Disposition  To be decided    Review of Systems  Review of Systems   Constitutional: Negative for chills, fever and weight loss.   HENT: Negative for hearing loss and tinnitus.    Eyes: Negative for blurred vision, double vision, photophobia and pain.   Respiratory: Negative for cough, sputum production and shortness of breath.    Cardiovascular: Negative for chest pain, palpitations, orthopnea and leg swelling.   Gastrointestinal: Negative for abdominal pain, constipation, diarrhea, nausea and vomiting.   Genitourinary: Negative for dysuria, frequency and urgency.   Musculoskeletal: Positive for back pain. Negative for joint pain, myalgias and neck pain.   Skin: Negative for rash.   Neurological: Positive for sensory change, focal weakness and weakness. Negative for dizziness, tingling, tremors, speech change and headaches.   Psychiatric/Behavioral: Negative for hallucinations and substance abuse.   All other systems reviewed and are negative.       Physical Exam  Temp:  [36.1 °C (97 °F)-36.3 °C (97.3 °F)] 36.3 °C (97.3 °F)  Pulse:  [] 115  Resp:  [12-16] 16  BP:  (119-142)/(65-90) 129/85  SpO2:  [94 %-96 %] 95 %    Physical Exam   Constitutional: He is oriented to person, place, and time. No distress.   Obese   HENT:   Head: Normocephalic and atraumatic.   Eyes: Pupils are equal, round, and reactive to light. Right eye exhibits no discharge. Left eye exhibits no discharge.   Neck: Normal range of motion. Neck supple.   Cardiovascular: Regular rhythm.  Exam reveals no friction rub.    No murmur heard.  Tachycardia   Pulmonary/Chest: Effort normal and breath sounds normal. No respiratory distress. He has no wheezes.   Abdominal: Soft. Bowel sounds are normal. He exhibits no distension. There is no tenderness. There is no rebound.   Musculoskeletal: Normal range of motion. He exhibits no edema or deformity.   Neurological: He is alert and oriented to person, place, and time. No cranial nerve deficit. Coordination normal.   Motor 3-4/5 in right lower extremity  Motor 2-3/5 in left lower extremity   Skin: Skin is warm and dry. No rash noted. He is not diaphoretic. No erythema.   Psychiatric: He has a normal mood and affect. His behavior is normal.       Fluids    Intake/Output Summary (Last 24 hours) at 07/09/19 2021  Last data filed at 07/09/19 1800   Gross per 24 hour   Intake              980 ml   Output             1250 ml   Net             -270 ml       Laboratory                        Imaging  MR-THORACIC SPINE-WITH & W/O   Final Result      Multilevel degenerative disc disease results in cord abutment but no cord compression      Straightening of the normal lordosis with multiple chronic Schmorl's nodes      Mild right T2/3 foraminal stenosis      MR-LUMBAR SPINE-WITH & W/O   Final Result      1.  New 10 x 3 x 1 cm deep subcutaneous fat fluid collection differential includes seroma with or without superimposed infection cannot      2.  Otherwise stable lumbar spine MRI with contrast following L3-L5 laminectomies      3.  Moderate multilevel central stenoses greatest at  L2/3 are again seen, mostly secondary to facet arthropathy and developmentally short pedicles as specifically detailed above      4.  Lesser foraminal stenoses are unchanged as detailed above           Assessment/Plan  * Spinal stenosis of lumbar region s/p aborted laminectomy with neurogenic claudication- (present on admission)   Assessment & Plan    With paresthesias, back pain  Severe ,debility.  He continued to have weakness in bilateral lower extremities more on the left side.  Continue to provide him pain control and monitor for adverse effect of pain medications.  Neurontin dose recently titrated up- monitor response.   As needed Flexeril  PT/OT evaluation and recommendations  He underwent MRI and neurosurgery Dr. Ruelas reviewed MRI imaging.  Appreciate neurosurgery recommendations.  I discussed plan of care during multidisciplinary rounds.     Essential hypertension   Assessment & Plan    Controlled  continue metoprolol  Lifestyle modifications  Continue to monitor        I discussed plan of care during Milena pronounced.    VTE prophylaxis: Lovenox 40 mg twice daily due to high BMI.  I discussed with pharmacist.

## 2019-07-11 LAB
APTT PPP: 31.4 SEC (ref 24.7–36)
INR PPP: 1.06 (ref 0.87–1.13)
PROTHROMBIN TIME: 14 SEC (ref 12–14.6)
UFH PPP CHRO-ACNC: 0.3 U/ML

## 2019-07-11 PROCEDURE — A9270 NON-COVERED ITEM OR SERVICE: HCPCS | Performed by: NURSE PRACTITIONER

## 2019-07-11 PROCEDURE — 700102 HCHG RX REV CODE 250 W/ 637 OVERRIDE(OP): Performed by: INTERNAL MEDICINE

## 2019-07-11 PROCEDURE — 85520 HEPARIN ASSAY: CPT

## 2019-07-11 PROCEDURE — A9270 NON-COVERED ITEM OR SERVICE: HCPCS | Performed by: INTERNAL MEDICINE

## 2019-07-11 PROCEDURE — 85610 PROTHROMBIN TIME: CPT

## 2019-07-11 PROCEDURE — 700102 HCHG RX REV CODE 250 W/ 637 OVERRIDE(OP): Performed by: NURSE PRACTITIONER

## 2019-07-11 PROCEDURE — 700111 HCHG RX REV CODE 636 W/ 250 OVERRIDE (IP): Performed by: INTERNAL MEDICINE

## 2019-07-11 PROCEDURE — 99232 SBSQ HOSP IP/OBS MODERATE 35: CPT | Performed by: INTERNAL MEDICINE

## 2019-07-11 PROCEDURE — 85730 THROMBOPLASTIN TIME PARTIAL: CPT

## 2019-07-11 PROCEDURE — 97110 THERAPEUTIC EXERCISES: CPT

## 2019-07-11 PROCEDURE — 97530 THERAPEUTIC ACTIVITIES: CPT

## 2019-07-11 PROCEDURE — 770006 HCHG ROOM/CARE - MED/SURG/GYN SEMI*

## 2019-07-11 PROCEDURE — 36415 COLL VENOUS BLD VENIPUNCTURE: CPT

## 2019-07-11 RX ADMIN — METOPROLOL SUCCINATE 25 MG: 25 TABLET, EXTENDED RELEASE ORAL at 05:39

## 2019-07-11 RX ADMIN — ENOXAPARIN SODIUM 40 MG: 100 INJECTION SUBCUTANEOUS at 05:40

## 2019-07-11 RX ADMIN — OXYCODONE HYDROCHLORIDE 5 MG: 5 TABLET ORAL at 23:26

## 2019-07-11 ASSESSMENT — ENCOUNTER SYMPTOMS
TREMORS: 0
COUGH: 0
SENSORY CHANGE: 1
DOUBLE VISION: 0
PALPITATIONS: 0
DIARRHEA: 0
PHOTOPHOBIA: 0
NAUSEA: 0
WEAKNESS: 1
SHORTNESS OF BREATH: 0
NECK PAIN: 0
MYALGIAS: 0
DIZZINESS: 0
SPEECH CHANGE: 0
SPUTUM PRODUCTION: 0
FEVER: 0
CONSTIPATION: 0
TINGLING: 0
VOMITING: 0
BLURRED VISION: 0
HEADACHES: 0
HALLUCINATIONS: 0
CHILLS: 0
ORTHOPNEA: 0
FOCAL WEAKNESS: 1
EYE PAIN: 0
WEIGHT LOSS: 0
ABDOMINAL PAIN: 0
BACK PAIN: 1

## 2019-07-11 ASSESSMENT — COGNITIVE AND FUNCTIONAL STATUS - GENERAL
WALKING IN HOSPITAL ROOM: A LOT
CLIMB 3 TO 5 STEPS WITH RAILING: TOTAL
MOBILITY SCORE: 14
MOVING TO AND FROM BED TO CHAIR: A LITTLE
STANDING UP FROM CHAIR USING ARMS: A LITTLE
SUGGESTED CMS G CODE MODIFIER MOBILITY: CL
MOVING FROM LYING ON BACK TO SITTING ON SIDE OF FLAT BED: A LOT
TURNING FROM BACK TO SIDE WHILE IN FLAT BAD: A LITTLE

## 2019-07-11 ASSESSMENT — GAIT ASSESSMENTS: GAIT LEVEL OF ASSIST: UNABLE TO PARTICIPATE

## 2019-07-11 ASSESSMENT — LIFESTYLE VARIABLES: SUBSTANCE_ABUSE: 0

## 2019-07-11 NOTE — PROGRESS NOTES
Hospital Medicine Daily Progress Note    Date of Service  7/10/2019    Chief Complaint  23 y.o. male admitted 7/5/2019 with bilateral lower extremity numbness.    Hospital Course    23-year-old male history of lumbar laminectomy 6/17/2019 per Dr. crenshaw with discharge 6/25 to home with use of walker admitted with bilateral lower extremity numbness and weakness x3 days.  Patient admitted for pain control, therapy and further work-up with planned evaluation by neurosurgery Dr. Crenshaw.    Interval Problem Update    I evaluated and examined him at the bedside.  He reported he continued to have weakness on his left lower extremity.  Neurosurgery evaluated him and he is going to have surgery on Friday.  I discussed plan of care with him.      Consultants/Specialty  Neurosurgery to consult    Code Status  Full code    Disposition  To be decided    Review of Systems  Review of Systems   Constitutional: Negative for chills, fever and weight loss.   HENT: Negative for hearing loss and tinnitus.    Eyes: Negative for blurred vision, double vision, photophobia and pain.   Respiratory: Negative for cough, sputum production and shortness of breath.    Cardiovascular: Negative for chest pain, palpitations, orthopnea and leg swelling.   Gastrointestinal: Negative for abdominal pain, constipation, diarrhea, nausea and vomiting.   Genitourinary: Negative for dysuria, frequency and urgency.   Musculoskeletal: Positive for back pain. Negative for joint pain, myalgias and neck pain.   Skin: Negative for rash.   Neurological: Positive for sensory change, focal weakness and weakness. Negative for dizziness, tingling, tremors, speech change and headaches.   Psychiatric/Behavioral: Negative for hallucinations and substance abuse.   All other systems reviewed and are negative.       Physical Exam  Temp:  [36.1 °C (96.9 °F)-36.8 °C (98.3 °F)] 36.3 °C (97.4 °F)  Pulse:  [] 122  Resp:  [17-20] 18  BP: (113-140)/(70-84) 113/70  SpO2:  [95 %-97  %] 97 %    Physical Exam   Constitutional: He is oriented to person, place, and time. No distress.   Obese   HENT:   Head: Normocephalic and atraumatic.   Eyes: Pupils are equal, round, and reactive to light. Right eye exhibits no discharge. Left eye exhibits no discharge.   Neck: Normal range of motion. Neck supple.   Cardiovascular: Regular rhythm.  Exam reveals no friction rub.    No murmur heard.  Tachycardia   Pulmonary/Chest: Effort normal and breath sounds normal. No respiratory distress. He has no wheezes.   Abdominal: Soft. Bowel sounds are normal. He exhibits no distension. There is no tenderness. There is no rebound.   Musculoskeletal: Normal range of motion. He exhibits no edema or deformity.   Neurological: He is alert and oriented to person, place, and time. No cranial nerve deficit. Coordination normal.   Motor 3-4/5 in right lower extremity  Motor 2-3/5 in left lower extremity   Skin: Skin is warm and dry. No rash noted. He is not diaphoretic. No erythema.   Psychiatric: He has a normal mood and affect. His behavior is normal.   I performed physical exam on July 10, 2019 and it remains similar from yesterday.    Fluids    Intake/Output Summary (Last 24 hours) at 07/10/19 1726  Last data filed at 07/09/19 1900   Gross per 24 hour   Intake              380 ml   Output              450 ml   Net              -70 ml       Laboratory                        Imaging  MR-THORACIC SPINE-WITH & W/O   Final Result      Multilevel degenerative disc disease results in cord abutment but no cord compression      Straightening of the normal lordosis with multiple chronic Schmorl's nodes      Mild right T2/3 foraminal stenosis      MR-LUMBAR SPINE-WITH & W/O   Final Result      1.  New 10 x 3 x 1 cm deep subcutaneous fat fluid collection differential includes seroma with or without superimposed infection cannot      2.  Otherwise stable lumbar spine MRI with contrast following L3-L5 laminectomies      3.  Moderate  multilevel central stenoses greatest at L2/3 are again seen, mostly secondary to facet arthropathy and developmentally short pedicles as specifically detailed above      4.  Lesser foraminal stenoses are unchanged as detailed above           Assessment/Plan  * Spinal stenosis of lumbar region s/p aborted laminectomy with neurogenic claudication- (present on admission)   Assessment & Plan    With paresthesias, back pain  Severe ,debility.  He continued to have weakness in bilateral lower extremities more on the left side.  Continue to provide him pain control and monitor for adverse effect of pain medications.  Neurontin dose recently titrated up- monitor response.   As needed Flexeril  PT/OT evaluation and recommendations  Neurosurgery evaluated him and he is going to have surgery on Friday.  Appreciate neurosurgery recommendations.  I discussed plan of care with him.  I discussed plan of care during multidisciplinary rounds.     Essential hypertension   Assessment & Plan    Controlled  continue metoprolol  Lifestyle modifications  Continue to monitor        I discussed plan of care during multidisciplinary rounds.    VTE prophylaxis: Lovenox 40 mg twice daily due to high BMI.  I discussed with pharmacist.

## 2019-07-11 NOTE — PROGRESS NOTES
Hospital Medicine Daily Progress Note    Date of Service  7/11/2019    Chief Complaint  23 y.o. male admitted 7/5/2019 with bilateral lower extremity numbness.    Hospital Course    23-year-old male history of lumbar laminectomy 6/17/2019 per Dr. crenshaw with discharge 6/25 to home with use of walker admitted with bilateral lower extremity numbness and weakness x3 days.  Patient admitted for pain control, therapy and further work-up with planned evaluation by neurosurgery Dr. Crenshaw.    Interval Problem Update    I evaluated and examined him at the bedside.  He continued to have left lower extremity weakness.  Plan is to perform surgery tomorrow and DVT prophylaxis discontinued.  N.p.o. from midnight.  Continue providing pain control.  I discussed plan of care with him        Consultants/Specialty  Neurosurgery to consult    Code Status  Full code    Disposition  To be decided    Review of Systems  Review of Systems   Constitutional: Negative for chills, fever and weight loss.   HENT: Negative for hearing loss and tinnitus.    Eyes: Negative for blurred vision, double vision, photophobia and pain.   Respiratory: Negative for cough, sputum production and shortness of breath.    Cardiovascular: Negative for chest pain, palpitations, orthopnea and leg swelling.   Gastrointestinal: Negative for abdominal pain, constipation, diarrhea, nausea and vomiting.   Genitourinary: Negative for dysuria, frequency and urgency.   Musculoskeletal: Positive for back pain. Negative for joint pain, myalgias and neck pain.   Skin: Negative for rash.   Neurological: Positive for sensory change, focal weakness and weakness. Negative for dizziness, tingling, tremors, speech change and headaches.   Psychiatric/Behavioral: Negative for hallucinations and substance abuse.   All other systems reviewed and are negative.       Physical Exam  Temp:  [36.1 °C (96.9 °F)-36.3 °C (97.4 °F)] 36.3 °C (97.3 °F)  Pulse:  [] 98  Resp:  [16-18] 16  BP:  (113-143)/(70-91) 138/89  SpO2:  [93 %-97 %] 94 %    Physical Exam   Constitutional: He is oriented to person, place, and time. No distress.   Obese   HENT:   Head: Normocephalic and atraumatic.   Eyes: Pupils are equal, round, and reactive to light. Right eye exhibits no discharge. Left eye exhibits no discharge.   Neck: Normal range of motion. Neck supple.   Cardiovascular: Regular rhythm.  Exam reveals no friction rub.    No murmur heard.  Tachycardia   Pulmonary/Chest: Effort normal and breath sounds normal. No respiratory distress. He has no wheezes.   Abdominal: Soft. Bowel sounds are normal. He exhibits no distension. There is no tenderness. There is no rebound.   Musculoskeletal: Normal range of motion. He exhibits no edema or deformity.   Neurological: He is alert and oriented to person, place, and time. No cranial nerve deficit. Coordination normal.   Motor 3-4/5 in right lower extremity  Motor 2-3/5 in left lower extremity   Skin: Skin is warm and dry. No rash noted. He is not diaphoretic. No erythema.   Psychiatric: He has a normal mood and affect. His behavior is normal.   I performed physical exam on July 11, 2019 and it remains similar from yesterday.    Fluids    Intake/Output Summary (Last 24 hours) at 07/11/19 0935  Last data filed at 07/11/19 0000   Gross per 24 hour   Intake                0 ml   Output              350 ml   Net             -350 ml       Laboratory                        Imaging  MR-THORACIC SPINE-WITH & W/O   Final Result      Multilevel degenerative disc disease results in cord abutment but no cord compression      Straightening of the normal lordosis with multiple chronic Schmorl's nodes      Mild right T2/3 foraminal stenosis      MR-LUMBAR SPINE-WITH & W/O   Final Result      1.  New 10 x 3 x 1 cm deep subcutaneous fat fluid collection differential includes seroma with or without superimposed infection cannot      2.  Otherwise stable lumbar spine MRI with contrast following  L3-L5 laminectomies      3.  Moderate multilevel central stenoses greatest at L2/3 are again seen, mostly secondary to facet arthropathy and developmentally short pedicles as specifically detailed above      4.  Lesser foraminal stenoses are unchanged as detailed above           Assessment/Plan  * Spinal stenosis of lumbar region s/p aborted laminectomy with neurogenic claudication- (present on admission)   Assessment & Plan    With paresthesias, back pain  Severe ,debility.  He continued to have weakness in bilateral lower extremities more on the left side.  Continue to provide him pain control and monitor for adverse effect of pain medications.  Neurontin dose recently titrated up- monitor response.   As needed Flexeril  PT/OT evaluation and recommendations  Neurosurgery evaluated him and he is going to have surgery tomorrow.  Discontinue Lovenox as per neurosurgery recommendations.  I discussed plan of care with him and I answered all his questions.  SCDs for DVT prophylaxis.     Essential hypertension   Assessment & Plan    Controlled  continue metoprolol  Lifestyle modifications  Continue to monitor        I discussed plan of care during multidisciplinary rounds and with RN at the bedside.    VTE prophylaxis: SCDs and discontinue Lovenox due to possible surgery tomorrow.

## 2019-07-11 NOTE — PROGRESS NOTES
Neurosurgery Progress Note    Subjective:  Less spasm over night, reports improved paresthesia: below knee level on admit, now reduced to (b) feet, no saddle anesthesia    Exam:  Alert, oriented today, able to cooperate fully with exam  RLE: I: 5/5,Q: 5/5, DF 2/5, PF 4+/5  LLE: I: 4-/5, Q: 4/5, DF 2/5, PF 4/5    BP  Min: 113/70  Max: 143/91  Pulse  Av.5  Min: 98  Max: 122  Resp  Av  Min: 16  Max: 18  Temp  Av.2 °C (97.2 °F)  Min: 36.1 °C (96.9 °F)  Max: 36.3 °C (97.4 °F)  SpO2  Av %  Min: 93 %  Max: 97 %    No Data Recorded                      Intake/Output       07/10/19 07 - 19 0659 19 - 1959       Total  Total       Intake    Total Intake -- -- -- -- -- --       Output    Urine  --  350 350  --  -- --    Number of Times Voided 600 x 1 x 601 x -- -- --    Urine Void (mL) -- 350 350 -- -- --    Stool  --  -- --  --  -- --    Number of Times Stooled 1 x -- 1 x -- -- --    Total Output -- 350 350 -- -- --       Net I/O     -- -350 -350 -- -- --            Intake/Output Summary (Last 24 hours) at 19 1023  Last data filed at 19 0000   Gross per 24 hour   Intake                0 ml   Output              350 ml   Net             -350 ml            • Pharmacy Consult Request  1 Each PHARMACY TO DOSE    And   • oxyCODONE immediate-release  5 mg Q3HRS PRN    And   • HYDROmorphone  0.2 mg Q HOUR PRN   • LR   Continuous   • metoprolol SR  25 mg DAILY   • senna-docusate  2 Tab BID    And   • polyethylene glycol/lytes  1 Packet QDAY PRN    And   • magnesium hydroxide  30 mL QDAY PRN    And   • bisacodyl  10 mg QDAY PRN   • acetaminophen  650 mg Q6HRS PRN       Assessment and Plan:  Hospital day # 7:  readmission for below knee paresthesia, weakness:  improved, lower extremity weakness: stable     HO L4-S1 decompression 19 aborted L2-4 decompression r/t intraop loss of neuromonitoring signals,  (MRI cervical spine  And  thoracic benign)      Intermittent tachycardia: present during prior admission, improved on Metoprolol, echo 6/21/19: WNL    Oversedation: resolved, probably r/t multiagent pain medication regiment, pain controlled    Plan: L2-S1 laminectomy on 7/12/19. Patient eager to proceed, Risks of anesthesia and surgery discussed.    NPO after midnight. Stop DVT prophylaxis. start sequentials  D/w Dr. Ruelas

## 2019-07-11 NOTE — PROGRESS NOTES
Bedside report received by june RN.  Patient is in bed. Pt complains of no pain and has no current needs at this time.  Will continue to monitor.

## 2019-07-11 NOTE — THERAPY
"Physical Therapy Treatment completed.   Bed Mobility:  Supine to Sit: Supervised  Transfers: Sit to Stand: Minimal Assist  Gait: Level Of Assist: Unable to Participate with Front-Wheel Walker       Plan of Care: Will benefit from Physical Therapy 4 times per week  Discharge Recommendations: Equipment: Will Continue to Assess for Equipment Needs.     See \"Rehab Therapy-Acute\" Patient Summary Report for complete documentation.     Pt progressing slowly w/ therapy. Pt is limited by functional weakness in his LE's. Pt was able to perform all exercises and MMT w/ good strength but once using them functionally, he demonstrated very poor tolerance. Pt needing to \"hop\" to transfer as he does not trust his LE's. He has to rely heavily on his UE's to perform all standing activities. Pt was open to an HEP for both before surgery and after to increase his LE strength. Pt to have surgery tomorrow so will follow up post op.  "

## 2019-07-11 NOTE — CARE PLAN
Problem: Infection  Goal: Will remain free from infection    Intervention: Assess signs and symptoms of infection  Pt educated on handwashing and hygiene. Standard precautions implemented. Assessed for s/s of infections. VS and labs monitored. Pt verbalized understanding of infection prevention care plan.

## 2019-07-11 NOTE — CARE PLAN
Problem: Safety  Goal: Will remain free from falls  Non-skid socks in use. Call light in reach. Pt instructed to call for help. Hourly rounding complete. Bed locked and in low position. Pt verbalized understanding of safety care plan.

## 2019-07-12 ENCOUNTER — APPOINTMENT (OUTPATIENT)
Dept: RADIOLOGY | Facility: MEDICAL CENTER | Age: 24
DRG: 519 | End: 2019-07-12
Attending: NEUROLOGICAL SURGERY
Payer: COMMERCIAL

## 2019-07-12 ENCOUNTER — ANESTHESIA EVENT (OUTPATIENT)
Dept: SURGERY | Facility: MEDICAL CENTER | Age: 24
DRG: 519 | End: 2019-07-12
Payer: COMMERCIAL

## 2019-07-12 ENCOUNTER — ANESTHESIA (OUTPATIENT)
Dept: SURGERY | Facility: MEDICAL CENTER | Age: 24
DRG: 519 | End: 2019-07-12
Payer: COMMERCIAL

## 2019-07-12 LAB
ANION GAP SERPL CALC-SCNC: 12 MMOL/L (ref 0–11.9)
BUN SERPL-MCNC: 15 MG/DL (ref 8–22)
CALCIUM SERPL-MCNC: 10 MG/DL (ref 8.5–10.5)
CHLORIDE SERPL-SCNC: 104 MMOL/L (ref 96–112)
CO2 SERPL-SCNC: 24 MMOL/L (ref 20–33)
CREAT SERPL-MCNC: 0.64 MG/DL (ref 0.5–1.4)
ERYTHROCYTE [DISTWIDTH] IN BLOOD BY AUTOMATED COUNT: 40.7 FL (ref 35.9–50)
GLUCOSE SERPL-MCNC: 100 MG/DL (ref 65–99)
HCT VFR BLD AUTO: 47 % (ref 42–52)
HGB BLD-MCNC: 14.9 G/DL (ref 14–18)
INR PPP: 1.01 (ref 0.87–1.13)
MAGNESIUM SERPL-MCNC: 2 MG/DL (ref 1.5–2.5)
MCH RBC QN AUTO: 26.8 PG (ref 27–33)
MCHC RBC AUTO-ENTMCNC: 31.7 G/DL (ref 33.7–35.3)
MCV RBC AUTO: 84.5 FL (ref 81.4–97.8)
PLATELET # BLD AUTO: 408 K/UL (ref 164–446)
PMV BLD AUTO: 8.8 FL (ref 9–12.9)
POTASSIUM SERPL-SCNC: 4 MMOL/L (ref 3.6–5.5)
PROTHROMBIN TIME: 13.5 SEC (ref 12–14.6)
RBC # BLD AUTO: 5.56 M/UL (ref 4.7–6.1)
SODIUM SERPL-SCNC: 140 MMOL/L (ref 135–145)
WBC # BLD AUTO: 12.6 K/UL (ref 4.8–10.8)

## 2019-07-12 PROCEDURE — 36415 COLL VENOUS BLD VENIPUNCTURE: CPT

## 2019-07-12 PROCEDURE — 700102 HCHG RX REV CODE 250 W/ 637 OVERRIDE(OP): Performed by: INTERNAL MEDICINE

## 2019-07-12 PROCEDURE — 700105 HCHG RX REV CODE 258: Performed by: ANESTHESIOLOGY

## 2019-07-12 PROCEDURE — 770006 HCHG ROOM/CARE - MED/SURG/GYN SEMI*

## 2019-07-12 PROCEDURE — 160035 HCHG PACU - 1ST 60 MINS PHASE I: Performed by: NEUROLOGICAL SURGERY

## 2019-07-12 PROCEDURE — 700101 HCHG RX REV CODE 250: Performed by: NEUROLOGICAL SURGERY

## 2019-07-12 PROCEDURE — 160036 HCHG PACU - EA ADDL 30 MINS PHASE I: Performed by: NEUROLOGICAL SURGERY

## 2019-07-12 PROCEDURE — 160029 HCHG SURGERY MINUTES - 1ST 30 MINS LEVEL 4: Performed by: NEUROLOGICAL SURGERY

## 2019-07-12 PROCEDURE — 700111 HCHG RX REV CODE 636 W/ 250 OVERRIDE (IP): Performed by: ANESTHESIOLOGY

## 2019-07-12 PROCEDURE — 500885 HCHG PACK, JACKSON TABLE: Performed by: NEUROLOGICAL SURGERY

## 2019-07-12 PROCEDURE — 95861 NEEDLE EMG 2 EXTREMITIES: CPT | Performed by: NEUROLOGICAL SURGERY

## 2019-07-12 PROCEDURE — 500444 HCHG HEMOSTAT, SURGICEL 2X3: Performed by: NEUROLOGICAL SURGERY

## 2019-07-12 PROCEDURE — 160041 HCHG SURGERY MINUTES - EA ADDL 1 MIN LEVEL 4: Performed by: NEUROLOGICAL SURGERY

## 2019-07-12 PROCEDURE — 95937 NEUROMUSCULAR JUNCTION TEST: CPT | Performed by: NEUROLOGICAL SURGERY

## 2019-07-12 PROCEDURE — 160009 HCHG ANES TIME/MIN: Performed by: NEUROLOGICAL SURGERY

## 2019-07-12 PROCEDURE — 500423 HCHG DRESSING, ABD COMBINE: Performed by: NEUROLOGICAL SURGERY

## 2019-07-12 PROCEDURE — A9270 NON-COVERED ITEM OR SERVICE: HCPCS | Performed by: INTERNAL MEDICINE

## 2019-07-12 PROCEDURE — 500331 HCHG COTTONOID, SURG PATTIE: Performed by: NEUROLOGICAL SURGERY

## 2019-07-12 PROCEDURE — 85610 PROTHROMBIN TIME: CPT

## 2019-07-12 PROCEDURE — 700102 HCHG RX REV CODE 250 W/ 637 OVERRIDE(OP): Performed by: ANESTHESIOLOGY

## 2019-07-12 PROCEDURE — 700111 HCHG RX REV CODE 636 W/ 250 OVERRIDE (IP): Performed by: PHYSICIAN ASSISTANT

## 2019-07-12 PROCEDURE — 85027 COMPLETE CBC AUTOMATED: CPT

## 2019-07-12 PROCEDURE — 160002 HCHG RECOVERY MINUTES (STAT): Performed by: NEUROLOGICAL SURGERY

## 2019-07-12 PROCEDURE — 72020 X-RAY EXAM OF SPINE 1 VIEW: CPT

## 2019-07-12 PROCEDURE — 95940 IONM IN OPERATNG ROOM 15 MIN: CPT | Performed by: NEUROLOGICAL SURGERY

## 2019-07-12 PROCEDURE — 80048 BASIC METABOLIC PNL TOTAL CA: CPT

## 2019-07-12 PROCEDURE — 500367 HCHG DRAIN KIT, HEMOVAC: Performed by: NEUROLOGICAL SURGERY

## 2019-07-12 PROCEDURE — 160048 HCHG OR STATISTICAL LEVEL 1-5: Performed by: NEUROLOGICAL SURGERY

## 2019-07-12 PROCEDURE — 99232 SBSQ HOSP IP/OBS MODERATE 35: CPT | Performed by: INTERNAL MEDICINE

## 2019-07-12 PROCEDURE — 83735 ASSAY OF MAGNESIUM: CPT

## 2019-07-12 PROCEDURE — 700101 HCHG RX REV CODE 250: Performed by: ANESTHESIOLOGY

## 2019-07-12 PROCEDURE — 95925 SOMATOSENSORY TESTING: CPT | Performed by: NEUROLOGICAL SURGERY

## 2019-07-12 PROCEDURE — A9270 NON-COVERED ITEM OR SERVICE: HCPCS | Performed by: ANESTHESIOLOGY

## 2019-07-12 PROCEDURE — 501838 HCHG SUTURE GENERAL: Performed by: NEUROLOGICAL SURGERY

## 2019-07-12 RX ORDER — ACETAMINOPHEN 500 MG
500 TABLET ORAL EVERY 6 HOURS PRN
Status: DISCONTINUED | OUTPATIENT
Start: 2019-07-12 | End: 2019-07-22 | Stop reason: HOSPADM

## 2019-07-12 RX ORDER — HYDROMORPHONE HYDROCHLORIDE 1 MG/ML
0.4 INJECTION, SOLUTION INTRAMUSCULAR; INTRAVENOUS; SUBCUTANEOUS
Status: DISCONTINUED | OUTPATIENT
Start: 2019-07-12 | End: 2019-07-12

## 2019-07-12 RX ORDER — BACITRACIN 50000 [IU]/1
INJECTION, POWDER, FOR SOLUTION INTRAMUSCULAR
Status: DISCONTINUED | OUTPATIENT
Start: 2019-07-12 | End: 2019-07-12 | Stop reason: HOSPADM

## 2019-07-12 RX ORDER — HYDROMORPHONE HYDROCHLORIDE 1 MG/ML
0.2 INJECTION, SOLUTION INTRAMUSCULAR; INTRAVENOUS; SUBCUTANEOUS
Status: DISCONTINUED | OUTPATIENT
Start: 2019-07-12 | End: 2019-07-12

## 2019-07-12 RX ORDER — SODIUM CHLORIDE, SODIUM GLUCONATE, SODIUM ACETATE, POTASSIUM CHLORIDE AND MAGNESIUM CHLORIDE 526; 502; 368; 37; 30 MG/100ML; MG/100ML; MG/100ML; MG/100ML; MG/100ML
INJECTION, SOLUTION INTRAVENOUS
Status: DISCONTINUED | OUTPATIENT
Start: 2019-07-12 | End: 2019-07-12 | Stop reason: SURG

## 2019-07-12 RX ORDER — HYDRALAZINE HYDROCHLORIDE 20 MG/ML
5 INJECTION INTRAMUSCULAR; INTRAVENOUS
Status: DISCONTINUED | OUTPATIENT
Start: 2019-07-12 | End: 2019-07-12 | Stop reason: HOSPADM

## 2019-07-12 RX ORDER — METOPROLOL TARTRATE 1 MG/ML
INJECTION, SOLUTION INTRAVENOUS PRN
Status: DISCONTINUED | OUTPATIENT
Start: 2019-07-12 | End: 2019-07-12 | Stop reason: SURG

## 2019-07-12 RX ORDER — SODIUM CHLORIDE, SODIUM LACTATE, POTASSIUM CHLORIDE, CALCIUM CHLORIDE 600; 310; 30; 20 MG/100ML; MG/100ML; MG/100ML; MG/100ML
INJECTION, SOLUTION INTRAVENOUS CONTINUOUS
Status: DISCONTINUED | OUTPATIENT
Start: 2019-07-12 | End: 2019-07-12 | Stop reason: HOSPADM

## 2019-07-12 RX ORDER — METOPROLOL TARTRATE 1 MG/ML
1 INJECTION, SOLUTION INTRAVENOUS
Status: DISCONTINUED | OUTPATIENT
Start: 2019-07-12 | End: 2019-07-12 | Stop reason: HOSPADM

## 2019-07-12 RX ORDER — OXYCODONE HCL 5 MG/5 ML
5 SOLUTION, ORAL ORAL
Status: COMPLETED | OUTPATIENT
Start: 2019-07-12 | End: 2019-07-12

## 2019-07-12 RX ORDER — OXYCODONE HCL 5 MG/5 ML
10 SOLUTION, ORAL ORAL
Status: COMPLETED | OUTPATIENT
Start: 2019-07-12 | End: 2019-07-12

## 2019-07-12 RX ORDER — HYDROMORPHONE HYDROCHLORIDE 2 MG/ML
INJECTION, SOLUTION INTRAMUSCULAR; INTRAVENOUS; SUBCUTANEOUS PRN
Status: DISCONTINUED | OUTPATIENT
Start: 2019-07-12 | End: 2019-07-12 | Stop reason: SURG

## 2019-07-12 RX ORDER — ACETAMINOPHEN 500 MG
1000 TABLET ORAL ONCE
Status: COMPLETED | OUTPATIENT
Start: 2019-07-12 | End: 2019-07-12

## 2019-07-12 RX ORDER — GABAPENTIN 300 MG/1
300 CAPSULE ORAL ONCE
Status: COMPLETED | OUTPATIENT
Start: 2019-07-12 | End: 2019-07-12

## 2019-07-12 RX ORDER — DIPHENHYDRAMINE HYDROCHLORIDE 50 MG/ML
12.5 INJECTION INTRAMUSCULAR; INTRAVENOUS
Status: DISCONTINUED | OUTPATIENT
Start: 2019-07-12 | End: 2019-07-12 | Stop reason: HOSPADM

## 2019-07-12 RX ORDER — ONDANSETRON 2 MG/ML
INJECTION INTRAMUSCULAR; INTRAVENOUS PRN
Status: DISCONTINUED | OUTPATIENT
Start: 2019-07-12 | End: 2019-07-12 | Stop reason: SURG

## 2019-07-12 RX ORDER — BUPIVACAINE HYDROCHLORIDE AND EPINEPHRINE 5; 5 MG/ML; UG/ML
INJECTION, SOLUTION EPIDURAL; INTRACAUDAL; PERINEURAL
Status: DISCONTINUED | OUTPATIENT
Start: 2019-07-12 | End: 2019-07-12 | Stop reason: HOSPADM

## 2019-07-12 RX ORDER — SODIUM CHLORIDE, SODIUM LACTATE, POTASSIUM CHLORIDE, CALCIUM CHLORIDE 600; 310; 30; 20 MG/100ML; MG/100ML; MG/100ML; MG/100ML
INJECTION, SOLUTION INTRAVENOUS
Status: DISCONTINUED | OUTPATIENT
Start: 2019-07-12 | End: 2019-07-12

## 2019-07-12 RX ORDER — CEFAZOLIN SODIUM 1 G/3ML
INJECTION, POWDER, FOR SOLUTION INTRAMUSCULAR; INTRAVENOUS PRN
Status: DISCONTINUED | OUTPATIENT
Start: 2019-07-12 | End: 2019-07-12 | Stop reason: SURG

## 2019-07-12 RX ORDER — CYCLOBENZAPRINE HCL 10 MG
10 TABLET ORAL EVERY 8 HOURS PRN
Status: DISCONTINUED | OUTPATIENT
Start: 2019-07-12 | End: 2019-07-15

## 2019-07-12 RX ORDER — CEFAZOLIN SODIUM 2 G/100ML
2 INJECTION, SOLUTION INTRAVENOUS EVERY 8 HOURS
Status: COMPLETED | OUTPATIENT
Start: 2019-07-12 | End: 2019-07-13

## 2019-07-12 RX ORDER — HALOPERIDOL 5 MG/ML
1 INJECTION INTRAMUSCULAR
Status: DISCONTINUED | OUTPATIENT
Start: 2019-07-12 | End: 2019-07-12 | Stop reason: HOSPADM

## 2019-07-12 RX ORDER — DEXAMETHASONE SODIUM PHOSPHATE 4 MG/ML
INJECTION, SOLUTION INTRA-ARTICULAR; INTRALESIONAL; INTRAMUSCULAR; INTRAVENOUS; SOFT TISSUE PRN
Status: DISCONTINUED | OUTPATIENT
Start: 2019-07-12 | End: 2019-07-12 | Stop reason: HOSPADM

## 2019-07-12 RX ORDER — HYDROMORPHONE HYDROCHLORIDE 1 MG/ML
0.1 INJECTION, SOLUTION INTRAMUSCULAR; INTRAVENOUS; SUBCUTANEOUS
Status: DISCONTINUED | OUTPATIENT
Start: 2019-07-12 | End: 2019-07-12

## 2019-07-12 RX ORDER — MEPERIDINE HYDROCHLORIDE 25 MG/ML
12.5 INJECTION INTRAMUSCULAR; INTRAVENOUS; SUBCUTANEOUS
Status: DISCONTINUED | OUTPATIENT
Start: 2019-07-12 | End: 2019-07-12 | Stop reason: HOSPADM

## 2019-07-12 RX ORDER — LABETALOL HYDROCHLORIDE 5 MG/ML
INJECTION, SOLUTION INTRAVENOUS PRN
Status: DISCONTINUED | OUTPATIENT
Start: 2019-07-12 | End: 2019-07-12 | Stop reason: SURG

## 2019-07-12 RX ORDER — LABETALOL HYDROCHLORIDE 5 MG/ML
5 INJECTION, SOLUTION INTRAVENOUS
Status: DISCONTINUED | OUTPATIENT
Start: 2019-07-12 | End: 2019-07-12 | Stop reason: HOSPADM

## 2019-07-12 RX ADMIN — CEFAZOLIN 3 G: 330 INJECTION, POWDER, FOR SOLUTION INTRAMUSCULAR; INTRAVENOUS at 12:00

## 2019-07-12 RX ADMIN — METOPROLOL TARTRATE 2 MG: 5 INJECTION, SOLUTION INTRAVENOUS at 12:15

## 2019-07-12 RX ADMIN — DEXAMETHASONE SODIUM PHOSPHATE 8 MG: 4 INJECTION, SOLUTION INTRA-ARTICULAR; INTRALESIONAL; INTRAMUSCULAR; INTRAVENOUS; SOFT TISSUE at 12:03

## 2019-07-12 RX ADMIN — MIDAZOLAM HYDROCHLORIDE 2 MG: 1 INJECTION, SOLUTION INTRAMUSCULAR; INTRAVENOUS at 12:00

## 2019-07-12 RX ADMIN — HYDROMORPHONE HYDROCHLORIDE: 10 INJECTION, SOLUTION INTRAMUSCULAR; INTRAVENOUS; SUBCUTANEOUS at 19:37

## 2019-07-12 RX ADMIN — ONDANSETRON 4 MG: 2 INJECTION INTRAMUSCULAR; INTRAVENOUS at 12:04

## 2019-07-12 RX ADMIN — ROCURONIUM BROMIDE 70 MG: 10 INJECTION, SOLUTION INTRAVENOUS at 12:10

## 2019-07-12 RX ADMIN — HYDROMORPHONE HYDROCHLORIDE 1 MG: 2 INJECTION, SOLUTION INTRAMUSCULAR; INTRAVENOUS; SUBCUTANEOUS at 13:28

## 2019-07-12 RX ADMIN — SUGAMMADEX 300 MG: 100 INJECTION, SOLUTION INTRAVENOUS at 15:00

## 2019-07-12 RX ADMIN — ROCURONIUM BROMIDE 10 MG: 10 INJECTION, SOLUTION INTRAVENOUS at 13:35

## 2019-07-12 RX ADMIN — HYDROMORPHONE HYDROCHLORIDE 1 MG: 2 INJECTION, SOLUTION INTRAMUSCULAR; INTRAVENOUS; SUBCUTANEOUS at 12:10

## 2019-07-12 RX ADMIN — ACETAMINOPHEN 1000 MG: 500 TABLET ORAL at 11:00

## 2019-07-12 RX ADMIN — OXYCODONE HYDROCHLORIDE 10 MG: 5 SOLUTION ORAL at 16:31

## 2019-07-12 RX ADMIN — METOPROLOL TARTRATE 2 MG: 5 INJECTION, SOLUTION INTRAVENOUS at 14:20

## 2019-07-12 RX ADMIN — ROCURONIUM BROMIDE 10 MG: 10 INJECTION, SOLUTION INTRAVENOUS at 13:00

## 2019-07-12 RX ADMIN — PHENYLEPHRINE HYDROCHLORIDE 25 MCG/MIN: 10 INJECTION INTRAVENOUS at 13:00

## 2019-07-12 RX ADMIN — HYDROMORPHONE HYDROCHLORIDE 1 MG: 2 INJECTION, SOLUTION INTRAMUSCULAR; INTRAVENOUS; SUBCUTANEOUS at 14:20

## 2019-07-12 RX ADMIN — SODIUM CHLORIDE, POTASSIUM CHLORIDE, SODIUM LACTATE AND CALCIUM CHLORIDE: 600; 310; 30; 20 INJECTION, SOLUTION INTRAVENOUS at 12:06

## 2019-07-12 RX ADMIN — PROPOFOL 300 MG: 10 INJECTION, EMULSION INTRAVENOUS at 12:10

## 2019-07-12 RX ADMIN — GABAPENTIN 300 MG: 300 CAPSULE ORAL at 11:00

## 2019-07-12 RX ADMIN — METOPROLOL TARTRATE 1 MG: 5 INJECTION, SOLUTION INTRAVENOUS at 14:50

## 2019-07-12 RX ADMIN — FENTANYL CITRATE 200 MCG: 50 INJECTION, SOLUTION INTRAMUSCULAR; INTRAVENOUS at 12:10

## 2019-07-12 RX ADMIN — PROPOFOL 125 MCG/KG/MIN: 10 INJECTION, EMULSION INTRAVENOUS at 12:10

## 2019-07-12 RX ADMIN — FENTANYL CITRATE 50 MCG: 50 INJECTION, SOLUTION INTRAMUSCULAR; INTRAVENOUS at 15:00

## 2019-07-12 RX ADMIN — ONDANSETRON 4 MG: 2 INJECTION INTRAMUSCULAR; INTRAVENOUS at 14:29

## 2019-07-12 RX ADMIN — SODIUM CHLORIDE, SODIUM GLUCONATE, SODIUM ACETATE, POTASSIUM CHLORIDE AND MAGNESIUM CHLORIDE: 526; 502; 368; 37; 30 INJECTION, SOLUTION INTRAVENOUS at 13:44

## 2019-07-12 RX ADMIN — METOPROLOL SUCCINATE 25 MG: 25 TABLET, EXTENDED RELEASE ORAL at 05:03

## 2019-07-12 RX ADMIN — SODIUM CHLORIDE, POTASSIUM CHLORIDE, SODIUM LACTATE AND CALCIUM CHLORIDE 1000 ML: 600; 310; 30; 20 INJECTION, SOLUTION INTRAVENOUS at 16:14

## 2019-07-12 RX ADMIN — HYDROMORPHONE HYDROCHLORIDE 1 MG: 2 INJECTION, SOLUTION INTRAMUSCULAR; INTRAVENOUS; SUBCUTANEOUS at 12:15

## 2019-07-12 RX ADMIN — CEFAZOLIN SODIUM 2 G: 2 INJECTION, SOLUTION INTRAVENOUS at 19:31

## 2019-07-12 RX ADMIN — LABETALOL HYDROCHLORIDE 10 MG: 5 INJECTION, SOLUTION INTRAVENOUS at 12:13

## 2019-07-12 RX ADMIN — METOPROLOL TARTRATE 2 MG: 5 INJECTION, SOLUTION INTRAVENOUS at 12:38

## 2019-07-12 RX ADMIN — METOPROLOL TARTRATE 2 MG: 5 INJECTION, SOLUTION INTRAVENOUS at 14:58

## 2019-07-12 RX ADMIN — LABETALOL HYDROCHLORIDE 10 MG: 5 INJECTION, SOLUTION INTRAVENOUS at 14:36

## 2019-07-12 ASSESSMENT — ENCOUNTER SYMPTOMS
SPUTUM PRODUCTION: 0
FEVER: 0
COUGH: 0
BACK PAIN: 1
WEIGHT LOSS: 0
MYALGIAS: 0
HEADACHES: 0
WEAKNESS: 1
SPEECH CHANGE: 0
SHORTNESS OF BREATH: 0
CONSTIPATION: 0
ABDOMINAL PAIN: 0
BLURRED VISION: 0
NAUSEA: 0
TREMORS: 0
EYE PAIN: 0
SENSORY CHANGE: 1
HALLUCINATIONS: 0
PALPITATIONS: 0
CHILLS: 0
ORTHOPNEA: 0
VOMITING: 0
NECK PAIN: 0
TINGLING: 0
DIZZINESS: 0
DIARRHEA: 0
FOCAL WEAKNESS: 1
DOUBLE VISION: 0
PHOTOPHOBIA: 0

## 2019-07-12 ASSESSMENT — LIFESTYLE VARIABLES: SUBSTANCE_ABUSE: 0

## 2019-07-12 ASSESSMENT — PAIN SCALES - GENERAL: PAIN_LEVEL: 0

## 2019-07-12 NOTE — ANESTHESIA PROCEDURE NOTES
Airway  Date/Time: 7/12/2019 12:12 PM  Performed by: DEBORA CRAIN  Authorized by: DEBORA CRAIN     Location:  OR  Urgency:  Elective  Indications for Airway Management:  Anesthesia  Spontaneous Ventilation: absent    Sedation Level:  Deep  Preoxygenated: Yes    Patient Position:  Sniffing  Final Airway Type:  Endotracheal airway  Final Endotracheal Airway:  ETT  Cuffed: Yes    Technique Used for Successful ETT Placement:  Direct laryngoscopy  Insertion Site:  Oral  Blade Type:  Marsha  Laryngoscope Blade/Videolaryngoscope Blade Size:  4  ETT Size (mm):  8.5  Measured from:  Lips  ETT to Lips (cm):  22  Placement Verified by: auscultation and capnometry    Cormack-Lehane Classification:  Grade I - full view of glottis  Number of Attempts at Approach:  1

## 2019-07-12 NOTE — OR SURGEON
Immediate Post OP Note    PreOp Diagnosis: L2-S1 stenosis, + scar tissue. Prior aborted lumbar worley, L>R foot drop, inability to walk, morbid obesity  PostOp Diagnosis: same    Procedure(s):  LAMINECTOMY, SPINE, LUMBAR, WITH DISCECTOMY - POSTERIOR L2-S1 WITH RE-DO L4-5 LAMINECTOMY - Wound Class: Clean    Surgeon(s):  MANGO Mace D.O.    Anesthesiologist/Type of Anesthesia:  Anesthesiologist: Preston Porras D.O./General    Surgical Staff:  Circulator: Huy Taylor R.N.  Scrub Person: Haley Lopez  Radiology Technologist: Jane Torres    Specimens removed if any:  * No specimens in log *    Estimated Blood Loss: < 300 cc    Findings: severe stenosis    Complications: none        7/12/2019 2:28 PM Samir Ruelas M.D.

## 2019-07-12 NOTE — PROGRESS NOTES
Hospital Medicine Daily Progress Note    Date of Service  7/12/2019    Chief Complaint  23 y.o. male admitted 7/5/2019 with bilateral lower extremity numbness.    Hospital Course    23-year-old male history of lumbar laminectomy 6/17/2019 per Dr. crenshaw with discharge 6/25 to home with use of walker admitted with bilateral lower extremity numbness and weakness x3 days.  Patient admitted for pain control, therapy and further work-up with planned evaluation by neurosurgery Dr. Crenshaw.    Interval Problem Update    I evaluated and examined him at the bedside.  He continued to have left lower extremity weakness.  I discussed plan of care with him.  He is going to have surgery today.  I discussed plan of care with him.  Currently he is n.p.o.  He found to have elevated white blood cell count most likely reactionary.  He is afebrile.      Consultants/Specialty  Neurosurgery to consult    Code Status  Full code    Disposition  To be decided    Review of Systems  Review of Systems   Constitutional: Negative for chills, fever and weight loss.   HENT: Negative for hearing loss and tinnitus.    Eyes: Negative for blurred vision, double vision, photophobia and pain.   Respiratory: Negative for cough, sputum production and shortness of breath.    Cardiovascular: Negative for chest pain, palpitations, orthopnea and leg swelling.   Gastrointestinal: Negative for abdominal pain, constipation, diarrhea, nausea and vomiting.   Genitourinary: Negative for dysuria, frequency and urgency.   Musculoskeletal: Positive for back pain. Negative for joint pain, myalgias and neck pain.   Skin: Negative for rash.   Neurological: Positive for sensory change, focal weakness and weakness. Negative for dizziness, tingling, tremors, speech change and headaches.   Psychiatric/Behavioral: Negative for hallucinations and substance abuse.   All other systems reviewed and are negative.       Physical Exam  Temp:  [36.1 °C (96.9 °F)-36.5 °C (97.7 °F)] 36.5 °C  (97.7 °F)  Pulse:  [104-120] 104  Resp:  [16-18] 17  BP: (127-135)/(66-88) 127/88  SpO2:  [90 %-98 %] 90 %    Physical Exam   Constitutional: He is oriented to person, place, and time. No distress.   Obese   HENT:   Head: Normocephalic and atraumatic.   Eyes: Pupils are equal, round, and reactive to light. Right eye exhibits no discharge. Left eye exhibits no discharge.   Neck: Normal range of motion. Neck supple.   Cardiovascular: Regular rhythm.  Exam reveals no friction rub.    No murmur heard.  Tachycardia   Pulmonary/Chest: Effort normal and breath sounds normal. No respiratory distress. He has no wheezes.   Abdominal: Soft. Bowel sounds are normal. He exhibits no distension. There is no tenderness. There is no rebound.   Musculoskeletal: Normal range of motion. He exhibits no edema or deformity.   Neurological: He is alert and oriented to person, place, and time. No cranial nerve deficit. Coordination normal.   Motor 3-4/5 in right lower extremity  Motor 2-3/5 in left lower extremity   Skin: Skin is warm and dry. No rash noted. He is not diaphoretic. No erythema.   Psychiatric: He has a normal mood and affect. His behavior is normal.   I performed physical exam on July 12, 2019 and it remains similar from yesterday.    Fluids    Intake/Output Summary (Last 24 hours) at 07/12/19 0933  Last data filed at 07/12/19 0000   Gross per 24 hour   Intake             1100 ml   Output              600 ml   Net              500 ml       Laboratory  Recent Labs      07/12/19   0431   WBC  12.6*   RBC  5.56   HEMOGLOBIN  14.9   HEMATOCRIT  47.0   MCV  84.5   MCH  26.8*   MCHC  31.7*   RDW  40.7   PLATELETCT  408   MPV  8.8*     Recent Labs      07/12/19   0431   SODIUM  140   POTASSIUM  4.0   CHLORIDE  104   CO2  24   GLUCOSE  100*   BUN  15   CREATININE  0.64   CALCIUM  10.0     Recent Labs      07/11/19   1046  07/12/19   0431   APTT  31.4   --    INR  1.06  1.01               Imaging  MR-THORACIC SPINE-WITH & W/O   Final  Result      Multilevel degenerative disc disease results in cord abutment but no cord compression      Straightening of the normal lordosis with multiple chronic Schmorl's nodes      Mild right T2/3 foraminal stenosis      MR-LUMBAR SPINE-WITH & W/O   Final Result      1.  New 10 x 3 x 1 cm deep subcutaneous fat fluid collection differential includes seroma with or without superimposed infection cannot      2.  Otherwise stable lumbar spine MRI with contrast following L3-L5 laminectomies      3.  Moderate multilevel central stenoses greatest at L2/3 are again seen, mostly secondary to facet arthropathy and developmentally short pedicles as specifically detailed above      4.  Lesser foraminal stenoses are unchanged as detailed above           Assessment/Plan  * Spinal stenosis of lumbar region s/p aborted laminectomy with neurogenic claudication- (present on admission)   Assessment & Plan    With paresthesias, back pain  Severe ,debility.  He continued to have weakness in bilateral lower extremities more on the left side.  Continue to provide him pain control and monitor for adverse effect of pain medications.  Neurontin dose recently titrated up- monitor response.   As needed Flexeril  PT/OT evaluation and recommendations  Neurosurgery evaluated him and he is going to have surgery on July 12, 2019.  Discontinue Lovenox as per neurosurgery recommendations.  I discussed plan of care with him and I answered all his questions.  SCDs for DVT prophylaxis.     Essential hypertension   Assessment & Plan    Controlled  continue metoprolol  Lifestyle modifications  Continue to monitor        I discussed plan of care during multidisciplinary rounds and with RN at the bedside.    VTE prophylaxis: SCDs as he is going to have surgery today

## 2019-07-12 NOTE — PROGRESS NOTES
24 yo male s/p lumbar worley one month ago represents with severe left greater than right leg weakness and inability to walk.    Last surgery was aborted when right ssep's and emg's were lost.    Only a L4-5 and L5,S1 partial lami was able to be completed.    Plan L2-S1 lami with particular attention to the L5 nerve roots.    C spine MRI was negative.    Patient understands risks, benefits, options including possibility of not completing the surgery.  Thanks.

## 2019-07-12 NOTE — OR NURSING
1546: received to PACU via bed with oral airway. Respirations spontaneous and unlabored.  1559: patient awaken. Oral airway dc'd without problem or difficulty.  1620: lumbar dressing CDI. Hemovac intact and compressed. Left lower extremity weaker than the right. With bilateral foot drop.  1631: c/o low back pain. Pain scale 4/10. Medicated. See MAR  1650: report called to Lela AZEVEDO.  1711: meets criteria for transfer to [facility]. Transported by transport with O2 at 2L/ nc. Stable.

## 2019-07-12 NOTE — PROGRESS NOTES
0825:  Assumed care of patient at 0700.  Report received at bedside.  Patient is A&O x 4, resting this AM.  Surgery scheduled for 1100 per report.  CHG bath completed.

## 2019-07-12 NOTE — PROGRESS NOTES
Pt's IV started leaking and was removed by the RN. One attempt was made by the primary RN and was unsuccessful. Two attempts were made by another RN with ultrasound guiding and were unsuccessful. Will make another attempt in the morning before surgery.

## 2019-07-12 NOTE — CARE PLAN
Problem: Pain Management  Goal: Pain level will decrease to patient's comfort goal    Intervention: Follow pain managment plan developed in collaboration with patient and Interdisciplinary Team  Discussed pain regimen with patient, all questions answered.

## 2019-07-12 NOTE — ANESTHESIA POSTPROCEDURE EVALUATION
Patient: Sammy Edmonds    Procedure Summary     Date:  07/12/19 Room / Location:  [facility] 07 / SURGERY [facility]    Anesthesia Start:  1200 Anesthesia Stop:  1547    Procedure:  LAMINECTOMY, SPINE, LUMBAR, WITH DISCECTOMY - POSTERIOR L2-S1 WITH RE-DO L4-5 LAMINECTOMY (Back) Diagnosis:  (bilateral lower extremity numbness, Spinal stenosis of lumbar region )    Surgeon:  Samir Ruelas M.D. Responsible Provider:  Preston Porras D.O.    Anesthesia Type:  general ASA Status:  2          Final Anesthesia Type: general  Last vitals  BP   Blood Pressure: 144/96    Temp   37 °C (98.6 °F)    Pulse   Pulse: (!) 105   Resp   20    SpO2   97 %      Anesthesia Post Evaluation    Patient location during evaluation: PACU  Patient participation: complete - patient participated  Level of consciousness: awake and alert  Pain score: 0    Airway patency: patent  Anesthetic complications: no  Cardiovascular status: hemodynamically stable  Respiratory status: acceptable  Hydration status: euvolemic    PONV: none           Nurse Pain Score: 4 (NPRS)

## 2019-07-12 NOTE — CARE PLAN
Problem: Venous Thromboembolism (VTW)/Deep Vein Thrombosis (DVT) Prevention:  Goal: Patient will participate in Venous Thrombosis (VTE)/Deep Vein Thrombosis (DVT)Prevention Measures    Intervention: Ensure patient wears graduated elastic stockings (THERESA hose) and/or SCDs, if ordered, when in bed or chair (Remove at least once per shift for skin check)  Educated patient on the importance of SCDs, states that he will not wear them while he sleeps but will re-evaluate in the AM

## 2019-07-12 NOTE — ANESTHESIA PREPROCEDURE EVALUATION
Relevant Problems   (+) Essential hypertension       Physical Exam    Airway   Mallampati: II  TM distance: >3 FB  Neck ROM: full       Cardiovascular - normal exam  Rhythm: regular  Rate: normal  (-) murmur     Dental - normal exam         Pulmonary - normal exam  Breath sounds clear to auscultation     Abdominal    Neurological - normal exam                 Anesthesia Plan    ASA 2       Plan - general       Airway plan will be ETT        Induction: intravenous    Postoperative Plan: Postoperative administration of opioids is intended.    Pertinent diagnostic labs and testing reviewed    Informed Consent:    Anesthetic plan and risks discussed with patient.    Use of blood products discussed with: patient whom consented to blood products.

## 2019-07-12 NOTE — ANESTHESIA PROCEDURE NOTES
Peripheral IV  Performed by: DEBORA CRAIN  Authorized by: DEBORA CRAIN     Size:  22 G  Laterality:  Right  Site Prep:  Alcohol  Technique:  Direct puncture  Attempts:  1

## 2019-07-12 NOTE — CARE PLAN
Problem: Safety  Goal: Will remain free from falls  Outcome: PROGRESSING AS EXPECTED      Problem: Knowledge Deficit  Goal: Knowledge of the prescribed therapeutic regimen will improve  Outcome: PROGRESSING AS EXPECTED

## 2019-07-12 NOTE — ANESTHESIA TIME REPORT
Anesthesia Start and Stop Event Times     Date Time Event    7/12/2019 1200 Anesthesia Start     1547 Anesthesia Stop        Responsible Staff  07/12/19    Name Role Begin End    Preston Porras D.O. Anesth 1200 1547        Preop Diagnosis (Free Text):  Pre-op Diagnosis     bilateral lower extremity numbness, Spinal stenosis of lumbar region         Preop Diagnosis (Codes):  Diagnosis Information     Diagnosis Code(s):         Post op Diagnosis  Intervertebral disc stenosis of neural canal of lumbar region      Premium Reason  A. 3PM - 7AM    Comments:

## 2019-07-13 LAB
ANION GAP SERPL CALC-SCNC: 11 MMOL/L (ref 0–11.9)
BUN SERPL-MCNC: 12 MG/DL (ref 8–22)
CALCIUM SERPL-MCNC: 8.7 MG/DL (ref 8.5–10.5)
CHLORIDE SERPL-SCNC: 102 MMOL/L (ref 96–112)
CO2 SERPL-SCNC: 25 MMOL/L (ref 20–33)
CREAT SERPL-MCNC: 0.65 MG/DL (ref 0.5–1.4)
ERYTHROCYTE [DISTWIDTH] IN BLOOD BY AUTOMATED COUNT: 39.8 FL (ref 35.9–50)
GLUCOSE SERPL-MCNC: 139 MG/DL (ref 65–99)
HCT VFR BLD AUTO: 40.3 % (ref 42–52)
HGB BLD-MCNC: 13.3 G/DL (ref 14–18)
MCH RBC QN AUTO: 27.7 PG (ref 27–33)
MCHC RBC AUTO-ENTMCNC: 33 G/DL (ref 33.7–35.3)
MCV RBC AUTO: 83.8 FL (ref 81.4–97.8)
PLATELET # BLD AUTO: 367 K/UL (ref 164–446)
PMV BLD AUTO: 8.9 FL (ref 9–12.9)
POTASSIUM SERPL-SCNC: 3.8 MMOL/L (ref 3.6–5.5)
RBC # BLD AUTO: 4.81 M/UL (ref 4.7–6.1)
SODIUM SERPL-SCNC: 138 MMOL/L (ref 135–145)
WBC # BLD AUTO: 15.4 K/UL (ref 4.8–10.8)

## 2019-07-13 PROCEDURE — 36415 COLL VENOUS BLD VENIPUNCTURE: CPT

## 2019-07-13 PROCEDURE — 770006 HCHG ROOM/CARE - MED/SURG/GYN SEMI*

## 2019-07-13 PROCEDURE — 97162 PT EVAL MOD COMPLEX 30 MIN: CPT

## 2019-07-13 PROCEDURE — 80048 BASIC METABOLIC PNL TOTAL CA: CPT

## 2019-07-13 PROCEDURE — 700102 HCHG RX REV CODE 250 W/ 637 OVERRIDE(OP): Performed by: INTERNAL MEDICINE

## 2019-07-13 PROCEDURE — 97530 THERAPEUTIC ACTIVITIES: CPT

## 2019-07-13 PROCEDURE — 85027 COMPLETE CBC AUTOMATED: CPT

## 2019-07-13 PROCEDURE — 99232 SBSQ HOSP IP/OBS MODERATE 35: CPT | Performed by: INTERNAL MEDICINE

## 2019-07-13 PROCEDURE — A9270 NON-COVERED ITEM OR SERVICE: HCPCS | Performed by: INTERNAL MEDICINE

## 2019-07-13 PROCEDURE — 97165 OT EVAL LOW COMPLEX 30 MIN: CPT

## 2019-07-13 PROCEDURE — 700111 HCHG RX REV CODE 636 W/ 250 OVERRIDE (IP): Performed by: PHYSICIAN ASSISTANT

## 2019-07-13 RX ADMIN — SENNOSIDES, DOCUSATE SODIUM 2 TABLET: 50; 8.6 TABLET, FILM COATED ORAL at 04:28

## 2019-07-13 RX ADMIN — SENNOSIDES, DOCUSATE SODIUM 2 TABLET: 50; 8.6 TABLET, FILM COATED ORAL at 17:52

## 2019-07-13 RX ADMIN — CEFAZOLIN SODIUM 2 G: 2 INJECTION, SOLUTION INTRAVENOUS at 10:46

## 2019-07-13 RX ADMIN — CEFAZOLIN SODIUM 2 G: 2 INJECTION, SOLUTION INTRAVENOUS at 02:59

## 2019-07-13 RX ADMIN — METOPROLOL SUCCINATE 25 MG: 25 TABLET, EXTENDED RELEASE ORAL at 04:28

## 2019-07-13 ASSESSMENT — ENCOUNTER SYMPTOMS
DOUBLE VISION: 0
MYALGIAS: 0
ORTHOPNEA: 0
SPEECH CHANGE: 0
CHILLS: 0
BLURRED VISION: 0
PALPITATIONS: 0
WEAKNESS: 1
NECK PAIN: 0
BACK PAIN: 1
TINGLING: 0
WEIGHT LOSS: 0
TREMORS: 0
DIZZINESS: 0
PHOTOPHOBIA: 0
EYE PAIN: 0
SPUTUM PRODUCTION: 0
SENSORY CHANGE: 1
CONSTIPATION: 0
FOCAL WEAKNESS: 1
COUGH: 0
DIARRHEA: 0
ABDOMINAL PAIN: 0
SHORTNESS OF BREATH: 0
HALLUCINATIONS: 0
VOMITING: 0
HEADACHES: 0
FEVER: 0
NAUSEA: 0

## 2019-07-13 ASSESSMENT — COGNITIVE AND FUNCTIONAL STATUS - GENERAL
DAILY ACTIVITIY SCORE: 17
CLIMB 3 TO 5 STEPS WITH RAILING: A LOT
MOVING FROM LYING ON BACK TO SITTING ON SIDE OF FLAT BED: UNABLE
HELP NEEDED FOR BATHING: A LOT
SUGGESTED CMS G CODE MODIFIER DAILY ACTIVITY: CK
DRESSING REGULAR LOWER BODY CLOTHING: A LOT
MOBILITY SCORE: 11
STANDING UP FROM CHAIR USING ARMS: A LITTLE
SUGGESTED CMS G CODE MODIFIER MOBILITY: CL
TOILETING: A LOT
WALKING IN HOSPITAL ROOM: A LOT
TURNING FROM BACK TO SIDE WHILE IN FLAT BAD: A LOT
DRESSING REGULAR UPPER BODY CLOTHING: A LITTLE
MOVING TO AND FROM BED TO CHAIR: UNABLE

## 2019-07-13 ASSESSMENT — LIFESTYLE VARIABLES: SUBSTANCE_ABUSE: 0

## 2019-07-13 ASSESSMENT — ACTIVITIES OF DAILY LIVING (ADL): TOILETING: INDEPENDENT

## 2019-07-13 ASSESSMENT — GAIT ASSESSMENTS: GAIT LEVEL OF ASSIST: UNABLE TO PARTICIPATE

## 2019-07-13 NOTE — OP REPORT
DATE OF SERVICE:  07/05/2019    PREOPERATIVE DIAGNOSES:  L2-S1 stenosis with scar tissue with a prior aborted   lumbar laminectomy and left greater than right foot drop with inability to   walk, and morbid obesity.    POSTOPERATIVE DIAGNOSES:  L2-S1 stenosis with scar tissue with a prior aborted   lumbar laminectomy and left greater than right foot drop with inability to   walk, and morbid obesity.    PRINCIPAL PROCEDURE PERFORMED:  L2, L3, L4, L5, S1 laminectomy, and a L4-L5   microdiskectomy.    SURGEON:  Samir Ruelas MD    ASSISTANT:  Julito Jacob DO    COMPLICATIONS:  There were no complications.    ANESTHESIA:  The procedure was performed under general anesthesia.    ANESTHESIOLOGIST:  Preston Porras DO    FINDINGS:  Include severe stenosis with a large subannular disk herniation at   L4-L5.  In addition, we had worsened SSEP and EMGs in both lower extremities   as we started the decompression.  This returned to baseline after more   thorough decompression.    IV FLUIDS:  Please see the anesthesia record.    URINE OUTPUT:  Please see the anesthesia record.    ESTIMATED BLOOD LOSS:  Please see the anesthesia record.    DISPOSITION:  The patient will be extubated and brought to the recovery room.    CLINICAL HISTORY:  The patient is a pleasant 23-year-old male.  The patient   presented after a several emergency room visits and severe intractable low   back pain and leg pain.  I offered him a lumbar decompression about a month   ago.  He underwent surgery.  However, in the middle of my surgery, the patient   lost SSEPs and EMGs in the right arm and leg.  Therefore, I was unable to   complete the procedure.  The patient was discharged home.  The patient   represents with worsening bilateral foot drop and inability to walk and   shooting leg pain and back pain.  His MRI was relatively stable and showed   some improved decompression at L4-L5.  I recommended a return to surgery as he   simply could not  function.  He understood risks, benefits and options.  He   also understood that if we were to lose signals in the right upper extremity   and right lower extremity again that we would have to abort the case and may   not be able to complete the surgery.  The patient understood.  The patient   signed a written informed consent.    DESCRIPTION OF PROCEDURE:  The patient was brought to the operating room and   placed under general anesthesia.  The needle electrodes were placed for   baseline SSEPs and EMGs.  The patient was turned prone atop a radiolucent OSI   table and all pressure points were padded.  Please note that a 22 modifier was   added given the extra difficulty of the case and the patient's BMI greater   than 40.  The patient is 6 feet tall and weighs 318 pounds and 5.5 ounces.    Once the patient was turned prone, his old incision was identified.  This was   clean, dry, and intact.  The back was prepped and draped in the usual sterile   fashion.  A timeout was performed.  Local anesthetic was infiltrated in the   skin.  The old skin incision was reopened.  The fascia was identified.  Deep   retractors were placed just to get to the fascia.  The muscle was split again   and self-retaining retractors were placed.  Intraoperative fluoroscopy   demonstrated the correct level.  An AM-8 drill bit was used to create gutters   just medial to the facet complexes.  A Kerrison 2, Kerrison 3 and Kerrison 4   punch was used to complete bilateral decompressive laminectomies, bilateral   medial facetectomies and bilateral foraminotomies.  A large disk herniation   was seen on the left at L4-L5.  This was less obvious on the MRI   preoperatively.  After gently dissecting the scar tissue off of the nerve root   sleeve on the right side, I used a downbiting curette and we performed a   diskectomy at L4-L5.  There was no evidence of CSF leak.  Bilateral L2,   bilateral L3, bilateral L4, bilateral L5 and bilateral S1 nerve  roots in   addition to the central canal were well decompressed.  Perfect hemostasis was   achieved.  The wound will be closed in anatomic layers and a sterile dressing   will be applied.  Neuromonitoring signals returned after the decompression to   baseline.  There were no changes to the upper extremities.  Please note that   preop we had done a cervical and thoracic MRI to rule out any upper motor   neuron disease that could be contributing.       ____________________________________     MD MATTHEW WILSON / JESSICA    DD:  07/12/2019 14:38:28  DT:  07/12/2019 17:08:48    D#:  [id number]  Job#:  [id number]

## 2019-07-13 NOTE — PROGRESS NOTES
Neurosurgery Progress Note    Subjective:  POD #1  States tingling, weakness, numbness in LEs improved.   Not OOB yet  Passing gas, GILBERTO in - inaccurate output due to leaking/disconnected drain     Exam:  Alert, oriented   VSS  Obese   RLE: I: 5/5,Q: 5/5, DF 3/5, PF and hamstring 4-/5  LLE: I: 4/5, Q: 4/5, DF and EHL 2/5, PF, hamstring 4/5    BP  Min: 112/59  Max: 144/96  Pulse  Av.3  Min: 105  Max: 123  Resp  Av.3  Min: 11  Max: 26  Temp  Av.4 °C (97.6 °F)  Min: 36.1 °C (97 °F)  Max: 37 °C (98.6 °F)  SpO2  Av.8 %  Min: 90 %  Max: 100 %    No Data Recorded    Recent Labs      19   0431  19   0402   WBC  12.6*  15.4*   RBC  5.56  4.81   HEMOGLOBIN  14.9  13.3*   HEMATOCRIT  47.0  40.3*   MCV  84.5  83.8   MCH  26.8*  27.7   MCHC  31.7*  33.0*   RDW  40.7  39.8   PLATELETCT  408  367   MPV  8.8*  8.9*     Recent Labs      19   0431  19   0402   SODIUM  140  138   POTASSIUM  4.0  3.8   CHLORIDE  104  102   CO2  24  25   GLUCOSE  100*  139*   BUN  15  12   CREATININE  0.64  0.65   CALCIUM  10.0  8.7     Recent Labs      19   1046  19   0431   APTT  31.4   --    INR  1.06  1.01           Intake/Output       19 0700 - 19 0659 19 07 - 19 0659      [id number] Total  Total       Intake    P.O.  --  720 720  --  -- --    P.O. -- 720 720 -- -- --    I.V.  3600  -- 3600  17.2  -- 17.2    PCA End of Shift Total Volume (ml) -- -- -- 17.2 -- 17.2    Volume (mL) (electrolyte-A (PLASMALYTE-A) infusion) 1200 -- 1200 -- -- --    Volume (mL) (lactated ringers infusion) 1400 -- 1400 -- -- --    Volume (mL) (lactated ringers infusion) 1000 -- 1000 -- -- --    IV Piggyback  --  100 100  --  -- --    Volume (mL) (ceFAZolin in dextrose (ANCEF) IVPB premix 2 g) -- 100 100 -- -- --    Total Intake 3600 820 4420 17.2 -- 17.2       Output    Urine  --  800 800  --  -- --    Number of Times Voided -- 1 x 1 x -- -- --    Urine Void (mL) --  800 800 -- -- --    Drains  --  140 140  --  -- --    Output (mL) (Closed/Suction Drain 1 Posterior Back Hemovac 10 Fr.) -- 140 140 -- -- --    Stool  --  -- --  --  -- --    Number of Times Stooled -- 0 x 0 x -- -- --    Blood  450  -- 450  --  -- --    Est. Blood Loss 450 -- 450 -- -- --    Total Output  -- -- --       Net I/O     3150 -120 3030 17.2 -- 17.2            Intake/Output Summary (Last 24 hours) at 07/13/19 1049  Last data filed at 07/13/19 0700   Gross per 24 hour   Intake           4437.2 ml   Output             1390 ml   Net           3047.2 ml            • Pharmacy Consult Request  1 Each PHARMACY TO DOSE   • MD ALERT...DO NOT ADMINISTER NSAIDS or ASPIRIN unless ORDERED By Neurosurgery  1 Each PRN   • acetaminophen  500 mg Q6HRS PRN   • HYDROmorphone   Continuous   • ceFAZolin  2 g Q8HR   • cyclobenzaprine  10 mg Q8HRS PRN   • metoprolol SR  25 mg DAILY   • senna-docusate  2 Tab BID    And   • polyethylene glycol/lytes  1 Packet QDAY PRN    And   • magnesium hydroxide  30 mL QDAY PRN    And   • bisacodyl  10 mg QDAY PRN       Assessment and Plan:  Hospital day #8  POD #1 L2-S1 laminectomy   Overall pt feels his LE strength and sensation is improved since prior to surgery   MRI C/T spine benign     Intermittent tachycardia: present during prior admission, improved on Metoprolol, echo 6/21/19: WNL    Oversedation: resolved, probably r/t multiagent pain medication regiment, pain controlled    Recommend therapies for discharge planning  Ambulate TID, get up to chair today     Will follow

## 2019-07-14 PROBLEM — E87.5 HYPERKALEMIA: Status: ACTIVE | Noted: 2019-07-14

## 2019-07-14 PROBLEM — E87.6 HYPOKALEMIA: Status: ACTIVE | Noted: 2019-07-14

## 2019-07-14 LAB
ANION GAP SERPL CALC-SCNC: 8 MMOL/L (ref 0–11.9)
BUN SERPL-MCNC: 14 MG/DL (ref 8–22)
CALCIUM SERPL-MCNC: 8.5 MG/DL (ref 8.5–10.5)
CHLORIDE SERPL-SCNC: 103 MMOL/L (ref 96–112)
CO2 SERPL-SCNC: 25 MMOL/L (ref 20–33)
CREAT SERPL-MCNC: 0.6 MG/DL (ref 0.5–1.4)
ERYTHROCYTE [DISTWIDTH] IN BLOOD BY AUTOMATED COUNT: 42.4 FL (ref 35.9–50)
GLUCOSE SERPL-MCNC: 115 MG/DL (ref 65–99)
HCT VFR BLD AUTO: 39.3 % (ref 42–52)
HGB BLD-MCNC: 12.2 G/DL (ref 14–18)
MCH RBC QN AUTO: 26.9 PG (ref 27–33)
MCHC RBC AUTO-ENTMCNC: 31 G/DL (ref 33.7–35.3)
MCV RBC AUTO: 86.6 FL (ref 81.4–97.8)
PLATELET # BLD AUTO: 315 K/UL (ref 164–446)
PMV BLD AUTO: 9 FL (ref 9–12.9)
POTASSIUM SERPL-SCNC: 3.4 MMOL/L (ref 3.6–5.5)
RBC # BLD AUTO: 4.54 M/UL (ref 4.7–6.1)
SODIUM SERPL-SCNC: 136 MMOL/L (ref 135–145)
WBC # BLD AUTO: 13.4 K/UL (ref 4.8–10.8)

## 2019-07-14 PROCEDURE — 85027 COMPLETE CBC AUTOMATED: CPT

## 2019-07-14 PROCEDURE — 36415 COLL VENOUS BLD VENIPUNCTURE: CPT

## 2019-07-14 PROCEDURE — A9270 NON-COVERED ITEM OR SERVICE: HCPCS | Performed by: INTERNAL MEDICINE

## 2019-07-14 PROCEDURE — A9270 NON-COVERED ITEM OR SERVICE: HCPCS | Performed by: PHYSICIAN ASSISTANT

## 2019-07-14 PROCEDURE — 700102 HCHG RX REV CODE 250 W/ 637 OVERRIDE(OP): Performed by: PHYSICIAN ASSISTANT

## 2019-07-14 PROCEDURE — 99232 SBSQ HOSP IP/OBS MODERATE 35: CPT | Performed by: INTERNAL MEDICINE

## 2019-07-14 PROCEDURE — 770006 HCHG ROOM/CARE - MED/SURG/GYN SEMI*

## 2019-07-14 PROCEDURE — 700102 HCHG RX REV CODE 250 W/ 637 OVERRIDE(OP): Performed by: INTERNAL MEDICINE

## 2019-07-14 PROCEDURE — 80048 BASIC METABOLIC PNL TOTAL CA: CPT

## 2019-07-14 PROCEDURE — 700111 HCHG RX REV CODE 636 W/ 250 OVERRIDE (IP): Performed by: PHYSICIAN ASSISTANT

## 2019-07-14 RX ORDER — HYDROCODONE BITARTRATE AND ACETAMINOPHEN 5; 325 MG/1; MG/1
1 TABLET ORAL EVERY 4 HOURS PRN
Status: DISCONTINUED | OUTPATIENT
Start: 2019-07-14 | End: 2019-07-22 | Stop reason: HOSPADM

## 2019-07-14 RX ORDER — OXYCODONE AND ACETAMINOPHEN 10; 325 MG/1; MG/1
1-2 TABLET ORAL EVERY 4 HOURS PRN
Status: DISCONTINUED | OUTPATIENT
Start: 2019-07-14 | End: 2019-07-22 | Stop reason: HOSPADM

## 2019-07-14 RX ORDER — POTASSIUM CHLORIDE 20 MEQ/1
40 TABLET, EXTENDED RELEASE ORAL ONCE
Status: COMPLETED | OUTPATIENT
Start: 2019-07-14 | End: 2019-07-14

## 2019-07-14 RX ORDER — HYDROCODONE BITARTRATE AND ACETAMINOPHEN 10; 325 MG/1; MG/1
1 TABLET ORAL EVERY 4 HOURS PRN
Status: DISCONTINUED | OUTPATIENT
Start: 2019-07-14 | End: 2019-07-22 | Stop reason: HOSPADM

## 2019-07-14 RX ORDER — TRAMADOL HYDROCHLORIDE 50 MG/1
50 TABLET ORAL EVERY 4 HOURS PRN
Status: DISCONTINUED | OUTPATIENT
Start: 2019-07-14 | End: 2019-07-22 | Stop reason: HOSPADM

## 2019-07-14 RX ADMIN — CYCLOBENZAPRINE HYDROCHLORIDE 10 MG: 10 TABLET, FILM COATED ORAL at 22:41

## 2019-07-14 RX ADMIN — OXYCODONE HYDROCHLORIDE AND ACETAMINOPHEN 2 TABLET: 10; 325 TABLET ORAL at 23:19

## 2019-07-14 RX ADMIN — CYCLOBENZAPRINE HYDROCHLORIDE 10 MG: 10 TABLET, FILM COATED ORAL at 01:02

## 2019-07-14 RX ADMIN — HYDROMORPHONE HYDROCHLORIDE: 10 INJECTION, SOLUTION INTRAMUSCULAR; INTRAVENOUS; SUBCUTANEOUS at 05:26

## 2019-07-14 RX ADMIN — SENNOSIDES, DOCUSATE SODIUM 2 TABLET: 50; 8.6 TABLET, FILM COATED ORAL at 05:26

## 2019-07-14 RX ADMIN — OXYCODONE HYDROCHLORIDE AND ACETAMINOPHEN 1 TABLET: 10; 325 TABLET ORAL at 12:27

## 2019-07-14 RX ADMIN — POTASSIUM CHLORIDE 40 MEQ: 20 TABLET, EXTENDED RELEASE ORAL at 10:48

## 2019-07-14 RX ADMIN — SENNOSIDES, DOCUSATE SODIUM 2 TABLET: 50; 8.6 TABLET, FILM COATED ORAL at 16:32

## 2019-07-14 RX ADMIN — METOPROLOL SUCCINATE 25 MG: 25 TABLET, EXTENDED RELEASE ORAL at 05:26

## 2019-07-14 RX ADMIN — POLYETHYLENE GLYCOL 3350 1 PACKET: 17 POWDER, FOR SOLUTION ORAL at 21:39

## 2019-07-14 RX ADMIN — OXYCODONE HYDROCHLORIDE AND ACETAMINOPHEN 2 TABLET: 10; 325 TABLET ORAL at 16:32

## 2019-07-14 RX ADMIN — POLYETHYLENE GLYCOL 3350 1 PACKET: 17 POWDER, FOR SOLUTION ORAL at 05:26

## 2019-07-14 ASSESSMENT — PATIENT HEALTH QUESTIONNAIRE - PHQ9
7. TROUBLE CONCENTRATING ON THINGS, SUCH AS READING THE NEWSPAPER OR WATCHING TELEVISION: NOT AT ALL
6. FEELING BAD ABOUT YOURSELF - OR THAT YOU ARE A FAILURE OR HAVE LET YOURSELF OR YOUR FAMILY DOWN: NOT AL ALL
SUM OF ALL RESPONSES TO PHQ9 QUESTIONS 1 AND 2: 1
SUM OF ALL RESPONSES TO PHQ QUESTIONS 1-9: 2
1. LITTLE INTEREST OR PLEASURE IN DOING THINGS: SEVERAL DAYS
8. MOVING OR SPEAKING SO SLOWLY THAT OTHER PEOPLE COULD HAVE NOTICED. OR THE OPPOSITE, BEING SO FIGETY OR RESTLESS THAT YOU HAVE BEEN MOVING AROUND A LOT MORE THAN USUAL: NOT AT ALL
2. FEELING DOWN, DEPRESSED, IRRITABLE, OR HOPELESS: NOT AT ALL
9. THOUGHTS THAT YOU WOULD BE BETTER OFF DEAD, OR OF HURTING YOURSELF: NOT AT ALL
5. POOR APPETITE OR OVEREATING: NOT AT ALL
4. FEELING TIRED OR HAVING LITTLE ENERGY: NOT AT ALL
3. TROUBLE FALLING OR STAYING ASLEEP OR SLEEPING TOO MUCH: SEVERAL DAYS

## 2019-07-14 ASSESSMENT — ENCOUNTER SYMPTOMS
HALLUCINATIONS: 0
SPEECH CHANGE: 0
ABDOMINAL PAIN: 0
EYE PAIN: 0
WEAKNESS: 1
FOCAL WEAKNESS: 1
TINGLING: 0
CONSTIPATION: 0
SPUTUM PRODUCTION: 0
PALPITATIONS: 0
HEADACHES: 0
BACK PAIN: 1
WEIGHT LOSS: 0
CHILLS: 0
NECK PAIN: 0
NAUSEA: 0
SHORTNESS OF BREATH: 0
TREMORS: 0
COUGH: 0
ORTHOPNEA: 0
DOUBLE VISION: 0
DIZZINESS: 0
MYALGIAS: 0
VOMITING: 0
DIARRHEA: 0
SENSORY CHANGE: 1
PHOTOPHOBIA: 0
FEVER: 0
BLURRED VISION: 0

## 2019-07-14 ASSESSMENT — LIFESTYLE VARIABLES: SUBSTANCE_ABUSE: 0

## 2019-07-14 NOTE — PROGRESS NOTES
Hospital Medicine Daily Progress Note    Date of Service  7/14/2019    Chief Complaint  23 y.o. male admitted 7/5/2019 with bilateral lower extremity numbness.    Hospital Course    23-year-old male history of lumbar laminectomy 6/17/2019 per Dr. crenshaw with discharge 6/25 to home with use of walker admitted with bilateral lower extremity numbness and weakness x3 days.  Patient admitted for pain control, therapy and further work-up with planned evaluation by neurosurgery Dr. Crenshaw.    Interval Problem Update    POD #2 L2-S1 laminectomy  I evaluated and examined him at the bedside.  He reported that he continued to have pain and left lower extremity weakness.  He was on PCA this morning and later it was discontinued.  He reported that his stent has improved.  His left lower extremity weakness has been improving.  Drain removed this morning.  I discussed plan of care with him.      Consultants/Specialty  Neurosurgery to consult    Code Status  Full code    Disposition  To be decided    Review of Systems  Review of Systems   Constitutional: Negative for chills, fever and weight loss.   HENT: Negative for hearing loss and tinnitus.    Eyes: Negative for blurred vision, double vision, photophobia and pain.   Respiratory: Negative for cough, sputum production and shortness of breath.    Cardiovascular: Negative for chest pain, palpitations, orthopnea and leg swelling.   Gastrointestinal: Negative for abdominal pain, constipation, diarrhea, nausea and vomiting.   Genitourinary: Negative for dysuria, frequency and urgency.   Musculoskeletal: Positive for back pain. Negative for joint pain, myalgias and neck pain.   Skin: Negative for rash.   Neurological: Positive for sensory change, focal weakness (Left lower extremity improving) and weakness. Negative for dizziness, tingling, tremors, speech change and headaches.   Psychiatric/Behavioral: Negative for hallucinations and substance abuse.   All other systems reviewed and are  negative.       Physical Exam  Temp:  [35.8 °C (96.5 °F)-37.3 °C (99.1 °F)] 36.2 °C (97.1 °F)  Pulse:  [105-131] 105  Resp:  [18] 18  BP: (112-143)/(57-92) 134/92  SpO2:  [92 %-96 %] 92 %    Physical Exam   Constitutional: He is oriented to person, place, and time. No distress.   Obese   HENT:   Head: Normocephalic and atraumatic.   Eyes: Pupils are equal, round, and reactive to light. Right eye exhibits no discharge. Left eye exhibits no discharge.   Neck: Normal range of motion. Neck supple.   Cardiovascular: Regular rhythm.  Exam reveals no friction rub.    No murmur heard.  Tachycardia   Pulmonary/Chest: Effort normal and breath sounds normal. No respiratory distress. He has no wheezes.   Abdominal: Soft. Bowel sounds are normal. He exhibits no distension. There is no tenderness. There is no rebound.   Musculoskeletal: Normal range of motion. He exhibits no edema or deformity.   Dressing present at the site of surgery.     Neurological: He is alert and oriented to person, place, and time. No cranial nerve deficit. Coordination normal.   Motor 3-4/5 in right lower extremity  Motor 3/5 in left lower extremity (slightly improved from yesterday)   Skin: Skin is warm and dry. No rash noted. He is not diaphoretic. No erythema.   Psychiatric: He has a normal mood and affect. His behavior is normal.   I performed physical exam on July 14, 2019 and it remains similar without any acute changes from yesterday.  Fluids    Intake/Output Summary (Last 24 hours) at 07/14/19 0936  Last data filed at 07/14/19 0600   Gross per 24 hour   Intake            493.6 ml   Output              514 ml   Net            -20.4 ml       Laboratory  Recent Labs      07/12/19   0431  07/13/19   0402  07/14/19   0402   WBC  12.6*  15.4*  13.4*   RBC  5.56  4.81  4.54*   HEMOGLOBIN  14.9  13.3*  12.2*   HEMATOCRIT  47.0  40.3*  39.3*   MCV  84.5  83.8  86.6   MCH  26.8*  27.7  26.9*   MCHC  31.7*  33.0*  31.0*   RDW  40.7  39.8  42.4   PLATELETCT   408  367  315   MPV  8.8*  8.9*  9.0     Recent Labs      07/12/19   0431  07/13/19   0402  07/14/19   0402   SODIUM  140  138  136   POTASSIUM  4.0  3.8  3.4*   CHLORIDE  104  102  103   CO2  24  25  25   GLUCOSE  100*  139*  115*   BUN  15  12  14   CREATININE  0.64  0.65  0.60   CALCIUM  10.0  8.7  8.5     Recent Labs      07/11/19   1046  07/12/19   0431   APTT  31.4   --    INR  1.06  1.01               Imaging  DX-SPINE-ANY ONE VIEW   Final Result      Fluoroscopic image(s) obtained during posterior lumbar laminectomy. Please see the patient's chart for full procedural details.      Fluoroscopy time 2 seconds.         DX-PORTABLE FLUORO > 1 HOUR   Preliminary Result         Portable fluoroscopy utilized for 2 seconds.      MR-THORACIC SPINE-WITH & W/O   Final Result      Multilevel degenerative disc disease results in cord abutment but no cord compression      Straightening of the normal lordosis with multiple chronic Schmorl's nodes      Mild right T2/3 foraminal stenosis      MR-LUMBAR SPINE-WITH & W/O   Final Result      1.  New 10 x 3 x 1 cm deep subcutaneous fat fluid collection differential includes seroma with or without superimposed infection cannot      2.  Otherwise stable lumbar spine MRI with contrast following L3-L5 laminectomies      3.  Moderate multilevel central stenoses greatest at L2/3 are again seen, mostly secondary to facet arthropathy and developmentally short pedicles as specifically detailed above      4.  Lesser foraminal stenoses are unchanged as detailed above           Assessment/Plan  * Spinal stenosis of lumbar region s/p aborted laminectomy with neurogenic claudication- (present on admission)   Assessment & Plan    With paresthesias, back pain  Severe ,debility.  He continued to have weakness in bilateral lower extremities more on the left side.  Continue to provide him pain control and monitor for adverse effect of pain medications.  Neurontin dose recently titrated up- monitor  response.   As needed Flexeril  PT/OT evaluation and recommendations  Neurosurgery evaluated him and he underwent L2-S1 laminectomy on July 12, 2019.  I discussed plan of care with him and I answered all his questions.  Drain removed this morning.  SCDs for DVT prophylaxis.     Hypokalemia   Assessment & Plan    He found to have hypokalemia and replace potassium.  Continue to monitor     Essential hypertension   Assessment & Plan    Controlled  continue metoprolol  Lifestyle modifications  Continue to monitor        I discussed plan of care with RN at the bedside.      VTE prophylaxis: Holding chemical DVT prophylaxis due to recent surgery.

## 2019-07-14 NOTE — THERAPY
"Occupational Therapy Evaluation completed.   Functional Status:  Assist with LE's to complete log roll for out/into bed; Assist for LE self-care ; to EOB only today with weakness and pain   Plan of Care: Will benefit from Occupational Therapy 3 times per week  Discharge Recommendations:  Equipment: Will Continue to Assess for Equipment Needs. Post-acute therapy May require discharge to a transitional care facility for continued skilled therapy services prior to d/c home depending on progress .    See \"Rehab Therapy-Acute\" Patient Summary Report for complete documentation.    "

## 2019-07-14 NOTE — PROGRESS NOTES
Neurosurgery Progress Note    Subjective:  Stable overnight  Difficulty with mobilization - per nursing poor motivation to mobilize, per pt felt like legs were going to give out when OOB at bedside   Passing gas, voiding  Denies new pain, numbness, weakness.   Denies HA, positional HA, N/V, dizziness, chest pain, SOB, difficulty breathing, chills      Exam:  Alert, oriented   VSS  Obese  RLE: I: 5/5,Q: 5/5, DF 3/5, PF and hamstring 4-/5  LLE: I: 4/5, Q: 4/5, DF and EHL 2/5, PF, hamstring 4/5    BP  Min: 112/74  Max: 143/57  Pulse  Av.2  Min: 105  Max: 131  Resp  Av  Min: 18  Max: 18  Temp  Av.3 °C (97.4 °F)  Min: 35.8 °C (96.5 °F)  Max: 37.3 °C (99.1 °F)  SpO2  Av.6 %  Min: 92 %  Max: 95 %    No Data Recorded    Recent Labs      19   0431  19   0402  19   040   WBC  12.6*  15.4*  13.4*   RBC  5.56  4.81  4.54*   HEMOGLOBIN  14.9  13.3*  12.2*   HEMATOCRIT  47.0  40.3*  39.3*   MCV  84.5  83.8  86.6   MCH  26.8*  27.7  26.9*   MCHC  31.7*  33.0*  31.0*   RDW  40.7  39.8  42.4   PLATELETCT  408  367  315   MPV  8.8*  8.9*  9.0     Recent Labs      19   0431  19   0402  19   0402   SODIUM  140  138  136   POTASSIUM  4.0  3.8  3.4*   CHLORIDE  104  102  103   CO2  24  25  25   GLUCOSE  100*  139*  115*   BUN  15  12  14   CREATININE  0.64  0.65  0.60   CALCIUM  10.0  8.7  8.5     Recent Labs      19   INR  1.01           Intake/Output       19 07 - 19 0659 19 07 - 07/15/19 0659      [id number] [id number] Total [id number] Total       Intake    P.O.  --  480 480  --  -- --    P.O. -- 480 480 -- -- --    I.V.  30.8  -- 30.8  33.5  -- 33.5    PCA End of Shift Total Volume (ml) 30.8 -- 30.8 33.5 -- 33.5    Total Intake 30.8 480 510.8 33.5 -- 33.5       Output    Urine  --  404 404  --  -- --    Urine Void (mL) -- 404 404 -- -- --    Drains  100  10 110  --  -- --    Output (mL) ([REMOVED] Closed/Suction Drain 1 Posterior Back  Hemovac 10 Fr.) 100 10 110 -- -- --    Total Output 100 414 514 -- -- --       Net I/O     -69.2 66 -3.2 33.5 -- 33.5            Intake/Output Summary (Last 24 hours) at 07/14/19 1131  Last data filed at 07/14/19 1128   Gross per 24 hour   Intake            527.1 ml   Output              514 ml   Net             13.1 ml            • Pharmacy Consult Request  1 Each PHARMACY TO DOSE   • MD ALERT...DO NOT ADMINISTER NSAIDS or ASPIRIN unless ORDERED By Neurosurgery  1 Each PRN   • acetaminophen  500 mg Q6HRS PRN   • cyclobenzaprine  10 mg Q8HRS PRN   • metoprolol SR  25 mg DAILY   • senna-docusate  2 Tab BID    And   • polyethylene glycol/lytes  1 Packet QDAY PRN    And   • magnesium hydroxide  30 mL QDAY PRN    And   • bisacodyl  10 mg QDAY PRN       Assessment and Plan:  Hospital day #9  POD #2 L2-S1 laminectomy   Overall pt feels his LE strength and sensation is improved since prior to surgery   MRI C/T spine benign   D/c PCA, drain  Encourage mobilization  Pt should sit up or get up to chair for all meals  D/c planning per primary team - PT/OT recommended SNF

## 2019-07-14 NOTE — THERAPY
"Physical Therapy Evaluation completed.   Bed Mobility:  Supine to Sit: Minimal Assist (with bed features)  Transfers: Sit to Stand: Minimal Assist  Gait: Level Of Assist: Unable to Participate;  Theo with pre gait activity with FWW; see below  Plan of Care: Will benefit from Physical Therapy 5 times per week  Discharge Recommendations: Equipment: Will Continue to Assess for Equipment Needs. See below    Pt was seen for re-evaluation s/p lumbar decompression. However his goals remain the same as prior to surgery and anticipate that as current co-morbidities resolve he will continue to progress. Note that pt reports he feels somewhat better after this surgery as compared to prior. He was able to demonstrate bed mobility with mod/min A and use of bed features, sit<>stands with min A and initaition of pre-gait with min A with FWW. He is known to this PT from prior admit and actually appears to have improved mobility post surgery this visit than prior though is well below his functional baseline. In current condition would recommend continued skilled PT/placement prior to dc to home for optimal functional recovery. WIll continue to visit for progression while here.     See \"Rehab Therapy-Acute\" Patient Summary Report for complete documentation.     "

## 2019-07-14 NOTE — PROGRESS NOTES
Patient inappropriately asking for nursing staff to turn and lift him. This RN gave education that since he is able he needs to turn himself and nursing staff will place pillows in under him. Patient then turned himself and this RN placed pillows in under. This RN continuing to educate on importance of mobilization.

## 2019-07-14 NOTE — PROGRESS NOTES
Hemovac output 0 mLs in 8 hours. Drain removed per MD order. Slight drainage present. Gauze applied, surgical dressing reinforced.

## 2019-07-14 NOTE — PROGRESS NOTES
Assumed pt care at 1900. Pt AOx4, states that his pain is well controlled at this time. PCA pump in place, SCDs are on, call light within reach, bed locked and in low position. No other needs at this time.

## 2019-07-14 NOTE — PROGRESS NOTES
Hospital Medicine Daily Progress Note    Date of Service  7/13/2019    Chief Complaint  23 y.o. male admitted 7/5/2019 with bilateral lower extremity numbness.    Hospital Course    23-year-old male history of lumbar laminectomy 6/17/2019 per Dr. crenshaw with discharge 6/25 to home with use of walker admitted with bilateral lower extremity numbness and weakness x3 days.  Patient admitted for pain control, therapy and further work-up with planned evaluation by neurosurgery Dr. Crenshaw.    Interval Problem Update    POD #1 L2-S1 laminectomy  I evaluated and examined him at the bedside.  He reported that his stent has improved.  His left lower extremity weakness has improved.  PT/OT eval recommend made recommendations       Consultants/Specialty  Neurosurgery to consult    Code Status  Full code    Disposition  To be decided    Review of Systems  Review of Systems   Constitutional: Negative for chills, fever and weight loss.   HENT: Negative for hearing loss and tinnitus.    Eyes: Negative for blurred vision, double vision, photophobia and pain.   Respiratory: Negative for cough, sputum production and shortness of breath.    Cardiovascular: Negative for chest pain, palpitations, orthopnea and leg swelling.   Gastrointestinal: Negative for abdominal pain, constipation, diarrhea, nausea and vomiting.   Genitourinary: Negative for dysuria, frequency and urgency.   Musculoskeletal: Positive for back pain. Negative for joint pain, myalgias and neck pain.   Skin: Negative for rash.   Neurological: Positive for sensory change, focal weakness (Left lower extremity improving) and weakness. Negative for dizziness, tingling, tremors, speech change and headaches.   Psychiatric/Behavioral: Negative for hallucinations and substance abuse.   All other systems reviewed and are negative.       Physical Exam  Temp:  [36.1 °C (97 °F)-37.3 °C (99.1 °F)] 37.3 °C (99.1 °F)  Pulse:  [107-124] 122  Resp:  [14-20] 18  BP: (112-143)/(42-87)  143/57  SpO2:  [90 %-96 %] 93 %    Physical Exam   Constitutional: He is oriented to person, place, and time. No distress.   Obese   HENT:   Head: Normocephalic and atraumatic.   Eyes: Pupils are equal, round, and reactive to light. Right eye exhibits no discharge. Left eye exhibits no discharge.   Neck: Normal range of motion. Neck supple.   Cardiovascular: Regular rhythm.  Exam reveals no friction rub.    No murmur heard.  Tachycardia   Pulmonary/Chest: Effort normal and breath sounds normal. No respiratory distress. He has no wheezes.   Abdominal: Soft. Bowel sounds are normal. He exhibits no distension. There is no tenderness. There is no rebound.   Musculoskeletal: Normal range of motion. He exhibits no edema or deformity.   Dressing present at the site of surgery.     Neurological: He is alert and oriented to person, place, and time. No cranial nerve deficit. Coordination normal.   Motor 3-4/5 in right lower extremity  Motor 3/5 in left lower extremity (slightly improved from yesterday)   Skin: Skin is warm and dry. No rash noted. He is not diaphoretic. No erythema.   Psychiatric: He has a normal mood and affect. His behavior is normal.     Fluids    Intake/Output Summary (Last 24 hours) at 07/13/19 1942  Last data filed at 07/13/19 1854   Gross per 24 hour   Intake            850.8 ml   Output             1040 ml   Net           -189.2 ml       Laboratory  Recent Labs      07/12/19   0431  07/13/19   0402   WBC  12.6*  15.4*   RBC  5.56  4.81   HEMOGLOBIN  14.9  13.3*   HEMATOCRIT  47.0  40.3*   MCV  84.5  83.8   MCH  26.8*  27.7   MCHC  31.7*  33.0*   RDW  40.7  39.8   PLATELETCT  408  367   MPV  8.8*  8.9*     Recent Labs      07/12/19   0431  07/13/19   0402   SODIUM  140  138   POTASSIUM  4.0  3.8   CHLORIDE  104  102   CO2  24  25   GLUCOSE  100*  139*   BUN  15  12   CREATININE  0.64  0.65   CALCIUM  10.0  8.7     Recent Labs      07/11/19   1046  07/12/19   0431   APTT  31.4   --    INR  1.06  1.01                Imaging  DX-SPINE-ANY ONE VIEW   Final Result      Fluoroscopic image(s) obtained during posterior lumbar laminectomy. Please see the patient's chart for full procedural details.      Fluoroscopy time 2 seconds.         DX-PORTABLE FLUORO > 1 HOUR   Preliminary Result         Portable fluoroscopy utilized for 2 seconds.      MR-THORACIC SPINE-WITH & W/O   Final Result      Multilevel degenerative disc disease results in cord abutment but no cord compression      Straightening of the normal lordosis with multiple chronic Schmorl's nodes      Mild right T2/3 foraminal stenosis      MR-LUMBAR SPINE-WITH & W/O   Final Result      1.  New 10 x 3 x 1 cm deep subcutaneous fat fluid collection differential includes seroma with or without superimposed infection cannot      2.  Otherwise stable lumbar spine MRI with contrast following L3-L5 laminectomies      3.  Moderate multilevel central stenoses greatest at L2/3 are again seen, mostly secondary to facet arthropathy and developmentally short pedicles as specifically detailed above      4.  Lesser foraminal stenoses are unchanged as detailed above           Assessment/Plan  * Spinal stenosis of lumbar region s/p aborted laminectomy with neurogenic claudication- (present on admission)   Assessment & Plan    With paresthesias, back pain  Severe ,debility.  He continued to have weakness in bilateral lower extremities more on the left side.  Continue to provide him pain control and monitor for adverse effect of pain medications.  Neurontin dose recently titrated up- monitor response.   As needed Flexeril  PT/OT evaluation and recommendations  Neurosurgery evaluated him and he underwent L2-S1 laminectomy on July 12, 2019.  I discussed plan of care with him and I answered all his questions.  SCDs for DVT prophylaxis.     Essential hypertension   Assessment & Plan    Controlled  continue metoprolol  Lifestyle modifications  Continue to monitor        I discussed plan  of care with RN at the bedside.    VTE prophylaxis: Holding chemical DVT prophylaxis due to recent surgery and drain present.

## 2019-07-15 LAB
ANION GAP SERPL CALC-SCNC: 7 MMOL/L (ref 0–11.9)
BUN SERPL-MCNC: 11 MG/DL (ref 8–22)
CALCIUM SERPL-MCNC: 8.9 MG/DL (ref 8.5–10.5)
CHLORIDE SERPL-SCNC: 104 MMOL/L (ref 96–112)
CO2 SERPL-SCNC: 25 MMOL/L (ref 20–33)
CREAT SERPL-MCNC: 0.6 MG/DL (ref 0.5–1.4)
ERYTHROCYTE [DISTWIDTH] IN BLOOD BY AUTOMATED COUNT: 40.5 FL (ref 35.9–50)
GLUCOSE SERPL-MCNC: 101 MG/DL (ref 65–99)
HCT VFR BLD AUTO: 39.2 % (ref 42–52)
HGB BLD-MCNC: 12.3 G/DL (ref 14–18)
MAGNESIUM SERPL-MCNC: 1.7 MG/DL (ref 1.5–2.5)
MCH RBC QN AUTO: 26.8 PG (ref 27–33)
MCHC RBC AUTO-ENTMCNC: 31.4 G/DL (ref 33.7–35.3)
MCV RBC AUTO: 85.4 FL (ref 81.4–97.8)
PLATELET # BLD AUTO: 302 K/UL (ref 164–446)
PMV BLD AUTO: 8.9 FL (ref 9–12.9)
POTASSIUM SERPL-SCNC: 3.8 MMOL/L (ref 3.6–5.5)
RBC # BLD AUTO: 4.59 M/UL (ref 4.7–6.1)
SODIUM SERPL-SCNC: 136 MMOL/L (ref 135–145)
WBC # BLD AUTO: 13.9 K/UL (ref 4.8–10.8)

## 2019-07-15 PROCEDURE — 700105 HCHG RX REV CODE 258: Performed by: NURSE PRACTITIONER

## 2019-07-15 PROCEDURE — 99232 SBSQ HOSP IP/OBS MODERATE 35: CPT | Performed by: INTERNAL MEDICINE

## 2019-07-15 PROCEDURE — 80048 BASIC METABOLIC PNL TOTAL CA: CPT

## 2019-07-15 PROCEDURE — 97110 THERAPEUTIC EXERCISES: CPT

## 2019-07-15 PROCEDURE — 700111 HCHG RX REV CODE 636 W/ 250 OVERRIDE (IP): Performed by: INTERNAL MEDICINE

## 2019-07-15 PROCEDURE — 700102 HCHG RX REV CODE 250 W/ 637 OVERRIDE(OP): Performed by: NURSE PRACTITIONER

## 2019-07-15 PROCEDURE — A9270 NON-COVERED ITEM OR SERVICE: HCPCS | Performed by: NURSE PRACTITIONER

## 2019-07-15 PROCEDURE — 83735 ASSAY OF MAGNESIUM: CPT

## 2019-07-15 PROCEDURE — 700102 HCHG RX REV CODE 250 W/ 637 OVERRIDE(OP): Performed by: INTERNAL MEDICINE

## 2019-07-15 PROCEDURE — A9270 NON-COVERED ITEM OR SERVICE: HCPCS | Performed by: PHYSICIAN ASSISTANT

## 2019-07-15 PROCEDURE — 85027 COMPLETE CBC AUTOMATED: CPT

## 2019-07-15 PROCEDURE — 36415 COLL VENOUS BLD VENIPUNCTURE: CPT

## 2019-07-15 PROCEDURE — 770006 HCHG ROOM/CARE - MED/SURG/GYN SEMI*

## 2019-07-15 PROCEDURE — 700102 HCHG RX REV CODE 250 W/ 637 OVERRIDE(OP): Performed by: PHYSICIAN ASSISTANT

## 2019-07-15 PROCEDURE — 700111 HCHG RX REV CODE 636 W/ 250 OVERRIDE (IP): Performed by: NURSE PRACTITIONER

## 2019-07-15 PROCEDURE — A9270 NON-COVERED ITEM OR SERVICE: HCPCS | Performed by: INTERNAL MEDICINE

## 2019-07-15 PROCEDURE — 97530 THERAPEUTIC ACTIVITIES: CPT

## 2019-07-15 RX ORDER — KETOROLAC TROMETHAMINE 30 MG/ML
30 INJECTION, SOLUTION INTRAMUSCULAR; INTRAVENOUS EVERY 8 HOURS
Status: DISCONTINUED | OUTPATIENT
Start: 2019-07-15 | End: 2019-07-15

## 2019-07-15 RX ORDER — KETOROLAC TROMETHAMINE 30 MG/ML
30 INJECTION, SOLUTION INTRAMUSCULAR; INTRAVENOUS ONCE
Status: COMPLETED | OUTPATIENT
Start: 2019-07-15 | End: 2019-07-15

## 2019-07-15 RX ORDER — DEXAMETHASONE 1 MG
2 TABLET ORAL EVERY 8 HOURS
Status: COMPLETED | OUTPATIENT
Start: 2019-07-15 | End: 2019-07-17

## 2019-07-15 RX ADMIN — HYDROCODONE BITARTRATE AND ACETAMINOPHEN 1 TABLET: 5; 325 TABLET ORAL at 10:40

## 2019-07-15 RX ADMIN — DEXAMETHASONE 2 MG: 1 TABLET ORAL at 13:27

## 2019-07-15 RX ADMIN — KETOROLAC TROMETHAMINE 30 MG: 30 INJECTION, SOLUTION INTRAMUSCULAR; INTRAVENOUS at 11:51

## 2019-07-15 RX ADMIN — METHOCARBAMOL 1000 MG: 100 INJECTION INTRAMUSCULAR; INTRAVENOUS at 13:27

## 2019-07-15 RX ADMIN — METOPROLOL SUCCINATE 25 MG: 25 TABLET, EXTENDED RELEASE ORAL at 04:36

## 2019-07-15 RX ADMIN — DEXAMETHASONE 2 MG: 1 TABLET ORAL at 20:39

## 2019-07-15 RX ADMIN — MAGNESIUM HYDROXIDE 30 ML: 400 SUSPENSION ORAL at 04:36

## 2019-07-15 RX ADMIN — MAGNESIUM HYDROXIDE 30 ML: 400 SUSPENSION ORAL at 11:51

## 2019-07-15 RX ADMIN — SENNOSIDES, DOCUSATE SODIUM 2 TABLET: 50; 8.6 TABLET, FILM COATED ORAL at 04:36

## 2019-07-15 RX ADMIN — ENOXAPARIN SODIUM 40 MG: 100 INJECTION SUBCUTANEOUS at 20:38

## 2019-07-15 RX ADMIN — ENOXAPARIN SODIUM 40 MG: 100 INJECTION SUBCUTANEOUS at 09:43

## 2019-07-15 RX ADMIN — KETOROLAC TROMETHAMINE 30 MG: 30 INJECTION, SOLUTION INTRAMUSCULAR; INTRAVENOUS at 11:52

## 2019-07-15 RX ADMIN — SENNOSIDES, DOCUSATE SODIUM 2 TABLET: 50; 8.6 TABLET, FILM COATED ORAL at 17:08

## 2019-07-15 RX ADMIN — POLYETHYLENE GLYCOL 3350 1 PACKET: 17 POWDER, FOR SOLUTION ORAL at 09:43

## 2019-07-15 RX ADMIN — TRAMADOL HYDROCHLORIDE 50 MG: 50 TABLET, FILM COATED ORAL at 10:23

## 2019-07-15 ASSESSMENT — GAIT ASSESSMENTS: GAIT LEVEL OF ASSIST: UNABLE TO PARTICIPATE

## 2019-07-15 ASSESSMENT — ENCOUNTER SYMPTOMS
MYALGIAS: 0
DIARRHEA: 0
VOMITING: 0
COUGH: 0
PALPITATIONS: 0
FOCAL WEAKNESS: 1
NECK PAIN: 0
CHILLS: 0
NAUSEA: 0
TREMORS: 0
HALLUCINATIONS: 0
WEIGHT LOSS: 0
CONSTIPATION: 1
ORTHOPNEA: 0
BACK PAIN: 1
PHOTOPHOBIA: 0
EYE PAIN: 0
FEVER: 0
HEADACHES: 0
DIZZINESS: 0
SPUTUM PRODUCTION: 0
DOUBLE VISION: 0
BLURRED VISION: 0
WEAKNESS: 1
SENSORY CHANGE: 1
TINGLING: 0
ABDOMINAL PAIN: 0
SPEECH CHANGE: 0
SHORTNESS OF BREATH: 0

## 2019-07-15 ASSESSMENT — LIFESTYLE VARIABLES: SUBSTANCE_ABUSE: 0

## 2019-07-15 ASSESSMENT — COGNITIVE AND FUNCTIONAL STATUS - GENERAL
STANDING UP FROM CHAIR USING ARMS: A LITTLE
WALKING IN HOSPITAL ROOM: A LOT
MOVING TO AND FROM BED TO CHAIR: UNABLE
TURNING FROM BACK TO SIDE WHILE IN FLAT BAD: A LOT
MOBILITY SCORE: 11
CLIMB 3 TO 5 STEPS WITH RAILING: A LOT
SUGGESTED CMS G CODE MODIFIER MOBILITY: CL
MOVING FROM LYING ON BACK TO SITTING ON SIDE OF FLAT BED: UNABLE

## 2019-07-15 NOTE — CARE PLAN
Problem: Safety  Goal: Will remain free from falls  Outcome: PROGRESSING AS EXPECTED  Interventions: bed alarm, non skid footwear, frequent nursing rounds, call bell within reach, education r/t fall precautions.    Problem: Mobility  Goal: Risk for activity intolerance will decrease  Outcome: PROGRESSING SLOWER THAN EXPECTED  Pt with demonstrating activity intolerance. Refusing to attempt ambulation due to pain. Pt encouraged to practice ROM excercises. Order for pt to sit up in bed with meals. Pharmacological and non pharmacological pain management implemented.

## 2019-07-15 NOTE — PROGRESS NOTES
AOx4, c/o bilat pain in LE, medicated per MAR w/ good results. Shower given at beginning of shift. Surgical dressing in place, CDI. 2L NC on at night. Max assist to wheelchair. Voids using urinal. MOM given this AM, no BM since 7/12. Fall precautions in place.

## 2019-07-15 NOTE — CARE PLAN
Problem: Safety  Goal: Will remain free from falls  Outcome: PROGRESSING AS EXPECTED  Interventions: bed/chair alarm, call bell within reach, non skid footwear, frequent nursing rounds, education r/t fall precautions.     Problem: Pain Management  Goal: Pain level will decrease to patient's comfort goal  Outcome: PROGRESSING AS EXPECTED  Pharmacological and non pharmacological pain interventions discussed with pt. Pt instructed to alert RN to new/changing levels of pain. Pt BLE leg pain managed with rest, repositioning, scheduled ketorolac and robaxin and PRN Norco.

## 2019-07-15 NOTE — PROGRESS NOTES
Chart reviewed - recommend OOB and to chair as well as limiting narcotic medication to keep pt alert for therapies and mobilization

## 2019-07-15 NOTE — CARE PLAN
Problem: Safety  Goal: Will remain free from falls  Outcome: PROGRESSING AS EXPECTED  Interventions: bed alarm, non skid footwear, call bell within reach, frequent nursing rounds, education r/t fall precautions.     Problem: Mobility  Goal: Risk for activity intolerance will decrease  Outcome: PROGRESSING SLOWER THAN EXPECTED  Pt demonstrating signs of activity intolerance. Pt encouraged to ambulate. Pain medication administered per orders. Pt instructed to sit in chair for meals.

## 2019-07-15 NOTE — ASSESSMENT & PLAN NOTE
He continued to have tachycardia-no further reductions in HR  On prior admission he was tachycardic.  Echocardiogram showed normal ejection fraction with no significant valvular abnormalities.  Continue to monitor  -no hypoxia, doubtful PE  -will repeat EKG today, personally reviewed by me shows ST, no e/o acute ST segment changes, HR around 115.  -?inappropriate sinus tachycardia?

## 2019-07-15 NOTE — DIETARY
NUTRITION SERVICES: BMI - Pt with BMI >40 (=Body mass index is 43.56 kg/m².), morbid obesity. Weight loss counseling not appropriate in acute care setting. RECOMMEND - Referral to outpatient nutrition services for weight management after D/C.

## 2019-07-15 NOTE — PROGRESS NOTES
Neurosurgery Progress Note    Subjective:  Slept most of day yesterday, r/t spasm in (b) legs, paresthesia reduced to plantar aspect of left foot, improved from preop, no saddle anesthesia    Exam:  Incision: cdi  NM: limited exam r/t pain  RLE: DF to 1/2 neutral: can hold at 4-/5, PF 4/5, quad 4-/5,able to flex hip antigravity   LLE: DF 4-/5 ta t 1/2 neutral, cannot extend knee r/t pain, able to flex hip antigravity  No pitting edema (b)      BP  Min: 123/70  Max: 144/97  Pulse  Av.5  Min: 109  Max: 129  Resp  Av  Min: 14  Max: 18  Temp  Av.5 °C (97.7 °F)  Min: 36.1 °C (97 °F)  Max: 36.8 °C (98.2 °F)  SpO2  Av.3 %  Min: 91 %  Max: 94 %    No Data Recorded    Recent Labs      07/13/19   0402  07/14/19   0402  07/15/19   0349   WBC  15.4*  13.4*  13.9*   RBC  4.81  4.54*  4.59*   HEMOGLOBIN  13.3*  12.2*  12.3*   HEMATOCRIT  40.3*  39.3*  39.2*   MCV  83.8  86.6  85.4   MCH  27.7  26.9*  26.8*   MCHC  33.0*  31.0*  31.4*   RDW  39.8  42.4  40.5   PLATELETCT  367  315  302   MPV  8.9*  9.0  8.9*     Recent Labs      07/13/19   0402  07/14/19   0402  07/15/19   0349   SODIUM  138  136  136   POTASSIUM  3.8  3.4*  3.8   CHLORIDE  102  103  104   CO2  25  25  25   GLUCOSE  139*  115*  101*   BUN  12  14  11   CREATININE  0.65  0.60  0.60   CALCIUM  8.7  8.5  8.9               Intake/Output       19 - 07/15/19 0659 07/15/19 0700 - 1959       Total  Total       Intake    P.O.  --  240 240  --  -- --    P.O. -- 240 240 -- -- --    I.V.  33.5  -- 33.5  --  -- --    PCA End of Shift Total Volume (ml) 33.5 -- 33.5 -- -- --    Total Intake 33.5 240 273.5 -- -- --       Output    Urine  --  975 975  --  -- --    Number of Times Voided -- 2 x 2 x 1 x -- 1 x    Urine Void (mL) -- 975 975 -- -- --    Total Output -- 975 975 -- -- --       Net I/O     33.5 -735 -701.5 -- -- --            Intake/Output Summary (Last 24 hours) at 07/15/19 1018  Last data  filed at 07/15/19 0400   Gross per 24 hour   Intake            273.5 ml   Output              975 ml   Net           -701.5 ml            • enoxaparin (LOVENOX) injection  40 mg DAILY   • HYDROcodone/acetaminophen  1 Tab Q4HRS PRN   • oxyCODONE-acetaminophen  1-2 Tab Q4HRS PRN   • tramadol  50 mg Q4HRS PRN   • HYDROcodone-acetaminophen  1 Tab Q4HRS PRN   • Pharmacy Consult Request  1 Each PHARMACY TO DOSE   • MD ALERT...DO NOT ADMINISTER NSAIDS or ASPIRIN unless ORDERED By Neurosurgery  1 Each PRN   • acetaminophen  500 mg Q6HRS PRN   • cyclobenzaprine  10 mg Q8HRS PRN   • metoprolol SR  25 mg DAILY   • senna-docusate  2 Tab BID    And   • polyethylene glycol/lytes  1 Packet QDAY PRN    And   • magnesium hydroxide  30 mL QDAY PRN    And   • bisacodyl  10 mg QDAY PRN       Assessment and Plan:  Hospital day # 10    POD # 3  L2-S1 laminectomy,   Prior L4-5 decompression, aborted L2-4 decompression r/t loss of neuromonitoring signal (DOS 6/17/19,    MRI C/T spine benign     NM: improved strength DF/PF, proximal strength exam limited r/t pain  ,   Prophylactic anticoagulation: yes         Start date/time:started.     Pain and spasms: preop patient was oversedated on combination of Oxycodone, Gabapentin, Flexeril. Will try IV Robaxin, Toradol x 1 for acute pain, Decadron, DC Flexeril.    Continue to mobilize. Sit up for meals, PT eval: d/w Rn to optimize oral pain control (only Tramadol given).

## 2019-07-15 NOTE — THERAPY
Attempted PT treatment session. Pt lethargic and declining due to fatigue after recently receiving pain medications. Will re-attempt as able.

## 2019-07-15 NOTE — CARE PLAN
Problem: Nutritional:  Goal: Achieve adequate nutritional intake  Patient will consume >50% of meals  Outcome: PROGRESSING AS EXPECTED  See RD note

## 2019-07-15 NOTE — PROGRESS NOTES
Hospital Medicine Daily Progress Note    Date of Service  7/15/2019    Chief Complaint  23 y.o. male admitted 7/5/2019 with bilateral lower extremity numbness.    Hospital Course    23-year-old male history of lumbar laminectomy 6/17/2019 per Dr. crenshaw with discharge 6/25 to home with use of walker admitted with bilateral lower extremity numbness and weakness x3 days.  Patient admitted for pain control, therapy and further work-up with planned evaluation by neurosurgery Dr. Crenshaw.    Interval Problem Update    POD #3 L2-S1 laminectomy  I evaluated and examined him at the bedside.  He reported that he continued to have pain and left lower extremity weakness.  I recommended him to mobilize.  His left lower extremity weakness has been improving.  I discussed plan of care with him.  He reported constipation.    Consultants/Specialty  Neurosurgery to consult    Code Status  Full code    Disposition  To be decided    Review of Systems  Review of Systems   Constitutional: Negative for chills, fever and weight loss.   HENT: Negative for hearing loss and tinnitus.    Eyes: Negative for blurred vision, double vision, photophobia and pain.   Respiratory: Negative for cough, sputum production and shortness of breath.    Cardiovascular: Negative for chest pain, palpitations, orthopnea and leg swelling.   Gastrointestinal: Positive for constipation. Negative for abdominal pain, diarrhea, nausea and vomiting.   Genitourinary: Negative for dysuria, frequency and urgency.   Musculoskeletal: Positive for back pain. Negative for joint pain, myalgias and neck pain.   Skin: Negative for rash.   Neurological: Positive for sensory change, focal weakness (Left lower extremity improving) and weakness. Negative for dizziness, tingling, tremors, speech change and headaches.   Psychiatric/Behavioral: Negative for hallucinations and substance abuse.   All other systems reviewed and are negative.       Physical Exam  Temp:  [36.1 °C (97 °F)-36.8  °C (98.2 °F)] 36.6 °C (97.9 °F)  Pulse:  [109-129] 109  Resp:  [14-18] 18  BP: (123-144)/(64-97) 144/97  SpO2:  [91 %-94 %] 94 %    Physical Exam   Constitutional: He is oriented to person, place, and time. No distress.   Obese   HENT:   Head: Normocephalic and atraumatic.   Eyes: Pupils are equal, round, and reactive to light. Right eye exhibits no discharge. Left eye exhibits no discharge.   Neck: Normal range of motion. Neck supple.   Cardiovascular: Regular rhythm.  Exam reveals no friction rub.    No murmur heard.  Tachycardia   Pulmonary/Chest: Effort normal and breath sounds normal. No respiratory distress. He has no wheezes.   Abdominal: Soft. Bowel sounds are normal. He exhibits no distension. There is no tenderness. There is no rebound.   Musculoskeletal: Normal range of motion. He exhibits no edema or deformity.   Dressing present at the site of surgery.     Neurological: He is alert and oriented to person, place, and time. No cranial nerve deficit. Coordination normal.   Motor 3-4/5 in right lower extremity  Motor 3/5 in left lower extremity (slightly improved from yesterday)   Skin: Skin is warm and dry. No rash noted. He is not diaphoretic. No erythema.   Psychiatric: He has a normal mood and affect. His behavior is normal.   I performed physical exam on July 15, 2019 and it remains similar without any acute changes from yesterday.  Fluids    Intake/Output Summary (Last 24 hours) at 07/15/19 0847  Last data filed at 07/15/19 0400   Gross per 24 hour   Intake            273.5 ml   Output              975 ml   Net           -701.5 ml       Laboratory  Recent Labs      07/13/19   0402  07/14/19   0402  07/15/19   0349   WBC  15.4*  13.4*  13.9*   RBC  4.81  4.54*  4.59*   HEMOGLOBIN  13.3*  12.2*  12.3*   HEMATOCRIT  40.3*  39.3*  39.2*   MCV  83.8  86.6  85.4   MCH  27.7  26.9*  26.8*   MCHC  33.0*  31.0*  31.4*   RDW  39.8  42.4  40.5   PLATELETCT  367  315  302   MPV  8.9*  9.0  8.9*     Recent Labs       07/13/19   0402  07/14/19   0402  07/15/19   0349   SODIUM  138  136  136   POTASSIUM  3.8  3.4*  3.8   CHLORIDE  102  103  104   CO2  25  25  25   GLUCOSE  139*  115*  101*   BUN  12  14  11   CREATININE  0.65  0.60  0.60   CALCIUM  8.7  8.5  8.9                   Imaging  DX-SPINE-ANY ONE VIEW   Final Result      Fluoroscopic image(s) obtained during posterior lumbar laminectomy. Please see the patient's chart for full procedural details.      Fluoroscopy time 2 seconds.         DX-PORTABLE FLUORO > 1 HOUR   Preliminary Result         Portable fluoroscopy utilized for 2 seconds.      MR-THORACIC SPINE-WITH & W/O   Final Result      Multilevel degenerative disc disease results in cord abutment but no cord compression      Straightening of the normal lordosis with multiple chronic Schmorl's nodes      Mild right T2/3 foraminal stenosis      MR-LUMBAR SPINE-WITH & W/O   Final Result      1.  New 10 x 3 x 1 cm deep subcutaneous fat fluid collection differential includes seroma with or without superimposed infection cannot      2.  Otherwise stable lumbar spine MRI with contrast following L3-L5 laminectomies      3.  Moderate multilevel central stenoses greatest at L2/3 are again seen, mostly secondary to facet arthropathy and developmentally short pedicles as specifically detailed above      4.  Lesser foraminal stenoses are unchanged as detailed above           Assessment/Plan  * Spinal stenosis of lumbar region s/p aborted laminectomy with neurogenic claudication- (present on admission)   Assessment & Plan    With paresthesias, back pain  Severe ,debility.  He continued to have weakness in bilateral lower extremities more on the left side.  Continue to provide him pain control and monitor for adverse effect of pain medications.  Neurontin dose recently titrated up- monitor response.   As needed Flexeril  PT/OT evaluation and recommendations  Neurosurgery evaluated him and he underwent L2-S1 laminectomy on July 12,  2019.  I discussed plan of care with him and I answered all his questions.  Drain removed this morning.  I started him on DVT prophylaxis with Lovenox.     Hypokalemia   Assessment & Plan    He found to have hypokalemia and replace potassium.  Continue to monitor     Essential hypertension   Assessment & Plan    Controlled  continue metoprolol  Lifestyle modifications  Continue to monitor     Sinus tachycardia- (present on admission)   Assessment & Plan    He continued to have tachycardia.  On prior admission he was tachycardic.  Echocardiogram showed normal ejection fraction with no significant valvular abnormalities.  Continue to monitor       I discussed plan of care during multidisciplinary rounds.    VTE prophylaxis: I discussed with neurosurgery regarding DVT prophylaxis.  His drain out more than 24 hours ago and I started him on DVT prophylaxis with Lovenox.

## 2019-07-15 NOTE — DIETARY
Nutrition services: Day 8 of admit.  Sammy Edmonds is a 23 y.o. male with admitting DX of spinal stenosis    Potential decrease in wt per weekly screen    RD able to visit pt at bedside. Pt states is eating fine, says it is a bit less than at home though not concerning. Pt confirms that is able to choose menus daily and it helps with intake. Relays has had some difficult going to the bathroom.    Assessment:  Height: 182.9 cm (6')  Weight: (!) 145.7 kg (321 lb 3.4 oz)- via Bed Scale   Body mass index is 43.56 kg/m²., BMI classification: Obesity Class III  Diet/Intake: Regular     Evaluation:   1. Hx of spinal stenosis of lumbar region s/p aborted laminectomy with neurogenic claudication.  2. Pt states eating fine, no concerns. Per ADLs, majority of meals >50%.  3. Pt with potential 13 kg weight loss within one week. This is a potential 8% loss within a week, which is an unlikely weight loss, especially in consideration with pt eating well. Pt is -824 mL per I/Os, no edema noted. RD to monitor for consistent PO intake given potential severe weight loss.   4. Labs: Glucose 101  5. Meds: decadron, milk of magnesia, senokot, kdur-completed  6. Last BM: 7/11    Malnutrition Risk: Pt does not meet criteria for malnutrition at this time per ASPEN guidelines. Reevaluate as appropriate.     Recommendations/Plan:  1. Encourage intake of meals  2. Document intake of all meals  as % taken in ADL's to provide interdisciplinary communication across all shifts.   3. Monitor weight.  4. Nutrition rep will continue to see patient for ongoing meal and snack preferences.     RD following

## 2019-07-15 NOTE — PROGRESS NOTES
Tachycardic at times, asymptomatic. Other VSS.  in place. Pt instructed on use of incentive spirometer: reached volume of 3000. BLE pain managed with rest, repositioning, scheduled Toradol and Robaxin and PRN tramadol and norco. Tolerating regular diet. Dressing to lower/mid changed, dressing C/D/I.  Pt up to chair for meals. Stand pivot to chair w. 1A. Bed alarm on, call bell within reach.

## 2019-07-16 LAB
ANION GAP SERPL CALC-SCNC: 11 MMOL/L (ref 0–11.9)
BUN SERPL-MCNC: 12 MG/DL (ref 8–22)
CALCIUM SERPL-MCNC: 9.2 MG/DL (ref 8.5–10.5)
CHLORIDE SERPL-SCNC: 101 MMOL/L (ref 96–112)
CO2 SERPL-SCNC: 23 MMOL/L (ref 20–33)
CREAT SERPL-MCNC: 0.55 MG/DL (ref 0.5–1.4)
ERYTHROCYTE [DISTWIDTH] IN BLOOD BY AUTOMATED COUNT: 39.6 FL (ref 35.9–50)
GLUCOSE SERPL-MCNC: 132 MG/DL (ref 65–99)
HCT VFR BLD AUTO: 37.7 % (ref 42–52)
HGB BLD-MCNC: 12.5 G/DL (ref 14–18)
MCH RBC QN AUTO: 27.8 PG (ref 27–33)
MCHC RBC AUTO-ENTMCNC: 33.2 G/DL (ref 33.7–35.3)
MCV RBC AUTO: 84 FL (ref 81.4–97.8)
PLATELET # BLD AUTO: 358 K/UL (ref 164–446)
PMV BLD AUTO: 8.9 FL (ref 9–12.9)
POTASSIUM SERPL-SCNC: 4.2 MMOL/L (ref 3.6–5.5)
RBC # BLD AUTO: 4.49 M/UL (ref 4.7–6.1)
SODIUM SERPL-SCNC: 135 MMOL/L (ref 135–145)
WBC # BLD AUTO: 16 K/UL (ref 4.8–10.8)

## 2019-07-16 PROCEDURE — 700105 HCHG RX REV CODE 258: Performed by: NURSE PRACTITIONER

## 2019-07-16 PROCEDURE — A9270 NON-COVERED ITEM OR SERVICE: HCPCS | Performed by: INTERNAL MEDICINE

## 2019-07-16 PROCEDURE — 700102 HCHG RX REV CODE 250 W/ 637 OVERRIDE(OP): Performed by: INTERNAL MEDICINE

## 2019-07-16 PROCEDURE — 99232 SBSQ HOSP IP/OBS MODERATE 35: CPT | Performed by: INTERNAL MEDICINE

## 2019-07-16 PROCEDURE — 700102 HCHG RX REV CODE 250 W/ 637 OVERRIDE(OP): Performed by: PHYSICIAN ASSISTANT

## 2019-07-16 PROCEDURE — 36415 COLL VENOUS BLD VENIPUNCTURE: CPT

## 2019-07-16 PROCEDURE — A9270 NON-COVERED ITEM OR SERVICE: HCPCS | Performed by: NURSE PRACTITIONER

## 2019-07-16 PROCEDURE — 700111 HCHG RX REV CODE 636 W/ 250 OVERRIDE (IP): Performed by: INTERNAL MEDICINE

## 2019-07-16 PROCEDURE — 700111 HCHG RX REV CODE 636 W/ 250 OVERRIDE (IP): Performed by: NURSE PRACTITIONER

## 2019-07-16 PROCEDURE — A9270 NON-COVERED ITEM OR SERVICE: HCPCS | Performed by: PHYSICIAN ASSISTANT

## 2019-07-16 PROCEDURE — 700102 HCHG RX REV CODE 250 W/ 637 OVERRIDE(OP): Performed by: NURSE PRACTITIONER

## 2019-07-16 PROCEDURE — 770006 HCHG ROOM/CARE - MED/SURG/GYN SEMI*

## 2019-07-16 PROCEDURE — 80048 BASIC METABOLIC PNL TOTAL CA: CPT

## 2019-07-16 PROCEDURE — 85027 COMPLETE CBC AUTOMATED: CPT

## 2019-07-16 RX ORDER — METHOCARBAMOL 500 MG/1
1000 TABLET, FILM COATED ORAL 3 TIMES DAILY
Status: DISCONTINUED | OUTPATIENT
Start: 2019-07-16 | End: 2019-07-22 | Stop reason: HOSPADM

## 2019-07-16 RX ADMIN — METHOCARBAMOL 1000 MG: 100 INJECTION INTRAMUSCULAR; INTRAVENOUS at 13:44

## 2019-07-16 RX ADMIN — SENNOSIDES, DOCUSATE SODIUM 2 TABLET: 50; 8.6 TABLET, FILM COATED ORAL at 16:46

## 2019-07-16 RX ADMIN — METOPROLOL SUCCINATE 25 MG: 25 TABLET, EXTENDED RELEASE ORAL at 06:39

## 2019-07-16 RX ADMIN — DEXAMETHASONE 2 MG: 1 TABLET ORAL at 13:43

## 2019-07-16 RX ADMIN — ENOXAPARIN SODIUM 40 MG: 100 INJECTION SUBCUTANEOUS at 06:39

## 2019-07-16 RX ADMIN — METHOCARBAMOL 1000 MG: 100 INJECTION INTRAMUSCULAR; INTRAVENOUS at 06:40

## 2019-07-16 RX ADMIN — DEXAMETHASONE 2 MG: 1 TABLET ORAL at 22:02

## 2019-07-16 RX ADMIN — BISACODYL 10 MG: 10 SUPPOSITORY RECTAL at 10:03

## 2019-07-16 RX ADMIN — DEXAMETHASONE 2 MG: 1 TABLET ORAL at 06:40

## 2019-07-16 RX ADMIN — MAGNESIUM HYDROXIDE 30 ML: 400 SUSPENSION ORAL at 09:45

## 2019-07-16 RX ADMIN — METHOCARBAMOL TABLETS 1000 MG: 500 TABLET, COATED ORAL at 19:41

## 2019-07-16 RX ADMIN — ENOXAPARIN SODIUM 40 MG: 100 INJECTION SUBCUTANEOUS at 16:46

## 2019-07-16 RX ADMIN — SENNOSIDES, DOCUSATE SODIUM 2 TABLET: 50; 8.6 TABLET, FILM COATED ORAL at 06:40

## 2019-07-16 ASSESSMENT — ENCOUNTER SYMPTOMS
DIARRHEA: 0
FEVER: 0
SHORTNESS OF BREATH: 0
FOCAL WEAKNESS: 1
NECK PAIN: 0
DEPRESSION: 0
CONSTIPATION: 0
WEAKNESS: 1
BLURRED VISION: 0
SPEECH CHANGE: 0
TREMORS: 0
SENSORY CHANGE: 1
MYALGIAS: 0
BACK PAIN: 1

## 2019-07-16 NOTE — DISCHARGE PLANNING
Agency/Facility Name: [facility]   Spoke To: [place]   Outcome: Facility willing to take patient on [facility]. Pricing for [facility] would be $450.00 per day and $615.21 for 30 days worth of medication. If patient is switched of lovenox then medication cost will lower. Therapy evaluations will be $180. Therapy after eval is included in 450 rate.       Agency/Facility Name: [facility]   Spoke To: Pamela   Outcome: Facility willing to take patient on [facility]. Pricing is $450 per week.     ROBERTO CARLOS Mun[place] notified.

## 2019-07-16 NOTE — CARE PLAN
Problem: Safety  Goal: Will remain free from falls  Outcome: PROGRESSING AS EXPECTED  Interventions: bed alarm, call bell within reach, non skid footwear, frequent nursing rounds, education r/t fall precautions.     Problem: Mobility  Goal: Risk for activity intolerance will decrease  Outcome: PROGRESSING SLOWER THAN EXPECTED  Interventions: physical and occupational therapy involved. Pt encouraged to practice ROM exercises while in bed. Up to chair for meals. Pt ambulated to bathroom w. Walker and 2A.

## 2019-07-16 NOTE — THERAPY
"Pt agreeable to PT tx session. Pt continues to demonstrate LE weakness with seated exercises; however, unsure how much effort pt is putting forth.  Pt expressed being very fatigued and was notably sweaty after completing ankle pumps, LAQ/hamstring curl, and seated marching. performed sit <> stand x3 trials requiring Min A to stabilize FWW/chair. Pt with difficulty with anterior weight shift to get COG over LENARD. Pt very relucant to stand too long c/o pain. PT will continue to follow while in house.     Physical Therapy Treatment completed.   Bed Mobility:  Supine to Sit:  (in chair pre/post PT session)  Transfers: Sit to Stand: Minimal Assist  Gait: Level Of Assist: Unable to Participate       Plan of Care: Will benefit from Physical Therapy 5 times per week  Discharge Recommendations: Equipment: Will Continue to Assess for Equipment Needs. Post-acute therapy Discharge to a transitional care facility for continued skilled therapy services.     See \"Rehab Therapy-Acute\" Patient Summary Report for complete documentation.       "

## 2019-07-16 NOTE — PROGRESS NOTES
AOx4, c/o bilat pain in LE, medicated per MAR w/ good results. Tachycardic at times. Surgical dressing in place, CDI. 2L NC on at night. Max assist to wheelchair. Voids using urinal. No BM overnight and is agreeable to suppository during AM shift. Fall precautions in place.

## 2019-07-16 NOTE — DISCHARGE PLANNING
Received Choice form at 7023.   Agency/Facility Name: [facility]   Referral sent per Choice form at [phone].

## 2019-07-16 NOTE — DISCHARGE PLANNING
Anticipated Discharge Disposition:   SNF with [facility]    Action:    [facility] choice form explained to patient.  He is agreeable.  Form faxed to [facility].    Barriers to Discharge:    SNF acceptance    Plan:    Wait for determinations.

## 2019-07-16 NOTE — PROGRESS NOTES
Tachycardic at times, asymptomatic. Other VSS.  in place. Pt instructed on use of incentive spirometer: reached volume of 3000. Reporting 2/10 BLE pain which increases with movement. Pain managed with rest, repositioning, scheduled Toradol and Robaxin. Tolerating regular diet. Dressing to lower/mid changed, dressing C/D/I.  Pt up to chair for meals. Ambulated to bathroom with rolling walker and 2A. Bed alarm on, call bell within reach.

## 2019-07-16 NOTE — PROGRESS NOTES
"Neurosurgery Progress Note    Subjective:  Spasms reduced with Robaxin IV, no radic. Pain, sense of\"swelling\" in left foot  Per RN: sat in chair yesterday, not cooperative with assisted ambulation    Exam:  Incision with monocryl self resorbing sutures, cdi  NM: (b) I/Q: 4 to 4+/5,   (b) DF to half neutra: strength 4/5  Right EHL 4/5, left EHL 4-/5  (b) PF: 4/5  Questionable full effort    BP  Min: 112/73  Max: 128/62  Pulse  Av.5  Min: 111  Max: 128  Resp  Av  Min: 16  Max: 18  Temp  Av.4 °C (97.6 °F)  Min: 36.3 °C (97.3 °F)  Max: 36.6 °C (97.9 °F)  SpO2  Av %  Min: 93 %  Max: 97 %    No Data Recorded    Recent Labs      07/14/19   0402  07/15/19   0349  07/16/19   0354   WBC  13.4*  13.9*  16.0*   RBC  4.54*  4.59*  4.49*   HEMOGLOBIN  12.2*  12.3*  12.5*   HEMATOCRIT  39.3*  39.2*  37.7*   MCV  86.6  85.4  84.0   MCH  26.9*  26.8*  27.8   MCHC  31.0*  31.4*  33.2*   RDW  42.4  40.5  39.6   PLATELETCT  315  302  358   MPV  9.0  8.9*  8.9*     Recent Labs      07/14/19   0402  07/15/19   0349  07/16/19   035   SODIUM  136  136  135   POTASSIUM  3.4*  3.8  4.2   CHLORIDE  103  104  101   CO2  25  25  23   GLUCOSE  115*  101*  132*   BUN  14  11  12   CREATININE  0.60  0.60  0.55   CALCIUM  8.5  8.9  9.2               Intake/Output       07/15/19 0700 - 19 - 19 Total  Total       Intake    Total Intake -- -- -- -- -- --       Output    Urine  350  1575 1925  --  -- --    Number of Times Voided 1 x 6 x 7 x -- -- --    Urine Void (mL) 350 1575 1925 -- -- --    Total Output 350 1575 1925 -- -- --       Net I/O     -350 -1575 -1925 -- -- --            Intake/Output Summary (Last 24 hours) at 19 0852  Last data filed at 19 0242   Gross per 24 hour   Intake                0 ml   Output             1925 ml   Net            -1925 ml            • magnesium hydroxide  30 mL AFTER BREAKFAST   • Methocarbamol IVPB  " 1,000 mg Q8HRS   • dexamethasone  2 mg Q8HRS   • enoxaparin (LOVENOX) injection  40 mg Q12HRS   • HYDROcodone/acetaminophen  1 Tab Q4HRS PRN   • oxyCODONE-acetaminophen  1-2 Tab Q4HRS PRN   • tramadol  50 mg Q4HRS PRN   • HYDROcodone-acetaminophen  1 Tab Q4HRS PRN   • Pharmacy Consult Request  1 Each PHARMACY TO DOSE   • acetaminophen  500 mg Q6HRS PRN   • metoprolol SR  25 mg DAILY   • senna-docusate  2 Tab BID    And   • polyethylene glycol/lytes  1 Packet QDAY PRN    And   • magnesium hydroxide  30 mL QDAY PRN    And   • bisacodyl  10 mg QDAY PRN       Assessment and Plan:  Hospital day # 11     POD # 4  L2-S1 laminectomy,   Prior L4-5 decompression, aborted L2-4 decompression r/t loss of neuromonitoring signal (DOS 6/17/19,     preop MRI C/T spine benign      NM: improved strength DF/PF, post op proximal leg weakness r/t pain and spasms resolved,   Prophylactic anticoagulation: yes         Start date/time:started.      Pain and spasms: preop patient was oversedated on combination of Oxycodone, Gabapentin, Flexeril. Currently doing well on Oxycodone and IV Robaxin, Decadron pulse of 6 doses in progress.     Continue mobilization. Patient concerned about going home as he has stairs and feels unable to walk on stairs.    Patient may discharge or transfer to SNF vs rehab anytime from [facility].  Continue protective dressing of incision r/t patient BMI, risk of dehiscence r/t poor mobility and 2nd surgery within one month. F/u in clinic as outpatient in 2-3 weeks.   D/w Dr. Ruelas

## 2019-07-17 PROCEDURE — A9270 NON-COVERED ITEM OR SERVICE: HCPCS | Performed by: INTERNAL MEDICINE

## 2019-07-17 PROCEDURE — 99232 SBSQ HOSP IP/OBS MODERATE 35: CPT | Performed by: INTERNAL MEDICINE

## 2019-07-17 PROCEDURE — 770006 HCHG ROOM/CARE - MED/SURG/GYN SEMI*

## 2019-07-17 PROCEDURE — 97535 SELF CARE MNGMENT TRAINING: CPT

## 2019-07-17 PROCEDURE — 97116 GAIT TRAINING THERAPY: CPT

## 2019-07-17 PROCEDURE — 700102 HCHG RX REV CODE 250 W/ 637 OVERRIDE(OP): Performed by: INTERNAL MEDICINE

## 2019-07-17 PROCEDURE — 700102 HCHG RX REV CODE 250 W/ 637 OVERRIDE(OP): Performed by: PHYSICIAN ASSISTANT

## 2019-07-17 PROCEDURE — 97530 THERAPEUTIC ACTIVITIES: CPT

## 2019-07-17 PROCEDURE — 700102 HCHG RX REV CODE 250 W/ 637 OVERRIDE(OP): Performed by: NURSE PRACTITIONER

## 2019-07-17 PROCEDURE — A9270 NON-COVERED ITEM OR SERVICE: HCPCS | Performed by: PHYSICIAN ASSISTANT

## 2019-07-17 PROCEDURE — A9270 NON-COVERED ITEM OR SERVICE: HCPCS | Performed by: NURSE PRACTITIONER

## 2019-07-17 PROCEDURE — 700111 HCHG RX REV CODE 636 W/ 250 OVERRIDE (IP): Performed by: INTERNAL MEDICINE

## 2019-07-17 RX ADMIN — METHOCARBAMOL TABLETS 1000 MG: 500 TABLET, COATED ORAL at 05:36

## 2019-07-17 RX ADMIN — SENNOSIDES, DOCUSATE SODIUM 2 TABLET: 50; 8.6 TABLET, FILM COATED ORAL at 05:36

## 2019-07-17 RX ADMIN — DEXAMETHASONE 2 MG: 1 TABLET ORAL at 05:36

## 2019-07-17 RX ADMIN — METOPROLOL SUCCINATE 25 MG: 25 TABLET, EXTENDED RELEASE ORAL at 05:36

## 2019-07-17 RX ADMIN — ENOXAPARIN SODIUM 40 MG: 100 INJECTION SUBCUTANEOUS at 16:59

## 2019-07-17 RX ADMIN — METHOCARBAMOL TABLETS 1000 MG: 500 TABLET, COATED ORAL at 16:59

## 2019-07-17 RX ADMIN — ENOXAPARIN SODIUM 40 MG: 100 INJECTION SUBCUTANEOUS at 05:37

## 2019-07-17 RX ADMIN — OXYCODONE HYDROCHLORIDE AND ACETAMINOPHEN 1 TABLET: 10; 325 TABLET ORAL at 15:11

## 2019-07-17 RX ADMIN — SENNOSIDES, DOCUSATE SODIUM 2 TABLET: 50; 8.6 TABLET, FILM COATED ORAL at 16:59

## 2019-07-17 ASSESSMENT — ENCOUNTER SYMPTOMS
MYALGIAS: 0
WEAKNESS: 1
SHORTNESS OF BREATH: 0
SPEECH CHANGE: 0
VOMITING: 0
TREMORS: 0
SENSORY CHANGE: 1
FEVER: 0
NECK PAIN: 0
BLURRED VISION: 0
FOCAL WEAKNESS: 1
NAUSEA: 0
BACK PAIN: 1
ABDOMINAL PAIN: 0
CHILLS: 0
DEPRESSION: 0

## 2019-07-17 ASSESSMENT — COGNITIVE AND FUNCTIONAL STATUS - GENERAL
MOVING TO AND FROM BED TO CHAIR: A LITTLE
MOBILITY SCORE: 17
TURNING FROM BACK TO SIDE WHILE IN FLAT BAD: A LITTLE
DRESSING REGULAR LOWER BODY CLOTHING: A LITTLE
STANDING UP FROM CHAIR USING ARMS: A LITTLE
TOILETING: A LITTLE
DAILY ACTIVITIY SCORE: 20
HELP NEEDED FOR BATHING: A LITTLE
SUGGESTED CMS G CODE MODIFIER DAILY ACTIVITY: CJ
CLIMB 3 TO 5 STEPS WITH RAILING: A LOT
DRESSING REGULAR UPPER BODY CLOTHING: A LITTLE
MOVING FROM LYING ON BACK TO SITTING ON SIDE OF FLAT BED: A LITTLE
SUGGESTED CMS G CODE MODIFIER MOBILITY: CK
WALKING IN HOSPITAL ROOM: A LITTLE

## 2019-07-17 ASSESSMENT — GAIT ASSESSMENTS
GAIT LEVEL OF ASSIST: MINIMAL ASSIST
ASSISTIVE DEVICE: FRONT WHEEL WALKER
DISTANCE (FEET): 100

## 2019-07-17 NOTE — DISCHARGE PLANNING
Anticipated Discharge Disposition:   SNF vs home health    Action:    Spoke with [facility] today and patient will have [employer] [employer] insurance starting 7-19-19.  They completed an application for Medicaid today on patient as he was showing as no health insurance.  Determination for Medicaid is 45 days.    OT/PT recommending skilled therapy services.    Barriers to Discharge:    No payor source until 7-19-19    Plan:  Review OT/PT evals for recommendations 7-19-19.  Collaborate with patient and medical team.

## 2019-07-17 NOTE — CARE PLAN
Problem: Safety  Goal: Will remain free from falls  Outcome: PROGRESSING AS EXPECTED  Interventions: bed alarm, call bell within reach, non skid footwear, frequent nursing rounds, education r/t fall precautions.     Problem: Infection  Goal: Will remain free from infection  Outcome: PROGRESSING AS EXPECTED  No signs/symptoms of infx. Standard precautions implemented.

## 2019-07-17 NOTE — PROGRESS NOTES
Hospital Medicine Daily Progress Note    Date of Service  7/16/2019    Chief Complaint  23 y.o. male admitted 7/5/2019 with bilateral lower extremity numbness.    Hospital Course    23-year-old male history of lumbar laminectomy 6/17/2019 per Dr. crenshaw with discharge 6/25 to home with use of walker admitted with bilateral lower extremity numbness and weakness x3 days.  Patient admitted for pain control, therapy and further work-up with planned evaluation by neurosurgery Dr. Crenshaw.    Interval Problem Update    POD #4 L2-S1 laminectomy-pain is better controlled today, mobilizing much more today. No other acute issues. Persistently ST. Remains without hypoxia    Consultants/Specialty  Neurosurgery    Code Status  Full code    Disposition  To be decided, ALEJANDRA sent to [facility]    Review of Systems  Review of Systems   Constitutional: Negative for fever.        Slowly improving   HENT: Negative for hearing loss.    Eyes: Negative for blurred vision.   Respiratory: Negative for shortness of breath.    Cardiovascular: Negative for chest pain and leg swelling.   Gastrointestinal: Negative for constipation and diarrhea.   Musculoskeletal: Positive for back pain. Negative for joint pain, myalgias and neck pain.        Decreased level of pain noted today   Skin: Negative for rash.   Neurological: Positive for sensory change, focal weakness (Left lower extremity improving) and weakness. Negative for tremors and speech change.        Slow progression   Psychiatric/Behavioral: Negative for depression.   All other systems reviewed and are negative.       Physical Exam  Temp:  [36.3 °C (97.3 °F)-37.3 °C (99.2 °F)] 37.3 °C (99.2 °F)  Pulse:  [111-128] 112  Resp:  [16-18] 18  BP: (115-131)/(59-71) 131/70  SpO2:  [93 %-97 %] 97 %    Physical Exam   Constitutional: He is oriented to person, place, and time. No distress.   Obese  Body mass index is 43.56 kg/m².     HENT:   Head: Normocephalic and atraumatic.   Eyes: Pupils are equal, round, and  reactive to light. No scleral icterus.   Neck: Normal range of motion. Neck supple.   Cardiovascular: Regular rhythm.    No murmur heard.  Tachycardia   Pulmonary/Chest: Effort normal and breath sounds normal. No stridor. No respiratory distress.   Abdominal: Soft. Bowel sounds are normal. He exhibits no distension. There is no tenderness.   Musculoskeletal: Normal range of motion. He exhibits no edema or deformity.   Dressing present at the site of surgery.     Neurological: He is alert and oriented to person, place, and time. No cranial nerve deficit. Coordination normal.   Motor 4/5 in right lower extremity  Motor 4/5 in left lower extremity  Surgical site in the lumbar region appears C/D/I   Skin: Skin is warm and dry. No rash noted. He is not diaphoretic. No erythema.   Psychiatric: He has a normal mood and affect. His behavior is normal.       Fluids    Intake/Output Summary (Last 24 hours) at 07/16/19 1718  Last data filed at 07/16/19 1647   Gross per 24 hour   Intake                0 ml   Output             2225 ml   Net            -2225 ml       Laboratory  Recent Labs      07/14/19   0402  07/15/19   0349  07/16/19   0354   WBC  13.4*  13.9*  16.0*   RBC  4.54*  4.59*  4.49*   HEMOGLOBIN  12.2*  12.3*  12.5*   HEMATOCRIT  39.3*  39.2*  37.7*   MCV  86.6  85.4  84.0   MCH  26.9*  26.8*  27.8   MCHC  31.0*  31.4*  33.2*   RDW  42.4  40.5  39.6   PLATELETCT  315  302  358   MPV  9.0  8.9*  8.9*     Recent Labs      07/14/19   0402  07/15/19   0349  07/16/19   0354   SODIUM  136  136  135   POTASSIUM  3.4*  3.8  4.2   CHLORIDE  103  104  101   CO2  25  25  23   GLUCOSE  115*  101*  132*   BUN  14  11  12   CREATININE  0.60  0.60  0.55   CALCIUM  8.5  8.9  9.2                   Imaging  DX-PORTABLE FLUORO > 1 HOUR   Final Result         Portable fluoroscopy utilized for 2 seconds.      DX-SPINE-ANY ONE VIEW   Final Result      Fluoroscopic image(s) obtained during posterior lumbar laminectomy. Please see the  patient's chart for full procedural details.      Fluoroscopy time 2 seconds.         MR-THORACIC SPINE-WITH & W/O   Final Result      Multilevel degenerative disc disease results in cord abutment but no cord compression      Straightening of the normal lordosis with multiple chronic Schmorl's nodes      Mild right T2/3 foraminal stenosis      MR-LUMBAR SPINE-WITH & W/O   Final Result      1.  New 10 x 3 x 1 cm deep subcutaneous fat fluid collection differential includes seroma with or without superimposed infection cannot      2.  Otherwise stable lumbar spine MRI with contrast following L3-L5 laminectomies      3.  Moderate multilevel central stenoses greatest at L2/3 are again seen, mostly secondary to facet arthropathy and developmentally short pedicles as specifically detailed above      4.  Lesser foraminal stenoses are unchanged as detailed above           Assessment/Plan  * Spinal stenosis of lumbar region s/p aborted laminectomy with neurogenic claudication- (present on admission)   Assessment & Plan    With paresthesias, back pain  Severe ,debility.  He continued to have weakness in bilateral lower extremities more on the left side, but moderate improvement noted today  -stopped gabapentin given mental status issues  -continue decadron, percocet PRN, multi modal pain therapy  As needed Flexeril  PT/OT evaluation and recommendations  Neurosurgery evaluated him and he underwent L2-S1 laminectomy on July 12, 2019.  -lovenox for DVT px     Hypokalemia- (present on admission)   Assessment & Plan    resolved     Essential hypertension   Assessment & Plan    Controlled  continue metoprolol  Lifestyle modifications  Continue to monitor     Sinus tachycardia- (present on admission)   Assessment & Plan    He continued to have tachycardia.  On prior admission he was tachycardic.  Echocardiogram showed normal ejection fraction with no significant valvular abnormalities.  Continue to monitor  -no hypoxia, doubtful PE          VTE prophylaxis: I discussed with neurosurgery regarding DVT prophylaxis.  His drain out more than 24 hours ago and I started him on DVT prophylaxis with Lovenox.

## 2019-07-17 NOTE — DISCHARGE PLANNING
Anticipated Discharge Disposition:   SNF vs home with help from father    Action:    Spoke with [facility].  Pt does not have insurance.  Did not qualify for Medicaid.  Medical financial hardship 7-19-19.    [facility] take [facility].    OT/PT recommending skilled therapy services.    Case escalated to hospital care management leadership.    Barriers to Discharge:    No payor source    Plan:    F/U with hospital care management leadership.

## 2019-07-17 NOTE — PROGRESS NOTES
Neurosurgery Progress Note    Subjective:  Minimal pain, requiring minimal narcotics  Per RN: only ambulated to bathroom, difficulty to motivate patient to try any further mobilization, d/w PT:self-limiting behavior, argumentative about reasons he cannot make further attempts  D/w attending MD: patient not candidate for SNF r/t  Non payer source    Exam:  No change to supine exam: I Q: at least 4/5, DF to 1/2 neutral, holds at 4/5, PF 4/5  Able to ambulate with front wheel walker with steppage gait    BP  Min: 108/65  Max: 144/85  Pulse  Av  Min: 72  Max: 112  Resp  Av.8  Min: 17  Max: 18  Temp  Av.7 °C (98.1 °F)  Min: 36.4 °C (97.6 °F)  Max: 37.3 °C (99.2 °F)  SpO2  Av.8 %  Min: 96 %  Max: 97 %    No Data Recorded    Recent Labs      07/15/19   0349  07/16/19   0354   WBC  13.9*  16.0*   RBC  4.59*  4.49*   HEMOGLOBIN  12.3*  12.5*   HEMATOCRIT  39.2*  37.7*   MCV  85.4  84.0   MCH  26.8*  27.8   MCHC  31.4*  33.2*   RDW  40.5  39.6   PLATELETCT  302  358   MPV  8.9*  8.9*     Recent Labs      07/15/19   0349  19   0354   SODIUM  136  135   POTASSIUM  3.8  4.2   CHLORIDE  104  101   CO2  25  23   GLUCOSE  101*  132*   BUN  11  12   CREATININE  0.60  0.55   CALCIUM  8.9  9.2               Intake/Output       19 - 1959 19 - 19 0659       Total  Total       Intake    Total Intake -- -- -- -- -- --       Output    Urine  650  -- 650  --  -- --    Urine Void (mL) 650 -- 650 -- -- --    Stool  --  -- --  --  -- --    Number of Times Stooled 2 x 1 x 3 x -- -- --    Total Output 650 -- 650 -- -- --       Net I/O     -650 -- -650 -- -- --            Intake/Output Summary (Last 24 hours) at 19 1031  Last data filed at 19 1647   Gross per 24 hour   Intake                0 ml   Output              650 ml   Net             -650 ml            • methocarbamol  1,000 mg TID   • magnesium hydroxide  30 mL AFTER BREAKFAST    • enoxaparin (LOVENOX) injection  40 mg Q12HRS   • HYDROcodone/acetaminophen  1 Tab Q4HRS PRN   • oxyCODONE-acetaminophen  1-2 Tab Q4HRS PRN   • tramadol  50 mg Q4HRS PRN   • HYDROcodone-acetaminophen  1 Tab Q4HRS PRN   • Pharmacy Consult Request  1 Each PHARMACY TO DOSE   • acetaminophen  500 mg Q6HRS PRN   • metoprolol SR  25 mg DAILY   • senna-docusate  2 Tab BID    And   • polyethylene glycol/lytes  1 Packet QDAY PRN    And   • magnesium hydroxide  30 mL QDAY PRN    And   • bisacodyl  10 mg QDAY PRN       Assessment and Plan:  Hospital day # 12     POD # 5  L2-S1 laminectomy,   Prior L4-5 decompression, aborted L2-4 decompression r/t loss of neuromonitoring signal (DOS 6/17/19,     preop MRI C/T spine benign      NM: improved strength DF/PF, post op proximal leg weakness r/t pain and spasms resolved,     Prophylactic anticoagulation: yes         Start date/time:started.      Pain and spasms: preop patient was oversedated on combination of Oxycodone, Gabapentin, Flexeril. Currently doing well on Oxycodone and IV Robaxin, Decadron pulse of 6 doses completed.      Continue mobilization. Patient concerned about going home as he has stairs and feels unable to walk on stairs. Pt eval for DC home needs and safety.    No active role of Nsx. Patient encouraged to maximize effort to improve mobilization for discharge home.    Will follow peripherally. D/w attending hospitalist.

## 2019-07-17 NOTE — THERAPY
"Occupational Therapy Treatment completed with focus on ADLs, ADL transfers and patient education.  Functional Status:  Pt was seen for Occupational Therapy treatment today, see Therapy Kardex for details.Treatment included education in breath control with activity and at rest, self pacing techs and energy conservation for pain management. Educated pt in safety awareness techs as well. Reviewed  NSP with all activity. Pt requested to attempt a shower. Pt demo Min A for ambulating ADL's with FWW to BR and into smaller shower stall with bariatric shower bench inside. Pt able to set up own shower with towels and water temp. Pt showered seated base with extended time maintaining NSP with supervision. Pt dried off with Min A. Mod A to come out of shower stall on wet floor. CGA for toilet transfers. Pt demonstrated Min A/SBA for UB dressing donning gown, supervised for LB dressing seated in chair refusing to use AE stating , \"I want to do this as Independently as I can\". Pt able to do tailor tech to don socks and pants. CGA/Min A while standing for clothing management up over hips using FWW. Some instability with standing activities noted. Pt also demonstrated Min A for Ambulating ADL's with FWW. Pt was left up in chair , call light in reach, bedside table in front of him and nursing is aware. RN updated on OT treatment findings and recommendations. Pt gave good effort. Pt is insistent in doing things \"my way\". Pt struggles but stated \"it gives me more exercise and strength to struggle\".  Continue Occupational Therapy services as per plan.    Plan of Care: Will benefit from Occupational Therapy 3 times per week  Discharge Recommendations:  Equipment Will Continue to Assess for Equipment Needs. Post-acute therapy:Pt may require discharge to a transitional care facility for continued skilled therapy services prior to d/c home depending on progress.    See \"Rehab Therapy-Acute\" Patient Summary Report for complete " documentation.

## 2019-07-17 NOTE — PROGRESS NOTES
Tachycardic at times, asymptomatic. Other VSS. Pt instructed on use of incentive spirometer: reached volume of 3000. Reporting 2/10 BLE pain which increases with movement. Pain managed with rest, repositioning, and Robaxin. Tolerating regular diet. Dressing to lower/mid changed, dressing C/D/I.  Pt up to chair for meals. Ambulated to bathroom and in hallway with rolling walker and 2A. Bed alarm on, call bell within reach.

## 2019-07-17 NOTE — PROGRESS NOTES
"AOx4, pain in bilat LE is better controlled per pt. Tachycardic at times. Surgical dressing in place, CDI. 2L NC on at night. Pt up w/ FWW and had a large BM overnight, needed wheelchair to go back to bed because he stated he was \"not confident\" in using FWW. This RN encouraged pt to increase his physical activity to alleviate stiffness. Fall precautions in place.   "

## 2019-07-17 NOTE — THERAPY
"Physical Therapy Treatment completed.   Bed Mobility:  Supine to Sit: Supervised (HOB flat and no railing)  Transfers: Sit to Stand: Supervised (from EOB and w/c height)  Gait: Level Of Assist: Minimal Assist with Front-Wheel Walker       Plan of Care: Will benefit from Physical Therapy 5 times per week  Discharge Recommendations: Equipment: No Equipment Needed. Post-acute therapy Discharge to home with outpatient or home health for additional skilled therapy services.     See \"Rehab Therapy-Acute\" Patient Summary Report for complete documentation.       "

## 2019-07-18 PROCEDURE — A9270 NON-COVERED ITEM OR SERVICE: HCPCS | Performed by: INTERNAL MEDICINE

## 2019-07-18 PROCEDURE — 700102 HCHG RX REV CODE 250 W/ 637 OVERRIDE(OP): Performed by: PHYSICIAN ASSISTANT

## 2019-07-18 PROCEDURE — 770006 HCHG ROOM/CARE - MED/SURG/GYN SEMI*

## 2019-07-18 PROCEDURE — 700102 HCHG RX REV CODE 250 W/ 637 OVERRIDE(OP): Performed by: INTERNAL MEDICINE

## 2019-07-18 PROCEDURE — 99232 SBSQ HOSP IP/OBS MODERATE 35: CPT | Performed by: INTERNAL MEDICINE

## 2019-07-18 PROCEDURE — 700111 HCHG RX REV CODE 636 W/ 250 OVERRIDE (IP): Performed by: INTERNAL MEDICINE

## 2019-07-18 PROCEDURE — 97530 THERAPEUTIC ACTIVITIES: CPT

## 2019-07-18 PROCEDURE — A9270 NON-COVERED ITEM OR SERVICE: HCPCS | Performed by: PHYSICIAN ASSISTANT

## 2019-07-18 RX ADMIN — ENOXAPARIN SODIUM 40 MG: 100 INJECTION SUBCUTANEOUS at 16:53

## 2019-07-18 RX ADMIN — METHOCARBAMOL TABLETS 1000 MG: 500 TABLET, COATED ORAL at 16:53

## 2019-07-18 RX ADMIN — METOPROLOL SUCCINATE 25 MG: 25 TABLET, EXTENDED RELEASE ORAL at 04:31

## 2019-07-18 RX ADMIN — METHOCARBAMOL TABLETS 1000 MG: 500 TABLET, COATED ORAL at 13:13

## 2019-07-18 RX ADMIN — ENOXAPARIN SODIUM 40 MG: 100 INJECTION SUBCUTANEOUS at 04:31

## 2019-07-18 RX ADMIN — HYDROCODONE BITARTRATE AND ACETAMINOPHEN 1 TABLET: 10; 325 TABLET ORAL at 10:24

## 2019-07-18 RX ADMIN — HYDROCODONE BITARTRATE AND ACETAMINOPHEN 1 TABLET: 10; 325 TABLET ORAL at 16:53

## 2019-07-18 RX ADMIN — HYDROCODONE BITARTRATE AND ACETAMINOPHEN 1 TABLET: 10; 325 TABLET ORAL at 22:25

## 2019-07-18 RX ADMIN — METHOCARBAMOL TABLETS 1000 MG: 500 TABLET, COATED ORAL at 04:31

## 2019-07-18 RX ADMIN — HYDROCODONE BITARTRATE AND ACETAMINOPHEN 1 TABLET: 5; 325 TABLET ORAL at 02:01

## 2019-07-18 ASSESSMENT — ENCOUNTER SYMPTOMS
SPEECH CHANGE: 0
BLURRED VISION: 0
HEADACHES: 0
DIARRHEA: 0
TREMORS: 0
SENSORY CHANGE: 1
FEVER: 0
FOCAL WEAKNESS: 1
DIZZINESS: 0
DEPRESSION: 0
PALPITATIONS: 0
SHORTNESS OF BREATH: 0
MYALGIAS: 0
BACK PAIN: 1
NECK PAIN: 0
WEAKNESS: 1

## 2019-07-18 NOTE — THERAPY
Pt not feeling well in a.m for PT's 1st attempt. Nrsg aware, vital taken. 2nd attempt in afternoon, pt too fatigued to demonstrate a FOS. Pt is willing to participate in a.m for stair training.

## 2019-07-18 NOTE — CARE PLAN
Problem: Nutritional:  Goal: Achieve adequate nutritional intake  Patient will consume >50% of meals   Outcome: MET Date Met: 07/18/19  Per ADLs since last observed on 7/15, Pt has consumed >50% for all meals charted. NR to continue to see pt daily for menu selections.

## 2019-07-18 NOTE — PROGRESS NOTES
Neurosurgery Progress Note    Subjective:  No new c/o    Exam:  Incision cdi  DF hold at 4 to 4+/5, PF 4+/5,  I/Q: at least 4/5: difficult to assess r/t body mass, patient's short attention to task    BP  Min: 114/66  Max: 140/75  Pulse  Av  Min: 109  Max: 119  Resp  Av  Min: 14  Max: 19  Temp  Av.5 °C (97.7 °F)  Min: 36.1 °C (97 °F)  Max: 37.3 °C (99.2 °F)  SpO2  Av.5 %  Min: 95 %  Max: 96 %    No Data Recorded    Recent Labs      19   035   WBC  16.0*   RBC  4.49*   HEMOGLOBIN  12.5*   HEMATOCRIT  37.7*   MCV  84.0   MCH  27.8   MCHC  33.2*   RDW  39.6   PLATELETCT  358   MPV  8.9*     Recent Labs      19   SODIUM  135   POTASSIUM  4.2   CHLORIDE  101   CO2  23   GLUCOSE  132*   BUN  12   CREATININE  0.55   CALCIUM  9.2               Intake/Output       19 - 1959 19 - 19 0659       [id number] Total  5112-5640 Total       Intake    Total Intake -- -- -- -- -- --       Output    Urine  350  -- 350  --  -- --    Urine Void (mL) 350 -- 350 -- -- --    Total Output 350 -- 350 -- -- --       Net I/O     -350 -- -350 -- -- --          No intake or output data in the 24 hours ending 19         • methocarbamol  1,000 mg TID   • magnesium hydroxide  30 mL AFTER BREAKFAST   • enoxaparin (LOVENOX) injection  40 mg Q12HRS   • HYDROcodone/acetaminophen  1 Tab Q4HRS PRN   • oxyCODONE-acetaminophen  1-2 Tab Q4HRS PRN   • tramadol  50 mg Q4HRS PRN   • HYDROcodone-acetaminophen  1 Tab Q4HRS PRN   • Pharmacy Consult Request  1 Each PHARMACY TO DOSE   • acetaminophen  500 mg Q6HRS PRN   • metoprolol SR  25 mg DAILY   • senna-docusate  2 Tab BID    And   • polyethylene glycol/lytes  1 Packet QDAY PRN    And   • magnesium hydroxide  30 mL QDAY PRN    And   • bisacodyl  10 mg QDAY PRN       Assessment and Plan:  Hospital day # 13     POD # 6  L2-S1 laminectomy,   Prior L4-5 decompression, aborted L2-4 decompression r/t loss of  neuromonitoring signal (DOS 6/17/19,     preop MRI C/T spine benign      NM: improved strength DF/PF, post op proximal leg weakness r/t pain and spasms resolved, patient not always cooperative with attempts to mobilize     Prophylactic anticoagulation: yes         Start date/time:started.      The  LE strength has steadily improved to manual strength testing while in bed. Patient is significantly deconditioned with the added detriment of morbid obesity.  He was able to ambulate short distance with FWW on 7/17/19 with stand by assist. PT will practice stairs today with patient.      No active role of Nsx. Can be discharged home when deemed safe by PT and medically stable. Nsx will sign off for this hospitalization. Please reconsult if needed. Outpatient follow up in 1-2 weeks.

## 2019-07-18 NOTE — PROGRESS NOTES
Hospital Medicine Daily Progress Note    Date of Service  7/17/2019    Chief Complaint  23 y.o. male admitted 7/5/2019 with bilateral lower extremity numbness.    Hospital Course    23-year-old male history of lumbar laminectomy 6/17/2019 per Dr. crenshaw with discharge 6/25 to home with use of walker admitted with bilateral lower extremity numbness and weakness x3 days.  Patient admitted for pain control, therapy and further work-up with planned evaluation by neurosurgery Dr. Crenshaw.    Interval Problem Update    POD #5 L2-S1 laminectomy-doing better today. Moving around more. Pain is less.     Consultants/Specialty  Neurosurgery    Code Status  Full code    Disposition  To be decided, ALEJANDRA sent to [facility]    Review of Systems  Review of Systems   Constitutional: Negative for chills and fever.        Further improvements noted   HENT: Negative for hearing loss.    Eyes: Negative for blurred vision.   Respiratory: Negative for shortness of breath.    Cardiovascular: Negative for chest pain and leg swelling.   Gastrointestinal: Negative for abdominal pain, nausea and vomiting.   Musculoskeletal: Positive for back pain. Negative for joint pain, myalgias and neck pain.        Pain continues to improve   Skin: Negative for rash.   Neurological: Positive for sensory change, focal weakness (Left lower extremity improving) and weakness. Negative for tremors and speech change.        Minor improvement noted today   Psychiatric/Behavioral: Negative for depression.   All other systems reviewed and are negative.       Physical Exam  Temp:  [36.4 °C (97.6 °F)-37.3 °C (99.2 °F)] 37.3 °C (99.2 °F)  Pulse:  [] 111  Resp:  [14-18] 14  BP: (108-144)/(65-85) 128/79  SpO2:  [96 %-97 %] 96 %    Physical Exam   Constitutional: He is oriented to person, place, and time. No distress.   Obese  Body mass index is 43.56 kg/m².     HENT:   Head: Normocephalic and atraumatic.   Eyes: Pupils are equal, round, and reactive to light.   Neck: Normal  range of motion. Neck supple.   Cardiovascular: Regular rhythm.    No murmur heard.  Tachycardia   Pulmonary/Chest: Effort normal and breath sounds normal. No stridor.   Abdominal: Soft. Bowel sounds are normal. There is no tenderness.   Musculoskeletal: Normal range of motion. He exhibits no edema or deformity.   Dressing present at the site of surgery.     Neurological: He is alert and oriented to person, place, and time. No cranial nerve deficit. Coordination normal.   Motor 5/5 in right lower extremity  Motor 5/5 in left lower extremity  Surgical site in the lumbar region appears C/D/I   Skin: Skin is warm and dry. No rash noted. He is not diaphoretic. No erythema.   Psychiatric: He has a normal mood and affect. His behavior is normal.       Fluids    Intake/Output Summary (Last 24 hours) at 07/17/19 1942  Last data filed at 07/17/19 0800   Gross per 24 hour   Intake                0 ml   Output              350 ml   Net             -350 ml       Laboratory  Recent Labs      07/15/19   0349 07/16/19   0354   WBC  13.9*  16.0*   RBC  4.59*  4.49*   HEMOGLOBIN  12.3*  12.5*   HEMATOCRIT  39.2*  37.7*   MCV  85.4  84.0   MCH  26.8*  27.8   MCHC  31.4*  33.2*   RDW  40.5  39.6   PLATELETCT  302  358   MPV  8.9*  8.9*     Recent Labs      07/15/19   0349  07/16/19   0354   SODIUM  136  135   POTASSIUM  3.8  4.2   CHLORIDE  104  101   CO2  25  23   GLUCOSE  101*  132*   BUN  11  12   CREATININE  0.60  0.55   CALCIUM  8.9  9.2                   Imaging  DX-PORTABLE FLUORO > 1 HOUR   Final Result         Portable fluoroscopy utilized for 2 seconds.      DX-SPINE-ANY ONE VIEW   Final Result      Fluoroscopic image(s) obtained during posterior lumbar laminectomy. Please see the patient's chart for full procedural details.      Fluoroscopy time 2 seconds.         MR-THORACIC SPINE-WITH & W/O   Final Result      Multilevel degenerative disc disease results in cord abutment but no cord compression      Straightening of the  normal lordosis with multiple chronic Schmorl's nodes      Mild right T2/3 foraminal stenosis      MR-LUMBAR SPINE-WITH & W/O   Final Result      1.  New 10 x 3 x 1 cm deep subcutaneous fat fluid collection differential includes seroma with or without superimposed infection cannot      2.  Otherwise stable lumbar spine MRI with contrast following L3-L5 laminectomies      3.  Moderate multilevel central stenoses greatest at L2/3 are again seen, mostly secondary to facet arthropathy and developmentally short pedicles as specifically detailed above      4.  Lesser foraminal stenoses are unchanged as detailed above           Assessment/Plan  * Spinal stenosis of lumbar region s/p aborted laminectomy with neurogenic claudication- (present on admission)   Assessment & Plan    With paresthesias, back pain  Severe ,debility.  Weakness and pain continue to improve  -continue decadron, percocet PRN, multi modal pain therapy  As needed Flexeril  PT/OT evaluation and recommendations  Neurosurgery evaluated him and he underwent L2-S1 laminectomy on July 12, 2019.  -lovenox for DVT px     Hypokalemia- (present on admission)   Assessment & Plan    resolved     Essential hypertension- (present on admission)   Assessment & Plan    Controlled  continue metoprolol  Lifestyle modifications  Continue to monitor     Sinus tachycardia- (present on admission)   Assessment & Plan    He continued to have tachycardia.  On prior admission he was tachycardic.  Echocardiogram showed normal ejection fraction with no significant valvular abnormalities.  Continue to monitor  -no hypoxia, doubtful PE         VTE prophylaxis: I discussed with neurosurgery regarding DVT prophylaxis.  His drain out more than 24 hours ago and I started him on DVT prophylaxis with Lovenox.

## 2019-07-18 NOTE — PROGRESS NOTES
Hospital Medicine Daily Progress Note    Date of Service  7/18/2019    Chief Complaint  23 y.o. male admitted 7/5/2019 with bilateral lower extremity numbness.    Hospital Course    23-year-old male history of lumbar laminectomy 6/17/2019 per Dr. crenshaw with discharge 6/25 to home with use of walker admitted with bilateral lower extremity numbness and weakness x3 days.  Patient admitted for pain control, therapy and further work-up with planned evaluation by neurosurgery Dr. Crenshaw.    Interval Problem Update    POD #6 L2-S1 laminectomy-pain was quite severe this AM, but current medication regimen is helping quite a bit. Advancing with therapies slowly, no issues with surgical site.      Consultants/Specialty  Neurosurgery    Code Status  Full code    Disposition  To be decided, ALEJANDRA sent to SNF, likely home with outpatient PT    Review of Systems  Review of Systems   Constitutional: Negative for fever.   HENT: Negative for tinnitus.    Eyes: Negative for blurred vision.   Respiratory: Negative for shortness of breath.    Cardiovascular: Negative for palpitations and leg swelling.   Gastrointestinal: Negative for diarrhea.   Musculoskeletal: Positive for back pain. Negative for joint pain, myalgias and neck pain.        Pain fluctuates some, but overall better pain control   Skin: Negative for rash.   Neurological: Positive for sensory change, focal weakness (Left lower extremity improving) and weakness (slow improvements). Negative for dizziness, tremors, speech change and headaches.        More improvement noted today   Psychiatric/Behavioral: Negative for depression.   All other systems reviewed and are negative.       Physical Exam  Temp:  [36.1 °C (97 °F)-37.3 °C (99.2 °F)] 36.2 °C (97.1 °F)  Pulse:  [109-119] 119  Resp:  [14-19] 17  BP: (114-140)/(66-92) 130/92  SpO2:  [95 %-96 %] 95 %    Physical Exam   Constitutional: He is oriented to person, place, and time.   Obese  Body mass index is 43.56 kg/m².     HENT:    Head: Normocephalic and atraumatic.   Eyes: Pupils are equal, round, and reactive to light. No scleral icterus.   Neck: Normal range of motion. Neck supple.   Cardiovascular: Regular rhythm.    No murmur heard.  Tachycardia   Pulmonary/Chest: Effort normal and breath sounds normal. No stridor. No respiratory distress.   Abdominal: Soft. Bowel sounds are normal. He exhibits no distension.   Musculoskeletal: Normal range of motion. He exhibits no edema.        Neurological: He is alert and oriented to person, place, and time. No cranial nerve deficit.   Motor 5/5 in right lower extremity  Motor 5/5 in left lower extremity  Surgical site in the lumbar region appears C/D/I  No further changes noted today   Skin: Skin is warm and dry. No rash noted. He is not diaphoretic.   Psychiatric: He has a normal mood and affect.   Laying flat in bed       Fluids  No intake or output data in the 24 hours ending 07/18/19 1142    Laboratory  Recent Labs      07/16/19   0354   WBC  16.0*   RBC  4.49*   HEMOGLOBIN  12.5*   HEMATOCRIT  37.7*   MCV  84.0   MCH  27.8   MCHC  33.2*   RDW  39.6   PLATELETCT  358   MPV  8.9*     Recent Labs      07/16/19   0354   SODIUM  135   POTASSIUM  4.2   CHLORIDE  101   CO2  23   GLUCOSE  132*   BUN  12   CREATININE  0.55   CALCIUM  9.2                   Imaging  DX-PORTABLE FLUORO > 1 HOUR   Final Result         Portable fluoroscopy utilized for 2 seconds.      DX-SPINE-ANY ONE VIEW   Final Result      Fluoroscopic image(s) obtained during posterior lumbar laminectomy. Please see the patient's chart for full procedural details.      Fluoroscopy time 2 seconds.         MR-THORACIC SPINE-WITH & W/O   Final Result      Multilevel degenerative disc disease results in cord abutment but no cord compression      Straightening of the normal lordosis with multiple chronic Schmorl's nodes      Mild right T2/3 foraminal stenosis      MR-LUMBAR SPINE-WITH & W/O   Final Result      1.  New 10 x 3 x 1 cm deep  subcutaneous fat fluid collection differential includes seroma with or without superimposed infection cannot      2.  Otherwise stable lumbar spine MRI with contrast following L3-L5 laminectomies      3.  Moderate multilevel central stenoses greatest at L2/3 are again seen, mostly secondary to facet arthropathy and developmentally short pedicles as specifically detailed above      4.  Lesser foraminal stenoses are unchanged as detailed above           Assessment/Plan  * Spinal stenosis of lumbar region s/p aborted laminectomy with neurogenic claudication- (present on admission)   Assessment & Plan    With paresthesias, back pain  Severe ,debility.  Weakness and pain continue to improve, but very slowly, motivation (therof lacking) is contributing as well  -decadron has been weaned off now  -continue percocet PRN, multi modal pain therapy  As needed Flexeril  PT/OT evaluation and recommendations  Neurosurgery evaluated him and he underwent L2-S1 laminectomy on July 12, 2019.  -lovenox for DVT px  -trying to work on getting patient home     Hypokalemia- (present on admission)   Assessment & Plan    resolved     Essential hypertension- (present on admission)   Assessment & Plan    Controlled  continue metoprolol  Lifestyle modifications  Continue to monitor     Sinus tachycardia- (present on admission)   Assessment & Plan    He continued to have tachycardia-no clear changes noted today  On prior admission he was tachycardic.  Echocardiogram showed normal ejection fraction with no significant valvular abnormalities.  Continue to monitor  -no hypoxia, doubtful PE  -will repeat EKG today         VTE prophylaxis: I discussed with neurosurgery regarding DVT prophylaxis.  His drain out more than 24 hours ago and I started him on DVT prophylaxis with Lovenox.

## 2019-07-18 NOTE — PROGRESS NOTES
Resumed care from night nurse at 0700. Pt really looking forward to doing stairs today with PT. He is hoping he will be able to do this so that he can be discharged home instead of a rehab facility. Patient also wanting to walk an additional time after PT works with him. He seems positive today and ready to work. No other needs at this time.

## 2019-07-18 NOTE — PROGRESS NOTES
Assumed care of pt at approximately 1900. Pt A&Ox4, able to make needs known. Pt updated on plan of care; questions and concerns addressed. Gauze dressing placed over incision to lower back. Pt reporting 2/10 pain in lower back however reports that pain is tolerable. Pt reporting no other needs at this time. Bed locked and in lowest position, bed alarm on. Call light and belongings within reach. Hourly rounding in place.

## 2019-07-19 ENCOUNTER — HOSPITAL ENCOUNTER (INPATIENT)
Facility: REHABILITATION | Age: 24
End: 2019-07-19
Attending: PHYSICAL MEDICINE & REHABILITATION | Admitting: PHYSICAL MEDICINE & REHABILITATION
Payer: MEDICAID

## 2019-07-19 LAB — EKG IMPRESSION: NORMAL

## 2019-07-19 PROCEDURE — A9270 NON-COVERED ITEM OR SERVICE: HCPCS | Performed by: PHYSICIAN ASSISTANT

## 2019-07-19 PROCEDURE — 770006 HCHG ROOM/CARE - MED/SURG/GYN SEMI*

## 2019-07-19 PROCEDURE — 700102 HCHG RX REV CODE 250 W/ 637 OVERRIDE(OP): Performed by: PHYSICIAN ASSISTANT

## 2019-07-19 PROCEDURE — 97116 GAIT TRAINING THERAPY: CPT

## 2019-07-19 PROCEDURE — 99232 SBSQ HOSP IP/OBS MODERATE 35: CPT | Performed by: INTERNAL MEDICINE

## 2019-07-19 PROCEDURE — 97530 THERAPEUTIC ACTIVITIES: CPT

## 2019-07-19 PROCEDURE — 93010 ELECTROCARDIOGRAM REPORT: CPT | Performed by: INTERNAL MEDICINE

## 2019-07-19 PROCEDURE — A9270 NON-COVERED ITEM OR SERVICE: HCPCS | Performed by: INTERNAL MEDICINE

## 2019-07-19 PROCEDURE — 700102 HCHG RX REV CODE 250 W/ 637 OVERRIDE(OP): Performed by: INTERNAL MEDICINE

## 2019-07-19 PROCEDURE — 97535 SELF CARE MNGMENT TRAINING: CPT

## 2019-07-19 PROCEDURE — 700111 HCHG RX REV CODE 636 W/ 250 OVERRIDE (IP): Performed by: INTERNAL MEDICINE

## 2019-07-19 PROCEDURE — 93005 ELECTROCARDIOGRAM TRACING: CPT | Performed by: INTERNAL MEDICINE

## 2019-07-19 RX ADMIN — HYDROCODONE BITARTRATE AND ACETAMINOPHEN 1 TABLET: 10; 325 TABLET ORAL at 19:47

## 2019-07-19 RX ADMIN — METHOCARBAMOL TABLETS 1000 MG: 500 TABLET, COATED ORAL at 18:02

## 2019-07-19 RX ADMIN — METOPROLOL SUCCINATE 25 MG: 25 TABLET, EXTENDED RELEASE ORAL at 06:33

## 2019-07-19 RX ADMIN — METHOCARBAMOL TABLETS 1000 MG: 500 TABLET, COATED ORAL at 06:33

## 2019-07-19 RX ADMIN — HYDROCODONE BITARTRATE AND ACETAMINOPHEN 1 TABLET: 10; 325 TABLET ORAL at 06:33

## 2019-07-19 RX ADMIN — METHOCARBAMOL TABLETS 1000 MG: 500 TABLET, COATED ORAL at 11:22

## 2019-07-19 RX ADMIN — ENOXAPARIN SODIUM 40 MG: 100 INJECTION SUBCUTANEOUS at 06:34

## 2019-07-19 RX ADMIN — HYDROCODONE BITARTRATE AND ACETAMINOPHEN 1 TABLET: 10; 325 TABLET ORAL at 13:07

## 2019-07-19 RX ADMIN — ENOXAPARIN SODIUM 40 MG: 100 INJECTION SUBCUTANEOUS at 18:02

## 2019-07-19 ASSESSMENT — ENCOUNTER SYMPTOMS
DIZZINESS: 0
SHORTNESS OF BREATH: 0
ABDOMINAL PAIN: 0
SENSORY CHANGE: 1
SPEECH CHANGE: 0
CHILLS: 0
FOCAL WEAKNESS: 1
NECK PAIN: 0
DEPRESSION: 0
BLURRED VISION: 0
WEAKNESS: 1
MYALGIAS: 0
BACK PAIN: 1

## 2019-07-19 ASSESSMENT — COGNITIVE AND FUNCTIONAL STATUS - GENERAL
DAILY ACTIVITIY SCORE: 21
DRESSING REGULAR LOWER BODY CLOTHING: A LITTLE
SUGGESTED CMS G CODE MODIFIER DAILY ACTIVITY: CJ
TOILETING: A LITTLE
HELP NEEDED FOR BATHING: A LITTLE

## 2019-07-19 ASSESSMENT — GAIT ASSESSMENTS
DISTANCE (FEET): 100
GAIT LEVEL OF ASSIST: SUPERVISED
ASSISTIVE DEVICE: FRONT WHEEL WALKER

## 2019-07-19 NOTE — PROGRESS NOTES
Hospital Medicine Daily Progress Note    Date of Service  7/19/2019    Chief Complaint  23 y.o. male admitted 7/5/2019 with bilateral lower extremity numbness.    Hospital Course    23-year-old male history of lumbar laminectomy 6/17/2019 per Dr. crenshaw with discharge 6/25 to home with use of walker admitted with bilateral lower extremity numbness and weakness x3 days.  Patient admitted for pain control, therapy and further work-up with planned evaluation by neurosurgery Dr. Crenshaw.    Interval Problem Update    POD #7 L2-S1 laminectomy-pain better controlled. Still not progressing quickly with therapies. No other new issues.      Consultants/Specialty  Neurosurgery    Code Status  Full code    Disposition  To be decided, ALEJANDRA sent to SNF, likely home with outpatient PT versus SNF versus REHAB    Review of Systems  Review of Systems   Constitutional: Negative for chills.   HENT: Negative for tinnitus.    Eyes: Negative for blurred vision.   Respiratory: Negative for shortness of breath.    Cardiovascular: Negative for chest pain.   Gastrointestinal: Negative for abdominal pain.   Musculoskeletal: Positive for back pain. Negative for joint pain, myalgias and neck pain.        Pain levels slightly better this AM   Skin: Negative for itching.   Neurological: Positive for sensory change, focal weakness (Left lower extremity improving) and weakness (slow improvements, unchanged today). Negative for dizziness and speech change.        No interval improvements today   Psychiatric/Behavioral: Negative for depression.   All other systems reviewed and are negative.       Physical Exam  Temp:  [35.8 °C (96.5 °F)-36.9 °C (98.4 °F)] 36.9 °C (98.4 °F)  Pulse:  [109-129] 114  Resp:  [16-19] 16  BP: (123-138)/(75-87) 133/87  SpO2:  [93 %-95 %] 95 %    Physical Exam   Constitutional: He is oriented to person, place, and time.   Obese  Body mass index is 43.56 kg/m².     HENT:   Head: Normocephalic and atraumatic.   Eyes: Pupils are  equal, round, and reactive to light.   Neck: Normal range of motion. Neck supple.   Cardiovascular: Regular rhythm.    Tachycardia   Pulmonary/Chest: Effort normal and breath sounds normal. No stridor. He has no wheezes. He has no rales.   Abdominal: Soft. Bowel sounds are normal. There is no tenderness.   Musculoskeletal: Normal range of motion. He exhibits no edema.        Neurological: He is alert and oriented to person, place, and time. Coordination normal.   Motor 5/5 in right lower extremity  Motor 5/5 in left lower extremity  Surgical site in the lumbar region appears C/D/I  No interval clinical changes noted   Skin: Skin is warm and dry. No rash noted.   Psychiatric: He has a normal mood and affect.   Laying flat in bed       Fluids    Intake/Output Summary (Last 24 hours) at 07/19/19 1658  Last data filed at 07/19/19 1500   Gross per 24 hour   Intake              880 ml   Output                0 ml   Net              880 ml       Laboratory                        Imaging  DX-PORTABLE FLUORO > 1 HOUR   Final Result         Portable fluoroscopy utilized for 2 seconds.      DX-SPINE-ANY ONE VIEW   Final Result      Fluoroscopic image(s) obtained during posterior lumbar laminectomy. Please see the patient's chart for full procedural details.      Fluoroscopy time 2 seconds.         MR-THORACIC SPINE-WITH & W/O   Final Result      Multilevel degenerative disc disease results in cord abutment but no cord compression      Straightening of the normal lordosis with multiple chronic Schmorl's nodes      Mild right T2/3 foraminal stenosis      MR-LUMBAR SPINE-WITH & W/O   Final Result      1.  New 10 x 3 x 1 cm deep subcutaneous fat fluid collection differential includes seroma with or without superimposed infection cannot      2.  Otherwise stable lumbar spine MRI with contrast following L3-L5 laminectomies      3.  Moderate multilevel central stenoses greatest at L2/3 are again seen, mostly secondary to facet  arthropathy and developmentally short pedicles as specifically detailed above      4.  Lesser foraminal stenoses are unchanged as detailed above           Assessment/Plan  * Spinal stenosis of lumbar region s/p aborted laminectomy with neurogenic claudication- (present on admission)   Assessment & Plan    With paresthesias, back pain  Severe ,debility.  Weakness and pain with slower than expected advancements, motivation (therof lacking) is contributing as well  -decadron has been weaned off now  -continue percocet PRN, multi modal pain therapy  As needed Flexeril  PT/OT evaluation and recommendations  Neurosurgery evaluated him and he underwent L2-S1 laminectomy on July 12, 2019.  -lovenox for DVT px  -trying to work on getting patient home versus REHAB now     Hypokalemia- (present on admission)   Assessment & Plan    resolved     Essential hypertension- (present on admission)   Assessment & Plan    Controlled  continue metoprolol  Lifestyle modifications  Continue to monitor     Sinus tachycardia- (present on admission)   Assessment & Plan    He continued to have tachycardia-no clear changes noted today  On prior admission he was tachycardic.  Echocardiogram showed normal ejection fraction with no significant valvular abnormalities.  Continue to monitor  -no hypoxia, doubtful PE  -will repeat EKG today  -?inappropriate sinus tachycardia?         VTE prophylaxis: I discussed with neurosurgery regarding DVT prophylaxis.  His drain out more than 24 hours ago and I started him on DVT prophylaxis with Lovenox.

## 2019-07-19 NOTE — THERAPY
Occupational Therapy Treatment completed with focus on ADLs, ADL transfers and patient education.  Functional Status:  Pt was seen for Occupational Therapy treatment today, see Therapy Kardex for details.Treatment included education in breath control with activity and at rest, self pacing techs and energy conservation for pain management. Educated pt in safety awareness techs as well. Reviewed NSP with ADL's and ADL transfers. Psychosocial intervention addressed. Pt demo supervision for log rolling from a flat bed. Supervised for sit to stand with FWW. Direct supervision for ambulating to BR post rest period but Min A back from BR to bed post shower due to fatigue. Pt demo indirect Supervision for seated shower today.  Pt set up own shower completely and dried off without assist. Pt demo supervision only for tub bench transfers into/out of shower using grab bars.  Supervision for toilet transfers. Full toilet hygiene maintaining NSP not yet assessed due to pt stated he did not need to have a BM. Pt demonstrated Mod Indep  for UB dressing seated base, supervision  for LB dressing today deciding to use AE for less struggling and safer to maintain NSP, dressing on damp body. Attempted sock aid today. Pt's feet too large unable to don socks. Pt used tailor tech seated on low surface to don both socks without assist. Oral hygiene and grooming done seated in shower post set up with Mod Indep. Pt also demonstrated  Min A for Ambulating ADL's with FWW. Pt was left up in chair, call light in reach, table in front of him and nursing is aware. RN updated on OT treatment findings and recommendations. Continue Occupational Therapy services as per plan.    Plan of Care: Will benefit from Occupational Therapy 3 times per week  Discharge Recommendations:  Equipment Tub Transfer Bench and Will Continue to Assess for Equipment Needs. Post-acute therapy: Pt may require discharge to a transitional care facility for continued skilled  "therapy services prior to d/c home depending on progress.    See \"Rehab Therapy-Acute\" Patient Summary Report for complete documentation.   "

## 2019-07-19 NOTE — DISCHARGE PLANNING
PMR referral from Dr. Calloway     Other neuro versus other ortho ongoing medical management as well as therapy need. Anticipate post acute services to facilitate a successful transition to community home 2 story 1 flight into with parents. RPG to consult per protocol.

## 2019-07-19 NOTE — PROGRESS NOTES
Bedside report rec'd, assumed care of pt. POC reviewed, likely d/c today, pt hopeful to work with PT and accomplish goal of walking up stairs. Pt denies need for pain med at this time.

## 2019-07-19 NOTE — CARE PLAN
Problem: Safety  Goal: Will remain free from injury  Outcome: PROGRESSING AS EXPECTED  Fall precautions in place. Bed alarm on.    Problem: Pain Management  Goal: Pain level will decrease to patient's comfort goal  Outcome: PROGRESSING AS EXPECTED  Assessing pain level frequently using 0 to 10 scale.  Providing medication per MAR.  Providing non-pharmacological intervention and therapeutic communication.

## 2019-07-19 NOTE — CARE PLAN
Problem: Knowledge Deficit  Goal: Knowledge of disease process/condition, treatment plan, diagnostic tests, and medications will improve  Outcome: PROGRESSING AS EXPECTED  Pt well educated about diagnoses, able to teach back    Problem: Respiratory:  Goal: Respiratory status will improve  Outcome: PROGRESSING AS EXPECTED  Pt using IS with encouragement. On RA, LS dim

## 2019-07-19 NOTE — DISCHARGE PLANNING
Anticipated Discharge Disposition:   TBD    Action:    Physiatry consult obtained from Dr. Calloway.    [place] text to TCC Lalo with [facility] regarding consult.    Barriers to Discharge:    Placement acceptance    Plan:    Wait for TCC response.

## 2019-07-20 PROCEDURE — A9270 NON-COVERED ITEM OR SERVICE: HCPCS | Performed by: INTERNAL MEDICINE

## 2019-07-20 PROCEDURE — 700111 HCHG RX REV CODE 636 W/ 250 OVERRIDE (IP): Performed by: INTERNAL MEDICINE

## 2019-07-20 PROCEDURE — 700102 HCHG RX REV CODE 250 W/ 637 OVERRIDE(OP): Performed by: INTERNAL MEDICINE

## 2019-07-20 PROCEDURE — 99232 SBSQ HOSP IP/OBS MODERATE 35: CPT | Performed by: INTERNAL MEDICINE

## 2019-07-20 PROCEDURE — 770006 HCHG ROOM/CARE - MED/SURG/GYN SEMI*

## 2019-07-20 PROCEDURE — 97530 THERAPEUTIC ACTIVITIES: CPT

## 2019-07-20 PROCEDURE — 97116 GAIT TRAINING THERAPY: CPT

## 2019-07-20 PROCEDURE — A9270 NON-COVERED ITEM OR SERVICE: HCPCS | Performed by: PHYSICIAN ASSISTANT

## 2019-07-20 PROCEDURE — 700102 HCHG RX REV CODE 250 W/ 637 OVERRIDE(OP): Performed by: PHYSICIAN ASSISTANT

## 2019-07-20 RX ADMIN — SENNOSIDES, DOCUSATE SODIUM 2 TABLET: 50; 8.6 TABLET, FILM COATED ORAL at 06:39

## 2019-07-20 RX ADMIN — METOPROLOL SUCCINATE 25 MG: 25 TABLET, EXTENDED RELEASE ORAL at 06:39

## 2019-07-20 RX ADMIN — HYDROCODONE BITARTRATE AND ACETAMINOPHEN 1 TABLET: 5; 325 TABLET ORAL at 10:27

## 2019-07-20 RX ADMIN — METHOCARBAMOL TABLETS 1000 MG: 500 TABLET, COATED ORAL at 06:39

## 2019-07-20 RX ADMIN — METHOCARBAMOL TABLETS 1000 MG: 500 TABLET, COATED ORAL at 12:25

## 2019-07-20 RX ADMIN — ENOXAPARIN SODIUM 40 MG: 100 INJECTION SUBCUTANEOUS at 06:40

## 2019-07-20 RX ADMIN — ENOXAPARIN SODIUM 40 MG: 100 INJECTION SUBCUTANEOUS at 16:58

## 2019-07-20 RX ADMIN — HYDROCODONE BITARTRATE AND ACETAMINOPHEN 1 TABLET: 10; 325 TABLET ORAL at 23:05

## 2019-07-20 RX ADMIN — METHOCARBAMOL TABLETS 1000 MG: 500 TABLET, COATED ORAL at 16:57

## 2019-07-20 RX ADMIN — HYDROCODONE BITARTRATE AND ACETAMINOPHEN 1 TABLET: 5; 325 TABLET ORAL at 16:57

## 2019-07-20 ASSESSMENT — COGNITIVE AND FUNCTIONAL STATUS - GENERAL
WALKING IN HOSPITAL ROOM: A LITTLE
TURNING FROM BACK TO SIDE WHILE IN FLAT BAD: A LITTLE
SUGGESTED CMS G CODE MODIFIER MOBILITY: CK
MOBILITY SCORE: 17
STANDING UP FROM CHAIR USING ARMS: A LITTLE
MOVING TO AND FROM BED TO CHAIR: A LITTLE
MOVING FROM LYING ON BACK TO SITTING ON SIDE OF FLAT BED: A LITTLE
CLIMB 3 TO 5 STEPS WITH RAILING: A LOT

## 2019-07-20 ASSESSMENT — ENCOUNTER SYMPTOMS
CHILLS: 0
DIZZINESS: 0
SENSORY CHANGE: 1
WEAKNESS: 1
DEPRESSION: 0
FEVER: 0
FOCAL WEAKNESS: 1
SHORTNESS OF BREATH: 0
BACK PAIN: 1
BLURRED VISION: 0
ABDOMINAL PAIN: 0
SPEECH CHANGE: 0
NECK PAIN: 0

## 2019-07-20 ASSESSMENT — GAIT ASSESSMENTS
GAIT LEVEL OF ASSIST: SUPERVISED
DISTANCE (FEET): 60
ASSISTIVE DEVICE: FRONT WHEEL WALKER

## 2019-07-20 NOTE — PROGRESS NOTES
[facility] Medicine Daily Progress Note    Date of Service  7/20/2019    Chief Complaint  23 y.o. male admitted 7/5/2019 with bilateral lower extremity numbness.    Hospital Course    23-year-old male history of lumbar laminectomy 6/17/2019 per Dr. crenshaw with discharge 6/25 to home with use of walker admitted with bilateral lower extremity numbness and weakness x3 days.  Patient admitted for pain control, therapy and further work-up with planned evaluation by neurosurgery Dr. Crenshaw.    Interval Problem Update    POD #8 L2-S1 laminectomy-he feels the best he has been in several days today. [place]king with PT, did some stairs. NO issues with surgical site.     Consultants/Specialty  Neurosurgery    Code Status  Full code    Disposition  To be decided, ALEJANDRA sent to SNF, likely home with outpatient PT versus SNF versus REHAB    Review of Systems  Review of Systems   Constitutional: Negative for chills and fever.   HENT: Negative for hearing loss.    Eyes: Negative for blurred vision.   Respiratory: Negative for shortness of breath.    Cardiovascular: Negative for chest pain.   Gastrointestinal: Negative for abdominal pain.   Musculoskeletal: Positive for back pain. Negative for joint pain and neck pain.        Pain levels slightly better this AM   Skin: Negative for itching.   Neurological: Positive for sensory change, focal weakness (Left lower extremity improving) and weakness (slow improvements, did some stairs today). Negative for dizziness and speech change.        He feels that his sensation is coming back   Psychiatric/Behavioral: Negative for depression.   All other systems reviewed and are negative.       Physical Exam  Temp:  [36.1 °C (97 °F)-37.3 °C (99.2 °F)] 36.9 °C (98.4 °F)  Pulse:  [112-137] 114  Resp:  [16-18] 16  BP: (120-146)/(67-86) 139/80  SpO2:  [95 %-96 %] 95 %    Physical Exam   Constitutional: He is oriented to person, place, and time.   Obese  Body mass index is 43.56 kg/m².     HENT:   Head:  Normocephalic and atraumatic.   Eyes: Pupils are equal, round, and reactive to light.   Neck: Normal range of motion. Neck supple.   Cardiovascular: Regular rhythm.    Tachycardia   Pulmonary/Chest: Effort normal and breath sounds normal. No stridor. No respiratory distress.   Abdominal: Soft. Bowel sounds are normal. He exhibits no distension.   Musculoskeletal: Normal range of motion. He exhibits no edema.        Neurological: He is alert and oriented to person, place, and time. Coordination normal.   Motor 5/5 in right lower extremity  Motor 5/5 in left lower extremity  Surgical site in the lumbar region appears C/D/I  No obvious clinical change appreciated today   Skin: Skin is warm and dry. No rash noted.   Psychiatric: He has a normal mood and affect.   Laying flat in bed, unchanged today       Fluids    Intake/Output Summary (Last 24 hours) at 07/20/19 1204  Last data filed at 07/20/19 0200   Gross per 24 hour   Intake              640 ml   Output              700 ml   Net              -60 ml       Laboratory                        Imaging  DX-PORTABLE FLUORO > 1 HOUR   Final Result         Portable fluoroscopy utilized for 2 seconds.      DX-SPINE-ANY ONE VIEW   Final Result      Fluoroscopic image(s) obtained during posterior lumbar laminectomy. Please see the patient's chart for full procedural details.      Fluoroscopy time 2 seconds.         MR-THORACIC SPINE-WITH & W/O   Final Result      Multilevel degenerative disc disease results in cord abutment but no cord compression      Straightening of the normal lordosis with multiple chronic Schmorl's nodes      Mild right T2/3 foraminal stenosis      MR-LUMBAR SPINE-WITH & W/O   Final Result      1.  New 10 x 3 x 1 cm deep subcutaneous fat fluid collection differential includes seroma with or without superimposed infection cannot      2.  Otherwise stable lumbar spine MRI with contrast following L3-L5 laminectomies      3.  Moderate multilevel central  stenoses greatest at L2/3 are again seen, mostly secondary to facet arthropathy and developmentally short pedicles as specifically detailed above      4.  Lesser foraminal stenoses are unchanged as detailed above           Assessment/Plan  * Spinal stenosis of lumbar region s/p aborted laminectomy with neurogenic claudication- (present on admission)   Assessment & Plan    With paresthesias, back pain  Severe ,debility.  Weakness and pain with slower than expected advancements (now up 3 stairs, still not strong enough to go home yet), motivation (therof lacking) is contributing as well  -decadron has been weaned off now  -continue percocet PRN, multi modal pain therapy  As needed Flexeril  PT/OT evaluation and recommendations  Neurosurgery evaluated him and he underwent L2-S1 laminectomy on July 12, 2019.  -lovenox for DVT px  -trying to work on getting patient home versus REHAB now     Hypokalemia- (present on admission)   Assessment & Plan    resolved     Essential hypertension- (present on admission)   Assessment & Plan    Controlled  continue metoprolol  Lifestyle modifications  Continue to monitor     Sinus tachycardia- (present on admission)   Assessment & Plan    He continued to have tachycardia-no further reductions in HR  On prior admission he was tachycardic.  Echocardiogram showed normal ejection fraction with no significant valvular abnormalities.  Continue to monitor  -no hypoxia, doubtful PE  -will repeat EKG today, personally reviewed by me shows ST, no e/o acute ST segment changes, HR around 115.  -?inappropriate sinus tachycardia?         VTE prophylaxis: I discussed with neurosurgery regarding DVT prophylaxis.  His drain out more than 24 hours ago and I started him on DVT prophylaxis with Lovenox.

## 2019-07-20 NOTE — CARE PLAN
Problem: Knowledge Deficit  Goal: Knowledge of disease process/condition, treatment plan, diagnostic tests, and medications will improve  Outcome: PROGRESSING AS EXPECTED  Reviewing plan of care, activities, and medication with patient.  Encouraging patient to ask questions and participate in plan of care.  Providing answers to all questions.  Continuing with current plan of care.  Hourly rounding in practice.    Problem: Pain Management  Goal: Pain level will decrease to patient's comfort goal  Outcome: PROGRESSING AS EXPECTED  Assessing pain level frequently using 0 to 10 scale.  Providing medication per MAR.  Providing non-pharmacological intervention and therapeutic communication.

## 2019-07-20 NOTE — DISCHARGE PLANNING
Anticipated Discharge Disposition:   [facility]    Action:    Discussed [facility] with patient.  He was agreeable to referral to [facility] and [facility].  Choices obtained and faxed to [facility].    Barriers to Discharge:    Acceptance    Plan:    F/U on determinations.

## 2019-07-20 NOTE — THERAPY
"Physical Therapy Treatment completed.   Bed Mobility:  Supine to Sit: Supervised  Transfers: Sit to Stand: Supervised (using grab bars . Pt does not have at home. )  Gait: Level Of Assist: Supervised with Front-Wheel Walker       Plan of Care: Will benefit from Physical Therapy 5 times per week  Discharge Recommendations: Equipment: No Equipment Needed. Post-acute therapy Discharge to a transitional care facility for continued skilled therapy services.  Pt unable to manage stairs today to allow for d/c home. PT will see pt Saturday to reassess stairs.      See \"Rehab Therapy-Acute\" Patient Summary Report for complete documentation.       "

## 2019-07-20 NOTE — DISCHARGE PLANNING
Received Choice form at 0800  Agency/Facility Name: 1. [facility] 2. [facility]  Referral sent per Choice form @ 0810

## 2019-07-20 NOTE — THERAPY
"Physical Therapy Treatment completed.   Bed Mobility:  Supine to Sit: Supervised  Transfers: Sit to Stand: Supervised  Gait: Level Of Assist: Supervised with Front-Wheel Walker       Plan of Care: Will benefit from Physical Therapy 5 times per week  Discharge Recommendations: Equipment: No Equipment Needed. Post-acute therapy Discharge to a transitional care facility for continued skilled therapy services.     See \"Rehab Therapy-Acute\" Patient Summary Report for complete documentation.     Pt was pleasant and agreeable to trial stairs today. Pt did present with some anixety and nervous about stairs. Offered to start with 1-2 steps not in stair case and than transition to stair case to build confidence and strength in BLE. He was able to complete 3 stairs usind handrail today but unable to complete full flight due to fatigue. He will benefit from continued acute skilled therapy to trail stairs. As pt has flights of stairs in house therefore not safe to dc home at this current level.   "

## 2019-07-21 PROCEDURE — 700102 HCHG RX REV CODE 250 W/ 637 OVERRIDE(OP): Performed by: INTERNAL MEDICINE

## 2019-07-21 PROCEDURE — 99232 SBSQ HOSP IP/OBS MODERATE 35: CPT | Performed by: INTERNAL MEDICINE

## 2019-07-21 PROCEDURE — 770006 HCHG ROOM/CARE - MED/SURG/GYN SEMI*

## 2019-07-21 PROCEDURE — 700111 HCHG RX REV CODE 636 W/ 250 OVERRIDE (IP): Performed by: INTERNAL MEDICINE

## 2019-07-21 PROCEDURE — A9270 NON-COVERED ITEM OR SERVICE: HCPCS | Performed by: INTERNAL MEDICINE

## 2019-07-21 RX ADMIN — POLYETHYLENE GLYCOL 3350 1 PACKET: 17 POWDER, FOR SOLUTION ORAL at 12:10

## 2019-07-21 RX ADMIN — METHOCARBAMOL TABLETS 1000 MG: 500 TABLET, COATED ORAL at 05:22

## 2019-07-21 RX ADMIN — SENNOSIDES, DOCUSATE SODIUM 2 TABLET: 50; 8.6 TABLET, FILM COATED ORAL at 16:41

## 2019-07-21 RX ADMIN — METHOCARBAMOL TABLETS 1000 MG: 500 TABLET, COATED ORAL at 12:10

## 2019-07-21 RX ADMIN — METOPROLOL SUCCINATE 25 MG: 25 TABLET, EXTENDED RELEASE ORAL at 05:22

## 2019-07-21 RX ADMIN — ENOXAPARIN SODIUM 40 MG: 100 INJECTION SUBCUTANEOUS at 05:22

## 2019-07-21 RX ADMIN — MAGNESIUM HYDROXIDE 30 ML: 400 SUSPENSION ORAL at 16:41

## 2019-07-21 RX ADMIN — METHOCARBAMOL TABLETS 1000 MG: 500 TABLET, COATED ORAL at 16:41

## 2019-07-21 RX ADMIN — ENOXAPARIN SODIUM 40 MG: 100 INJECTION SUBCUTANEOUS at 16:41

## 2019-07-21 ASSESSMENT — ENCOUNTER SYMPTOMS
DEPRESSION: 0
DOUBLE VISION: 0
SENSORY CHANGE: 1
FEVER: 0
NECK PAIN: 0
BACK PAIN: 1
ABDOMINAL PAIN: 0
FOCAL WEAKNESS: 1
WEAKNESS: 1
SPEECH CHANGE: 0
HEADACHES: 0
MYALGIAS: 0
COUGH: 0

## 2019-07-21 NOTE — CARE PLAN
Problem: Safety  Goal: Will remain free from injury  Outcome: PROGRESSING AS EXPECTED  Treaded socks on, bed alarm on    Problem: Mobility  Goal: Risk for activity intolerance will decrease  Outcome: PROGRESSING AS EXPECTED  Ambulating daily

## 2019-07-21 NOTE — PROGRESS NOTES
Changed incision dressing. See flow sheets. Notified Md TAMAR examined incision, aware of drainage. Pt wants to mobilize more, goal to be able to walk up the steps at his apartment

## 2019-07-21 NOTE — PROGRESS NOTES
[facility] Medicine Daily Progress Note    Date of Service  7/21/2019    Chief Complaint  23 y.o. male admitted 7/5/2019 with bilateral lower extremity numbness.    Hospital Course    23-year-old male history of lumbar laminectomy 6/17/2019 per Dr. crenshaw with discharge 6/25 to home with use of walker admitted with bilateral lower extremity numbness and weakness x3 days.  Patient admitted for pain control, therapy and further work-up with planned evaluation by neurosurgery Dr. Crenshaw.    Interval Problem Update    POD #9 L2-S1 laminectomy-continues to improve. Motivated to take on the stairs today. No new pain. Small leakage of clear fluid from superior part of back incision noted by nursing staff.     Consultants/Specialty  Neurosurgery    Code Status  Full code    Disposition  To be decided, ALEJANDRA sent to SNF, likely home with outpatient PT versus SNF versus REHAB    Review of Systems  Review of Systems   Constitutional: Negative for fever.        Continued slow improvmeent   HENT: Negative for hearing loss.    Eyes: Negative for double vision.   Respiratory: Negative for cough.    Gastrointestinal: Negative for abdominal pain.   Musculoskeletal: Positive for back pain. Negative for joint pain, myalgias and neck pain.        Pain is well controlled today   Skin: Negative for itching.   Neurological: Positive for sensory change, focal weakness (More improvement noted again today) and weakness (slow improvements, did some stairs today). Negative for speech change and headaches.        He feels that his sensation is coming back   Psychiatric/Behavioral: Negative for depression.   All other systems reviewed and are negative.       Physical Exam  Temp:  [36.1 °C (97 °F)-37.2 °C (99 °F)] 36.1 °C (97 °F)  Pulse:  [115-123] 116  Resp:  [16-18] 17  BP: (112-141)/(48-79) 141/79  SpO2:  [94 %-97 %] 95 %    Physical Exam   Constitutional: He is oriented to person, place, and time.   Obese  Body mass index is 43.56 kg/m².     HENT:    Head: Normocephalic and atraumatic.   Eyes: Pupils are equal, round, and reactive to light.   Neck: Normal range of motion. Neck supple.   Cardiovascular: Regular rhythm.    Tachycardia   Pulmonary/Chest: Effort normal and breath sounds normal. No stridor. No respiratory distress.   Abdominal: Soft. Bowel sounds are normal. There is no tenderness.   Musculoskeletal: Normal range of motion. He exhibits no edema.        Neurological: He is alert and oriented to person, place, and time. Coordination normal.   Motor 5/5 in right lower extremity  Motor 5/5 in left lower extremity  Surgical site in the lumbar region with very mild dehiscence at the superior part of the vertical incision, about 2-3 cm in length  No obvious clinical change appreciated today   Skin: Skin is warm and dry. No rash noted.   Psychiatric: He has a normal mood and affect.   Laying flat in bed, unchanged today       Fluids  No intake or output data in the 24 hours ending 07/21/19 1213    Laboratory                        Imaging  DX-PORTABLE FLUORO > 1 HOUR   Final Result         Portable fluoroscopy utilized for 2 seconds.      DX-SPINE-ANY ONE VIEW   Final Result      Fluoroscopic image(s) obtained during posterior lumbar laminectomy. Please see the patient's chart for full procedural details.      Fluoroscopy time 2 seconds.         MR-THORACIC SPINE-WITH & W/O   Final Result      Multilevel degenerative disc disease results in cord abutment but no cord compression      Straightening of the normal lordosis with multiple chronic Schmorl's nodes      Mild right T2/3 foraminal stenosis      MR-LUMBAR SPINE-WITH & W/O   Final Result      1.  New 10 x 3 x 1 cm deep subcutaneous fat fluid collection differential includes seroma with or without superimposed infection cannot      2.  Otherwise stable lumbar spine MRI with contrast following L3-L5 laminectomies      3.  Moderate multilevel central stenoses greatest at L2/3 are again seen, mostly  secondary to facet arthropathy and developmentally short pedicles as specifically detailed above      4.  Lesser foraminal stenoses are unchanged as detailed above           Assessment/Plan  * Spinal stenosis of lumbar region s/p aborted laminectomy with neurogenic claudication- (present on admission)   Assessment & Plan    With paresthesias, back pain  Severe ,debility.  Weakness and pain with slower than expected advancements, more motivated today, mild drainage that is clear from the lumbar area, but no clear s/s infection, monitor closely, work with PT otdya  -decadron has been weaned off now  -continue percocet PRN, multi modal pain therapy  As needed Flexeril  PT/OT evaluation and recommendations  Neurosurgery evaluated him and he underwent L2-S1 laminectomy on July 12, 2019.  -lovenox for DVT px  -trying to work on getting patient home versus REHAB now     Hypokalemia- (present on admission)   Assessment & Plan    resolved     Essential hypertension- (present on admission)   Assessment & Plan    Controlled  continue metoprolol  Lifestyle modifications  Continue to monitor     Sinus tachycardia- (present on admission)   Assessment & Plan    He continued to have tachycardia-no further reductions in HR  On prior admission he was tachycardic.  Echocardiogram showed normal ejection fraction with no significant valvular abnormalities.  Continue to monitor  -no hypoxia, doubtful PE  -will repeat EKG today, personally reviewed by me shows ST, no e/o acute ST segment changes, HR around 115.  -?inappropriate sinus tachycardia?         VTE prophylaxis: I discussed with neurosurgery regarding DVT prophylaxis.  His drain out more than 24 hours ago and I started him on DVT prophylaxis with Lovenox.

## 2019-07-21 NOTE — CARE PLAN
Problem: Bowel/Gastric:  Goal: Normal bowel function is maintained or improved  Outcome: PROGRESSING AS EXPECTED  Pt had large BM today.     Problem: Mobility  Goal: Risk for activity intolerance will decrease  Outcome: PROGRESSING AS EXPECTED  Up with PT today, walked in hallway and did stairs.

## 2019-07-22 ENCOUNTER — HOME HEALTH ADMISSION (OUTPATIENT)
Dept: HOME HEALTH SERVICES | Facility: HOME HEALTHCARE | Age: 24
End: 2019-07-22
Payer: COMMERCIAL

## 2019-07-22 VITALS
SYSTOLIC BLOOD PRESSURE: 121 MMHG | HEIGHT: 72 IN | TEMPERATURE: 97.6 F | OXYGEN SATURATION: 97 % | HEART RATE: 110 BPM | WEIGHT: 294.98 LBS | BODY MASS INDEX: 39.95 KG/M2 | DIASTOLIC BLOOD PRESSURE: 82 MMHG | RESPIRATION RATE: 18 BRPM

## 2019-07-22 PROBLEM — E87.6 HYPOKALEMIA: Status: RESOLVED | Noted: 2019-07-14 | Resolved: 2019-07-22

## 2019-07-22 PROBLEM — M48.062 SPINAL STENOSIS OF LUMBAR REGION WITH NEUROGENIC CLAUDICATION: Status: RESOLVED | Noted: 2019-06-16 | Resolved: 2019-07-22

## 2019-07-22 PROCEDURE — 97535 SELF CARE MNGMENT TRAINING: CPT

## 2019-07-22 PROCEDURE — 700111 HCHG RX REV CODE 636 W/ 250 OVERRIDE (IP): Performed by: INTERNAL MEDICINE

## 2019-07-22 PROCEDURE — 97530 THERAPEUTIC ACTIVITIES: CPT

## 2019-07-22 PROCEDURE — 700102 HCHG RX REV CODE 250 W/ 637 OVERRIDE(OP): Performed by: INTERNAL MEDICINE

## 2019-07-22 PROCEDURE — 99239 HOSP IP/OBS DSCHRG MGMT >30: CPT | Performed by: INTERNAL MEDICINE

## 2019-07-22 PROCEDURE — A9270 NON-COVERED ITEM OR SERVICE: HCPCS | Performed by: INTERNAL MEDICINE

## 2019-07-22 PROCEDURE — 97116 GAIT TRAINING THERAPY: CPT

## 2019-07-22 RX ORDER — HYDROCODONE BITARTRATE AND ACETAMINOPHEN 5; 325 MG/1; MG/1
1 TABLET ORAL EVERY 4 HOURS PRN
Qty: 15 TAB | Refills: 0 | Status: SHIPPED | OUTPATIENT
Start: 2019-07-22 | End: 2019-07-25

## 2019-07-22 RX ORDER — METHOCARBAMOL 500 MG/1
1000 TABLET, FILM COATED ORAL 3 TIMES DAILY
Qty: 120 TAB | Refills: 0 | Status: SHIPPED | OUTPATIENT
Start: 2019-07-22 | End: 2019-07-31

## 2019-07-22 RX ORDER — AMOXICILLIN 250 MG
2 CAPSULE ORAL 2 TIMES DAILY
Qty: 30 TAB | Refills: 0 | Status: ON HOLD | OUTPATIENT
Start: 2019-07-22 | End: 2019-08-09

## 2019-07-22 RX ADMIN — METHOCARBAMOL TABLETS 1000 MG: 500 TABLET, COATED ORAL at 04:39

## 2019-07-22 RX ADMIN — METOPROLOL SUCCINATE 25 MG: 25 TABLET, EXTENDED RELEASE ORAL at 04:39

## 2019-07-22 RX ADMIN — METHOCARBAMOL TABLETS 1000 MG: 500 TABLET, COATED ORAL at 13:03

## 2019-07-22 RX ADMIN — SENNOSIDES, DOCUSATE SODIUM 2 TABLET: 50; 8.6 TABLET, FILM COATED ORAL at 04:39

## 2019-07-22 ASSESSMENT — ENCOUNTER SYMPTOMS
HEADACHES: 0
DEPRESSION: 0
FOCAL WEAKNESS: 1
SENSORY CHANGE: 1
BACK PAIN: 1
ABDOMINAL PAIN: 0
DOUBLE VISION: 0
COUGH: 0
MYALGIAS: 0
FEVER: 0
WEAKNESS: 1
SPEECH CHANGE: 0
NECK PAIN: 0

## 2019-07-22 ASSESSMENT — GAIT ASSESSMENTS
ASSISTIVE DEVICE: FRONT WHEEL WALKER
DISTANCE (FEET): 100
GAIT LEVEL OF ASSIST: SUPERVISED

## 2019-07-22 ASSESSMENT — COGNITIVE AND FUNCTIONAL STATUS - GENERAL
CLIMB 3 TO 5 STEPS WITH RAILING: A LITTLE
MOVING TO AND FROM BED TO CHAIR: A LITTLE
SUGGESTED CMS G CODE MODIFIER MOBILITY: CJ
TURNING FROM BACK TO SIDE WHILE IN FLAT BAD: A LITTLE
MOBILITY SCORE: 20
MOVING FROM LYING ON BACK TO SITTING ON SIDE OF FLAT BED: A LITTLE

## 2019-07-22 NOTE — DISCHARGE PLANNING
Received Choice form at 1511  Agency/Facility Name: [facility]  Referral sent per Choice form @ [phone]

## 2019-07-22 NOTE — DISCHARGE PLANNING
Anticipated Discharge Disposition: Home with HH     Action: LSW informed by Beckie with PT that pt is good to go home with HH. Beckie also informed LSW that pt has a walker and doesn't have a PCP.   LSW called [facility] (ext. ) and left a vm with pt's name, , MRN, and LSW name and number for call back.      Barriers to Discharge: None     Plan: LSW to collect HH CHOICE. LSW to call [facility] to check Medicaid status.     Addendum 1118  LSW received a call from Princess with scheduling. She stated that she would be setting up a d/c appointment for this Thursday (2019).   LSW emailed [facility] to request Medicaid status update and also ask about [facility] listed.     Addendum 1302  LSW met with pt at bedside to discuss CHOICE. Pt has [facility] as of 2019. Pt signed to send referral to [facility].   LSW faxed CHOICE to Beckie NEWMAN.     Addendum 1354  LSW informed by [facility] that pt has a high deductible of $7200. LSW informed by bedside RNAlisa that pt does not need wound care. LSW updated Beckie NEWMAN who will gather quotes for out of pocket HH.

## 2019-07-22 NOTE — DISCHARGE PLANNING
Anticipated Discharge Disposition: Home with HH    Action: LSW informed by Beckie with PT that pt is good to go home with HH. Beckie also informed LSW that pt has a walker and doesn't have a PCP.   LSW called [facility] (ext. ) and left a vm with pt's name, , MRN, and LSW name and number for call back.     Barriers to Discharge: None    Plan: LSW to collect HH CHOICE

## 2019-07-22 NOTE — DISCHARGE PLANNING
Agency/Facility Name: [facility]  Outcome: Patient has high deductible and will need to pay out of pocket for home health services until deductible is met. [place]) notified via voicemail.

## 2019-07-22 NOTE — DISCHARGE PLANNING
Agency/Facility Name: [facility]  Spoke To: Marion  Outcome: Patient aware of co-pay. No wound care needed. Marion notified. Patient accepted pending completion of PCP appointment. [place]) notified.

## 2019-07-22 NOTE — DISCHARGE INSTRUCTIONS
Surgical Site Infections FAQs  What is a Surgical Site Infection (SSI)?   A surgical site infection is an infection that occurs after surgery in the part of the body where the surgery took place. Most patients who have surgery do not develop an infection. However, infections develop in about 1 to 3 out of every 100 patients who have surgery.  Some of the common symptoms of a surgical site infection are:  · Redness and pain around the area where you had surgery  · Drainage of cloudy fluid from your surgical wound  · Fever  Can SSIs be treated?   Yes. Most surgical site infections can be treated with antibiotics. The antibiotic given to you depends on the bacteria (germs) causing the infection. Sometimes patients with SSIs also need another surgery to treat the infection.  What are some of the things that hospitals are doing to prevent SSIs?   To prevent SSIs, doctors, nurses, and other healthcare providers:  · Clean their hands and arms up to their elbows with an antiseptic agent just before the surgery.  · Clean their hands with soap and water or an alcohol-based hand rub before and after caring for each patient.  · May remove some of your hair immediately before your surgery using electric clippers if the hair is in the same area where the procedure will occur. They should not shave you with a razor.  · Wear special hair covers, masks, gowns, and gloves during surgery to keep the surgery area clean.  · Give you antibiotics before your surgery starts. In most cases, you should get antibiotics within 60 minutes before the surgery starts and the antibiotics should be stopped within 24 hours after surgery.  · Clean the skin at the site of your surgery with a special soap that kills germs.  What can I do to help prevent SSIs?   Before your surgery:  · Tell your doctor about other medical problems you may have. Health problems such as allergies, diabetes, and obesity could affect your surgery and your treatment.  · Quit  smoking. Patients who smoke get more infections. Talk to your doctor about how you can quit before your surgery.  · Do not shave near where you will have surgery. Shaving with a razor can irritate your skin and make it easier to develop an infection.  At the time of your surgery:  · Speak up if someone tries to shave you with a razor before surgery. Ask why you need to be shaved and talk with your surgeon if you have any concerns.  · Ask if you will get antibiotics before surgery.  After your surgery:  · Make sure that your healthcare providers clean their hands before examining you, either with soap and water or an alcohol-based hand rub.  ¨ If you do not see your providers clean their hands, please ask them to do so.  · Family and friends who visit you should not touch the surgical wound or dressings.  · Family and friends should clean their hands with soap and water or an alcohol-based hand rub before and after visiting you. If you do not see them clean their hands, ask them to clean their hands.  What do I need to do when I go home from the hospital?  · Before you go home, your doctor or nurse should explain everything you need to know about taking care of your wound. Make sure you understand how to care for your wound before you leave the hospital.  · Always clean your hands before and after caring for your wound.  · Before you go home, make sure you know who to contact if you have questions or problems after you get home.  · If you have any symptoms of an infection, such as redness and pain at the surgery site, drainage, or fever, call your doctor immediately.  If you have additional questions, please ask your doctor or nurse.   Developed and co-sponsored by The Society for Healthcare Epidemiology of [place] (SHEA); Infectious Diseases Society of [place] (IDSA); American Hospital Association; Association for Professionals in Infection Control and Epidemiology (APIC); Centers for Disease Control and Prevention  (CDC); and The Joint Commission.   This information is not intended to replace advice given to you by your health care provider. Make sure you discuss any questions you have with your health care provider.  Document Released: 12/23/2014 Document Revised: 05/25/2017 Document Reviewed: 05/08/2017  [employer] Patient Education © 2017 [employer]  Discharge Instructions    Discharged to home by car with relative. Discharged via wheelchair, hospital escort: Yes.  Special equipment needed: Walker    Be sure to schedule a follow-up appointment with your primary care doctor or any specialists as instructed.     Discharge Plan:   Influenza Vaccine Indication: Not indicated: Previously immunized this influenza season and > 8 years of age    I understand that a diet low in cholesterol, fat, and sodium is recommended for good health. Unless I have been given specific instructions below for another diet, I accept this instruction as my diet prescription.   Other diet: regular    Special Instructions: Monitor surgical incision located on lower back.    · Is patient discharged on Warfarin / Coumadin?   No     Depression / Suicide Risk    As you are discharged from this [facility] facility, it is important to learn how to keep safe from harming yourself.    Recognize the warning signs:  · Abrupt changes in personality, positive or negative- including increase in energy   · Giving away possessions  · Change in eating patterns- significant weight changes-  positive or negative  · Change in sleeping patterns- unable to sleep or sleeping all the time   · Unwillingness or inability to communicate  · Depression  · Unusual sadness, discouragement and loneliness  · Talk of wanting to die  · Neglect of personal appearance   · Rebelliousness- reckless behavior  · Withdrawal from people/activities they love  · Confusion- inability to concentrate     If you or a loved one observes any of these behaviors or has concerns about  self-harm, here's what you can do:  · Talk about it- your feelings and reasons for harming yourself  · Remove any means that you might use to hurt yourself (examples: pills, rope, extension cords, firearm)  · Get professional help from the community (Mental Health, Substance Abuse, psychological counseling)  · Do not be alone:Call your Safe Contact- someone whom you trust who will be there for you.  · Call your local CRISIS HOTLINE 394-2882 or 921-175-8593  · Call your local Children's Mobile Crisis Response Team [place] (233) 134-5765 or www.HomeStars  · Call the toll free National Suicide Prevention Hotlines   · National Suicide Prevention Lifeline 106-332-BIGS (4176)  · [employer] Line Network 800-SUICIDE (754-3241)          Methocarbamol tablets  What is this medicine?  METHOCARBAMOL (meth oh DAVIE ba mole) helps to relieve pain and stiffness in muscles caused by strains, sprains, or other injury to your muscles.  This medicine may be used for other purposes; ask your health care provider or pharmacist if you have questions.  COMMON BRAND NAME(S): Robaxin  What should I tell my health care provider before I take this medicine?  They need to know if you have any of these conditions:  -kidney disease  -seizures  -an unusual or allergic reaction to methocarbamol, other medicines, foods, dyes, or preservatives  -pregnant or trying to get pregnant  -breast-feeding  How should I use this medicine?  Take this medicine by mouth with a full glass of water. Follow the directions on the prescription label. Take your medicine at regular intervals. Do not take your medicine more often than directed.  Talk to your pediatrician regarding the use of this medicine in children. Special care may be needed.  Overdosage: If you think you have taken too much of this medicine contact a poison control center or emergency room at once.  NOTE: This medicine is only for you. Do not share this medicine with others.  What if I  miss a dose?  If you miss a dose, take it as soon as you can. If it is almost time for your next dose, take only the next dose. Do not take double or extra doses.  What may interact with this medicine?  Do not take this medication with any of the following medicines:  -narcotic medicines for cough  This medicine may also interact with the following medications:  -alcohol  -antihistamines for allergy, cough and cold  -certain medicines for anxiety or sleep  -certain medicines for depression like amitriptyline, fluoxetine, sertraline  -certain medicines for seizures like phenobarbital, primidone  -cholinesterase inhibitors like neostigmine, ambenonium, and pyridostigmine bromide  -general anesthetics like halothane, isoflurane, methoxyflurane, propofol  -local anesthetics like lidocaine, pramoxine, tetracaine  -medicines that relax muscles for surgery  -narcotic medicines for pain  -phenothiazines like chlorpromazine, mesoridazine, prochlorperazine, thioridazine  This list may not describe all possible interactions. Give your health care provider a list of all the medicines, herbs, non-prescription drugs, or dietary supplements you use. Also tell them if you smoke, drink alcohol, or use illegal drugs. Some items may interact with your medicine.  What should I watch for while using this medicine?  Tell your doctor or health care professional if your symptoms do not start to get better or if they get worse.  You may get drowsy or dizzy. Do not drive, use machinery, or do anything that needs mental alertness until you know how this medicine affects you. Do not stand or sit up quickly, especially if you are an older patient. This reduces the risk of dizzy or fainting spells. Alcohol may interfere with the effect of this medicine. Avoid alcoholic drinks.  If you are taking another medicine that also causes drowsiness, you may have more side effects. Give your health care provider a list of all medicines you use. Your  doctor will tell you how much medicine to take. Do not take more medicine than directed. Call emergency for help if you have problems breathing or unusual sleepiness.  What side effects may I notice from receiving this medicine?  Side effects that you should report to your doctor or health care professional as soon as possible:  -allergic reactions like skin rash, itching or hives, swelling of the face, lips, or tongue  -breathing problems  -confusion  -seizures  -unusually weak or tired  Side effects that usually do not require medical attention (report to your doctor or health care professional if they continue or are bothersome):  -dizziness  -headache  -metallic taste  -tiredness  -upset stomach  This list may not describe all possible side effects. Call your doctor for medical advice about side effects. You may report side effects to FDA at 0-834-FDA-9049.  Where should I keep my medicine?  Keep out of the reach of children.  Store at room temperature between 20 and 25 degrees C (68 and 77 degrees F). Keep container tightly closed. Throw away any unused medicine after the expiration date.  NOTE: This sheet is a summary. It may not cover all possible information. If you have questions about this medicine, talk to your doctor, pharmacist, or health care provider.  © 2018 [employer]/Gold Standard (2016-09-27 13:11:54)  Acetaminophen; Hydrocodone tablets or capsules  What is this medicine?  ACETAMINOPHEN; HYDROCODONE (a set a KAMALJIT karina fen; giovanny droe KOE done) is a pain reliever. It is used to treat moderate to severe pain.  This medicine may be used for other purposes; ask your health care provider or pharmacist if you have questions.  COMMON BRAND NAME(S): Anexsia, Bancap HC, Ceta-Plus, Co-Gesic, Comfortpak, Dolagesic, Dolorex Forte, DuoCet, Hydrocet, Hydrogesic, Lorcet, Lorcet HD, Lorcet Plus, Lortab, Margesic H, Maxidone, Norco, Polygesic, Stagesic, Vanacet, Verdrocet, Vicodin, Vicodin ES, Vicodin HP, Xodol,  Erica  What should I tell my health care provider before I take this medicine?  They need to know if you have any of these conditions:  -brain tumor  -Crohn's disease, inflammatory bowel disease, or ulcerative colitis  -drug abuse or addiction  -head injury  -heart or circulation problems  -if you often drink alcohol  -kidney disease or problems going to the bathroom  -liver disease  -lung disease, asthma, or breathing problems  -an unusual or allergic reaction to acetaminophen, hydrocodone, other opioid analgesics, other medicines, foods, dyes, or preservatives  -pregnant or trying to get pregnant  -breast-feeding  How should I use this medicine?  Take this medicine by mouth with a glass of water. Follow the directions on the prescription label. You can take it with or without food. If it upsets your stomach, take it with food. Do not take your medicine more often than directed.  A special MedGuide will be given to you by the pharmacist with each prescription and refill. Be sure to read this information carefully each time.  Talk to your pediatrician regarding the use of this medicine in children. Special care may be needed.  Overdosage: If you think you have taken too much of this medicine contact a poison control center or emergency room at once.  NOTE: This medicine is only for you. Do not share this medicine with others.  What if I miss a dose?  If you miss a dose, take it as soon as you can. If it is almost time for your next dose, take only that dose. Do not take double or extra doses.  What may interact with this medicine?  This medicine may interact with the following medications:  -alcohol  -antiviral medicines for HIV or AIDS  -atropine  -antihistamines for allergy, cough and cold  -certain antibiotics like erythromycin, clarithromycin  -certain medicines for anxiety or sleep  -certain medicines for bladder problems like oxybutynin, tolterodine  -certain medicines for depression like amitriptyline,  fluoxetine, sertraline  -certain medicines for fungal infections like ketoconazole and itraconazole  -certain medicines for Parkinson's disease like benztropine, trihexyphenidyl  -certain medicines for seizures like carbamazepine, phenobarbital, phenytoin, primidone  -certain medicines for stomach problems like dicyclomine, hyoscyamine  -certain medicines for travel sickness like scopolamine  -general anesthetics like halothane, isoflurane, methoxyflurane, propofol  -ipratropium  -local anesthetics like lidocaine, pramoxine, tetracaine  -MAOIs like Carbex, Eldepryl, Marplan, Nardil, and Parnate  -medicines that relax muscles for surgery  -other medicines with acetaminophen  -other narcotic medicines for pain or cough  -phenothiazines like chlorpromazine, mesoridazine, prochlorperazine, thioridazine  -rifampin  This list may not describe all possible interactions. Give your health care provider a list of all the medicines, herbs, non-prescription drugs, or dietary supplements you use. Also tell them if you smoke, drink alcohol, or use illegal drugs. Some items may interact with your medicine.  What should I watch for while using this medicine?  Tell your doctor or health care professional if your pain does not go away, if it gets worse, or if you have new or a different type of pain. You may develop tolerance to the medicine. Tolerance means that you will need a higher dose of the medicine for pain relief. Tolerance is normal and is expected if you take the medicine for a long time.  Do not suddenly stop taking your medicine because you may develop a severe reaction. Your body becomes used to the medicine. This does NOT mean you are addicted. Addiction is a behavior related to getting and using a drug for a non-medical reason. If you have pain, you have a medical reason to take pain medicine. Your doctor will tell you how much medicine to take. If your doctor wants you to stop the medicine, the dose will be slowly  lowered over time to avoid any side effects.  There are different types of narcotic medicines (opiates). If you take more than one type at the same time or if you are taking another medicine that also causes drowsiness, you may have more side effects. Give your health care provider a list of all medicines you use. Your doctor will tell you how much medicine to take. Do not take more medicine than directed. Call emergency for help if you have problems breathing or unusual sleepiness.  Do not take other medicines that contain acetaminophen with this medicine. Always read labels carefully. If you have questions, ask your doctor or pharmacist.  If you take too much acetaminophen get medical help right away. Too much acetaminophen can be very dangerous and cause liver damage. Even if you do not have symptoms, it is important to get help right away.  You may get drowsy or dizzy. Do not drive, use machinery, or do anything that needs mental alertness until you know how this medicine affects you. Do not stand or sit up quickly, especially if you are an older patient. This reduces the risk of dizzy or fainting spells. Alcohol may interfere with the effect of this medicine. Avoid alcoholic drinks.  The medicine will cause constipation. Try to have a bowel movement at least every 2 to 3 days. If you do not have a bowel movement for 3 days, call your doctor or health care professional.  Your mouth may get dry. Chewing sugarless gum or sucking hard candy, and drinking plenty of water may help. Contact your doctor if the problem does not go away or is severe.  What side effects may I notice from receiving this medicine?  Side effects that you should report to your doctor or health care professional as soon as possible:  -allergic reactions like skin rash, itching or hives, swelling of the face, lips, or tongue  -breathing problems  -confusion  -redness, blistering, peeling or loosening of the skin, including inside the  mouth  -signs and symptoms of low blood pressure like dizziness; feeling faint or lightheaded, falls; unusually weak or tired  -trouble passing urine or change in the amount of urine  -yellowing of the eyes or skin  Side effects that usually do not require medical attention (report to your doctor or health care professional if they continue or are bothersome):  -constipation  -dry mouth  -nausea, vomiting  -tiredness  This list may not describe all possible side effects. Call your doctor for medical advice about side effects. You may report side effects to FDA at 2-723-FDA-2591.  Where should I keep my medicine?  Keep out of the reach of children. This medicine can be abused. Keep your medicine in a safe place to protect it from theft. Do not share this medicine with anyone. Selling or giving away this medicine is dangerous and against the law.  This medicine may cause accidental overdose and death if it taken by other adults, children, or pets. Mix any unused medicine with a substance like cat litter or coffee grounds. Then throw the medicine away in a sealed container like a sealed bag or a coffee can with a lid. Do not use the medicine after the expiration date.  Store at room temperature between 15 and 30 degrees C (59 and 86 degrees F).  NOTE: This sheet is a summary. It may not cover all possible information. If you have questions about this medicine, talk to your doctor, pharmacist, or health care provider.  © 2018 [employer]/Gold Standard (2016-09-09 10:02:16)

## 2019-07-22 NOTE — PROGRESS NOTES
Pt discharged to home. Discharge care plan discussed with pt and pt family at bedside. All members verbalized understanding. Belongings with pt at time of discharge. Pt escorted to lobby via wheelchair by member of nursing staff.

## 2019-07-22 NOTE — DISCHARGE PLANNING
[facility] to accept this referral pending patient's completion of appointment to establish with Dr. Ophelia Santos scheduled for 7/25/2019 @ 9:20 am.  Thank you!

## 2019-07-22 NOTE — THERAPY
"Physical Therapy Treatment completed.   Bed Mobility:  Supine to Sit: Supervised  Transfers: Sit to Stand: Supervised  Gait: Level Of Assist: Supervised with Front-Wheel Walker       Plan of Care: Patient with no further skilled PT needs in the acute care setting at this time  Discharge Recommendations: Equipment: No Equipment Needed. Post-acute therapy Discharge to home with outpatient or home health for additional skilled therapy services.  Pt is cleared for d/c home from PT standpoint. Pt managed a FOS w/supervision only. Pt would benefit from  services.      See \"Rehab Therapy-Acute\" Patient Summary Report for complete documentation.       "

## 2019-07-22 NOTE — PROGRESS NOTES
Hospital Medicine Daily Progress Note    Date of Service  7/22/2019           Chief Complaint  23 y.o. male admitted 7/5/2019 with bilateral lower extremity numbness.    Hospital Course    23-year-old male history of lumbar laminectomy 6/17/2019 per Dr. crenshaw with discharge 6/25 to home with use of walker admitted with bilateral lower extremity numbness and weakness x3 days.  Patient admitted for pain control, therapy and further work-up with planned evaluation by neurosurgery Dr. Crenshaw.    Interval Problem Update    POD #9 L2-S1 laminectomy-continues to improve. Motivated to take on the stairs today. No new pain. Small leakage of clear fluid from superior part of back incision noted by nursing staff.     Consultants/Specialty  Neurosurgery    Code Status  Full code    Disposition  To be decided, ALEJANDRA sent to SNF, likely home with outpatient PT versus SNF versus REHAB    Review of Systems  Review of Systems   Constitutional: Negative for fever.        Continued slow improvmeent   HENT: Negative for hearing loss.    Eyes: Negative for double vision.   Respiratory: Negative for cough.    Gastrointestinal: Negative for abdominal pain.   Musculoskeletal: Positive for back pain. Negative for joint pain, myalgias and neck pain.        Pain is well controlled today   Skin: Negative for itching.   Neurological: Positive for sensory change, focal weakness (More improvement noted again today) and weakness (slow improvements, did some stairs today). Negative for speech change and headaches.        He feels that his sensation is coming back   Psychiatric/Behavioral: Negative for depression.   All other systems reviewed and are negative.       Physical Exam  Temp:  [36.2 °C (97.2 °F)-37.2 °C (98.9 °F)] 36.4 °C (97.6 °F)  Pulse:  [110-120] 110  Resp:  [18-20] 18  BP: (121-140)/(73-82) 121/82  SpO2:  [95 %-100 %] 97 %    Physical Exam   Constitutional: He is oriented to person, place, and time.   Obese  Body mass index is 43.56  kg/m².     HENT:   Head: Normocephalic and atraumatic.   Eyes: Pupils are equal, round, and reactive to light.   Neck: Normal range of motion. Neck supple.   Cardiovascular: Regular rhythm.    Tachycardia   Pulmonary/Chest: Effort normal and breath sounds normal. No stridor. No respiratory distress.   Abdominal: Soft. Bowel sounds are normal. There is no tenderness.   Musculoskeletal: Normal range of motion. He exhibits no edema.        Neurological: He is alert and oriented to person, place, and time. Coordination normal.   Motor 5/5 in right lower extremity  Motor 5/5 in left lower extremity  Surgical site in the lumbar region with very mild dehiscence at the superior part of the vertical incision, about 2-3 cm in length  No obvious clinical change appreciated today   Skin: Skin is warm and dry. No rash noted.   Psychiatric: He has a normal mood and affect.   Laying flat in bed, unchanged today       Fluids    Intake/Output Summary (Last 24 hours) at 07/22/19 1439  Last data filed at 07/22/19 1200   Gross per 24 hour   Intake              300 ml   Output                0 ml   Net              300 ml       Laboratory                        Imaging  DX-PORTABLE FLUORO > 1 HOUR   Final Result         Portable fluoroscopy utilized for 2 seconds.      DX-SPINE-ANY ONE VIEW   Final Result      Fluoroscopic image(s) obtained during posterior lumbar laminectomy. Please see the patient's chart for full procedural details.      Fluoroscopy time 2 seconds.         MR-THORACIC SPINE-WITH & W/O   Final Result      Multilevel degenerative disc disease results in cord abutment but no cord compression      Straightening of the normal lordosis with multiple chronic Schmorl's nodes      Mild right T2/3 foraminal stenosis      MR-LUMBAR SPINE-WITH & W/O   Final Result      1.  New 10 x 3 x 1 cm deep subcutaneous fat fluid collection differential includes seroma with or without superimposed infection cannot      2.  Otherwise stable  lumbar spine MRI with contrast following L3-L5 laminectomies      3.  Moderate multilevel central stenoses greatest at L2/3 are again seen, mostly secondary to facet arthropathy and developmentally short pedicles as specifically detailed above      4.  Lesser foraminal stenoses are unchanged as detailed above           Assessment/Plan  Essential hypertension- (present on admission)   Assessment & Plan    Controlled  continue metoprolol  Lifestyle modifications  Continue to monitor     Sinus tachycardia- (present on admission)   Assessment & Plan    He continued to have tachycardia-no further reductions in HR  On prior admission he was tachycardic.  Echocardiogram showed normal ejection fraction with no significant valvular abnormalities.  Continue to monitor  -no hypoxia, doubtful PE  -will repeat EKG today, personally reviewed by me shows ST, no e/o acute ST segment changes, HR around 115.  -?inappropriate sinus tachycardia?         VTE prophylaxis: I discussed with neurosurgery regarding DVT prophylaxis.  His drain out more than 24 hours ago and I started him on DVT prophylaxis with Lovenox.

## 2019-07-22 NOTE — CARE PLAN
Problem: Safety  Goal: Will remain free from falls  Outcome: PROGRESSING AS EXPECTED  Interventions: bed alarm, non skid footwear, frequent nursing rounds, call bell within reach, education r/t fall precautions.     Problem: Infection  Goal: Will remain free from infection  Outcome: PROGRESSING AS EXPECTED  No sings/symptoms of infx. Standard precautions implemented.

## 2019-07-22 NOTE — PROGRESS NOTES
Tachycardic, asymptomatic. Other VSS. Denying pain. Tolerating regular diet. Surgical incision to lower back appears to be slightly opened, with small amount of serosanguinous drainage present. Surgical incision cleansed and redressed. Providers aware of incision status. Up 1A with FWW. Bed alarm on, call bell within reach.

## 2019-07-22 NOTE — CONSULTS
"Medical chart review completed.     The patient is a 23 y.o. male with a past medical history of obesity and low back pain, presented on 7/5/2019 11:50 AM with 3-day history of bilateral lower extremity numbness and weakness.  He reportedly had a lumbar laminectomy on 6/17.  He was discharged on 6/25 with lumbar stenosis status post aborted laminectomy on 6/17 by Dr. crenshaw secondary to loss of EMG signals during procedure.  He was discharged with front wheel walker.    He reported paresthesias occurring from the knees down to the feet.    He lives in a second floor apartment no elevator, single flight of stairs, rail on the right side    Current functional status:  Occupational Therapy Treatment completed with focus on ADLs, ADL transfers and patient education.  Functional Status:  Pt was seen for Occupational Therapy treatment today, see Therapy Kardex for details.Treatment included education in breath control with activity and at rest, self pacing techs and energy conservation for pain management. Educated pt in safety awareness techs as well. Reviewed  NSP with all activity. Pt requested to attempt a shower. Pt demo Min A for ambulating ADL's with FWW to BR and into smaller shower stall with bariatric shower bench inside. Pt able to set up own shower with towels and water temp. Pt showered seated base with extended time maintaining NSP with supervision. Pt dried off with Min A. Mod A to come out of shower stall on wet floor. CGA for toilet transfers. Pt demonstrated Min A/SBA for UB dressing donning gown, supervised for LB dressing seated in chair refusing to use AE stating , \"I want to do this as Independently as I can\". Pt able to do tailor tech to don socks and pants. CGA/Min A while standing for clothing management up over hips using FWW. Some instability with standing activities noted. Pt also demonstrated Min A for Ambulating ADL's with FWW. Pt was left up in chair , call light in reach, bedside table in " "front of him and nursing is aware. RN updated on OT treatment findings and recommendations. Pt gave good effort. Pt is insistent in doing things \"my way\". Pt struggles but stated \"it gives me more exercise and strength to struggle\".  Continue Occupational Therapy services as per plan.      Physical Therapy Treatment completed.   Bed Mobility:  Supine to Sit: Supervised (HOB flat and no railing)  Transfers: Sit to Stand: Supervised (from EOB and w/c height)  Gait: Level Of Assist: Minimal Assist with Front-Wheel Walker         PMH:  Past Medical History:   Diagnosis Date   • Morbid obesity (HCC)    • Tachycardia        PSH:  Past Surgical History:   Procedure Laterality Date   • LUMBAR LAMINECTOMY DISKECTOMY  7/12/2019    Procedure: LAMINECTOMY, SPINE, LUMBAR, WITH DISCECTOMY - POSTERIOR L2-S1 WITH RE-DO L4-5 LAMINECTOMY;  Surgeon: Samir Ruelas M.D.;  Location: SURGERY [facility];  Service: Neurosurgery   • LUMBAR LAMINECTOMY DISKECTOMY N/A 6/17/2019    Procedure: LUMBAR 2 - SACRAL 1 LAMINECTOMY AND DISCECTOMY;  Surgeon: Samir Ruelas M.D.;  Location: SURGERY [facility];  Service: Neurosurgery       FAMILY HISTORY:  History reviewed. No pertinent family history.    MEDICATIONS:  Current Facility-Administered Medications   Medication Dose   • methocarbamol (ROBAXIN) tablet 1,000 mg  1,000 mg   • magnesium hydroxide (MILK OF MAGNESIA) suspension 30 mL  30 mL   • enoxaparin (LOVENOX) inj 40 mg  40 mg   • HYDROcodone/acetaminophen (NORCO)  MG per tablet 1 Tab  1 Tab   • oxyCODONE-acetaminophen (PERCOCET-10)  MG per tablet 1-2 Tab  1-2 Tab   • tramadol (ULTRAM) 50 MG tablet 50 mg  50 mg   • HYDROcodone-acetaminophen (NORCO) 5-325 MG per tablet 1 Tab  1 Tab   • Pharmacy Consult Request ...Pain Management Review 1 Each  1 Each   • acetaminophen (TYLENOL) tablet 500 mg  500 mg   • metoprolol SR (TOPROL XL) tablet 25 mg  25 mg   • senna-docusate (PERICOLACE or SENOKOT S) 8.6-50 MG per tablet 2 Tab  " 2 Tab    And   • polyethylene glycol/lytes (MIRALAX) PACKET 1 Packet  1 Packet    And   • magnesium hydroxide (MILK OF MAGNESIA) suspension 30 mL  30 mL    And   • bisacodyl (DULCOLAX) suppository 10 mg  10 mg       ALLERGIES:  Other misc    PSYCHOSOCIAL HISTORY:  Living Site:  Home  Living With:  family  Caregiver's availability:  mother  Number of stairs:  flight  Substance use history:  none    MRI of lumbar spine 7/8:   1.  New 10 x 3 x 1 cm deep subcutaneous fat fluid collection differential includes seroma with or without superimposed infection cannot  2.  Otherwise stable lumbar spine MRI with contrast following L3-L5 laminectomies  3.  Moderate multilevel central stenoses greatest at L2/3 are again seen, mostly secondary to facet arthropathy and developmentally short pedicles as specifically detailed above  4.  Lesser foraminal stenoses are unchanged as detailed above    Incomplete spinal cord injury: Cauda equina syndrome, MRI 7/8 personally evaluated shows significant central canal stenosis at L2/3, question of disc herniation/extrusion     Leukocytosis: WBC elevated 16 on 7/16  -Check CBC    Rehab: Impaired ADLs  - We do not currently have a bed for this gentleman until the beginning of next week.  Given his current strength and therapy notes he may be able to progress to a place where he can discharge home with home health.  I will follow-up with him next week  - I would recommend that he follows up with outpatient physical medicine and rehabilitation to make sure bowel bladder and neurologic status is improving. I will discuss with Dr Ruelas.     Roland Barone D.O.

## 2019-07-22 NOTE — DISCHARGE PLANNING
Referral sent over as ALEJANDRA but patient does have active Home [facility]. Patient DED is high and would have high co pay with home. Also there is mention of wound care and need clarification if wound care is needed. Reached out to [facility] Beckie who will find out if wound care is needed and will advised of insurance. Will contact once she has answers.     Thank you

## 2019-07-23 NOTE — DISCHARGE SUMMARY
Discharge Summary    CHIEF COMPLAINT ON ADMISSION  Chief Complaint   Patient presents with   • Numbness     patient c/o bilateral leg numbness x 3 days. patient had lumbar laminectomy on 6/17/19.        Reason for Admission  post op complications, numbness in*     Admission Date  7/5/2019    CODE STATUS  Prior    HPI & HOSPITAL COURSE  This is a 23 y.o. male here with above medical issues.  He was admitted to the general neurological floor and MRI of the lumbar and thoracic spine was ordered. Patient had just had a lumbar laminectomy on 6/17/19 that was aborted intra-operatively given lost of neurosignaling. Dr Green of neurosurgery was consulted. Patient underwent the following procedure:    PREOPERATIVE DIAGNOSES:  L2-S1 stenosis with scar tissue with a prior aborted   lumbar laminectomy and left greater than right foot drop with inability to   walk, and morbid obesity.     POSTOPERATIVE DIAGNOSES:  L2-S1 stenosis with scar tissue with a prior aborted   lumbar laminectomy and left greater than right foot drop with inability to   walk, and morbid obesity.     PRINCIPAL PROCEDURE PERFORMED:  L2, L3, L4, L5, S1 laminectomy, and a L4-L5   Microdiskectomy.    Patient had a very slow post operative rehabilitative course, but eventually did quite well. He acquired health insurance while admitted and home health services was set up for the patient. Of note, patient has persistent sinus tachycardia with normal ECHO and is on low dose toprol XL. Unclear etiology for this, ?inappropriate sinus tachycardia. He was strongly encouraged to lose weight. Of note, patient's surgical incision had a very mild amount of dehiscence with no purulent drainage or signs/symptoms of active infection. He has a wound care appointment set up and neurosurgery was not concerned at this time.          Therefore, he is discharged in fair and stable condition to home with close outpatient follow-up.    The patient met 2-midnight criteria for an  "inpatient stay at the time of discharge.    Discharge Date  7/22/2019    FOLLOW UP ITEMS POST DISCHARGE  F/U with neurosurgery in 1 week  F/U with is PCP in 1-2 weeks    DISCHARGE DIAGNOSES  Principal Problem (Resolved):    Spinal stenosis of lumbar region s/p aborted laminectomy with neurogenic claudication POA: Yes  Active Problems:    Sinus tachycardia POA: Yes    Essential hypertension POA: Yes  Resolved Problems:    Hypokalemia POA: Yes      FOLLOW UP  Future Appointments  Date Time Provider Department Center   7/24/2019 9:40 AM [facility] MANAGER [address]   7/25/2019 9:20 AM Ophelia Santos M.D. [address]   9/26/2019 9:55 AM Amaris Zhang M.D. [address] [address] [phone]    PLEASE CALL OR WALK IN TO ESTABLISH A NEW PCP APPOINTMENT. THANK YOU      MEDICATIONS ON DISCHARGE     Medication List      START taking these medications      Instructions   HYDROcodone-acetaminophen 5-325 MG Tabs per tablet  Commonly known as:  NORCO   Take 1 Tab by mouth every four hours as needed for up to 3 days.  Dose:  1 Tab     methocarbamol 500 MG Tabs  Commonly known as:  ROBAXIN   Take 2 Tabs by mouth 3 times a day.  Dose:  1000 mg     senna-docusate 8.6-50 MG Tabs  Commonly known as:  PERICOLACE or SENOKOT S   Take 2 Tabs by mouth 2 Times a Day.  Dose:  2 Tab        CONTINUE taking these medications      Instructions   cyclobenzaprine 10 MG Tabs  Commonly known as:  FLEXERIL   Take 10 mg by mouth 3 times a day as needed.  Dose:  10 mg     metoprolol SR 25 MG Tb24  Commonly known as:  TOPROL XL   Take 1 Tab by mouth every day for 30 days.  Dose:  25 mg            Allergies  Allergies   Allergen Reactions   • Other Misc      Cat allergy- \"start sneezing\"         DIET  Low fat low sugar heart healthy    ACTIVITY  As tolerated.  Weight bearing as tolerated    CONSULTATIONS  Neurosurgery    PROCEDURES  As above    DX-PORTABLE FLUORO > " 1 HOUR   Final Result         Portable fluoroscopy utilized for 2 seconds.      DX-SPINE-ANY ONE VIEW   Final Result      Fluoroscopic image(s) obtained during posterior lumbar laminectomy. Please see the patient's chart for full procedural details.      Fluoroscopy time 2 seconds.         MR-THORACIC SPINE-WITH & W/O   Final Result      Multilevel degenerative disc disease results in cord abutment but no cord compression      Straightening of the normal lordosis with multiple chronic Schmorl's nodes      Mild right T2/3 foraminal stenosis      MR-LUMBAR SPINE-WITH & W/O   Final Result      1.  New 10 x 3 x 1 cm deep subcutaneous fat fluid collection differential includes seroma with or without superimposed infection cannot      2.  Otherwise stable lumbar spine MRI with contrast following L3-L5 laminectomies      3.  Moderate multilevel central stenoses greatest at L2/3 are again seen, mostly secondary to facet arthropathy and developmentally short pedicles as specifically detailed above      4.  Lesser foraminal stenoses are unchanged as detailed above            LABORATORY  Lab Results   Component Value Date    SODIUM 135 07/16/2019    POTASSIUM 4.2 07/16/2019    CHLORIDE 101 07/16/2019    CO2 23 07/16/2019    GLUCOSE 132 (H) 07/16/2019    BUN 12 07/16/2019    CREATININE 0.55 07/16/2019        Lab Results   Component Value Date    WBC 16.0 (H) 07/16/2019    HEMOGLOBIN 12.5 (L) 07/16/2019    HEMATOCRIT 37.7 (L) 07/16/2019    PLATELETCT 358 07/16/2019        Total time of the discharge process exceeds 45 minutes.

## 2019-07-24 ENCOUNTER — PATIENT OUTREACH (OUTPATIENT)
Dept: HEALTH INFORMATION MANAGEMENT | Facility: OTHER | Age: 24
End: 2019-07-24

## 2019-07-25 ENCOUNTER — OFFICE VISIT (OUTPATIENT)
Dept: MEDICAL GROUP | Facility: MEDICAL CENTER | Age: 24
End: 2019-07-25
Payer: COMMERCIAL

## 2019-07-25 VITALS
TEMPERATURE: 96.6 F | BODY MASS INDEX: 42.66 KG/M2 | HEART RATE: 120 BPM | WEIGHT: 315 LBS | RESPIRATION RATE: 18 BRPM | DIASTOLIC BLOOD PRESSURE: 60 MMHG | OXYGEN SATURATION: 94 % | HEIGHT: 72 IN | SYSTOLIC BLOOD PRESSURE: 148 MMHG

## 2019-07-25 DIAGNOSIS — R00.0 CHRONIC TACHYCARDIA: ICD-10-CM

## 2019-07-25 DIAGNOSIS — E66.01 MORBID OBESITY WITH BMI OF 40.0-44.9, ADULT (HCC): ICD-10-CM

## 2019-07-25 DIAGNOSIS — Z98.890 S/P LUMBAR LAMINECTOMY: ICD-10-CM

## 2019-07-25 DIAGNOSIS — Z09 HOSPITAL DISCHARGE FOLLOW-UP: ICD-10-CM

## 2019-07-25 PROCEDURE — 99203 OFFICE O/P NEW LOW 30 MIN: CPT | Performed by: FAMILY MEDICINE

## 2019-07-25 ASSESSMENT — PATIENT HEALTH QUESTIONNAIRE - PHQ9: CLINICAL INTERPRETATION OF PHQ2 SCORE: 0

## 2019-07-25 NOTE — PATIENT INSTRUCTIONS
If you need further assistance, or have any questions; concerns or lingering symptoms before seeing your Primary Care Provider or specialist.     Do not hesitate to contact us.     Please contact us at the Post-Hospital Follow Up Program at [phone] and ask for the Medical Assistant for Dr Santos.   Our offices hours are Monday-Friday 8 am-5 pm.

## 2019-07-26 ENCOUNTER — ANTICOAGULATION MONITORING (OUTPATIENT)
Dept: MEDICAL GROUP | Facility: PHYSICIAN GROUP | Age: 24
End: 2019-07-26

## 2019-07-26 ENCOUNTER — HOME CARE VISIT (OUTPATIENT)
Dept: HOME HEALTH SERVICES | Facility: HOME HEALTHCARE | Age: 24
End: 2019-07-26
Payer: COMMERCIAL

## 2019-07-26 VITALS
HEIGHT: 72 IN | BODY MASS INDEX: 42.66 KG/M2 | WEIGHT: 315 LBS | HEART RATE: 110 BPM | SYSTOLIC BLOOD PRESSURE: 110 MMHG | TEMPERATURE: 97.5 F | DIASTOLIC BLOOD PRESSURE: 80 MMHG | OXYGEN SATURATION: 98 % | RESPIRATION RATE: 18 BRPM

## 2019-07-26 PROCEDURE — 665001 SOC-HOME HEALTH

## 2019-07-26 PROCEDURE — G0493 RN CARE EA 15 MIN HH/HOSPICE: HCPCS

## 2019-07-26 ASSESSMENT — ENCOUNTER SYMPTOMS
SHORTNESS OF BREATH: T
MENTAL STATUS CHANGE: 0

## 2019-07-26 ASSESSMENT — PATIENT HEALTH QUESTIONNAIRE - PHQ9
CLINICAL INTERPRETATION OF PHQ2 SCORE: 0
1. LITTLE INTEREST OR PLEASURE IN DOING THINGS: 00
2. FEELING DOWN, DEPRESSED, IRRITABLE, OR HOPELESS: 00

## 2019-07-26 ASSESSMENT — ACTIVITIES OF DAILY LIVING (ADL): OASIS_M1830: 03

## 2019-07-26 NOTE — PROGRESS NOTES
Received referral from [facility]. Medications reviewed.       Using a muscle relaxer like Robaxin and Norco and Flexeril can cause additive sedation.  Generally to muscle relaxers at the same class Flexeril and Robaxin should not be used together.      Alexander Wisdom M.S., Pharm.D., [facility], [facility]    This note was created using voice recognition software (Dragon). The accuracy of the dictation is limited by the abilities of the software. I have reviewed the note prior to signing, however some errors in grammar and context are still possible. If you have any questions related to this note please do not hesitate to contact our office.

## 2019-07-26 NOTE — Clinical Note
FYI patient's heart rate was 110 at SOC and he gets very Short of breath with any activity. He was started on metoprolol SR (TOPROL XL) 25 MG TABLET SR 24 during the hospital stay. Let us know if you want anything done.   Thanks

## 2019-07-27 ENCOUNTER — HOSPITAL ENCOUNTER (EMERGENCY)
Facility: MEDICAL CENTER | Age: 24
End: 2019-07-27
Attending: EMERGENCY MEDICINE
Payer: COMMERCIAL

## 2019-07-27 VITALS
HEART RATE: 115 BPM | RESPIRATION RATE: 18 BRPM | DIASTOLIC BLOOD PRESSURE: 70 MMHG | BODY MASS INDEX: 42.66 KG/M2 | SYSTOLIC BLOOD PRESSURE: 132 MMHG | TEMPERATURE: 97.9 F | HEIGHT: 72 IN | WEIGHT: 315 LBS | OXYGEN SATURATION: 99 %

## 2019-07-27 DIAGNOSIS — Z51.89 ENCOUNTER FOR WOUND RE-CHECK: ICD-10-CM

## 2019-07-27 PROCEDURE — 99283 EMERGENCY DEPT VISIT LOW MDM: CPT

## 2019-07-28 NOTE — ED TRIAGE NOTES
"Pt to triage via w/c c/o need for a new cartridge for wound vac. Pt states had wound vac placed Thursday and \"they only gave me two cartridges but I have gone through both now and this one is full and now drainage coming down my back\" pt denies any other needs. States next appt with md isn't til Thursday.   "

## 2019-07-28 NOTE — ED PROVIDER NOTES
ED Provider Note    CHIEF COMPLAINT   Full wound VAC  Leaking wound site    HPI   Sammy Edmonds is a 23 y.o. male who presents with a chief complaint of full wound VAC.  He states that he had surgery on Thursday for infected wound in his back after surgery.  He tells us that he was discharged from the office with wound VAC.  2 cartridges.  They are both full now.  He states that he called Dr. crenshaw's office and the PA was trying around town to get him new canisters but they were unable to and so now he is here.  He denies any fever chills just discharged in the back.  Associate with a loose dressing.  No fever no chills.    REVIEW OF SYSTEMS   General: No fever or chills.  GI: No abdominal pain nausea or vomiting.  Dermatologic: See above    PAST MEDICAL HISTORY   Past Medical History:   Diagnosis Date   • Morbid obesity (HCC)    • Tachycardia        FAMILY HISTORY  History reviewed. No pertinent family history.    SOCIAL HISTORY  Social History     Social History   • Marital status: Single     Spouse name: N/A   • Number of children: N/A   • Years of education: N/A     Social History Main Topics   • Smoking status: Former Smoker     Packs/day: 0.25     Types: Cigarettes     Quit date: 12/28/2018   • Smokeless tobacco: Never Used   • Alcohol use Yes      Comment: twice weekly   • Drug use: Yes     Types: Inhaled      Comment: occasional marijuana   • Sexual activity: Not on file     Other Topics Concern   • Not on file     Social History Narrative   • No narrative on file        SURGICAL HISTORY  Past Surgical History:   Procedure Laterality Date   • LUMBAR LAMINECTOMY DISKECTOMY  7/12/2019    Procedure: LAMINECTOMY, SPINE, LUMBAR, WITH DISCECTOMY - POSTERIOR L2-S1 WITH RE-DO L4-5 LAMINECTOMY;  Surgeon: Samir Crenshaw M.D.;  Location: SURGERY [facility];  Service: Neurosurgery   • LUMBAR LAMINECTOMY DISKECTOMY N/A 6/17/2019    Procedure: LUMBAR 2 - SACRAL 1 LAMINECTOMY AND DISCECTOMY;  Surgeon: Samir  "SOFIA Ruelas M.D.;  Location: SURGERY [facility];  Service: Neurosurgery       CURRENT MEDICATIONS   Home Medications     Reviewed by Coleen Mcneil R.N. (Registered Nurse) on 07/27/19 at 1800  Med List Status: Partial   Medication Last Dose Status   cyclobenzaprine (FLEXERIL) 10 MG Tab  Active   HYDROcodone-acetaminophen (NORCO) 5-325 MG Tab per tablet  Active   methocarbamol (ROBAXIN) 500 MG Tab  Active   naproxen (NAPROSYN) 500 MG Tab  Active   senna-docusate (PERICOLACE OR SENOKOT S) 8.6-50 MG Tab  Active                ALLERGIES   Allergies   Allergen Reactions   • Other Misc      Cat allergy- \"start sneezing\"         PHYSICAL EXAM  VITAL SIGNS: /72   Pulse (!) 126   Temp 36.3 °C (97.3 °F) (Temporal)   Resp 18   Ht 1.829 m (6')   Wt (!) 144.7 kg (319 lb)   SpO2 98%   BMI 43.26 kg/m²       Constitutional: Well developed, Well nourished, No acute distress, Non-toxic appearance.   Cardiovascular: Normal heart rate, Normal rhythm, No murmurs, No rubs, No gallops.   Thorax & Lungs: Normal breath sounds, No respiratory distress, No wheezing, No chest tenderness.   Skin: Patient has a wound VAC in place in the low back there is no surrounding erythema.  He can see leakage just inferior to it.  The wound VAC itself appears full  Extremities: Intact distal pulses, No edema, No tenderness, No cyanosis, No clubbing.       COURSE & MEDICAL DECISION MAKING  Pertinent Labs & Imaging studies reviewed. (See chart for details)  Full wound VAC for patient after surgery no fever.  Does not appear to be septic.  Looks like the patient simply just needs wound VAC replaced.  The nurse will go ahead and continue to search and call the wound team to see what they have available.    8:00 PM  We are able to call the central supply and the patient here in the come in and they were able to find a cartridge for the patient is sent home with 5 others.  During this time the patient had a new Adaptic dressing placed.  In " addition, we placed my direct suction to the wall to collapsed.  This was primarily because the device was not release having cysts really satisfactory suction however, the collapse of the dressing was noted and appear to have good suction.  At this point, I do not feel the patient has a clot I feel the patient probably has just no fluid at this time.  Therefore we put back on his typical pump VAC and he will be discharged home.  He is given several refills to take as directed he is to return if he notes that is not draining if he continues leaking or if he feels worse.  With no fever tachycardia that appears to be transient, I think the patient be safely discharged home.  Do not think patient meets sepsis criteria at this time and he looks otherwise well.    FINAL IMPRESSION  1.  Wound VAC check.  Filled wound VAC cartridge  3.  Wound recheck      Electronically signed by: Sage Graham, 7/27/2019 6:44 PM

## 2019-07-28 NOTE — ED NOTES
Pt with wound vac to lumbar spine. Pt notes lumbar spine surgery on 7/12, then had wound vac placed 2 days ago. He is in need of new cartridges for his wound vac.

## 2019-07-29 NOTE — OP REPORT
DATE OF SERVICE:  07/12/2019    PREOPERATIVE DIAGNOSES:  L2-S1 stenosis with scar tissue with a prior aborted   lumbar laminectomy and left greater than right foot drop with inability to   walk, and morbid obesity.    POSTOPERATIVE DIAGNOSES:  L2-S1 stenosis with scar tissue with a prior aborted   lumbar laminectomy and left greater than right foot drop with inability to   walk, and morbid obesity.    PRINCIPAL PROCEDURE PERFORMED:  L2, L3, L4, L5, S1 laminectomy, and a L4-L5   microdiskectomy.    SURGEON:  Samir Ruelas MD    ASSISTANT:  Julito Jacob DO    COMPLICATIONS:  There were no complications.    ANESTHESIA:  The procedure was performed under general anesthesia.    ANESTHESIOLOGIST:  Preston Porras DO    FINDINGS:  Include severe stenosis with a large subannular disk herniation at   L4-L5.  In addition, we had worsened SSEP and EMGs in both lower extremities   as we started the decompression.  This returned to baseline after more   thorough decompression.    IV FLUIDS:  Please see the anesthesia record.    URINE OUTPUT:  Please see the anesthesia record.    ESTIMATED BLOOD LOSS:  Please see the anesthesia record.    DISPOSITION:  The patient will be extubated and brought to the recovery room.    CLINICAL HISTORY:  The patient is a pleasant 23-year-old male.  The patient   presented after a several emergency room visits and severe intractable low   back pain and leg pain.  I offered him a lumbar decompression about a month   ago.  He underwent surgery.  However, in the middle of my surgery, the patient   lost SSEPs and EMGs in the right arm and leg.  Therefore, I was unable to   complete the procedure.  The patient was discharged home.  The patient   represents with worsening bilateral foot drop and inability to walk and   shooting leg pain and back pain.  His MRI was relatively stable and showed   some improved decompression at L4-L5.  I recommended a return to surgery as he   simply could not  function.  He understood risks, benefits and options.  He   also understood that if we were to lose signals in the right upper extremity   and right lower extremity again that we would have to abort the case and may   not be able to complete the surgery.  The patient understood.  The patient   signed a written informed consent.    DESCRIPTION OF PROCEDURE:  The patient was brought to the operating room and   placed under general anesthesia.  The needle electrodes were placed for   baseline SSEPs and EMGs.  The patient was turned prone atop a radiolucent OSI   table and all pressure points were padded.  Please note that a 22 modifier was   added given the extra difficulty of the case and the patient's BMI greater   than 40.  The patient is 6 feet tall and weighs 318 pounds and 5.5 ounces.    Once the patient was turned prone, his old incision was identified.  This was   clean, dry, and intact.  The back was prepped and draped in the usual sterile   fashion.  A timeout was performed.  Local anesthetic was infiltrated in the   skin.  The old skin incision was reopened.  The fascia was identified.  Deep   retractors were placed just to get to the fascia.  The muscle was split again   and self-retaining retractors were placed.  Intraoperative fluoroscopy   demonstrated the correct level.  An AM-8 drill bit was used to create gutters   just medial to the facet complexes.  A Kerrison 2, Kerrison 3 and Kerrison 4   punch was used to complete bilateral decompressive laminectomies, bilateral   medial facetectomies and bilateral foraminotomies.  A large disk herniation   was seen on the left at L4-L5.  This was less obvious on the MRI   preoperatively.  After gently dissecting the scar tissue off of the nerve root   sleeve on the right side, I used a downbiting curette and we performed a   diskectomy at L4-L5.  There was no evidence of CSF leak.  Bilateral L2,   bilateral L3, bilateral L4, bilateral L5 and bilateral S1 nerve  roots in   addition to the central canal were well decompressed.  Perfect hemostasis was   achieved.  The wound will be closed in anatomic layers and a sterile dressing   will be applied.  Neuromonitoring signals returned after the decompression to   baseline.  There were no changes to the upper extremities.  Please note that   preop we had done a cervical and thoracic MRI to rule out any upper motor   neuron disease that could be contributing.       ____________________________________     MD MATTHEW WILSON / JESSICA    DD:  07/12/2019 14:38:28  DT:  07/12/2019 17:08:48    D#:  [id number]  Job#:  [id number]

## 2019-07-29 NOTE — PROGRESS NOTES
Patient was referred for cardiology consult for this ongoing problem. If he has not been contacted, instruct him to call [phone] to schedule an appointment. Thank you!

## 2019-07-30 ENCOUNTER — HOME CARE VISIT (OUTPATIENT)
Dept: HOME HEALTH SERVICES | Facility: HOME HEALTHCARE | Age: 24
End: 2019-07-30
Payer: COMMERCIAL

## 2019-07-30 VITALS
TEMPERATURE: 98 F | SYSTOLIC BLOOD PRESSURE: 118 MMHG | HEART RATE: 116 BPM | RESPIRATION RATE: 18 BRPM | OXYGEN SATURATION: 98 % | DIASTOLIC BLOOD PRESSURE: 80 MMHG

## 2019-07-30 PROCEDURE — G0299 HHS/HOSPICE OF RN EA 15 MIN: HCPCS

## 2019-07-30 ASSESSMENT — ENCOUNTER SYMPTOMS
MENTAL STATUS CHANGE: 0
VOMITING: DENIES
NAUSEA: DENIES

## 2019-07-31 ENCOUNTER — HOME CARE VISIT (OUTPATIENT)
Dept: HOME HEALTH SERVICES | Facility: HOME HEALTHCARE | Age: 24
End: 2019-07-31
Payer: COMMERCIAL

## 2019-07-31 ENCOUNTER — HOSPITAL ENCOUNTER (INPATIENT)
Facility: MEDICAL CENTER | Age: 24
LOS: 15 days | DRG: 856 | End: 2019-08-16
Attending: EMERGENCY MEDICINE | Admitting: HOSPITALIST
Payer: COMMERCIAL

## 2019-07-31 DIAGNOSIS — G89.18 POST-OPERATIVE PAIN: ICD-10-CM

## 2019-07-31 DIAGNOSIS — L24.A9 WOUND DRAINAGE: ICD-10-CM

## 2019-07-31 DIAGNOSIS — I10 ESSENTIAL HYPERTENSION: ICD-10-CM

## 2019-07-31 DIAGNOSIS — R29.898 LEFT LEG WEAKNESS: ICD-10-CM

## 2019-07-31 DIAGNOSIS — R78.81 MRSA BACTEREMIA: ICD-10-CM

## 2019-07-31 DIAGNOSIS — I46.9 CARDIAC ARREST (HCC): ICD-10-CM

## 2019-07-31 DIAGNOSIS — E66.01 MORBIDLY OBESE (HCC): ICD-10-CM

## 2019-07-31 DIAGNOSIS — G61.89 OTHER INFLAMMATORY POLYNEUROPATHIES (HCC): ICD-10-CM

## 2019-07-31 DIAGNOSIS — M54.42 ACUTE BILATERAL LOW BACK PAIN WITH BILATERAL SCIATICA: ICD-10-CM

## 2019-07-31 DIAGNOSIS — T81.30XA WOUND DEHISCENCE: ICD-10-CM

## 2019-07-31 DIAGNOSIS — T81.49XA INFECTED SURGICAL WOUND: ICD-10-CM

## 2019-07-31 DIAGNOSIS — M54.41 ACUTE BILATERAL LOW BACK PAIN WITH BILATERAL SCIATICA: ICD-10-CM

## 2019-07-31 DIAGNOSIS — B95.62 MRSA BACTEREMIA: ICD-10-CM

## 2019-07-31 PROBLEM — T14.8XXA WOUND DRAINAGE: Status: ACTIVE | Noted: 2019-07-31

## 2019-07-31 LAB
ALBUMIN SERPL BCP-MCNC: 3.9 G/DL (ref 3.2–4.9)
ALBUMIN/GLOB SERPL: 0.9 G/DL
ALP SERPL-CCNC: 65 U/L (ref 30–99)
ALT SERPL-CCNC: 38 U/L (ref 2–50)
ANION GAP SERPL CALC-SCNC: 15 MMOL/L (ref 0–11.9)
AST SERPL-CCNC: 16 U/L (ref 12–45)
BASOPHILS # BLD AUTO: 0.2 % (ref 0–1.8)
BASOPHILS # BLD: 0.02 K/UL (ref 0–0.12)
BILIRUB SERPL-MCNC: 0.3 MG/DL (ref 0.1–1.5)
BLOOD CULTURE HOLD CXBCH: NORMAL
BUN SERPL-MCNC: 8 MG/DL (ref 8–22)
CALCIUM SERPL-MCNC: 9.6 MG/DL (ref 8.5–10.5)
CHLORIDE SERPL-SCNC: 104 MMOL/L (ref 96–112)
CO2 SERPL-SCNC: 20 MMOL/L (ref 20–33)
CREAT SERPL-MCNC: 0.63 MG/DL (ref 0.5–1.4)
CRP SERPL HS-MCNC: 8.78 MG/DL (ref 0–0.75)
EOSINOPHIL # BLD AUTO: 0.07 K/UL (ref 0–0.51)
EOSINOPHIL NFR BLD: 0.7 % (ref 0–6.9)
ERYTHROCYTE [DISTWIDTH] IN BLOOD BY AUTOMATED COUNT: 38.8 FL (ref 35.9–50)
ERYTHROCYTE [SEDIMENTATION RATE] IN BLOOD BY WESTERGREN METHOD: 87 MM/HOUR (ref 0–15)
GLOBULIN SER CALC-MCNC: 4.3 G/DL (ref 1.9–3.5)
GLUCOSE SERPL-MCNC: 141 MG/DL (ref 65–99)
GRAM STN SPEC: NORMAL
HCT VFR BLD AUTO: 38.9 % (ref 42–52)
HGB BLD-MCNC: 12.5 G/DL (ref 14–18)
IMM GRANULOCYTES # BLD AUTO: 0.03 K/UL (ref 0–0.11)
IMM GRANULOCYTES NFR BLD AUTO: 0.3 % (ref 0–0.9)
LYMPHOCYTES # BLD AUTO: 2.19 K/UL (ref 1–4.8)
LYMPHOCYTES NFR BLD: 22 % (ref 22–41)
MCH RBC QN AUTO: 27 PG (ref 27–33)
MCHC RBC AUTO-ENTMCNC: 32.1 G/DL (ref 33.7–35.3)
MCV RBC AUTO: 84 FL (ref 81.4–97.8)
MONOCYTES # BLD AUTO: 0.5 K/UL (ref 0–0.85)
MONOCYTES NFR BLD AUTO: 5 % (ref 0–13.4)
NEUTROPHILS # BLD AUTO: 7.13 K/UL (ref 1.82–7.42)
NEUTROPHILS NFR BLD: 71.8 % (ref 44–72)
NRBC # BLD AUTO: 0 K/UL
NRBC BLD-RTO: 0 /100 WBC
PLATELET # BLD AUTO: 466 K/UL (ref 164–446)
PMV BLD AUTO: 8.4 FL (ref 9–12.9)
POTASSIUM SERPL-SCNC: 3.8 MMOL/L (ref 3.6–5.5)
PROT SERPL-MCNC: 8.2 G/DL (ref 6–8.2)
RBC # BLD AUTO: 4.63 M/UL (ref 4.7–6.1)
SIGNIFICANT IND 70042: NORMAL
SITE SITE: NORMAL
SODIUM SERPL-SCNC: 139 MMOL/L (ref 135–145)
SOURCE SOURCE: NORMAL
WBC # BLD AUTO: 9.9 K/UL (ref 4.8–10.8)

## 2019-07-31 PROCEDURE — 87186 SC STD MICRODIL/AGAR DIL: CPT

## 2019-07-31 PROCEDURE — A9270 NON-COVERED ITEM OR SERVICE: HCPCS | Performed by: INTERNAL MEDICINE

## 2019-07-31 PROCEDURE — 86140 C-REACTIVE PROTEIN: CPT

## 2019-07-31 PROCEDURE — 96375 TX/PRO/DX INJ NEW DRUG ADDON: CPT

## 2019-07-31 PROCEDURE — 87077 CULTURE AEROBIC IDENTIFY: CPT

## 2019-07-31 PROCEDURE — 700102 HCHG RX REV CODE 250 W/ 637 OVERRIDE(OP): Performed by: HOSPITALIST

## 2019-07-31 PROCEDURE — 87205 SMEAR GRAM STAIN: CPT

## 2019-07-31 PROCEDURE — A9270 NON-COVERED ITEM OR SERVICE: HCPCS | Performed by: HOSPITALIST

## 2019-07-31 PROCEDURE — 80053 COMPREHEN METABOLIC PANEL: CPT

## 2019-07-31 PROCEDURE — G0378 HOSPITAL OBSERVATION PER HR: HCPCS

## 2019-07-31 PROCEDURE — 87070 CULTURE OTHR SPECIMN AEROBIC: CPT

## 2019-07-31 PROCEDURE — 700102 HCHG RX REV CODE 250 W/ 637 OVERRIDE(OP): Performed by: INTERNAL MEDICINE

## 2019-07-31 PROCEDURE — 85652 RBC SED RATE AUTOMATED: CPT

## 2019-07-31 PROCEDURE — 700111 HCHG RX REV CODE 636 W/ 250 OVERRIDE (IP): Performed by: INTERNAL MEDICINE

## 2019-07-31 PROCEDURE — 85025 COMPLETE CBC W/AUTO DIFF WBC: CPT

## 2019-07-31 PROCEDURE — 99285 EMERGENCY DEPT VISIT HI MDM: CPT

## 2019-07-31 PROCEDURE — 99220 PR INITIAL OBSERVATION CARE,LEVL III: CPT | Performed by: HOSPITALIST

## 2019-07-31 RX ORDER — POLYETHYLENE GLYCOL 3350 17 G/17G
1 POWDER, FOR SOLUTION ORAL
Status: DISCONTINUED | OUTPATIENT
Start: 2019-07-31 | End: 2019-08-16 | Stop reason: HOSPADM

## 2019-07-31 RX ORDER — HYDROMORPHONE HYDROCHLORIDE 1 MG/ML
0.5 INJECTION, SOLUTION INTRAMUSCULAR; INTRAVENOUS; SUBCUTANEOUS ONCE
Status: COMPLETED | OUTPATIENT
Start: 2019-07-31 | End: 2019-07-31

## 2019-07-31 RX ORDER — ONDANSETRON 4 MG/1
4 TABLET, ORALLY DISINTEGRATING ORAL EVERY 4 HOURS PRN
Status: DISCONTINUED | OUTPATIENT
Start: 2019-07-31 | End: 2019-08-16 | Stop reason: HOSPADM

## 2019-07-31 RX ORDER — ACETAMINOPHEN 325 MG/1
650 TABLET ORAL EVERY 6 HOURS PRN
Status: DISCONTINUED | OUTPATIENT
Start: 2019-07-31 | End: 2019-08-16 | Stop reason: HOSPADM

## 2019-07-31 RX ORDER — DIAZEPAM 5 MG/1
5 TABLET ORAL ONCE
Status: DISCONTINUED | OUTPATIENT
Start: 2019-07-31 | End: 2019-07-31

## 2019-07-31 RX ORDER — PROMETHAZINE HYDROCHLORIDE 25 MG/1
12.5-25 TABLET ORAL EVERY 4 HOURS PRN
Status: DISCONTINUED | OUTPATIENT
Start: 2019-07-31 | End: 2019-08-16 | Stop reason: HOSPADM

## 2019-07-31 RX ORDER — AMOXICILLIN 250 MG
2 CAPSULE ORAL 2 TIMES DAILY
Status: DISCONTINUED | OUTPATIENT
Start: 2019-07-31 | End: 2019-08-16 | Stop reason: HOSPADM

## 2019-07-31 RX ORDER — PROMETHAZINE HYDROCHLORIDE 12.5 MG/1
12.5-25 SUPPOSITORY RECTAL EVERY 4 HOURS PRN
Status: DISCONTINUED | OUTPATIENT
Start: 2019-07-31 | End: 2019-08-16 | Stop reason: HOSPADM

## 2019-07-31 RX ORDER — HYDROCODONE BITARTRATE AND ACETAMINOPHEN 5; 325 MG/1; MG/1
1 TABLET ORAL EVERY 4 HOURS PRN
Status: DISCONTINUED | OUTPATIENT
Start: 2019-07-31 | End: 2019-08-16 | Stop reason: HOSPADM

## 2019-07-31 RX ORDER — ONDANSETRON 2 MG/ML
4 INJECTION INTRAMUSCULAR; INTRAVENOUS EVERY 4 HOURS PRN
Status: DISCONTINUED | OUTPATIENT
Start: 2019-07-31 | End: 2019-08-16 | Stop reason: HOSPADM

## 2019-07-31 RX ORDER — BISACODYL 10 MG
10 SUPPOSITORY, RECTAL RECTAL
Status: DISCONTINUED | OUTPATIENT
Start: 2019-07-31 | End: 2019-08-16 | Stop reason: HOSPADM

## 2019-07-31 RX ORDER — METHOCARBAMOL 500 MG/1
1000 TABLET, FILM COATED ORAL 3 TIMES DAILY
Status: DISCONTINUED | OUTPATIENT
Start: 2019-07-31 | End: 2019-08-16 | Stop reason: HOSPADM

## 2019-07-31 RX ORDER — AMOXICILLIN 250 MG
2 CAPSULE ORAL 2 TIMES DAILY
Status: DISCONTINUED | OUTPATIENT
Start: 2019-07-31 | End: 2019-08-01

## 2019-07-31 RX ORDER — METHOCARBAMOL 500 MG/1
1000 TABLET, FILM COATED ORAL 3 TIMES DAILY
COMMUNITY

## 2019-07-31 RX ORDER — DIAZEPAM 5 MG/1
5 TABLET ORAL
Status: COMPLETED | OUTPATIENT
Start: 2019-07-31 | End: 2019-07-31

## 2019-07-31 RX ADMIN — HYDROMORPHONE HYDROCHLORIDE 0.5 MG: 1 INJECTION, SOLUTION INTRAMUSCULAR; INTRAVENOUS; SUBCUTANEOUS at 22:52

## 2019-07-31 RX ADMIN — DIAZEPAM 5 MG: 5 TABLET ORAL at 23:25

## 2019-07-31 RX ADMIN — HYDROCODONE BITARTRATE AND ACETAMINOPHEN 1 TABLET: 5; 325 TABLET ORAL at 16:56

## 2019-07-31 RX ADMIN — METHOCARBAMOL TABLETS 1000 MG: 500 TABLET, COATED ORAL at 18:03

## 2019-07-31 RX ADMIN — SENNOSIDES AND DOCUSATE SODIUM 2 TABLET: 8.6; 5 TABLET ORAL at 18:39

## 2019-07-31 RX ADMIN — HYDROCODONE BITARTRATE AND ACETAMINOPHEN 1 TABLET: 5; 325 TABLET ORAL at 21:50

## 2019-07-31 ASSESSMENT — COGNITIVE AND FUNCTIONAL STATUS - GENERAL
CLIMB 3 TO 5 STEPS WITH RAILING: A LITTLE
MOVING TO AND FROM BED TO CHAIR: A LITTLE
MOVING FROM LYING ON BACK TO SITTING ON SIDE OF FLAT BED: A LITTLE
TOILETING: A LITTLE
TURNING FROM BACK TO SIDE WHILE IN FLAT BAD: A LITTLE
DAILY ACTIVITIY SCORE: 21
DRESSING REGULAR LOWER BODY CLOTHING: A LITTLE
HELP NEEDED FOR BATHING: A LITTLE
SUGGESTED CMS G CODE MODIFIER DAILY ACTIVITY: CJ
SUGGESTED CMS G CODE MODIFIER MOBILITY: CJ
MOBILITY SCORE: 20

## 2019-07-31 ASSESSMENT — LIFESTYLE VARIABLES
TOTAL SCORE: 0
ON A TYPICAL DAY WHEN YOU DRINK ALCOHOL HOW MANY DRINKS DO YOU HAVE: 3
TOTAL SCORE: 0
EVER HAD A DRINK FIRST THING IN THE MORNING TO STEADY YOUR NERVES TO GET RID OF A HANGOVER: NO
HAVE PEOPLE ANNOYED YOU BY CRITICIZING YOUR DRINKING: NO
HAVE YOU EVER FELT YOU SHOULD CUT DOWN ON YOUR DRINKING: NO
HOW MANY TIMES IN THE PAST YEAR HAVE YOU HAD 5 OR MORE DRINKS IN A DAY: 4
TOTAL SCORE: 0
CONSUMPTION TOTAL: POSITIVE
EVER FELT BAD OR GUILTY ABOUT YOUR DRINKING: NO
EVER_SMOKED: YES
ALCOHOL_USE: YES
AVERAGE NUMBER OF DAYS PER WEEK YOU HAVE A DRINK CONTAINING ALCOHOL: 0
DOES PATIENT WANT TO STOP DRINKING: NO

## 2019-07-31 ASSESSMENT — PATIENT HEALTH QUESTIONNAIRE - PHQ9
6. FEELING BAD ABOUT YOURSELF - OR THAT YOU ARE A FAILURE OR HAVE LET YOURSELF OR YOUR FAMILY DOWN: NOT AL ALL
8. MOVING OR SPEAKING SO SLOWLY THAT OTHER PEOPLE COULD HAVE NOTICED. OR THE OPPOSITE, BEING SO FIGETY OR RESTLESS THAT YOU HAVE BEEN MOVING AROUND A LOT MORE THAN USUAL: NOT AT ALL
1. LITTLE INTEREST OR PLEASURE IN DOING THINGS: SEVERAL DAYS
SUM OF ALL RESPONSES TO PHQ9 QUESTIONS 1 AND 2: 1
4. FEELING TIRED OR HAVING LITTLE ENERGY: NOT AT ALL
3. TROUBLE FALLING OR STAYING ASLEEP OR SLEEPING TOO MUCH: SEVERAL DAYS
SUM OF ALL RESPONSES TO PHQ QUESTIONS 1-9: 2
9. THOUGHTS THAT YOU WOULD BE BETTER OFF DEAD, OR OF HURTING YOURSELF: NOT AT ALL
2. FEELING DOWN, DEPRESSED, IRRITABLE, OR HOPELESS: NOT AT ALL
5. POOR APPETITE OR OVEREATING: NOT AT ALL
7. TROUBLE CONCENTRATING ON THINGS, SUCH AS READING THE NEWSPAPER OR WATCHING TELEVISION: NOT AT ALL

## 2019-07-31 ASSESSMENT — ENCOUNTER SYMPTOMS
ROS SKIN COMMENTS: WOUND DRAINAGE
BACK PAIN: 0
FOCAL WEAKNESS: 1

## 2019-07-31 ASSESSMENT — COPD QUESTIONNAIRES
COPD SCREENING SCORE: 2
HAVE YOU SMOKED AT LEAST 100 CIGARETTES IN YOUR ENTIRE LIFE: YES
IN THE PAST 12 MONTHS DO YOU DO LESS THAN YOU USED TO BECAUSE OF YOUR BREATHING PROBLEMS: DISAGREE/UNSURE
DO YOU EVER COUGH UP ANY MUCUS OR PHLEGM?: NO/ONLY WITH OCCASIONAL COLDS OR INFECTIONS
DURING THE PAST 4 WEEKS HOW MUCH DID YOU FEEL SHORT OF BREATH: NONE/LITTLE OF THE TIME

## 2019-07-31 NOTE — H&P
"Hospital Medicine History & Physical Note    Date of Service  7/31/2019    Primary Care Physician  Pcp Pt States None    Consultants  Neurosurgery Dr. Crenshaw    Code Status  Full code    Chief Complaint  Drainage from surgical wound    History of Presenting Illness  23 y.o. male who presented 7/31/2019 with history of lumbar laminectomy and microdiscectomy on July 12, 2019 by Dr. crenshaw presented to the emergency room for evaluation of increased drainage from his wound VAC.  He reports that over the past few days he has been noting increased drainage from his wound which is foul-smelling.  No fever no chills.  No bleeding.  No nausea no vomiting.  No change in urine or bowel habits.  His pain is been controlled on Norco and muscle relaxants and naproxen.    Review of Systems  Review of Systems   Neurological: Positive for focal weakness (Improved postop).   All other systems reviewed and are negative.      Past Medical History   has a past medical history of Morbid obesity (HCC) and Tachycardia.    Surgical History   has a past surgical history that includes lumbar laminectomy diskectomy (N/A, 6/17/2019) and lumbar laminectomy diskectomy (7/12/2019).     Family History  Reviewed and not pertinent to the presenting problem    Social History   reports that he quit smoking about 7 months ago. His smoking use included cigarettes. He smoked 0.25 packs per day. He has never used smokeless tobacco. He reports that he drinks alcohol. He reports that he has current or past drug history. Drug: Inhaled.    Allergies  Allergies   Allergen Reactions   • Other Misc      Cat allergy- \"start sneezing\"         Medications  Prior to Admission Medications   Prescriptions Last Dose Informant Patient Reported? Taking?   HYDROcodone-acetaminophen (NORCO) 5-325 MG Tab per tablet 7/30/2019 at PRN Patient Yes No   Sig: Take 1 Tab by mouth every four hours as needed (pain). has 12 pills in bottle at SOC   cyclobenzaprine (FLEXERIL) 10 MG Tab " 7/31/2019 at PRN Patient Yes No   Sig: Take 10 mg by mouth 3 times a day as needed for Muscle Spasms.   methocarbamol (ROBAXIN) 500 MG Tab 7/31/2019 at AM Patient Yes Yes   Sig: Take 1,000 mg by mouth 3 times a day.   naproxen (NAPROSYN) 500 MG Tab 7/31/2019 at AM Patient Yes No   Sig: Take 500 mg by mouth every day. Indications: Pain   senna-docusate (PERICOLACE OR SENOKOT S) 8.6-50 MG Tab 7/31/2019 at AM Patient No No   Sig: Take 2 Tabs by mouth 2 Times a Day.      Facility-Administered Medications: None       Physical Exam  Temp:  [36.1 °C (97 °F)] 36.1 °C (97 °F)  Pulse:  [116-121] 116  Resp:  [18] 18  BP: (134-139)/(73-80) 139/80  SpO2:  [98 %] 98 %    Physical Exam   Constitutional: He is oriented to person, place, and time. He appears well-developed and well-nourished.   Morbidly obese   HENT:   Head: Normocephalic and atraumatic.   Right Ear: External ear normal.   Left Ear: External ear normal.   Mouth/Throat: No oropharyngeal exudate.   Eyes: Conjunctivae are normal. Right eye exhibits no discharge. Left eye exhibits no discharge. No scleral icterus.   Neck: Neck supple. No JVD present. No tracheal deviation present.   Cardiovascular: Regular rhythm. Exam reveals no gallop and no friction rub.   No murmur heard.  Tachycardia   Pulmonary/Chest: Effort normal and breath sounds normal. No stridor. No respiratory distress. He has no wheezes. He has no rales. He exhibits no tenderness.   Abdominal: Soft. Bowel sounds are normal. He exhibits no distension and no mass. There is no tenderness. There is no rebound and no guarding.   Musculoskeletal: He exhibits no edema or tenderness.   Neurological: He is alert and oriented to person, place, and time. No cranial nerve deficit. He exhibits abnormal muscle tone (Mild left  lower extremity weakness 4+/5  per patient improving).   Skin: Skin is warm and dry. He is not diaphoretic. No cyanosis. Nails show no clubbing.   Psychiatric: He has a normal mood and affect. His  behavior is normal. Thought content normal.   Nursing note and vitals reviewed.      Laboratory:  Recent Labs     07/31/19  1152   WBC 9.9   RBC 4.63*   HEMOGLOBIN 12.5*   HEMATOCRIT 38.9*   MCV 84.0   MCH 27.0   MCHC 32.1*   RDW 38.8   PLATELETCT 466*   MPV 8.4*     Recent Labs     07/31/19  1152   SODIUM 139   POTASSIUM 3.8   CHLORIDE 104   CO2 20   GLUCOSE 141*   BUN 8   CREATININE 0.63   CALCIUM 9.6     Recent Labs     07/31/19  1152   ALTSGPT 38   ASTSGOT 16   ALKPHOSPHAT 65   TBILIRUBIN 0.3   GLUCOSE 141*         No results for input(s): NTPROBNP in the last 72 hours.      No results for input(s): TROPONINT in the last 72 hours.    Urinalysis:    No results found     Imaging:  MR-LUMBAR SPINE-WITH & W/O    (Results Pending)         Assessment/Plan:  I anticipate this patient is appropriate for observation status at this time.    * Wound drainage  Assessment & Plan  Images reviewed no cellulitis however given elevated sed rate and CRP need to rule out wound infection  Wound culture was obtained  Check blood cultures  Dr. crenshaw recommended MRI we will order with anesthesia as patient has not tolerated MRI with sedation  We will hold off on antibiotics at this time pending MRI and culture results  Should the patient have fever or become clinically unstable we will start broad-spectrum empiric antibiotics  Wound care consultation    Sinus tachycardia- (present on admission)  Assessment & Plan  Recent echo reviewed with normal EF  Patient asymptomatic  This was present during his last hospitalization was discharged on metoprolol  TSH was normal hemoglobin 12.5      VTE prophylaxis:SCD

## 2019-07-31 NOTE — ED NOTES
Med Rec Updated and Complete per Pt at bedside  Allergies Reviewed  No PO ABX last 14 days.    Pt knows medication history well.

## 2019-07-31 NOTE — ED PROVIDER NOTES
ED Provider Note    Scribed for David Montero M.D. by Ramirez Devries. 2019, 12:11 PM.    Primary care provider: Pcp Pt States None  Means of arrival: walk in  History obtained from: patient  History limited by: none    CHIEF COMPLAINT  Chief Complaint   Patient presents with   • Post-Op Complications   • Wound Infection   • Sent by MD MARTINEZ  Sammy Edmonds is a 23 y.o. male who presents to the Emergency Department for a possible wound infection. The patient states he got spinal surgery on 19 by Dr. Ruelas. He called his neurosurgeon's office today when he began to experience drainage and was told to come in to the ED. He currently has a wound vac in place and was suppose to start wound care next week. He denies any current pain to the area. The patient states that he is currently on Narco for pain as needed, Maeve colace, and Flexeril. He is not allergic to any medications.     REVIEW OF SYSTEMS  Review of Systems   Musculoskeletal: Negative for back pain.   Skin:        Wound drainage    All other systems reviewed and are negative.      PAST MEDICAL HISTORY   has a past medical history of Morbid obesity (HCC) and Tachycardia.    SURGICAL HISTORY   has a past surgical history that includes lumbar laminectomy diskectomy (N/A, 2019) and lumbar laminectomy diskectomy (2019).    SOCIAL HISTORY  Social History     Tobacco Use   • Smoking status: Former Smoker     Packs/day: 0.25     Types: Cigarettes     Last attempt to quit: 2018     Years since quittin.5   • Smokeless tobacco: Never Used   Substance Use Topics   • Alcohol use: Yes     Comment: twice weekly   • Drug use: Yes     Types: Inhaled     Comment: occasional marijuana      Social History     Substance and Sexual Activity   Drug Use Yes   • Types: Inhaled    Comment: occasional marijuana       FAMILY HISTORY  No family history on file.    CURRENT MEDICATIONS  Home Medications     Reviewed by Elaine Ley PhT  "(Pharmacy Tech) on 07/31/19 at 1510  Med List Status: Complete   Medication Last Dose Status   cyclobenzaprine (FLEXERIL) 10 MG Tab 7/31/2019 Active   HYDROcodone-acetaminophen (NORCO) 5-325 MG Tab per tablet 7/30/2019 Active   methocarbamol (ROBAXIN) 500 MG Tab 7/31/2019 Active   naproxen (NAPROSYN) 500 MG Tab 7/31/2019 Active   senna-docusate (PERICOLACE OR SENOKOT S) 8.6-50 MG Tab 7/31/2019 Active                ALLERGIES  Allergies   Allergen Reactions   • Other Misc      Cat allergy- \"start sneezing\"         PHYSICAL EXAM  VITAL SIGNS: /73   Pulse (!) 121   Temp 36.1 °C (97 °F) (Temporal)   Resp 18   Ht 1.829 m (6')   Wt (!) 144.7 kg (319 lb)   SpO2 98%   BMI 43.26 kg/m²     Constitutional: Well developed, Well nourished, No acute distress, Non-toxic appearance.   HENT: Normocephalic, Atraumatic, Bilateral external ears normal, oropharynx moist, No oral exudates, Nose normal.   Eyes:conjunctiva is normal, there are no signs of exudate.   Neck: Supple, no meningeal signs.  Lymphatic: No lymphadenopathy noted.   Cardiovascular: Regular rate and rhythm without murmurs gallops or rubs.   Thorax & Lungs: No respiratory distress. Breathing comfortably. Lungs are clear to auscultation bilaterally, there are no wheezes no rales. Chest wall is nontender.  Abdomen: Soft, nontender, nondistended. Bowel sounds are present.   Skin: Warm, Dry, No erythema,   Back: Natural well healing wound 10 cm down the middle of his back. There was discharge on the wound itself. No tenderness to the area. No CVA tenderness.  No signs of significant erythema.  No induration.  Musculoskeletal: Good range of motion in all major joints. No tenderness to palpation or major deformities noted. Intact distal pulses, no clubbing, no cyanosis, no edema,   Neurologic: Alert & oriented x 3, Moving all extremities. No gross abnormalities.    Psychiatric: Affect normal, Judgment normal, Mood normal.     LABS  Results for orders placed or " performed during the hospital encounter of 07/31/19   CBC WITH DIFFERENTIAL   Result Value Ref Range    WBC 9.9 4.8 - 10.8 K/uL    RBC 4.63 (L) 4.70 - 6.10 M/uL    Hemoglobin 12.5 (L) 14.0 - 18.0 g/dL    Hematocrit 38.9 (L) 42.0 - 52.0 %    MCV 84.0 81.4 - 97.8 fL    MCH 27.0 27.0 - 33.0 pg    MCHC 32.1 (L) 33.7 - 35.3 g/dL    RDW 38.8 35.9 - 50.0 fL    Platelet Count 466 (H) 164 - 446 K/uL    MPV 8.4 (L) 9.0 - 12.9 fL    Neutrophils-Polys 71.80 44.00 - 72.00 %    Lymphocytes 22.00 22.00 - 41.00 %    Monocytes 5.00 0.00 - 13.40 %    Eosinophils 0.70 0.00 - 6.90 %    Basophils 0.20 0.00 - 1.80 %    Immature Granulocytes 0.30 0.00 - 0.90 %    Nucleated RBC 0.00 /100 WBC    Neutrophils (Absolute) 7.13 1.82 - 7.42 K/uL    Lymphs (Absolute) 2.19 1.00 - 4.80 K/uL    Monos (Absolute) 0.50 0.00 - 0.85 K/uL    Eos (Absolute) 0.07 0.00 - 0.51 K/uL    Baso (Absolute) 0.02 0.00 - 0.12 K/uL    Immature Granulocytes (abs) 0.03 0.00 - 0.11 K/uL    NRBC (Absolute) 0.00 K/uL   Comp Metabolic Panel   Result Value Ref Range    Sodium 139 135 - 145 mmol/L    Potassium 3.8 3.6 - 5.5 mmol/L    Chloride 104 96 - 112 mmol/L    Co2 20 20 - 33 mmol/L    Anion Gap 15.0 (H) 0.0 - 11.9    Glucose 141 (H) 65 - 99 mg/dL    Bun 8 8 - 22 mg/dL    Creatinine 0.63 0.50 - 1.40 mg/dL    Calcium 9.6 8.5 - 10.5 mg/dL    AST(SGOT) 16 12 - 45 U/L    ALT(SGPT) 38 2 - 50 U/L    Alkaline Phosphatase 65 30 - 99 U/L    Total Bilirubin 0.3 0.1 - 1.5 mg/dL    Albumin 3.9 3.2 - 4.9 g/dL    Total Protein 8.2 6.0 - 8.2 g/dL    Globulin 4.3 (H) 1.9 - 3.5 g/dL    A-G Ratio 0.9 g/dL   ESTIMATED GFR   Result Value Ref Range    GFR If African American >60 >60 mL/min/1.73 m 2    GFR If Non African American >60 >60 mL/min/1.73 m 2   Blood Culture,Hold   Result Value Ref Range    Blood Culture Hold Collected    WESTERGREN SED RATE   Result Value Ref Range    Sed Rate Westergren 87 (H) 0 - 15 mm/hour   CRP QUANTITIVE (NON-CARDIAC)   Result Value Ref Range    Stat C-Reactive  Protein 8.78 (H) 0.00 - 0.75 mg/dL      All labs reviewed by me.    COURSE & MEDICAL DECISION MAKING  Pertinent Labs & Imaging studies reviewed. (See chart for details)    12:11 PM - Patient seen and examined at bedside. Ordered CMP, CBC with differential, and culture wound with gram stain to evaluate his symptoms. The differential diagnoses include but are not limited to: Infection seroma    12:18 PM Neuro was paged at this time.    12:30 PM I discussed the patient's case and the above findings with Dr. Ruelas (Neuro) who recommends a SED rate and CRP.     2:52 PM I discussed the patient's case and the above findings with the Neuro PA who would like him to be admitted and to have an MRI done.     2:53 PM Hopitalist was paged at this time.     2:51 PM I updated the patient that he will be admitted.     2:58 PM I discussed the patient's case and the above findings with Dr. Cooper Green (hospitalist) who agrees to admit the patient.     Decision Making:  Patient presents for evaluation for clinically the patient is in for skin appears good the wound appears like it is well-healing.  I did speak with Dr. Jacobson's physician assistant and at this point because of elevated CRP as well as sed rate we will admit the patient for MRI to evaluate for any deeper wounds.  But at this point the patient is afebrile has a normal white blood cell count so we will wait for the further testing for any antibiotics.    DISPOSITION:  Patient will be admitted to Dr. Cooper Green in guarded condition.     FINAL IMPRESSION  1. Wound dehiscence    2. Post-operative pain          Ramirez RHOADES (Snehal), am scribing for, and in the presence of, David Montero M.D..    Electronically signed by: Ramirez Devries (Michaibgregg), 7/31/2019    IDavid M.D. personally performed the services described in this documentation, as scribed by Ramirez Devries in my presence, and it is both accurate and complete.  C  The note accurately reflects work and  decisions made by me.  David Montero  7/31/2019  6:15 PM

## 2019-07-31 NOTE — ED TRIAGE NOTES
Chief Complaint   Patient presents with   • Post-Op Complications   • Wound Infection   • Sent by MD   Pt to triage in NAD.   Pt with sent by Neurosurgeon for probable wound infection.  Pt has wound vac in place to lumbar surgical incision.  Pt educated on triage process and instructed to notify triage RN of any change in status.

## 2019-07-31 NOTE — WOUND TEAM
[facility]  Inpatient Services  Initial Wound and Skin Care Evaluation    Admission Date: 7/31/2019     Consult Date: 07/31/19   HPI, PMH, SH: Reviewed    Unit where seen by Wound Team: DALIA 61/61 LUPE     WOUND CONSULT RELATED TO:  Back incision      Self Report / Pain Level:  Tolerated well.         OBJECTIVE:  In bed, laying on stomach.  Incision open to air.  Prevena pump at bedside.     WOUND TYPE, LOCATION, CHARACTERISTICS (Pressure Injuries: location, stage, POA or date identified)      Wound 07/31/19 Incision Back dehisced incision  (Active)   Site Assessment Yellow    Maeve-wound Assessment Intact    Margins Unattached edges    Wound Length (cm) 11.5 cm    Wound Width (cm) 1 cm    Wound Depth (cm) 1.5 cm    Wound Surface Area (cm^2) 11.5 cm^2    Closure Sutures    Drainage Amount Moderate    Drainage Description Yellow    Non-staged Wound Description Full thickness    Treatments Cleansed;Site care    Cleansing Approved Wound Cleanser    Periwound Protectant Not Applicable    Dressing Options Strip Iodoform;Dry Gauze;Tape    Dressing Cleansing/Solutions Not Applicable    Dressing Changed New    Dressing Status Clean;Dry;Intact    Dressing Change Frequency Daily    NEXT Dressing Change  08/01/19    NEXT Weekly Photo (Inpatient Only) 08/07/19    WOUND NURSE ONLY - Odor Mild    WOUND NURSE ONLY - Tissue Type and Percentage 100% mixed yellow slough    WOUND NURSE ONLY - Time Spent with Patient (mins) 60      DELLA:      NA    Culture:   Obtained 07/31, Results pending     INTERVENTIONS BY WOUND TEAM:  Incision cleansed with wound cleanser.  Photograph and measurements taken.  Probed area and thin layer of overlying slough easily removed.  Gently filled with cut piece of iodoform gauze, 1 piece into each open area leaving wick.  Covered with dry gauze and secured with silk tape.       Dressing selection:  Iodoform gauze, dry gauze, silk tape          Interdisciplinary consultation: Patient, Bedside, ER  Doctor     EVALUATION: patient had surgery with Dr. Ruelas recently, Prevena incisional VAC was placed and patient discharged.  Patient came into ER today for wound drainage/odor.  Per MD, incisional VAC was not completely covering incision.  Dr. Ruelas was consulted, patient will be admitted for MRI further eval.  Would likely benefit from further debridement of wound to remove slough.     Factors affecting wound healing: infection, obesity   Goals: Steady decrease in wound area and depth weekly.    NURSING PLAN OF CARE ORDERS (X):    Dressing changes: See Dressing Care orders: X  Skin care: See Skin Care orders: patient independent with movement   Rectal tube care: See Rectal Tube Care orders:   Other orders:    RSKIN: CURRENT (X) ORDERED (O):   Q shift Ramses:  X  Q shift pressure point assessments:  X  Pressure redistribution mattress          ER bed  JAKE          Bariatric JAKE         Bariatric foam           Heel float boots          Float Heels off Bed with Pillows               Barrier wipes         Barrier Cream         Barrier paste          Sacral silicone dressing         Silicone O2 tubing         Anchorfast         Cannula fixation Device (Tender )          Gray Foam Ear protectors           Trach with Optifoam split foam                 Waffle cushion        Waffle Overlay         Rectal tube or BMS         Antifungal tx      Interdry          Reposition q 2 hours        Up to chair        Ambulate      PT/OT        Dietician        Diabetes Education      PO     TF     TPN     NPO   # days   Other        WOUND TEAM PLAN OF CARE (X):   NPWT change 3 x week:        Dressing changes by wound team:       Follow up as needed:     Wound team to follow up  Other (explain):     Anticipated discharge plans (X): TBA - unclear at this time if patient will go to surgery  SNF:           Home Care:           Outpatient Wound Center:            Self Care:            Other:

## 2019-07-31 NOTE — ED NOTES
Spoke with wound care RN who will be sending someone from the team down to place a new wound vac dressing.

## 2019-08-01 ENCOUNTER — ANESTHESIA EVENT (OUTPATIENT)
Dept: RADIOLOGY | Facility: MEDICAL CENTER | Age: 24
DRG: 856 | End: 2019-08-01
Payer: COMMERCIAL

## 2019-08-01 ENCOUNTER — ANESTHESIA (OUTPATIENT)
Dept: RADIOLOGY | Facility: MEDICAL CENTER | Age: 24
DRG: 856 | End: 2019-08-01
Payer: COMMERCIAL

## 2019-08-01 ENCOUNTER — APPOINTMENT (OUTPATIENT)
Dept: RADIOLOGY | Facility: MEDICAL CENTER | Age: 24
DRG: 856 | End: 2019-08-01
Attending: NEUROLOGICAL SURGERY
Payer: COMMERCIAL

## 2019-08-01 PROBLEM — T81.49XA INFECTED SURGICAL WOUND: Status: ACTIVE | Noted: 2019-08-01

## 2019-08-01 PROBLEM — G61.89 OTHER INFLAMMATORY POLYNEUROPATHIES (HCC): Status: ACTIVE | Noted: 2019-08-01

## 2019-08-01 PROBLEM — M54.9 ACUTE BILATERAL BACK PAIN: Status: ACTIVE | Noted: 2019-06-15

## 2019-08-01 LAB
ANION GAP SERPL CALC-SCNC: 9 MMOL/L (ref 0–11.9)
BASOPHILS # BLD AUTO: 0.3 % (ref 0–1.8)
BASOPHILS # BLD: 0.03 K/UL (ref 0–0.12)
BUN SERPL-MCNC: 10 MG/DL (ref 8–22)
CALCIUM SERPL-MCNC: 8.8 MG/DL (ref 8.5–10.5)
CHLORIDE SERPL-SCNC: 102 MMOL/L (ref 96–112)
CO2 SERPL-SCNC: 24 MMOL/L (ref 20–33)
CREAT SERPL-MCNC: 0.67 MG/DL (ref 0.5–1.4)
EOSINOPHIL # BLD AUTO: 0.11 K/UL (ref 0–0.51)
EOSINOPHIL NFR BLD: 1.1 % (ref 0–6.9)
ERYTHROCYTE [DISTWIDTH] IN BLOOD BY AUTOMATED COUNT: 39.2 FL (ref 35.9–50)
GLUCOSE SERPL-MCNC: 95 MG/DL (ref 65–99)
HCT VFR BLD AUTO: 38.4 % (ref 42–52)
HGB BLD-MCNC: 12.1 G/DL (ref 14–18)
IMM GRANULOCYTES # BLD AUTO: 0.04 K/UL (ref 0–0.11)
IMM GRANULOCYTES NFR BLD AUTO: 0.4 % (ref 0–0.9)
LYMPHOCYTES # BLD AUTO: 2.61 K/UL (ref 1–4.8)
LYMPHOCYTES NFR BLD: 27.1 % (ref 22–41)
MCH RBC QN AUTO: 26.6 PG (ref 27–33)
MCHC RBC AUTO-ENTMCNC: 31.5 G/DL (ref 33.7–35.3)
MCV RBC AUTO: 84.4 FL (ref 81.4–97.8)
MONOCYTES # BLD AUTO: 0.75 K/UL (ref 0–0.85)
MONOCYTES NFR BLD AUTO: 7.8 % (ref 0–13.4)
NEUTROPHILS # BLD AUTO: 6.08 K/UL (ref 1.82–7.42)
NEUTROPHILS NFR BLD: 63.3 % (ref 44–72)
NRBC # BLD AUTO: 0 K/UL
NRBC BLD-RTO: 0 /100 WBC
PLATELET # BLD AUTO: 392 K/UL (ref 164–446)
PMV BLD AUTO: 8.4 FL (ref 9–12.9)
POTASSIUM SERPL-SCNC: 4 MMOL/L (ref 3.6–5.5)
RBC # BLD AUTO: 4.55 M/UL (ref 4.7–6.1)
SODIUM SERPL-SCNC: 135 MMOL/L (ref 135–145)
WBC # BLD AUTO: 9.6 K/UL (ref 4.8–10.8)

## 2019-08-01 PROCEDURE — 85025 COMPLETE CBC W/AUTO DIFF WBC: CPT

## 2019-08-01 PROCEDURE — 96376 TX/PRO/DX INJ SAME DRUG ADON: CPT

## 2019-08-01 PROCEDURE — 72158 MRI LUMBAR SPINE W/O & W/DYE: CPT

## 2019-08-01 PROCEDURE — 700105 HCHG RX REV CODE 258: Performed by: ANESTHESIOLOGY

## 2019-08-01 PROCEDURE — 36415 COLL VENOUS BLD VENIPUNCTURE: CPT

## 2019-08-01 PROCEDURE — A9585 GADOBUTROL INJECTION: HCPCS | Performed by: NEUROLOGICAL SURGERY

## 2019-08-01 PROCEDURE — A9270 NON-COVERED ITEM OR SERVICE: HCPCS | Performed by: HOSPITALIST

## 2019-08-01 PROCEDURE — 80048 BASIC METABOLIC PNL TOTAL CA: CPT

## 2019-08-01 PROCEDURE — 770006 HCHG ROOM/CARE - MED/SURG/GYN SEMI*

## 2019-08-01 PROCEDURE — 700117 HCHG RX CONTRAST REV CODE 255: Performed by: NEUROLOGICAL SURGERY

## 2019-08-01 PROCEDURE — 700111 HCHG RX REV CODE 636 W/ 250 OVERRIDE (IP): Performed by: INTERNAL MEDICINE

## 2019-08-01 PROCEDURE — 700105 HCHG RX REV CODE 258: Performed by: HOSPITALIST

## 2019-08-01 PROCEDURE — 99233 SBSQ HOSP IP/OBS HIGH 50: CPT | Performed by: HOSPITALIST

## 2019-08-01 PROCEDURE — 700101 HCHG RX REV CODE 250: Performed by: ANESTHESIOLOGY

## 2019-08-01 PROCEDURE — 160002 HCHG RECOVERY MINUTES (STAT)

## 2019-08-01 PROCEDURE — 700111 HCHG RX REV CODE 636 W/ 250 OVERRIDE (IP): Performed by: ANESTHESIOLOGY

## 2019-08-01 PROCEDURE — 700102 HCHG RX REV CODE 250 W/ 637 OVERRIDE(OP): Performed by: HOSPITALIST

## 2019-08-01 PROCEDURE — 700111 HCHG RX REV CODE 636 W/ 250 OVERRIDE (IP): Performed by: HOSPITALIST

## 2019-08-01 PROCEDURE — 96365 THER/PROPH/DIAG IV INF INIT: CPT

## 2019-08-01 RX ORDER — GADOBUTROL 604.72 MG/ML
14 INJECTION INTRAVENOUS ONCE
Status: COMPLETED | OUTPATIENT
Start: 2019-08-01 | End: 2019-08-01

## 2019-08-01 RX ORDER — MIDAZOLAM HYDROCHLORIDE 1 MG/ML
INJECTION INTRAMUSCULAR; INTRAVENOUS
Status: COMPLETED
Start: 2019-08-01 | End: 2019-08-01

## 2019-08-01 RX ORDER — SODIUM CHLORIDE, SODIUM LACTATE, POTASSIUM CHLORIDE, CALCIUM CHLORIDE 600; 310; 30; 20 MG/100ML; MG/100ML; MG/100ML; MG/100ML
INJECTION, SOLUTION INTRAVENOUS CONTINUOUS
Status: DISCONTINUED | OUTPATIENT
Start: 2019-08-01 | End: 2019-08-01 | Stop reason: HOSPADM

## 2019-08-01 RX ORDER — ONDANSETRON 2 MG/ML
4 INJECTION INTRAMUSCULAR; INTRAVENOUS
Status: DISCONTINUED | OUTPATIENT
Start: 2019-08-01 | End: 2019-08-01 | Stop reason: HOSPADM

## 2019-08-01 RX ORDER — SODIUM CHLORIDE 9 MG/ML
INJECTION, SOLUTION INTRAVENOUS CONTINUOUS
Status: DISCONTINUED | OUTPATIENT
Start: 2019-08-01 | End: 2019-08-04

## 2019-08-01 RX ORDER — GABAPENTIN 100 MG/1
100 CAPSULE ORAL 3 TIMES DAILY
Status: DISCONTINUED | OUTPATIENT
Start: 2019-08-01 | End: 2019-08-16 | Stop reason: HOSPADM

## 2019-08-01 RX ORDER — SODIUM CHLORIDE, SODIUM LACTATE, POTASSIUM CHLORIDE, CALCIUM CHLORIDE 600; 310; 30; 20 MG/100ML; MG/100ML; MG/100ML; MG/100ML
INJECTION, SOLUTION INTRAVENOUS
Status: DISCONTINUED | OUTPATIENT
Start: 2019-08-01 | End: 2019-08-01 | Stop reason: SURG

## 2019-08-01 RX ORDER — HYDROMORPHONE HYDROCHLORIDE 1 MG/ML
0.5 INJECTION, SOLUTION INTRAMUSCULAR; INTRAVENOUS; SUBCUTANEOUS ONCE
Status: COMPLETED | OUTPATIENT
Start: 2019-08-01 | End: 2019-08-01

## 2019-08-01 RX ORDER — HYDROMORPHONE HYDROCHLORIDE 1 MG/ML
0.5 INJECTION, SOLUTION INTRAMUSCULAR; INTRAVENOUS; SUBCUTANEOUS EVERY 4 HOURS PRN
Status: DISCONTINUED | OUTPATIENT
Start: 2019-08-01 | End: 2019-08-02

## 2019-08-01 RX ORDER — OXYCODONE HCL 5 MG/5 ML
10 SOLUTION, ORAL ORAL
Status: DISCONTINUED | OUTPATIENT
Start: 2019-08-01 | End: 2019-08-01 | Stop reason: HOSPADM

## 2019-08-01 RX ORDER — OXYCODONE HCL 5 MG/5 ML
5 SOLUTION, ORAL ORAL
Status: DISCONTINUED | OUTPATIENT
Start: 2019-08-01 | End: 2019-08-01 | Stop reason: HOSPADM

## 2019-08-01 RX ORDER — HALOPERIDOL 5 MG/ML
1 INJECTION INTRAMUSCULAR
Status: DISCONTINUED | OUTPATIENT
Start: 2019-08-01 | End: 2019-08-01 | Stop reason: HOSPADM

## 2019-08-01 RX ORDER — DIPHENHYDRAMINE HYDROCHLORIDE 50 MG/ML
12.5 INJECTION INTRAMUSCULAR; INTRAVENOUS
Status: DISCONTINUED | OUTPATIENT
Start: 2019-08-01 | End: 2019-08-01 | Stop reason: HOSPADM

## 2019-08-01 RX ADMIN — SENNOSIDES AND DOCUSATE SODIUM 2 TABLET: 8.6; 5 TABLET ORAL at 05:07

## 2019-08-01 RX ADMIN — GABAPENTIN 100 MG: 100 CAPSULE ORAL at 18:03

## 2019-08-01 RX ADMIN — HYDROCODONE BITARTRATE AND ACETAMINOPHEN 1 TABLET: 5; 325 TABLET ORAL at 08:30

## 2019-08-01 RX ADMIN — GABAPENTIN 100 MG: 100 CAPSULE ORAL at 13:55

## 2019-08-01 RX ADMIN — METHOCARBAMOL TABLETS 1000 MG: 500 TABLET, COATED ORAL at 05:06

## 2019-08-01 RX ADMIN — HYDROCODONE BITARTRATE AND ACETAMINOPHEN 1 TABLET: 5; 325 TABLET ORAL at 13:54

## 2019-08-01 RX ADMIN — METHOCARBAMOL TABLETS 1000 MG: 500 TABLET, COATED ORAL at 13:54

## 2019-08-01 RX ADMIN — ACETAMINOPHEN 650 MG: 325 TABLET, FILM COATED ORAL at 05:06

## 2019-08-01 RX ADMIN — HYDROMORPHONE HYDROCHLORIDE 0.5 MG: 1 INJECTION, SOLUTION INTRAMUSCULAR; INTRAVENOUS; SUBCUTANEOUS at 20:45

## 2019-08-01 RX ADMIN — HYDROMORPHONE HYDROCHLORIDE 0.5 MG: 1 INJECTION, SOLUTION INTRAMUSCULAR; INTRAVENOUS; SUBCUTANEOUS at 14:42

## 2019-08-01 RX ADMIN — MIDAZOLAM HYDROCHLORIDE 4 MG: 1 INJECTION, SOLUTION INTRAMUSCULAR; INTRAVENOUS at 15:14

## 2019-08-01 RX ADMIN — SENNOSIDES AND DOCUSATE SODIUM 2 TABLET: 8.6; 5 TABLET ORAL at 18:03

## 2019-08-01 RX ADMIN — METOPROLOL TARTRATE 25 MG: 25 TABLET, FILM COATED ORAL at 18:03

## 2019-08-01 RX ADMIN — HYDROCODONE BITARTRATE AND ACETAMINOPHEN 1 TABLET: 5; 325 TABLET ORAL at 02:09

## 2019-08-01 RX ADMIN — GABAPENTIN 100 MG: 100 CAPSULE ORAL at 07:58

## 2019-08-01 RX ADMIN — PROPOFOL 200 MG: 10 INJECTION, EMULSION INTRAVENOUS at 15:15

## 2019-08-01 RX ADMIN — SODIUM CHLORIDE, POTASSIUM CHLORIDE, SODIUM LACTATE AND CALCIUM CHLORIDE: 600; 310; 30; 20 INJECTION, SOLUTION INTRAVENOUS at 15:05

## 2019-08-01 RX ADMIN — VANCOMYCIN HYDROCHLORIDE 3 G: 500 INJECTION, POWDER, LYOPHILIZED, FOR SOLUTION INTRAVENOUS at 14:03

## 2019-08-01 RX ADMIN — FENTANYL CITRATE 100 MCG: 50 INJECTION, SOLUTION INTRAMUSCULAR; INTRAVENOUS at 15:14

## 2019-08-01 RX ADMIN — HYDROCODONE BITARTRATE AND ACETAMINOPHEN 1 TABLET: 5; 325 TABLET ORAL at 18:05

## 2019-08-01 RX ADMIN — SODIUM CHLORIDE: 9 INJECTION, SOLUTION INTRAVENOUS at 20:43

## 2019-08-01 RX ADMIN — METHOCARBAMOL TABLETS 1000 MG: 500 TABLET, COATED ORAL at 18:03

## 2019-08-01 RX ADMIN — HYDROMORPHONE HYDROCHLORIDE 0.5 MG: 1 INJECTION, SOLUTION INTRAMUSCULAR; INTRAVENOUS; SUBCUTANEOUS at 01:00

## 2019-08-01 RX ADMIN — GADOBUTROL 14 ML: 604.72 INJECTION INTRAVENOUS at 16:23

## 2019-08-01 ASSESSMENT — PAIN SCALES - GENERAL: PAIN_LEVEL: 0

## 2019-08-01 ASSESSMENT — ENCOUNTER SYMPTOMS
PSYCHIATRIC NEGATIVE: 1
BACK PAIN: 1
CHILLS: 0
EYES NEGATIVE: 1
FEVER: 0
CARDIOVASCULAR NEGATIVE: 1
WEIGHT LOSS: 0
NEUROLOGICAL NEGATIVE: 1
RESPIRATORY NEGATIVE: 1
GASTROINTESTINAL NEGATIVE: 1
MYALGIAS: 1

## 2019-08-01 NOTE — DIETARY
NUTRITION SERVICES: BMI - Pt with BMI >40 (=Body mass index is 41.26 kg/m².), morbid obesity. Weight loss counseling not appropriate in acute care setting.     RECOMMEND - Referral to outpatient nutrition services for weight management after D/C.

## 2019-08-01 NOTE — PROGRESS NOTES
Pt AAOX4, HODGES. Reports pain at 1/10 at incision site. Declines intervention at this time. Dressing on low back CDI. Reports slight numbness to bottom of left foot. Ambulatory with FWW, declining ambulation at this time due to muscle spasms. Call light in reach.

## 2019-08-01 NOTE — ANESTHESIA PROCEDURE NOTES
Airway  Date/Time: 8/1/2019 3:16 PM  Performed by: Wolfgang Saxena M.D.  Authorized by: Wolfgang Saxena M.D.     Location:  OR  Urgency:  Elective  Indications for Airway Management:  Anesthesia  Spontaneous Ventilation: absent    Sedation Level:  Deep  Preoxygenated: Yes    Mask Difficulty Assessment:  0 - not attempted  Final Airway Type:  Supraglottic airway  Final Supraglottic Airway:  Standard LMA  SGA Size:  5  Number of Attempts at Approach:  1

## 2019-08-01 NOTE — ANESTHESIA QCDR
2019 [facility] Clinical Data Registry (for Quality Improvement)     Postoperative nausea/vomiting risk protocol (Adult = 18 yrs and Pediatric 3-17 yrs)- (430 and 463)  General inhalation anesthetic (NOT TIVA) with PONV risk factors: No  Provision of anti-emetic therapy with at least 2 different classes of agents: N/A  Patient DID NOT receive anti-emetic therapy and reason is documented in Medical Record: N/A    Multimodal Pain Management- (AQI59)  Patient undergoing Elective Surgery (i.e. Outpatient, or ASC, or Prescheduled Surgery prior to Hospital Admission): No  Use of Multimodal Pain Management, two or more drugs and/or interventions, NOT including systemic opioids: N/A  Exception: Documented allergy to multiple classes of analgesics: N/A    PACU assessment of acute postoperative pain prior to Anesthesia Care End- Applies to Patients Age = 18- (ABG7)  Initial PACU pain score is which of the following: < 7/10  Patient unable to report pain score: N/A    Post-anesthetic transfer of care checklist/protocol to PACU/ICU- (426 and 427)  Upon conclusion of case, patient transferred to which of the following locations: PACU/Non-ICU  Use of transfer checklist/protocol: Yes  Exclusion: Service Performed in Patient Hospital Room (and thus did not require transfer): N/A    PACU Reintubation- (AQI31)  General anesthesia requiring endotracheal intubation (ETT) along with subsequent extubation in OR or PACU: No  Required reintubation in the PACU: N/A  Extubation was a planned trial documented in the medical record prior to removal of the original airway device: N/A    Unplanned admission to ICU related to anesthesia service up through end of PACU care- (MD51)  Unplanned admission to ICU (not initially anticipated at anesthesia start time): No

## 2019-08-01 NOTE — ANESTHESIA PREPROCEDURE EVALUATION
Relevant Problems   CARDIAC   (+) Essential hypertension       Physical Exam    Airway   Mallampati: II  TM distance: >3 FB  Neck ROM: full       Cardiovascular - normal exam  Rhythm: regular  Rate: normal  (-) murmur     Dental - normal exam         Pulmonary - normal exam  Breath sounds clear to auscultation     Abdominal   (+) obese     Neurological - normal exam                 Anesthesia Plan    ASA 2       Plan - general       Airway plan will be LMA        Induction: intravenous    Postoperative Plan: Postoperative administration of opioids is intended.    Pertinent diagnostic labs and testing reviewed    Informed Consent:    Anesthetic plan and risks discussed with patient.    Use of blood products discussed with: patient whom consented to blood products.

## 2019-08-01 NOTE — ANESTHESIA POSTPROCEDURE EVALUATION
Patient: Sammy Edmonds    Procedure Summary     Date:  08/01/19 Room / Location:  [facility]    Anesthesia Start:  1510 Anesthesia Stop:  1613    Procedure:  MR-LUMBAR SPINE-WITH & W/O Diagnosis:                             Scheduled Providers:   Responsible Provider:  Wolfgang Saxena M.D.    Anesthesia Type:  general ASA Status:  2          Final Anesthesia Type: general  Last vitals  BP   Blood Pressure: 108/76    Temp   36.5 °C (97.7 °F)    Pulse   Pulse: (!) 117(RN notified)   Resp   16    SpO2   98 %      Anesthesia Post Evaluation    Patient location during evaluation: PACU  Patient participation: complete - patient participated  Level of consciousness: sleepy but conscious  Pain score: 0    Airway patency: patent  Anesthetic complications: no  Cardiovascular status: hemodynamically stable  Respiratory status: acceptable  Hydration status: euvolemic    PONV: none           Nurse Pain Score: 5 (NPRS)

## 2019-08-01 NOTE — PROGRESS NOTES
Pt developed 8/10 after attempting to roll onto side. Minimal relief from PRN norco 5mg. On call hospitalist paged an received order for .5mg Iv dilaudid once and valium 5mg PO for muscle spasms if ineffective.

## 2019-08-01 NOTE — ANESTHESIA TIME REPORT
Anesthesia Start and Stop Event Times     Date Time Event    8/1/2019 1509 Ready for Procedure     1510 Anesthesia Start     1613 Anesthesia Stop        Responsible Staff  08/01/19    Name Role Begin End    Wolfgang Saxena M.D. Anesth 1510 1613        Preop Diagnosis (Free Text):  Pre-op Diagnosis             Preop Diagnosis (Codes):    Post op Diagnosis  Lumbar surgical wound fluid collection, sequela      Premium Reason  A. 3PM - 7AM    Comments:

## 2019-08-01 NOTE — CARE PLAN
Problem: Communication  Goal: The ability to communicate needs accurately and effectively will improve  Outcome: PROGRESSING AS EXPECTED  Note:   Oriented to call light      Problem: Safety  Goal: Will remain free from injury  Outcome: PROGRESSING AS EXPECTED  Note:   Fall precautions in place.

## 2019-08-01 NOTE — PROGRESS NOTES
[facility] Medicine Daily Progress Note    Date of Service  8/1/2019    Chief Complaint  23 y.o. male admitted 7/31/2019 with buttock/back pain      Interval Problem Update    Patient has significant drainage from the lower back wound    He is afebrile    Culture from the wound shows staph aureus    His lower back pain , sharp pain , intensity is 8 out of 10 constant , radiates down both legs and into his feet    As per neurosurgery note possible surgery on 8/2  Consultants/Specialty    Dr flower MENSAH    Code Status  Full    Disposition  Pending    Review of Systems  Review of Systems   Constitutional: Negative for chills, fever, malaise/fatigue and weight loss.   HENT: Negative.    Eyes: Negative.    Respiratory: Negative.    Cardiovascular: Negative.    Gastrointestinal: Negative.    Musculoskeletal: Positive for back pain and myalgias.   Neurological: Negative.    Psychiatric/Behavioral: Negative.    All other systems reviewed and are negative.       Physical Exam  Temp:  [36.3 °C (97.3 °F)-37.2 °C (98.9 °F)] 36.5 °C (97.7 °F)  Pulse:  [104-123] 117  Resp:  [16-22] 16  BP: (105-143)/(70-87) 108/76  SpO2:  [90 %-99 %] 98 %    Physical Exam   Constitutional: He is oriented to person, place, and time. He appears distressed.   HENT:   Head: Atraumatic.   Mouth/Throat: No oropharyngeal exudate.   Eyes: Pupils are equal, round, and reactive to light. Right eye exhibits no discharge. Left eye exhibits no discharge.   Neck: No JVD present. No tracheal deviation present. No thyromegaly present.   Cardiovascular: Normal rate and regular rhythm. Exam reveals no gallop and no friction rub.   No murmur heard.  Pulmonary/Chest: Effort normal and breath sounds normal. No respiratory distress. He has no wheezes. He has no rales.   Abdominal: He exhibits distension. There is no tenderness. There is no rebound.   Musculoskeletal:   Low back wound with dressing on board with saturated with serosanguineous fluid   Neurological: He is  alert and oriented to person, place, and time. No cranial nerve deficit. Coordination normal.   Skin: There is pallor.   Psychiatric: He has a normal mood and affect.       Fluids    Intake/Output Summary (Last 24 hours) at 8/1/2019 1412  Last data filed at 8/1/2019 1200  Gross per 24 hour   Intake --   Output 1175 ml   Net -1175 ml       Laboratory  Recent Labs     07/31/19  1152 08/01/19  0337   WBC 9.9 9.6   RBC 4.63* 4.55*   HEMOGLOBIN 12.5* 12.1*   HEMATOCRIT 38.9* 38.4*   MCV 84.0 84.4   MCH 27.0 26.6*   MCHC 32.1* 31.5*   RDW 38.8 39.2   PLATELETCT 466* 392   MPV 8.4* 8.4*     Recent Labs     07/31/19  1152 08/01/19  0337   SODIUM 139 135   POTASSIUM 3.8 4.0   CHLORIDE 104 102   CO2 20 24   GLUCOSE 141* 95   BUN 8 10   CREATININE 0.63 0.67   CALCIUM 9.6 8.8                   Imaging  MR-LUMBAR SPINE-WITH & W/O    (Results Pending)        Assessment/Plan  * Infected surgical wound- (present on admission)  Assessment & Plan  Wound care    Pain management with IV Dilaudid    Antibiotics IV vancomycin    Follow all culture    Plan for I&D as per Dr. crenshaw    Other inflammatory polyneuropathies (HCC)- (present on admission)  Assessment & Plan  Neurontin 100 mg 3 times daily    Wound drainage- (present on admission)  Assessment & Plan  Infected with staph aureus    Antibiotics IV vancomycin    Surgery soon    Sinus tachycardia- (present on admission)  Assessment & Plan  Beta-blocker      Acute bilateral back pain- (present on admission)  Assessment & Plan  Back pain is intractable    P.o. Vicodin    PRN IV Dilaudid  Check a.m. CBC BMP    VTE prophylaxis: scd

## 2019-08-01 NOTE — PROGRESS NOTES
Neurosurgery Progress Note    Subjective:  C/o of severe right buttock pain    Exam:  Exam limited r/t pain: right DF: 4+/5, left DF 4/5, (b) PF 4+/5 (improved from previous hospitalization, EHL 4-/5    Incision: proximal end about 2 inch packed opening, distal end 1 inch opening, depth indetermite, serous drainage on dressing, no josee incisional erythema    BP  Min: 105/74  Max: 143/87  Pulse  Av.1  Min: 104  Max: 123  Resp  Av.3  Min: 16  Max: 22  Temp  Av.6 °C (97.9 °F)  Min: 36.1 °C (97 °F)  Max: 37.2 °C (98.9 °F)  SpO2  Av.1 %  Min: 90 %  Max: 99 %    No data recorded    Recent Labs     19   WBC 9.9 9.6   RBC 4.63* 4.55*   HEMOGLOBIN 12.5* 12.1*   HEMATOCRIT 38.9* 38.4*   MCV 84.0 84.4   MCH 27.0 26.6*   MCHC 32.1* 31.5*   RDW 38.8 39.2   PLATELETCT 466* 392   MPV 8.4* 8.4*     Recent Labs     19  11519   SODIUM 139 135   POTASSIUM 3.8 4.0   CHLORIDE 104 102   CO2 20 24   GLUCOSE 141* 95   BUN 8 10   CREATININE 0.63 0.67   CALCIUM 9.6 8.8               Intake/Output       19 - 19 - 19 0659       Total  Total       Intake    Total Intake -- -- -- -- -- --       Output    Urine  --  -- --  550  -- 550    Urine Void (mL) -- -- -- 550 -- 550    Total Output -- -- -- 550 -- 550       Net I/O     -- -- -- -550 -- -550            Intake/Output Summary (Last 24 hours) at 2019 0913  Last data filed at 2019 0800  Gross per 24 hour   Intake --   Output 550 ml   Net -550 ml            • gabapentin  100 mg TID   • HYDROmorphone  0.5 mg Q4HRS PRN   • HYDROcodone-acetaminophen  1 Tab Q4HRS PRN   • methocarbamol  1,000 mg TID   • senna-docusate  2 Tab BID   • senna-docusate  2 Tab BID    And   • polyethylene glycol/lytes  1 Packet QDAY PRN    And   • magnesium hydroxide  30 mL QDAY PRN    And   • bisacodyl  10 mg QDAY PRN   • acetaminophen  650 mg Q6HRS PRN   • ondansetron   4 mg Q4HRS PRN   • ondansetron  4 mg Q4HRS PRN   • promethazine  12.5-25 mg Q4HRS PRN   • promethazine  12.5-25 mg Q4HRS PRN   • prochlorperazine  5-10 mg Q4HRS PRN       Assessment and Plan:  Hospital day # 1: admission for wound dehiscence, possible infection, continued large amount of wound drainage with wound vac:  SED rate: 87, quant. CRP: 8.7, wound cultures: few gram + cocci, serum WBC WNL, blood cultures in progress,  afebrile    Ho of LS spine surgery x 2: last laminectomy 7/12/19    Prophylactic anticoagulation: no         Start date/time: in preparation for surgery on 8/2/19      MRI lumbar spine pending for 15:00 today under sedation. Anticipate I & D on 8/2/19. Will order NPO and consent.

## 2019-08-01 NOTE — ASSESSMENT & PLAN NOTE
Wound culures MSRA, and now even blood cx are +  ID on board, changed abx to Daptomycin and Bactrim  Wound care  Pain management that is multi-modal  Mgmt as above  Wound vac appears to be working well at this time

## 2019-08-01 NOTE — PROGRESS NOTES
Pharmacy Kinetics 23 y.o. male on vancomycin day # 1 2019    Indication for Treatment: MRSA infected surgical wound    Pertinent history per medical record: Admitted on 2019 for wound dehiscense after laminectomy on .  Plan is for I&D in the OR.     Allergies: Other misc     List concerns for renal function: BMI 42,     Pertinent cultures to date:   19 wound - back: MRSA per screening method per lab    Recent Labs     19  1152 19  0337   WBC 9.9 9.6   NEUTSPOLYS 71.80 63.30     Recent Labs     19  1152 19  0337   BUN 8 10   CREATININE 0.63 0.67   ALBUMIN 3.9  --      No results for input(s): VANCOTROUGH, VANCOPEAK, VANCORANDOM in the last 72 hours.    Intake/Output Summary (Last 24 hours) at 2019 1532  Last data filed at 2019 1200  Gross per 24 hour   Intake --   Output 1175 ml   Net -1175 ml      /76   Pulse (!) 117   Temp 36.5 °C (97.7 °F) (Temporal)   Resp 16   Ht 1.829 m (6')   Wt (!) 138 kg (304 lb 3.8 oz)   SpO2 98%  Temp (24hrs), Av.7 °C (98 °F), Min:36.3 °C (97.3 °F), Max:37.2 °C (98.9 °F)      A/P   1. Vancomycin dose change: 3 gm IV x 1 then 2 gm I 1330 tomorrowV q12h - give first 2 gm dose @ 2200   2. Next vancomycin level: prior to the 3rd total dose @ 0930  3. Goal trough: 16-20 mcg/mL  4. Comments: Due to the patient's BMI and weight, he did not receive a full 25 mg/kg loading dose; therefore, will schedule the first maintenance dose at 8 hours.  Due to his BMI, he will likely accumulate on a standard dose, so will start q12h maintenance dosing with close monitoring.    Deanne Gilmore, PharmD, BCPS-ID

## 2019-08-02 ENCOUNTER — APPOINTMENT (OUTPATIENT)
Dept: CARDIOLOGY | Facility: MEDICAL CENTER | Age: 24
DRG: 856 | End: 2019-08-02
Attending: INTERNAL MEDICINE
Payer: COMMERCIAL

## 2019-08-02 ENCOUNTER — APPOINTMENT (OUTPATIENT)
Dept: RADIOLOGY | Facility: MEDICAL CENTER | Age: 24
DRG: 856 | End: 2019-08-02
Attending: INTERNAL MEDICINE
Payer: COMMERCIAL

## 2019-08-02 ENCOUNTER — HOME CARE VISIT (OUTPATIENT)
Dept: HOME HEALTH SERVICES | Facility: HOME HEALTHCARE | Age: 24
End: 2019-08-02
Payer: COMMERCIAL

## 2019-08-02 ENCOUNTER — ANESTHESIA EVENT (OUTPATIENT)
Dept: SURGERY | Facility: MEDICAL CENTER | Age: 24
DRG: 856 | End: 2019-08-02
Payer: COMMERCIAL

## 2019-08-02 ENCOUNTER — ANESTHESIA (OUTPATIENT)
Dept: SURGERY | Facility: MEDICAL CENTER | Age: 24
DRG: 856 | End: 2019-08-02
Payer: COMMERCIAL

## 2019-08-02 PROBLEM — E66.01 MORBIDLY OBESE (HCC): Status: ACTIVE | Noted: 2019-08-02

## 2019-08-02 PROBLEM — I46.9 CARDIAC ARREST (HCC): Status: ACTIVE | Noted: 2019-08-02

## 2019-08-02 LAB
ABO + RH BLD: NORMAL
ABO GROUP BLD: NORMAL
ACTION RANGE TRIGGERED IACRT: NO
ALBUMIN SERPL BCP-MCNC: 2.7 G/DL (ref 3.2–4.9)
ALBUMIN/GLOB SERPL: 0.9 G/DL
ALP SERPL-CCNC: 52 U/L (ref 30–99)
ALT SERPL-CCNC: 229 U/L (ref 2–50)
ANION GAP SERPL CALC-SCNC: 11 MMOL/L (ref 0–11.9)
ANION GAP SERPL CALC-SCNC: 9 MMOL/L (ref 0–11.9)
AST SERPL-CCNC: 182 U/L (ref 12–45)
BACTERIA WND AEROBE CULT: ABNORMAL
BACTERIA WND AEROBE CULT: ABNORMAL
BASE EXCESS BLDA CALC-SCNC: -5 MMOL/L (ref -4–3)
BASOPHILS # BLD AUTO: 0.2 % (ref 0–1.8)
BASOPHILS # BLD AUTO: 0.3 % (ref 0–1.8)
BASOPHILS # BLD: 0.03 K/UL (ref 0–0.12)
BASOPHILS # BLD: 0.04 K/UL (ref 0–0.12)
BILIRUB SERPL-MCNC: 0.5 MG/DL (ref 0.1–1.5)
BLD GP AB SCN SERPL QL: NORMAL
BODY TEMPERATURE: ABNORMAL DEGREES
BUN SERPL-MCNC: 8 MG/DL (ref 8–22)
BUN SERPL-MCNC: 8 MG/DL (ref 8–22)
CA-I BLD ISE-SCNC: 1.26 MMOL/L (ref 1.1–1.3)
CALCIUM SERPL-MCNC: 7 MG/DL (ref 8.5–10.5)
CALCIUM SERPL-MCNC: 8.6 MG/DL (ref 8.5–10.5)
CFT BLD TEG: 4.6 MIN (ref 5–10)
CHLORIDE SERPL-SCNC: 103 MMOL/L (ref 96–112)
CHLORIDE SERPL-SCNC: 111 MMOL/L (ref 96–112)
CLOT ANGLE BLD TEG: 77 DEGREES (ref 53–72)
CLOT LYSIS 30M P MA LENFR BLD TEG: 0 % (ref 0–8)
CO2 BLDA-SCNC: 21 MMOL/L (ref 20–33)
CO2 SERPL-SCNC: 17 MMOL/L (ref 20–33)
CO2 SERPL-SCNC: 20 MMOL/L (ref 20–33)
CREAT SERPL-MCNC: 0.63 MG/DL (ref 0.5–1.4)
CREAT SERPL-MCNC: 0.63 MG/DL (ref 0.5–1.4)
CT.EXTRINSIC BLD ROTEM: 1 MIN (ref 1–3)
EOSINOPHIL # BLD AUTO: 0.01 K/UL (ref 0–0.51)
EOSINOPHIL # BLD AUTO: 0.01 K/UL (ref 0–0.51)
EOSINOPHIL NFR BLD: 0.1 % (ref 0–6.9)
EOSINOPHIL NFR BLD: 0.1 % (ref 0–6.9)
ERYTHROCYTE [DISTWIDTH] IN BLOOD BY AUTOMATED COUNT: 39 FL (ref 35.9–50)
ERYTHROCYTE [DISTWIDTH] IN BLOOD BY AUTOMATED COUNT: 39.1 FL (ref 35.9–50)
GLOBULIN SER CALC-MCNC: 2.9 G/DL (ref 1.9–3.5)
GLUCOSE BLD-MCNC: 115 MG/DL (ref 65–99)
GLUCOSE SERPL-MCNC: 100 MG/DL (ref 65–99)
GLUCOSE SERPL-MCNC: 113 MG/DL (ref 65–99)
GRAM STN SPEC: ABNORMAL
HCO3 BLDA-SCNC: 19.8 MMOL/L (ref 17–25)
HCT VFR BLD AUTO: 24.9 % (ref 42–52)
HCT VFR BLD AUTO: 31.9 % (ref 42–52)
HCT VFR BLD AUTO: 36.7 % (ref 42–52)
HCT VFR BLD CALC: 31 % (ref 42–52)
HGB BLD-MCNC: 10.5 G/DL (ref 14–18)
HGB BLD-MCNC: 10.5 G/DL (ref 14–18)
HGB BLD-MCNC: 11.9 G/DL (ref 14–18)
HGB BLD-MCNC: 7.9 G/DL (ref 14–18)
HOROWITZ INDEX BLDA+IHG-RTO: 186 MM[HG]
IMM GRANULOCYTES # BLD AUTO: 0.03 K/UL (ref 0–0.11)
IMM GRANULOCYTES # BLD AUTO: 0.11 K/UL (ref 0–0.11)
IMM GRANULOCYTES NFR BLD AUTO: 0.3 % (ref 0–0.9)
IMM GRANULOCYTES NFR BLD AUTO: 0.6 % (ref 0–0.9)
INR PPP: 1.19 (ref 0.87–1.13)
INST. QUALIFIED PATIENT IIQPT: YES
LV EJECT FRACT  99904: 65
LV EJECT FRACT MOD 2C 99903: 47.64
LV EJECT FRACT MOD 4C 99902: 56.71
LV EJECT FRACT MOD BP 99901: 52.12
LYMPHOCYTES # BLD AUTO: 0.97 K/UL (ref 1–4.8)
LYMPHOCYTES # BLD AUTO: 1.96 K/UL (ref 1–4.8)
LYMPHOCYTES NFR BLD: 21.9 % (ref 22–41)
LYMPHOCYTES NFR BLD: 5.6 % (ref 22–41)
MAGNESIUM SERPL-MCNC: 2.1 MG/DL (ref 1.5–2.5)
MCF BLD TEG: 82 MM (ref 50–70)
MCH RBC QN AUTO: 26.5 PG (ref 27–33)
MCH RBC QN AUTO: 27 PG (ref 27–33)
MCHC RBC AUTO-ENTMCNC: 31.7 G/DL (ref 33.7–35.3)
MCHC RBC AUTO-ENTMCNC: 32.4 G/DL (ref 33.7–35.3)
MCV RBC AUTO: 83.2 FL (ref 81.4–97.8)
MCV RBC AUTO: 83.6 FL (ref 81.4–97.8)
MONOCYTES # BLD AUTO: 0.59 K/UL (ref 0–0.85)
MONOCYTES # BLD AUTO: 0.77 K/UL (ref 0–0.85)
MONOCYTES NFR BLD AUTO: 4.5 % (ref 0–13.4)
MONOCYTES NFR BLD AUTO: 6.6 % (ref 0–13.4)
NEUTROPHILS # BLD AUTO: 15.37 K/UL (ref 1.82–7.42)
NEUTROPHILS # BLD AUTO: 6.32 K/UL (ref 1.82–7.42)
NEUTROPHILS NFR BLD: 70.8 % (ref 44–72)
NEUTROPHILS NFR BLD: 89 % (ref 44–72)
NRBC # BLD AUTO: 0 K/UL
NRBC # BLD AUTO: 0 K/UL
NRBC BLD-RTO: 0 /100 WBC
NRBC BLD-RTO: 0 /100 WBC
O2/TOTAL GAS SETTING VFR VENT: 100 %
PA AA BLD-ACNC: 37.4 %
PA ADP BLD-ACNC: 43 %
PCO2 BLDA: 33 MMHG (ref 26–37)
PH BLDA: 7.39 [PH] (ref 7.4–7.5)
PLATELET # BLD AUTO: 300 K/UL (ref 164–446)
PLATELET # BLD AUTO: 364 K/UL (ref 164–446)
PMV BLD AUTO: 8.3 FL (ref 9–12.9)
PMV BLD AUTO: 8.3 FL (ref 9–12.9)
PO2 BLDA: 186 MMHG (ref 64–87)
POTASSIUM BLD-SCNC: 3.1 MMOL/L (ref 3.6–5.5)
POTASSIUM SERPL-SCNC: 2.8 MMOL/L (ref 3.6–5.5)
POTASSIUM SERPL-SCNC: 3.8 MMOL/L (ref 3.6–5.5)
PROT SERPL-MCNC: 5.6 G/DL (ref 6–8.2)
PROTHROMBIN TIME: 15.4 SEC (ref 12–14.6)
RBC # BLD AUTO: 2.98 M/UL (ref 4.7–6.1)
RBC # BLD AUTO: 4.41 M/UL (ref 4.7–6.1)
RH BLD: NORMAL
SAO2 % BLDA: 100 % (ref 93–99)
SIGNIFICANT IND 70042: ABNORMAL
SITE SITE: ABNORMAL
SODIUM BLD-SCNC: 136 MMOL/L (ref 135–145)
SODIUM SERPL-SCNC: 134 MMOL/L (ref 135–145)
SODIUM SERPL-SCNC: 137 MMOL/L (ref 135–145)
SOURCE SOURCE: ABNORMAL
SPECIMEN DRAWN FROM PATIENT: ABNORMAL
TEG ALGORITHM TGALG: ABNORMAL
TROPONIN T SERPL-MCNC: 98 NG/L (ref 6–19)
VANCOMYCIN TROUGH SERPL-MCNC: 15 UG/ML (ref 10–20)
WBC # BLD AUTO: 17.3 K/UL (ref 4.8–10.8)
WBC # BLD AUTO: 8.9 K/UL (ref 4.8–10.8)

## 2019-08-02 PROCEDURE — 85384 FIBRINOGEN ACTIVITY: CPT

## 2019-08-02 PROCEDURE — 99291 CRITICAL CARE FIRST HOUR: CPT | Performed by: INTERNAL MEDICINE

## 2019-08-02 PROCEDURE — 700102 HCHG RX REV CODE 250 W/ 637 OVERRIDE(OP): Performed by: INTERNAL MEDICINE

## 2019-08-02 PROCEDURE — A9270 NON-COVERED ITEM OR SERVICE: HCPCS | Performed by: HOSPITALIST

## 2019-08-02 PROCEDURE — 80202 ASSAY OF VANCOMYCIN: CPT

## 2019-08-02 PROCEDURE — 700102 HCHG RX REV CODE 250 W/ 637 OVERRIDE(OP): Performed by: HOSPITALIST

## 2019-08-02 PROCEDURE — 85576 BLOOD PLATELET AGGREGATION: CPT | Mod: 91

## 2019-08-02 PROCEDURE — 85610 PROTHROMBIN TIME: CPT

## 2019-08-02 PROCEDURE — 700102 HCHG RX REV CODE 250 W/ 637 OVERRIDE(OP)

## 2019-08-02 PROCEDURE — 82330 ASSAY OF CALCIUM: CPT

## 2019-08-02 PROCEDURE — 85025 COMPLETE CBC W/AUTO DIFF WBC: CPT

## 2019-08-02 PROCEDURE — 700111 HCHG RX REV CODE 636 W/ 250 OVERRIDE (IP): Performed by: INTERNAL MEDICINE

## 2019-08-02 PROCEDURE — 71045 X-RAY EXAM CHEST 1 VIEW: CPT

## 2019-08-02 PROCEDURE — 80053 COMPREHEN METABOLIC PANEL: CPT

## 2019-08-02 PROCEDURE — 160048 HCHG OR STATISTICAL LEVEL 1-5: Performed by: NEUROLOGICAL SURGERY

## 2019-08-02 PROCEDURE — 80048 BASIC METABOLIC PNL TOTAL CA: CPT

## 2019-08-02 PROCEDURE — 93005 ELECTROCARDIOGRAM TRACING: CPT | Performed by: INTERNAL MEDICINE

## 2019-08-02 PROCEDURE — 84295 ASSAY OF SERUM SODIUM: CPT

## 2019-08-02 PROCEDURE — 500445 HCHG HEMOSTAT, SURGICEL 4X8: Performed by: NEUROLOGICAL SURGERY

## 2019-08-02 PROCEDURE — 500367 HCHG DRAIN KIT, HEMOVAC: Performed by: NEUROLOGICAL SURGERY

## 2019-08-02 PROCEDURE — 99292 CRITICAL CARE ADDL 30 MIN: CPT | Performed by: INTERNAL MEDICINE

## 2019-08-02 PROCEDURE — 160009 HCHG ANES TIME/MIN: Performed by: NEUROLOGICAL SURGERY

## 2019-08-02 PROCEDURE — 85347 COAGULATION TIME ACTIVATED: CPT

## 2019-08-02 PROCEDURE — 86901 BLOOD TYPING SEROLOGIC RH(D): CPT

## 2019-08-02 PROCEDURE — 85014 HEMATOCRIT: CPT

## 2019-08-02 PROCEDURE — 700105 HCHG RX REV CODE 258: Performed by: INTERNAL MEDICINE

## 2019-08-02 PROCEDURE — 93306 TTE W/DOPPLER COMPLETE: CPT

## 2019-08-02 PROCEDURE — 700102 HCHG RX REV CODE 250 W/ 637 OVERRIDE(OP): Performed by: ANESTHESIOLOGY

## 2019-08-02 PROCEDURE — 700105 HCHG RX REV CODE 258: Performed by: HOSPITALIST

## 2019-08-02 PROCEDURE — 99232 SBSQ HOSP IP/OBS MODERATE 35: CPT | Performed by: HOSPITALIST

## 2019-08-02 PROCEDURE — 700101 HCHG RX REV CODE 250: Performed by: ANESTHESIOLOGY

## 2019-08-02 PROCEDURE — 85018 HEMOGLOBIN: CPT

## 2019-08-02 PROCEDURE — 501838 HCHG SUTURE GENERAL: Performed by: NEUROLOGICAL SURGERY

## 2019-08-02 PROCEDURE — 82962 GLUCOSE BLOOD TEST: CPT

## 2019-08-02 PROCEDURE — 86850 RBC ANTIBODY SCREEN: CPT

## 2019-08-02 PROCEDURE — 83735 ASSAY OF MAGNESIUM: CPT

## 2019-08-02 PROCEDURE — 84132 ASSAY OF SERUM POTASSIUM: CPT

## 2019-08-02 PROCEDURE — 700111 HCHG RX REV CODE 636 W/ 250 OVERRIDE (IP): Performed by: HOSPITALIST

## 2019-08-02 PROCEDURE — 5A2204Z RESTORATION OF CARDIAC RHYTHM, SINGLE: ICD-10-PCS | Performed by: ANESTHESIOLOGY

## 2019-08-02 PROCEDURE — 700117 HCHG RX CONTRAST REV CODE 255: Performed by: INTERNAL MEDICINE

## 2019-08-02 PROCEDURE — 82803 BLOOD GASES ANY COMBINATION: CPT

## 2019-08-02 PROCEDURE — A9270 NON-COVERED ITEM OR SERVICE: HCPCS | Performed by: INTERNAL MEDICINE

## 2019-08-02 PROCEDURE — 160029 HCHG SURGERY MINUTES - 1ST 30 MINS LEVEL 4: Performed by: NEUROLOGICAL SURGERY

## 2019-08-02 PROCEDURE — 160041 HCHG SURGERY MINUTES - EA ADDL 1 MIN LEVEL 4: Performed by: NEUROLOGICAL SURGERY

## 2019-08-02 PROCEDURE — 770022 HCHG ROOM/CARE - ICU (200)

## 2019-08-02 PROCEDURE — 99252 IP/OBS CONSLTJ NEW/EST SF 35: CPT | Performed by: INTERNAL MEDICINE

## 2019-08-02 PROCEDURE — 84484 ASSAY OF TROPONIN QUANT: CPT

## 2019-08-02 PROCEDURE — 86900 BLOOD TYPING SEROLOGIC ABO: CPT

## 2019-08-02 PROCEDURE — 37799 UNLISTED PX VASCULAR SURGERY: CPT

## 2019-08-02 PROCEDURE — 93306 TTE W/DOPPLER COMPLETE: CPT | Mod: 26 | Performed by: INTERNAL MEDICINE

## 2019-08-02 PROCEDURE — 700111 HCHG RX REV CODE 636 W/ 250 OVERRIDE (IP): Performed by: ANESTHESIOLOGY

## 2019-08-02 PROCEDURE — 501445 HCHG STAPLER, SKIN DISP: Performed by: NEUROLOGICAL SURGERY

## 2019-08-02 RX ORDER — LABETALOL HYDROCHLORIDE 5 MG/ML
5 INJECTION, SOLUTION INTRAVENOUS
Status: DISCONTINUED | OUTPATIENT
Start: 2019-08-02 | End: 2019-08-02

## 2019-08-02 RX ORDER — SODIUM CHLORIDE, SODIUM LACTATE, POTASSIUM CHLORIDE, CALCIUM CHLORIDE 600; 310; 30; 20 MG/100ML; MG/100ML; MG/100ML; MG/100ML
INJECTION, SOLUTION INTRAVENOUS CONTINUOUS
Status: DISCONTINUED | OUTPATIENT
Start: 2019-08-02 | End: 2019-08-02

## 2019-08-02 RX ORDER — POTASSIUM CHLORIDE 7.45 MG/ML
10 INJECTION INTRAVENOUS
Status: DISCONTINUED | OUTPATIENT
Start: 2019-08-02 | End: 2019-08-02

## 2019-08-02 RX ORDER — CALCIUM CHLORIDE 100 MG/ML
INJECTION INTRAVENOUS; INTRAVENTRICULAR PRN
Status: DISCONTINUED | OUTPATIENT
Start: 2019-08-02 | End: 2019-08-02 | Stop reason: HOSPADM

## 2019-08-02 RX ORDER — AMIODARONE HYDROCHLORIDE 150 MG/3ML
INJECTION, SOLUTION INTRAVENOUS PRN
Status: DISCONTINUED | OUTPATIENT
Start: 2019-08-02 | End: 2019-08-02 | Stop reason: SURG

## 2019-08-02 RX ORDER — ONDANSETRON 2 MG/ML
4 INJECTION INTRAMUSCULAR; INTRAVENOUS
Status: DISCONTINUED | OUTPATIENT
Start: 2019-08-02 | End: 2019-08-02

## 2019-08-02 RX ORDER — MAGNESIUM SULFATE HEPTAHYDRATE 40 MG/ML
INJECTION, SOLUTION INTRAVENOUS PRN
Status: DISCONTINUED | OUTPATIENT
Start: 2019-08-02 | End: 2019-08-02 | Stop reason: SURG

## 2019-08-02 RX ORDER — MEPERIDINE HYDROCHLORIDE 25 MG/ML
25 INJECTION INTRAMUSCULAR; INTRAVENOUS; SUBCUTANEOUS
Status: DISCONTINUED | OUTPATIENT
Start: 2019-08-02 | End: 2019-08-02

## 2019-08-02 RX ORDER — HYDROMORPHONE HYDROCHLORIDE 2 MG/ML
0.5 INJECTION, SOLUTION INTRAMUSCULAR; INTRAVENOUS; SUBCUTANEOUS EVERY 4 HOURS PRN
Status: DISCONTINUED | OUTPATIENT
Start: 2019-08-02 | End: 2019-08-09

## 2019-08-02 RX ORDER — SODIUM CHLORIDE, SODIUM LACTATE, POTASSIUM CHLORIDE, AND CALCIUM CHLORIDE .6; .31; .03; .02 G/100ML; G/100ML; G/100ML; G/100ML
1000 INJECTION, SOLUTION INTRAVENOUS ONCE
Status: COMPLETED | OUTPATIENT
Start: 2019-08-02 | End: 2019-08-03

## 2019-08-02 RX ORDER — POTASSIUM CHLORIDE 20 MEQ/1
40 TABLET, EXTENDED RELEASE ORAL ONCE
Status: COMPLETED | OUTPATIENT
Start: 2019-08-02 | End: 2019-08-02

## 2019-08-02 RX ORDER — OXYCODONE HCL 5 MG/5 ML
10 SOLUTION, ORAL ORAL
Status: DISCONTINUED | OUTPATIENT
Start: 2019-08-02 | End: 2019-08-02

## 2019-08-02 RX ORDER — LORAZEPAM 2 MG/ML
1 INJECTION INTRAMUSCULAR
Status: DISCONTINUED | OUTPATIENT
Start: 2019-08-02 | End: 2019-08-02

## 2019-08-02 RX ORDER — MAGNESIUM SULFATE HEPTAHYDRATE 500 MG/ML
INJECTION, SOLUTION INTRAMUSCULAR; INTRAVENOUS
Status: COMPLETED | OUTPATIENT
Start: 2019-08-02 | End: 2019-08-02

## 2019-08-02 RX ORDER — OXYCODONE HCL 5 MG/5 ML
5 SOLUTION, ORAL ORAL
Status: DISCONTINUED | OUTPATIENT
Start: 2019-08-02 | End: 2019-08-02

## 2019-08-02 RX ORDER — HALOPERIDOL 5 MG/ML
1 INJECTION INTRAMUSCULAR
Status: DISCONTINUED | OUTPATIENT
Start: 2019-08-02 | End: 2019-08-02

## 2019-08-02 RX ORDER — ESMOLOL HYDROCHLORIDE 10 MG/ML
INJECTION INTRAVENOUS PRN
Status: DISCONTINUED | OUTPATIENT
Start: 2019-08-02 | End: 2019-08-02 | Stop reason: SURG

## 2019-08-02 RX ORDER — AMIODARONE HYDROCHLORIDE 150 MG/3ML
INJECTION, SOLUTION INTRAVENOUS
Status: COMPLETED | OUTPATIENT
Start: 2019-08-02 | End: 2019-08-02

## 2019-08-02 RX ORDER — POTASSIUM CHLORIDE 20 MEQ/1
40 TABLET, EXTENDED RELEASE ORAL DAILY
Status: DISCONTINUED | OUTPATIENT
Start: 2019-08-03 | End: 2019-08-02

## 2019-08-02 RX ORDER — DIPHENHYDRAMINE HYDROCHLORIDE 50 MG/ML
12.5 INJECTION INTRAMUSCULAR; INTRAVENOUS
Status: DISCONTINUED | OUTPATIENT
Start: 2019-08-02 | End: 2019-08-02

## 2019-08-02 RX ORDER — CALCIUM CHLORIDE 100 MG/ML
INJECTION INTRAVENOUS; INTRAVENTRICULAR
Status: COMPLETED | OUTPATIENT
Start: 2019-08-02 | End: 2019-08-02

## 2019-08-02 RX ADMIN — AMIODARONE HYDROCHLORIDE 150 MG: 50 INJECTION, SOLUTION INTRAVENOUS at 16:42

## 2019-08-02 RX ADMIN — SODIUM BICARBONATE 50 MEQ: 84 INJECTION, SOLUTION INTRAVENOUS at 16:36

## 2019-08-02 RX ADMIN — PROPOFOL 30 MG: 10 INJECTION, EMULSION INTRAVENOUS at 16:28

## 2019-08-02 RX ADMIN — INSULIN HUMAN 20 UNITS: 100 INJECTION, SOLUTION PARENTERAL at 16:39

## 2019-08-02 RX ADMIN — VANCOMYCIN HYDROCHLORIDE 2 G: 500 INJECTION, POWDER, LYOPHILIZED, FOR SOLUTION INTRAVENOUS at 13:19

## 2019-08-02 RX ADMIN — MAGNESIUM SULFATE IN WATER 4 G: 40 INJECTION, SOLUTION INTRAVENOUS at 16:38

## 2019-08-02 RX ADMIN — SUCCINYLCHOLINE CHLORIDE 180 MG: 20 INJECTION, SOLUTION INTRAMUSCULAR; INTRAVENOUS at 16:31

## 2019-08-02 RX ADMIN — HYDROCODONE BITARTRATE AND ACETAMINOPHEN 1 TABLET: 5; 325 TABLET ORAL at 22:23

## 2019-08-02 RX ADMIN — HYDROCODONE BITARTRATE AND ACETAMINOPHEN 1 TABLET: 5; 325 TABLET ORAL at 00:23

## 2019-08-02 RX ADMIN — PROPOFOL 150 MG: 10 INJECTION, EMULSION INTRAVENOUS at 16:31

## 2019-08-02 RX ADMIN — GABAPENTIN 100 MG: 100 CAPSULE ORAL at 05:04

## 2019-08-02 RX ADMIN — SODIUM CHLORIDE, POTASSIUM CHLORIDE, SODIUM LACTATE AND CALCIUM CHLORIDE 1000 ML: 600; 310; 30; 20 INJECTION, SOLUTION INTRAVENOUS at 20:01

## 2019-08-02 RX ADMIN — ROCURONIUM BROMIDE 5 MG: 10 INJECTION, SOLUTION INTRAVENOUS at 16:28

## 2019-08-02 RX ADMIN — MAGNESIUM SULFATE HEPTAHYDRATE 4 G: 500 INJECTION, SOLUTION INTRAMUSCULAR; INTRAVENOUS at 16:38

## 2019-08-02 RX ADMIN — HYDROCODONE BITARTRATE AND ACETAMINOPHEN 1 TABLET: 5; 325 TABLET ORAL at 10:24

## 2019-08-02 RX ADMIN — ALFENTANIL HYDROCHLORIDE 1000 MCG: 500 INJECTION, SOLUTION INTRAVENOUS at 16:28

## 2019-08-02 RX ADMIN — VANCOMYCIN HYDROCHLORIDE 2 G: 500 INJECTION, POWDER, LYOPHILIZED, FOR SOLUTION INTRAVENOUS at 02:06

## 2019-08-02 RX ADMIN — POTASSIUM CHLORIDE 40 MEQ: 1500 TABLET, EXTENDED RELEASE ORAL at 23:33

## 2019-08-02 RX ADMIN — METOPROLOL TARTRATE 25 MG: 25 TABLET, FILM COATED ORAL at 05:04

## 2019-08-02 RX ADMIN — POTASSIUM CHLORIDE 10 MEQ: 7.46 INJECTION, SOLUTION INTRAVENOUS at 18:46

## 2019-08-02 RX ADMIN — METHOCARBAMOL TABLETS 1000 MG: 500 TABLET, COATED ORAL at 05:04

## 2019-08-02 RX ADMIN — SODIUM CHLORIDE: 9 INJECTION, SOLUTION INTRAVENOUS at 10:25

## 2019-08-02 RX ADMIN — CALCIUM CHLORIDE 1 G: 100 INJECTION, SOLUTION INTRAVENOUS at 16:34

## 2019-08-02 RX ADMIN — HYDROMORPHONE HYDROCHLORIDE 0.5 MG: 1 INJECTION, SOLUTION INTRAMUSCULAR; INTRAVENOUS; SUBCUTANEOUS at 14:18

## 2019-08-02 RX ADMIN — HYDROMORPHONE HYDROCHLORIDE 0.5 MG: 2 INJECTION INTRAMUSCULAR; INTRAVENOUS; SUBCUTANEOUS at 23:45

## 2019-08-02 RX ADMIN — SODIUM CHLORIDE, POTASSIUM CHLORIDE, SODIUM LACTATE AND CALCIUM CHLORIDE 1000 ML: 600; 310; 30; 20 INJECTION, SOLUTION INTRAVENOUS at 19:45

## 2019-08-02 RX ADMIN — ESMOLOL HYDROCHLORIDE 30 MG: 100 INJECTION, SOLUTION INTRAVENOUS at 16:45

## 2019-08-02 RX ADMIN — METHOCARBAMOL TABLETS 1000 MG: 500 TABLET, COATED ORAL at 22:23

## 2019-08-02 RX ADMIN — HUMAN ALBUMIN MICROSPHERES AND PERFLUTREN 3 ML: 10; .22 INJECTION, SOLUTION INTRAVENOUS at 20:15

## 2019-08-02 RX ADMIN — GABAPENTIN 100 MG: 100 CAPSULE ORAL at 22:23

## 2019-08-02 ASSESSMENT — ENCOUNTER SYMPTOMS
ABDOMINAL PAIN: 0
FEVER: 0
FOCAL WEAKNESS: 0
DEPRESSION: 0
COUGH: 0
EYES NEGATIVE: 1
CARDIOVASCULAR NEGATIVE: 1
MYALGIAS: 1
SHORTNESS OF BREATH: 0
WEIGHT LOSS: 0
NAUSEA: 0
VOMITING: 0
BLURRED VISION: 0
GASTROINTESTINAL NEGATIVE: 1
NEUROLOGICAL NEGATIVE: 1
CHILLS: 0
DOUBLE VISION: 0
BACK PAIN: 1
RESPIRATORY NEGATIVE: 1
SPUTUM PRODUCTION: 0
PSYCHIATRIC NEGATIVE: 1
PALPITATIONS: 0

## 2019-08-02 ASSESSMENT — PAIN SCALES - GENERAL: PAIN_LEVEL: 0

## 2019-08-02 NOTE — ANESTHESIA QCDR
2019 [facility] Clinical Data Registry (for Quality Improvement)     Postoperative nausea/vomiting risk protocol (Adult = 18 yrs and Pediatric 3-17 yrs)- (430 and 463)  General inhalation anesthetic (NOT TIVA) with PONV risk factors: Yes  Provision of anti-emetic therapy with at least 2 different classes of agents: Yes   Patient DID NOT receive anti-emetic therapy and reason is documented in Medical Record:  N/A    Multimodal Pain Management- (AQI59)  Patient undergoing Elective Surgery (i.e. Outpatient, or ASC, or Prescheduled Surgery prior to Hospital Admission): No  Use of Multimodal Pain Management, two or more drugs and/or interventions, NOT including systemic opioids: N/A  Exception: Documented allergy to multiple classes of analgesics: N/A    PACU assessment of acute postoperative pain prior to Anesthesia Care End- Applies to Patients Age = 18- (ABG7)  Initial PACU pain score is which of the following: < 7/10  Patient unable to report pain score: N/A    Post-anesthetic transfer of care checklist/protocol to PACU/ICU- (426 and 427)  Upon conclusion of case, patient transferred to which of the following locations: PACU/Non-ICU  Use of transfer checklist/protocol: Yes  Exclusion: Service Performed in Patient Hospital Room (and thus did not require transfer): N/A    PACU Reintubation- (AQI31)  General anesthesia requiring endotracheal intubation (ETT) along with subsequent extubation in OR or PACU: Yes  Required reintubation in the PACU: No   Extubation was a planned trial documented in the medical record prior to removal of the original airway device:  N/A    Unplanned admission to ICU related to anesthesia service up through end of PACU care- (MD51)  Unplanned admission to ICU (not initially anticipated at anesthesia start time): No

## 2019-08-02 NOTE — CARE PLAN
Problem: Communication  Goal: The ability to communicate needs accurately and effectively will improve  Outcome: PROGRESSING AS EXPECTED     Problem: Safety  Goal: Will remain free from injury  Outcome: PROGRESSING AS EXPECTED  Goal: Will remain free from falls  Outcome: PROGRESSING AS EXPECTED     Problem: Infection  Goal: Will remain free from infection  Outcome: PROGRESSING AS EXPECTED     Problem: Venous Thromboembolism (VTW)/Deep Vein Thrombosis (DVT) Prevention:  Goal: Patient will participate in Venous Thrombosis (VTE)/Deep Vein Thrombosis (DVT)Prevention Measures  Description  SCDs in use   Outcome: PROGRESSING AS EXPECTED     Problem: Bowel/Gastric:  Goal: Normal bowel function is maintained or improved  Outcome: PROGRESSING AS EXPECTED  Goal: Will not experience complications related to bowel motility  Outcome: PROGRESSING AS EXPECTED     Problem: Knowledge Deficit  Goal: Knowledge of disease process/condition, treatment plan, diagnostic tests, and medications will improve  Outcome: PROGRESSING AS EXPECTED  Goal: Knowledge of the prescribed therapeutic regimen will improve  Outcome: PROGRESSING AS EXPECTED     Problem: Discharge Barriers/Planning  Goal: Patient's continuum of care needs will be met  Outcome: PROGRESSING AS EXPECTED     Problem: Mobility  Goal: Risk for activity intolerance will decrease  Outcome: PROGRESSING AS EXPECTED     Problem: Respiratory:  Goal: Respiratory status will improve  Outcome: PROGRESSING AS EXPECTED     Problem: Pain Management  Goal: Pain level will decrease to patient's comfort goal  Description  Pain medicated per MAR to achieve patient's comfort goal   Outcome: PROGRESSING AS EXPECTED

## 2019-08-02 NOTE — PROGRESS NOTES
Neurosurgery Progress Note    Subjective:  Pain controlled this AM, reports has been ambulatory at home holding on to walls or furniture for support. LE strengths slowly improving, but  feels safer with FWW for longer distances and outside the home. No new weakness.     Exam:  Left DF: 1/2 flexed can hold at 4/6, EHL 4-/5, RLE grossly intact (exam limited r/t patient participation)    serosang drainage on dressing  Wound cultures: MRSA    BP  Min: 108/76  Max: 122/57  Pulse  Av.2  Min: 114  Max: 126  Resp  Av.4  Min: 16  Max: 33  Temp  Av.7 °C (98.1 °F)  Min: 36.5 °C (97.7 °F)  Max: 37 °C (98.6 °F)  SpO2  Av.4 %  Min: 93 %  Max: 100 %    No data recorded    Recent Labs     19  11519   WBC 9.9 9.6 8.9   RBC 4.63* 4.55* 4.41*   HEMOGLOBIN 12.5* 12.1* 11.9*   HEMATOCRIT 38.9* 38.4* 36.7*   MCV 84.0 84.4 83.2   MCH 27.0 26.6* 27.0   MCHC 32.1* 31.5* 32.4*   RDW 38.8 39.2 39.0   PLATELETCT 466* 392 364   MPV 8.4* 8.4* 8.3*     Recent Labs     19  11519   SODIUM 139 135 134*   POTASSIUM 3.8 4.0 3.8   CHLORIDE 104 102 103   CO2 20 24 20   GLUCOSE 141* 95 100*   BUN 8 10 8   CREATININE 0.63 0.67 0.63   CALCIUM 9.6 8.8 8.6               Intake/Output       19 - 19 - 19 Total  Total       Intake    P.O.  100  100 200  --  -- --    P.O. 100 100 200 -- -- --    I.V.  750  -- 750  --  -- --    Volume (mL) (Lactated Ringers) 750 -- 750 -- -- --    Total Intake 850 100 950 -- -- --       Output    Urine  1175  1350 2525  --  -- --    Number of Times Voided 1 x 2 x 3 x -- -- --    Urine Void (mL) 1175 1350 2525 -- -- --    Total Output 1175 1350 2525 -- -- --       Net I/O     -878 -[phone] -2948 -- -- --            Intake/Output Summary (Last 24 hours) at 2019 0848  Last data filed at 2019 0400  Gross per 24 hour   Intake 950 ml   Output 1975 ml    Net -1025 ml            • gabapentin  100 mg TID   • HYDROmorphone  0.5 mg Q4HRS PRN   • MD Alert...Vancomycin per Pharmacy   PHARMACY TO DOSE   • NS   Continuous   • metoprolol  25 mg TWICE DAILY   • vancomycin  2 g Q12HR   • HYDROcodone-acetaminophen  1 Tab Q4HRS PRN   • methocarbamol  1,000 mg TID   • senna-docusate  2 Tab BID    And   • polyethylene glycol/lytes  1 Packet QDAY PRN    And   • magnesium hydroxide  30 mL QDAY PRN    And   • bisacodyl  10 mg QDAY PRN   • acetaminophen  650 mg Q6HRS PRN   • ondansetron  4 mg Q4HRS PRN   • ondansetron  4 mg Q4HRS PRN   • promethazine  12.5-25 mg Q4HRS PRN   • promethazine  12.5-25 mg Q4HRS PRN   • prochlorperazine  5-10 mg Q4HRS PRN       Assessment and Plan:  Hospital day # 3: wound dehiscence and drainage  Wound culture: Heavy growth MRSA per screening method: on Vancomycin since 8/1/19  no leukocytosis, fever, blood cultures negative so far  ESR 87,  MRI: no evidence of discitis/osteomyletis, + nonenhancing fluid collection, diffuse enhancement of cauda equina nerve roots per radiology     lumbar spine surgery x 2: L2-S1 laminectomy 7/12, and  Prior L4-5 decompression, aborted further decompression r/t neuromonitoring signal loss June 2019    For I & D procedure this afternoon  Recommend ID consult.

## 2019-08-02 NOTE — PROGRESS NOTES
[facility] Medicine Daily Progress Note    Date of Service  8/2/2019    Chief Complaint  23 y.o. male admitted 7/31/2019 with buttock/back pain      Interval Problem Update     drainage from the lower back wound    He is afebrile    Culture from the wound shows MRSA    His lower back pain , sharp pain , intensity is 6 out of 10 constant , radiates down both legs and into his feet    Surgery today  Consultants/Specialty    Dr flower MENSAH    Code Status  Full    Disposition  Pending    Review of Systems  Review of Systems   Constitutional: Negative for chills, fever, malaise/fatigue and weight loss.   HENT: Negative.    Eyes: Negative.    Respiratory: Negative.    Cardiovascular: Negative.    Gastrointestinal: Negative.    Musculoskeletal: Positive for back pain and myalgias.   Neurological: Negative.    Psychiatric/Behavioral: Negative.    All other systems reviewed and are negative.       Physical Exam  Temp:  [36.6 °C (97.9 °F)-37 °C (98.6 °F)] 36.6 °C (97.9 °F)  Pulse:  [114-126] 121  Resp:  [16-33] 16  BP: (112-122)/(57-78) 115/75  SpO2:  [93 %-100 %] 93 %    Physical Exam   Constitutional: He is oriented to person, place, and time. No distress.   HENT:   Head: Atraumatic.   Mouth/Throat: No oropharyngeal exudate.   Eyes: Pupils are equal, round, and reactive to light. Right eye exhibits no discharge. Left eye exhibits no discharge.   Neck: No JVD present. No tracheal deviation present. No thyromegaly present.   Cardiovascular: Normal rate and regular rhythm. Exam reveals no gallop and no friction rub.   No murmur heard.  Pulmonary/Chest: Effort normal and breath sounds normal. No respiratory distress. He has no wheezes. He has no rales.   Abdominal: He exhibits distension. There is no tenderness. There is no rebound.   Musculoskeletal:   Low back wound with dressing on board with saturated with serosanguineous fluid   Neurological: He is alert and oriented to person, place, and time. No cranial nerve deficit.  Coordination normal.   Skin: There is pallor.   Psychiatric: He has a normal mood and affect.       Fluids    Intake/Output Summary (Last 24 hours) at 8/2/2019 1432  Last data filed at 8/2/2019 1024  Gross per 24 hour   Intake 950 ml   Output 2175 ml   Net -1225 ml       Laboratory  Recent Labs     07/31/19  1152 08/01/19 0337 08/02/19 0227   WBC 9.9 9.6 8.9   RBC 4.63* 4.55* 4.41*   HEMOGLOBIN 12.5* 12.1* 11.9*   HEMATOCRIT 38.9* 38.4* 36.7*   MCV 84.0 84.4 83.2   MCH 27.0 26.6* 27.0   MCHC 32.1* 31.5* 32.4*   RDW 38.8 39.2 39.0   PLATELETCT 466* 392 364   MPV 8.4* 8.4* 8.3*     Recent Labs     07/31/19  1152 08/01/19 0337 08/02/19 0227   SODIUM 139 135 134*   POTASSIUM 3.8 4.0 3.8   CHLORIDE 104 102 103   CO2 20 24 20   GLUCOSE 141* 95 100*   BUN 8 10 8   CREATININE 0.63 0.67 0.63   CALCIUM 9.6 8.8 8.6                   Imaging  MR-LUMBAR SPINE-WITH & W/O   Final Result      1.  Stable postsurgical changes L3-L5 laminectomies. Interval ill-defined nonenhancing fluid within the laminectomy bed most pronounced at the L4-L5 level. Infection should be excluded clinically.   2.  There is diffuse enhancement of the cauda equina nerve roots which is presumably infectious/inflammatory.   3.  No evidence of discitis osteomyelitis or epidural abscess.   4.  Degenerative changes as detailed above with persistent moderate thecal sac stenosis at L2-L3 and L4-L5.           Assessment/Plan  Other inflammatory polyneuropathies (HCC)- (present on admission)  Assessment & Plan  Neurontin 100 mg 3 times daily    Infected surgical wound- (present on admission)  Assessment & Plan  Wound care    Pain management with IV Dilaudid    Antibiotics IV vancomycin    Plan for I&D as per Dr. crenshaw    Wound drainage- (present on admission)  Assessment & Plan  Infected with MRSA    Antibiotics IV vancomycin    Surgery today    Sinus tachycardia- (present on admission)  Assessment & Plan  Beta-blocker      Acute bilateral back pain- (present on  admission)  Assessment & Plan  Back pain is intractable    P.o. Vicodin    PRN IV Dilaudid      VTE prophylaxis: scd

## 2019-08-02 NOTE — CARE PLAN
Problem: Safety  Goal: Will remain free from falls  Outcome: PROGRESSING AS EXPECTED   Bed alarm on, bed locked in lowest position, upper side rails up, call light and personal items within reach, non skids socks on.       Problem: Venous Thromboembolism (VTW)/Deep Vein Thrombosis (DVT) Prevention:  Goal: Patient will participate in Venous Thrombosis (VTE)/Deep Vein Thrombosis (DVT)Prevention Measures  Description  SCDs in use   Outcome: PROGRESSING AS EXPECTED

## 2019-08-02 NOTE — PROGRESS NOTES
Pharmacy Kinetics 23 y.o. male on vancomycin day # 2   2019    Currently on Vancomycin 2,000mg iv q12h (0200, 1400)    Indication for Treatment: MRSA infected surgical wound     Pertinent history per medical record: Admitted on 2019 for wound dehiscense after laminectomy on .  Plan is for I&D in the OR.      Allergies: Other misc      List concerns for renal function: BMI 42      Pertinent cultures to date:   : Back Wound - Methicillin Resistant Staphylococcus aureus (Vanco PARRISH = 1)    Recent Labs     19  1152 19   WBC 9.9 9.6 8.9   NEUTSPOLYS 71.80 63.30 70.80     Recent Labs     19  11519   BUN 8 10 8   CREATININE 0.63 0.67 0.63   ALBUMIN 3.9  --   --      Recent Labs     19  0943   VANCOTROUGH 15.0       Intake/Output Summary (Last 24 hours) at 2019 1245  Last data filed at 2019 1024  Gross per 24 hour   Intake 950 ml   Output 2175 ml   Net -1225 ml      /75   Pulse (!) 121   Temp 36.6 °C (97.9 °F) (Temporal)   Resp 16   Ht 1.829 m (6')   Wt (!) 138 kg (304 lb 3.8 oz)   SpO2 93%  Temp (24hrs), Av.8 °C (98.3 °F), Min:36.6 °C (97.9 °F), Max:37 °C (98.6 °F)      A/P   1. Vancomycin dose change: No.   2. Next vancomycin level: Tomorrow, 8/3 at 1330   3. Goal trough: 16 - 20 mcg/mL   4. Comments: Vancomycin doses were re-timed. Trough level collected not a true trough level. Continue current regimen and repeat trough level tomorrow afternoon. Renal indices are stable following vancomycin initiation. MRSA in wound culture sensitive to Vancomycin. Afebrile, No leukocytosis, persistent tachy otherwise VS stable.     Gloria Whitmore, ArtD

## 2019-08-02 NOTE — PROGRESS NOTES
Assumed pt care at 1900 and received bedside report.    Pt alert and oriented X 4. Pt denies chest pain, sob, nausea and vomiting, headache, and blurry or double vision. Pt c/o numbness to L foot. Pt c/o pain to lower back, medicating per MAR. Q2 turns in place.  in place. Pt has wound to mid low back, dressing in place, CDI.   POC discussed and education provided on administered medications. All questions and concerns addressed. Fall precautions, hourly rounding and Q4 hour neuro checks in place.

## 2019-08-02 NOTE — ANESTHESIA PREPROCEDURE EVALUATION
Relevant Problems   CARDIAC   (+) Essential hypertension       Physical Exam    Airway   Mallampati: II  TM distance: >3 FB  Neck ROM: full       Cardiovascular - normal exam  Rhythm: regular  Rate: normal     Dental - normal exam         Pulmonary - normal exam  Breath sounds clear to auscultation     Abdominal    Neurological - normal exam                 Anesthesia Plan    ASA 3   ASA physical status 3 criteria: morbid obesity - BMI greater than or equal to 40    Plan - general             Induction: intravenous and rapid sequence    Postoperative Plan: Postoperative administration of opioids is intended.    Pertinent diagnostic labs and testing reviewed    Informed Consent:    Anesthetic plan and risks discussed with patient.    Use of blood products discussed with: patient whom consented to blood products.

## 2019-08-02 NOTE — PROGRESS NOTES
2 RN skin check performed    Surgical wound to mid low back, dressing in place, CDI.    No other skin issues noted.    Q2 turns in place.

## 2019-08-03 ENCOUNTER — APPOINTMENT (OUTPATIENT)
Dept: RADIOLOGY | Facility: MEDICAL CENTER | Age: 24
DRG: 856 | End: 2019-08-03
Attending: INTERNAL MEDICINE
Payer: COMMERCIAL

## 2019-08-03 PROBLEM — R74.8 ELEVATED LIVER ENZYMES: Status: ACTIVE | Noted: 2019-08-03

## 2019-08-03 LAB
ANION GAP SERPL CALC-SCNC: 8 MMOL/L (ref 0–11.9)
BASOPHILS # BLD AUTO: 0.2 % (ref 0–1.8)
BASOPHILS # BLD: 0.02 K/UL (ref 0–0.12)
BUN SERPL-MCNC: 10 MG/DL (ref 8–22)
CALCIUM SERPL-MCNC: 8.1 MG/DL (ref 8.5–10.5)
CHLORIDE SERPL-SCNC: 104 MMOL/L (ref 96–112)
CO2 SERPL-SCNC: 24 MMOL/L (ref 20–33)
CREAT SERPL-MCNC: 0.7 MG/DL (ref 0.5–1.4)
EKG IMPRESSION: NORMAL
EOSINOPHIL # BLD AUTO: 0.01 K/UL (ref 0–0.51)
EOSINOPHIL NFR BLD: 0.1 % (ref 0–6.9)
ERYTHROCYTE [DISTWIDTH] IN BLOOD BY AUTOMATED COUNT: 39.3 FL (ref 35.9–50)
GLUCOSE SERPL-MCNC: 130 MG/DL (ref 65–99)
HCT VFR BLD AUTO: 30.8 % (ref 42–52)
HGB BLD-MCNC: 9.6 G/DL (ref 14–18)
IMM GRANULOCYTES # BLD AUTO: 0.03 K/UL (ref 0–0.11)
IMM GRANULOCYTES NFR BLD AUTO: 0.4 % (ref 0–0.9)
LYMPHOCYTES # BLD AUTO: 1.76 K/UL (ref 1–4.8)
LYMPHOCYTES NFR BLD: 21.9 % (ref 22–41)
MAGNESIUM SERPL-MCNC: 2 MG/DL (ref 1.5–2.5)
MCH RBC QN AUTO: 25.7 PG (ref 27–33)
MCHC RBC AUTO-ENTMCNC: 31.2 G/DL (ref 33.7–35.3)
MCV RBC AUTO: 82.4 FL (ref 81.4–97.8)
MONOCYTES # BLD AUTO: 0.54 K/UL (ref 0–0.85)
MONOCYTES NFR BLD AUTO: 6.7 % (ref 0–13.4)
NEUTROPHILS # BLD AUTO: 5.68 K/UL (ref 1.82–7.42)
NEUTROPHILS NFR BLD: 70.7 % (ref 44–72)
NRBC # BLD AUTO: 0 K/UL
NRBC BLD-RTO: 0 /100 WBC
PLATELET # BLD AUTO: 318 K/UL (ref 164–446)
PMV BLD AUTO: 8.4 FL (ref 9–12.9)
POTASSIUM SERPL-SCNC: 3.9 MMOL/L (ref 3.6–5.5)
RBC # BLD AUTO: 3.74 M/UL (ref 4.7–6.1)
SODIUM SERPL-SCNC: 136 MMOL/L (ref 135–145)
TROPONIN T SERPL-MCNC: 24 NG/L (ref 6–19)
TROPONIN T SERPL-MCNC: 42 NG/L (ref 6–19)
VANCOMYCIN TROUGH SERPL-MCNC: 8.3 UG/ML (ref 10–20)
WBC # BLD AUTO: 8 K/UL (ref 4.8–10.8)

## 2019-08-03 PROCEDURE — 85025 COMPLETE CBC W/AUTO DIFF WBC: CPT

## 2019-08-03 PROCEDURE — 770022 HCHG ROOM/CARE - ICU (200)

## 2019-08-03 PROCEDURE — A9270 NON-COVERED ITEM OR SERVICE: HCPCS | Performed by: HOSPITALIST

## 2019-08-03 PROCEDURE — 94760 N-INVAS EAR/PLS OXIMETRY 1: CPT

## 2019-08-03 PROCEDURE — 93010 ELECTROCARDIOGRAM REPORT: CPT | Performed by: INTERNAL MEDICINE

## 2019-08-03 PROCEDURE — 80048 BASIC METABOLIC PNL TOTAL CA: CPT

## 2019-08-03 PROCEDURE — 700102 HCHG RX REV CODE 250 W/ 637 OVERRIDE(OP): Performed by: INTERNAL MEDICINE

## 2019-08-03 PROCEDURE — 99253 IP/OBS CNSLTJ NEW/EST LOW 45: CPT | Performed by: INTERNAL MEDICINE

## 2019-08-03 PROCEDURE — A9270 NON-COVERED ITEM OR SERVICE: HCPCS | Performed by: INTERNAL MEDICINE

## 2019-08-03 PROCEDURE — 99233 SBSQ HOSP IP/OBS HIGH 50: CPT | Performed by: INTERNAL MEDICINE

## 2019-08-03 PROCEDURE — 80202 ASSAY OF VANCOMYCIN: CPT

## 2019-08-03 PROCEDURE — 700117 HCHG RX CONTRAST REV CODE 255: Performed by: INTERNAL MEDICINE

## 2019-08-03 PROCEDURE — 700102 HCHG RX REV CODE 250 W/ 637 OVERRIDE(OP): Performed by: HOSPITALIST

## 2019-08-03 PROCEDURE — 700111 HCHG RX REV CODE 636 W/ 250 OVERRIDE (IP): Performed by: HOSPITALIST

## 2019-08-03 PROCEDURE — 99233 SBSQ HOSP IP/OBS HIGH 50: CPT | Performed by: HOSPITALIST

## 2019-08-03 PROCEDURE — 83735 ASSAY OF MAGNESIUM: CPT

## 2019-08-03 PROCEDURE — 71275 CT ANGIOGRAPHY CHEST: CPT

## 2019-08-03 PROCEDURE — 700105 HCHG RX REV CODE 258: Performed by: INTERNAL MEDICINE

## 2019-08-03 PROCEDURE — 84484 ASSAY OF TROPONIN QUANT: CPT

## 2019-08-03 PROCEDURE — 87150 DNA/RNA AMPLIFIED PROBE: CPT

## 2019-08-03 PROCEDURE — 87186 SC STD MICRODIL/AGAR DIL: CPT

## 2019-08-03 PROCEDURE — 94667 MNPJ CHEST WALL 1ST: CPT

## 2019-08-03 PROCEDURE — 700105 HCHG RX REV CODE 258: Performed by: HOSPITALIST

## 2019-08-03 PROCEDURE — 94668 MNPJ CHEST WALL SBSQ: CPT

## 2019-08-03 PROCEDURE — 87040 BLOOD CULTURE FOR BACTERIA: CPT | Mod: 91

## 2019-08-03 RX ORDER — POTASSIUM CHLORIDE 20 MEQ/1
40 TABLET, EXTENDED RELEASE ORAL ONCE
Status: COMPLETED | OUTPATIENT
Start: 2019-08-03 | End: 2019-08-03

## 2019-08-03 RX ADMIN — METHOCARBAMOL TABLETS 1000 MG: 500 TABLET, COATED ORAL at 11:33

## 2019-08-03 RX ADMIN — SENNOSIDES AND DOCUSATE SODIUM 2 TABLET: 8.6; 5 TABLET ORAL at 17:39

## 2019-08-03 RX ADMIN — GABAPENTIN 100 MG: 100 CAPSULE ORAL at 05:21

## 2019-08-03 RX ADMIN — POTASSIUM CHLORIDE 40 MEQ: 1500 TABLET, EXTENDED RELEASE ORAL at 11:25

## 2019-08-03 RX ADMIN — METHOCARBAMOL TABLETS 1000 MG: 500 TABLET, COATED ORAL at 05:22

## 2019-08-03 RX ADMIN — SODIUM CHLORIDE: 9 INJECTION, SOLUTION INTRAVENOUS at 18:30

## 2019-08-03 RX ADMIN — VANCOMYCIN HYDROCHLORIDE 1600 MG: 500 INJECTION, POWDER, LYOPHILIZED, FOR SOLUTION INTRAVENOUS at 23:09

## 2019-08-03 RX ADMIN — SODIUM CHLORIDE: 9 INJECTION, SOLUTION INTRAVENOUS at 08:01

## 2019-08-03 RX ADMIN — IOHEXOL 80 ML: 350 INJECTION, SOLUTION INTRAVENOUS at 21:31

## 2019-08-03 RX ADMIN — GABAPENTIN 100 MG: 100 CAPSULE ORAL at 17:39

## 2019-08-03 RX ADMIN — HYDROMORPHONE HYDROCHLORIDE 0.5 MG: 1 INJECTION, SOLUTION INTRAMUSCULAR; INTRAVENOUS; SUBCUTANEOUS at 20:52

## 2019-08-03 RX ADMIN — GABAPENTIN 100 MG: 100 CAPSULE ORAL at 11:25

## 2019-08-03 RX ADMIN — METHOCARBAMOL TABLETS 1000 MG: 500 TABLET, COATED ORAL at 18:12

## 2019-08-03 RX ADMIN — VANCOMYCIN HYDROCHLORIDE 2 G: 500 INJECTION, POWDER, LYOPHILIZED, FOR SOLUTION INTRAVENOUS at 14:18

## 2019-08-03 RX ADMIN — METOPROLOL TARTRATE 25 MG: 25 TABLET, FILM COATED ORAL at 17:39

## 2019-08-03 RX ADMIN — HYDROCODONE BITARTRATE AND ACETAMINOPHEN 1 TABLET: 5; 325 TABLET ORAL at 02:24

## 2019-08-03 RX ADMIN — VANCOMYCIN HYDROCHLORIDE 2 G: 500 INJECTION, POWDER, LYOPHILIZED, FOR SOLUTION INTRAVENOUS at 03:07

## 2019-08-03 ASSESSMENT — ENCOUNTER SYMPTOMS
TINGLING: 0
DOUBLE VISION: 0
LOSS OF CONSCIOUSNESS: 0
SHORTNESS OF BREATH: 0
DIARRHEA: 0
VOMITING: 0
MYALGIAS: 1
ABDOMINAL PAIN: 0
BACK PAIN: 1
CONSTIPATION: 0
FOCAL WEAKNESS: 0
COUGH: 0
DIZZINESS: 0
PALPITATIONS: 0
HEADACHES: 0
NAUSEA: 0
CHILLS: 0
SORE THROAT: 0
BACK PAIN: 0
SPUTUM PRODUCTION: 0
NERVOUS/ANXIOUS: 0
CONSTIPATION: 1
DEPRESSION: 0
BLURRED VISION: 0
SHORTNESS OF BREATH: 1
SENSORY CHANGE: 1
FEVER: 0

## 2019-08-03 NOTE — PROGRESS NOTES
1710: patient arrived from OR with OR team. Patient tachycardic in the 150s. MD aware. Cards consulted. Patient is lethargic but intact neurologically.     Dr Smith at bedside. New orders placed.    1915: Dr. Gonda paged regarding hemoglobin 7.9 from 11.9. . MD orders one L LR bolus. Recheck hemoglobin in 4hrs.

## 2019-08-03 NOTE — FLOWSHEET NOTE
Respiratory Rapid Response Note        Breath Sounds  RUL Breath Sounds: Clear (08/02/19 0800)  RML Breath Sounds: Clear (08/02/19 0800)  RLL Breath Sounds: Diminished (08/02/19 0800)  JARRELL Breath Sounds: Clear (08/02/19 0800)  LLL Breath Sounds: Diminished (08/02/19 0800)                     O2 (LPM): 0 (08/02/19 0811)       Events/Summary/Plan: Rapid response called in OR 10.  Pt. had coded but brought back quickly.  2 RTs here.  Going to another rapid just called.  Other RT staying here. (08/02/19 8884)  Transferred to ICU

## 2019-08-03 NOTE — CARE PLAN
Pt on PEP/IS therapy after CPR was preformed yesterday. Pt not meeting IS grid goals but encouraging and seeing improvement.

## 2019-08-03 NOTE — PROGRESS NOTES
[facility] Medicine Daily Progress Note    Date of Service  8/3/2019    Chief Complaint  23 y.o. male admitted 7/31/2019 with drainage from surgical wound    Hospital Course    Hx of Lmbar laminectomy and microdiscectomy on 7/12 by Dr Ruelas.  Came to the ED with increased drainage from wound vac.   Taken to the OR on 8/2 for I&D.  After induction pt went into VTach and VFib requiring cardioversion x 3.  GIven IV AMio and Cardiology consulted.         Interval Problem Update  Pt notes some back pain but feels it's less then a few days ago.  No F or C.  Hasn't had a BM since admission however does not feel he needs to go and notes very little po intake since admission  Sinus on the monitor  UOP 1450ml/24hrs  D3 Vanc    Consultants/Specialty  Neurosurgery  ID    Code Status  full    Disposition  OK to floor    Review of Systems  Review of Systems   Constitutional: Negative for chills and fever.   HENT: Negative for nosebleeds and sore throat.    Eyes: Negative for blurred vision and double vision.   Respiratory: Negative for cough and shortness of breath.    Cardiovascular: Negative for chest pain, palpitations and leg swelling.   Gastrointestinal: Positive for constipation. Negative for abdominal pain, diarrhea, nausea and vomiting.   Genitourinary: Negative for dysuria, frequency and urgency.   Musculoskeletal: Positive for back pain.   Skin: Negative for rash.   Neurological: Positive for sensory change. Negative for dizziness, focal weakness, loss of consciousness and headaches.        Some numbness on volar aspect of L foot; present since the surgery and unchanged        Physical Exam  Temp:  [36.5 °C (97.7 °F)-37.8 °C (100 °F)] 36.5 °C (97.7 °F)  Pulse:  [106-149] 112  Resp:  [16-42] 35  BP: ()/(49-84) 126/68  SpO2:  [92 %-100 %] 100 %    Physical Exam   Constitutional: He is oriented to person, place, and time. He appears well-developed and well-nourished. No distress.   HENT:   Head: Normocephalic and  atraumatic.   Nose: Nose normal.   Mouth/Throat: Oropharynx is clear and moist.   Eyes: Conjunctivae are normal.   Neck: No JVD present.   Cardiovascular: Normal rate. Exam reveals no gallop.   No murmur heard.  Pulmonary/Chest: Effort normal. No stridor. No respiratory distress. He has no wheezes. He has no rales.   Abdominal: Soft. There is no tenderness. There is no rebound and no guarding.   Musculoskeletal: He exhibits no edema.   Neurological: He is alert and oriented to person, place, and time.   Strength 5/5 B lower Ext  Sensation intact to light touch   Skin: Skin is warm and dry. No rash noted. He is not diaphoretic.   Psychiatric: He has a normal mood and affect. Thought content normal.   Nursing note and vitals reviewed.      Fluids    Intake/Output Summary (Last 24 hours) at 8/3/2019 0555  Last data filed at 8/3/2019 0400  Gross per 24 hour   Intake 5909.05 ml   Output 1425 ml   Net 4484.05 ml       Laboratory  Recent Labs     08/02/19 0227 08/02/19 1830 08/02/19 2116 08/03/19 0316   WBC 8.9 17.3*  --  8.0   RBC 4.41* 2.98*  --  3.74*   HEMOGLOBIN 11.9* 7.9* 10.5* 9.6*   HEMATOCRIT 36.7* 24.9* 31.9* 30.8*   MCV 83.2 83.6  --  82.4   MCH 27.0 26.5*  --  25.7*   MCHC 32.4* 31.7*  --  31.2*   RDW 39.0 39.1  --  39.3   PLATELETCT 364 300  --  318   MPV 8.3* 8.3*  --  8.4*     Recent Labs     08/02/19 0227 08/02/19 1830 08/03/19  0316   SODIUM 134* 137 136   POTASSIUM 3.8 2.8* 3.9   CHLORIDE 103 111 104   CO2 20 17* 24   GLUCOSE 100* 113* 130*   BUN 8 8 10   CREATININE 0.63 0.63 0.70   CALCIUM 8.6 7.0* 8.1*     Recent Labs     08/02/19 2050   INR 1.19*               Imaging  EC-ECHOCARDIOGRAM COMPLETE W/ CONT   Final Result      DX-CHEST-LIMITED (1 VIEW)   Final Result      No evidence of acute cardiopulmonary process.      MR-LUMBAR SPINE-WITH & W/O   Final Result      1.  Stable postsurgical changes L3-L5 laminectomies. Interval ill-defined nonenhancing fluid within the laminectomy bed most  pronounced at the L4-L5 level. Infection should be excluded clinically.   2.  There is diffuse enhancement of the cauda equina nerve roots which is presumably infectious/inflammatory.   3.  No evidence of discitis osteomyelitis or epidural abscess.   4.  Degenerative changes as detailed above with persistent moderate thecal sac stenosis at L2-L3 and L4-L5.           Assessment/Plan  * Cardiac arrest (HCC)  Assessment & Plan  Cards consulted  EP consult pending  Follow and maximize K, Mg, Phos    Elevated liver enzymes  Assessment & Plan  Secondary to VT/VF arrest most likely  Cont to follow, work up further if fail to normalize    Other inflammatory polyneuropathies (HCC)- (present on admission)  Assessment & Plan  Neurontin 100 mg 3 times daily    Infected surgical wound- (present on admission)  Assessment & Plan  Wound care    Pain management with IV Dilaudid    Antibiotics IV vancomycin    Plan for I&D as per Dr. crenshaw    Wound drainage- (present on admission)  Assessment & Plan  Infected with MRSA    Antibiotics IV vancomycin    Consult ID    Was taken to the OR however had VT/VF arrest with induction and procedure was aborted    Sinus tachycardia- (present on admission)  Assessment & Plan  Beta-blocker      Acute bilateral back pain- (present on admission)  Assessment & Plan  Back pain is intractable    P.o. Vicodin    PRN IV Dilaudid       VTE prophylaxis: none due to recent surgery

## 2019-08-03 NOTE — PROGRESS NOTES
Pt tx to T630 in bed on tx monitor with all belongings. All pt questions and concerns addressed. Report given to ITA RN

## 2019-08-03 NOTE — PROGRESS NOTES
Pulmonary/Critical Care Medicine   Progress Note    Date of service: 8/2/2019  Time: 1930    I went to patient's bedside to reassess him given his developing hypotension and ongoing work-up for causes of cardiac arrest preoperatively.  Patient's hemoglobin had dropped by 4 points without any obvious source of bleeding.  Patient denies any history of gastrointestinal hemorrhage, NSAID use, melena, alcohol abuse.  He denies any abdominal pain.  He has had purulent discharge from his back wound but no bloody output and his surgery did not go as scheduled so unlikely to be bleeding from that.  I ordered a tagged/platelet mapping, type and screen, INR in anticipation that he may need blood transfusion with the developing hypotension.  I also got a call by cardiology regarding his echocardiogram which showed essentially normal heart function but an inferior vena cava that is nearly flat consistent with hypovolemia.  Patient endorses some chest pain post CPR but no shortness of breath, headache, nausea and he is neurologically intact.  I will keep him n.p.o. at this time pending evaluation for possible bleeding and a repeat hemoglobin is pending.  Should it not reveal true anemia than this is likely a lab error and I will allow him to eat.  He will be given ongoing IV fluid resuscitation monitoring his blood pressure, mentation, urine output closely.  He is undergoing continuous telemetry monitoring given his recent V. fib arrest and cardiology has been consulted.  I am replacing his potassium aggressively and will reassess a level after repletion.  His magnesium level is at goal.    I spent 47 min in reviewing the patient's condition, physical examination, laboratory and imaging data, prior documentation, in discussion with RN, RT, patient, and in formulating an assessment/plan.    Critical Care time: 47 min. No time overlap, procedures not included in time.  [id number]

## 2019-08-03 NOTE — CONSULTS
Critical Care Consultation    Date of consult: 8/2/2019    Referring Physician  Humberto Davis M.D.    Reason for Consultation  Cardiac arrest    History of Presenting Illness  23 y.o. male who presented 7/31/2019 with infection of lumbar laminectomy.  Patient presented 6/17/2019 with congenital spinal stenosis and severe L4-L5 stenosis with disc herniation, L2-L3 moderate to severe stenosis, and L5-S1 moderate to severe stenosis with morbid obesity and intractable pain.  At that time he had L4 and L5 and S1 decompressive laminectomy, bilateral medial facetectomies and bilateral foraminotomies with aborted L2-L3 and L3-L4 laminectomies due to loss of signal.  He subsequently returned on 7/12 to have L2, L3, L4, L5, S1 laminectomy, and a L4-L5 microdiscectomy and was hospitalized 7/5-22.  Further during that hospitalization he was noted to have persistent sinus tachycardia, had echocardiogram which was unremarkable, and was initiated on metoprolol.      Patient presented to ED on 7/27 after indicating leaking wound VAC for which he had wound drainage and cleanout performed 7/25.  Patient returned again on 7/31 with ongoing concerns of wound drainage and at which point was admitted and found to have ill-defined nonenhancing fluid within the laminectomy bed most pronounced at L4-L5 level as well as diffuse enhancement of the cauda equina nerve roots presumably infectious/inflammatory on MRI.  Wound culture grew MRSA.  Plan today was for I&D of wound.    Patient went to the OR for I&D and apparently after being intubated yet prior to initiation of surgical procedure patient went into a ventricular tachycardic rhythm followed by reported VChun levin, he had a total of 3 shocks before reestablishing sinus rhythm further he also was given 150 mg of amiodarone.  As there was concern of possible hyperkalemia post succinylcholine as paralytic he was given 20 of IV insulin and calcium.  Immediately after returning to sinus rhythm  patient was awake agitated and looking to pull out ET tube.  He was subsequently was extubated and surgery canceled.    At this time he complains of unchanged back pain, denies any palpitations or chest discomfort, denies significant shortness of breath, no visual changes or lightheadedness.    Code Status  Full Code    Review of Systems  Review of Systems   Constitutional: Negative for chills, fever and malaise/fatigue.   HENT: Negative for congestion.    Eyes: Negative for blurred vision and double vision.   Respiratory: Negative for cough, sputum production and shortness of breath.    Cardiovascular: Negative for chest pain and palpitations.   Gastrointestinal: Negative for abdominal pain, nausea and vomiting.   Genitourinary: Negative for dysuria.   Neurological: Negative for focal weakness.   Psychiatric/Behavioral: Negative for depression.   All other systems reviewed and are negative.      Past Medical History   has a past medical history of Morbid obesity (HCC) and Tachycardia.    Surgical History   has a past surgical history that includes lumbar laminectomy diskectomy (N/A, 6/17/2019) and lumbar laminectomy diskectomy (7/12/2019).    Family History  family history is not on file.  Reviewed not contributory      Social History   reports that he quit smoking about 7 months ago. His smoking use included cigarettes. He smoked 0.25 packs per day. He has never used smokeless tobacco. He reports that he drinks alcohol. He reports that he has current or past drug history. Drug: Inhaled.    Medications  Home Medications     Reviewed by Karen Sanchez R.N. (Registered Nurse) on 08/02/19 at 1503  Med List Status: Complete   Medication Last Dose Status   cyclobenzaprine (FLEXERIL) 10 MG Tab 7/31/2019 Active   HYDROcodone-acetaminophen (NORCO) 5-325 MG Tab per tablet 7/30/2019 Active   methocarbamol (ROBAXIN) 500 MG Tab 7/31/2019 Active   naproxen (NAPROSYN) 500 MG Tab 7/31/2019 Active   senna-docusate (PERICOLACE  OR SENOKOT S) 8.6-50 MG Tab 7/31/2019 Active              Current Facility-Administered Medications   Medication Dose Route Frequency Provider Last Rate Last Dose   • ondansetron (ZOFRAN) syringe/vial injection 4 mg  4 mg Intravenous Once PRN Kenneth Ybarra M.D.       • haloperidol lactate (HALDOL) injection 1 mg  1 mg Intravenous Q15 MIN PRN Kenneth Ybarra M.D.       • diphenhydrAMINE (BENADRYL) injection 12.5 mg  12.5 mg Intravenous Q15 MIN PRN Kenneth Ybarra M.D.       • lactated ringers infusion   Intravenous Continuous Kenneth Ybarra M.D.       • oxyCODONE (ROXICODONE) oral solution 5 mg  5 mg Oral Once PRN Kenneth Ybarra M.D.        Or   • oxyCODONE (ROXICODONE) oral solution 10 mg  10 mg Oral Once PRN Kenneth Ybarra M.D.       • meperidine (DEMEROL) injection 25 mg  25 mg Intravenous Q5 MIN PRN Kenneth Ybarra M.D.       • LORazepam (ATIVAN) injection 1 mg  1 mg Intravenous Q10 MIN PRN Kenneth Ybarra M.D.       • labetalol (NORMODYNE,TRANDATE) injection 5 mg  5 mg Intravenous Q5 MIN PRN Kenneth Ybarra M.D.       • [MAR Hold] gabapentin (NEURONTIN) capsule 100 mg  100 mg Oral TID Humberto Davis M.D.   Stopped at 08/02/19 1200   • [MAR Hold] HYDROmorphone pf (DILAUDID) injection 0.5 mg  0.5 mg Intravenous Q4HRS PRN Humberto Davis M.D.   0.5 mg at 08/02/19 1418   • [MAR Hold] MD Alert...Vancomycin per Pharmacy   Other PHARMACY TO DOSE Humberto Davis M.D.       • NS infusion   Intravenous Continuous Humberto Davis M.D. 83 mL/hr at 08/02/19 1025     • [MAR Hold] metoprolol (LOPRESSOR) tablet 25 mg  25 mg Oral TWICE DAILY Humberto Davis M.D.   25 mg at 08/02/19 0504   • [MAR Hold] vancomycin (VANCOCIN) 2 g in  mL IVPB  2 g Intravenous Q12HR Humberto Davis M.D.   Stopped at 08/02/19 1519   • [MAR Hold] HYDROcodone-acetaminophen (NORCO) 5-325 MG per tablet 1 Tab  1 Tab Oral Q4HRS PRN Chadwick Green M.D.   1 Tab at 08/02/19 1024   • [MAR Hold] methocarbamol (ROBAXIN) tablet 1,000 mg  1,000 mg Oral TID  "Chadwick Green M.D.   Stopped at 08/02/19 1200   • [MAR Hold] senna-docusate (PERICOLACE or SENOKOT S) 8.6-50 MG per tablet 2 Tab  2 Tab Oral BID Chadwick Green M.D.   2 Tab at 08/01/19 1803    And   • [MAR Hold] polyethylene glycol/lytes (MIRALAX) PACKET 1 Packet  1 Packet Oral QDAY PRN Chadwick Green M.D.        And   • [MAR Hold] magnesium hydroxide (MILK OF MAGNESIA) suspension 30 mL  30 mL Oral QDAY PRN Chadwick Green M.D.        And   • [MAR Hold] bisacodyl (DULCOLAX) suppository 10 mg  10 mg Rectal QDAY PRN Chadwick Green M.D.       • [MAR Hold] acetaminophen (TYLENOL) tablet 650 mg  650 mg Oral Q6HRS PRN Chadwick Green M.D.   650 mg at 08/01/19 0506   • [MAR Hold] ondansetron (ZOFRAN) syringe/vial injection 4 mg  4 mg Intravenous Q4HRS PRN Chadwick Green M.D.       • [MAR Hold] ondansetron (ZOFRAN ODT) dispertab 4 mg  4 mg Oral Q4HRS PRN Chadwick Green M.D.       • [MAR Hold] promethazine (PHENERGAN) tablet 12.5-25 mg  12.5-25 mg Oral Q4HRS PRN Chadwick Green M.D.       • [MAR Hold] promethazine (PHENERGAN) suppository 12.5-25 mg  12.5-25 mg Rectal Q4HRS PRN Chadwick Green M.D.       • [MAR Hold] prochlorperazine (COMPAZINE) injection 5-10 mg  5-10 mg Intravenous Q4HRS PRN Chadwick Green M.D.         Facility-Administered Medications Ordered in Other Encounters   Medication Dose Route Frequency Provider Last Rate Last Dose   • calcium CHLORIDE injection    PRN Kenneth Ybarra M.D.   1 g at 08/02/19 1634       Allergies  Allergies   Allergen Reactions   • Other Misc      Cat allergy- \"start sneezing\"         Vital Signs last 24 hours  Temp:  [36.6 °C (97.9 °F)-37 °C (98.6 °F)] 36.9 °C (98.5 °F)  Pulse:  [114-122] 122  Resp:  [16-20] 16  BP: (115-121)/(60-84) 121/84  SpO2:  [92 %-94 %] 92 %    Physical Exam  Physical Exam   Constitutional: He appears well-developed and well-nourished.   Morbidly obese   HENT:   Head: Normocephalic and atraumatic. "   Eyes: Pupils are equal, round, and reactive to light. Conjunctivae are normal.   Neck: No tracheal deviation present.   Cardiovascular: Intact distal pulses.   Sinus tachycardia   Pulmonary/Chest: He has no wheezes. He has no rales.   Diminished   Abdominal: Soft. He exhibits no distension. There is no tenderness.   Musculoskeletal: He exhibits no edema.   Neurological: No cranial nerve deficit.   Grossly intact sensation bilateral lower extremities, 4 out of 5 strength bilaterally   Skin: Skin is warm and dry.   Psychiatric:   Low mood   Nursing note and vitals reviewed.      Fluids    Intake/Output Summary (Last 24 hours) at 8/2/2019 1720  Last data filed at 8/2/2019 1714  Gross per 24 hour   Intake 400 ml   Output 2175 ml   Net -1775 ml       Laboratory  Recent Results (from the past 48 hour(s))   Basic Metabolic Panel (BMP)    Collection Time: 08/01/19  3:37 AM   Result Value Ref Range    Sodium 135 135 - 145 mmol/L    Potassium 4.0 3.6 - 5.5 mmol/L    Chloride 102 96 - 112 mmol/L    Co2 24 20 - 33 mmol/L    Glucose 95 65 - 99 mg/dL    Bun 10 8 - 22 mg/dL    Creatinine 0.67 0.50 - 1.40 mg/dL    Calcium 8.8 8.5 - 10.5 mg/dL    Anion Gap 9.0 0.0 - 11.9   CBC with Differential    Collection Time: 08/01/19  3:37 AM   Result Value Ref Range    WBC 9.6 4.8 - 10.8 K/uL    RBC 4.55 (L) 4.70 - 6.10 M/uL    Hemoglobin 12.1 (L) 14.0 - 18.0 g/dL    Hematocrit 38.4 (L) 42.0 - 52.0 %    MCV 84.4 81.4 - 97.8 fL    MCH 26.6 (L) 27.0 - 33.0 pg    MCHC 31.5 (L) 33.7 - 35.3 g/dL    RDW 39.2 35.9 - 50.0 fL    Platelet Count 392 164 - 446 K/uL    MPV 8.4 (L) 9.0 - 12.9 fL    Neutrophils-Polys 63.30 44.00 - 72.00 %    Lymphocytes 27.10 22.00 - 41.00 %    Monocytes 7.80 0.00 - 13.40 %    Eosinophils 1.10 0.00 - 6.90 %    Basophils 0.30 0.00 - 1.80 %    Immature Granulocytes 0.40 0.00 - 0.90 %    Nucleated RBC 0.00 /100 WBC    Neutrophils (Absolute) 6.08 1.82 - 7.42 K/uL    Lymphs (Absolute) 2.61 1.00 - 4.80 K/uL    Monos (Absolute)  0.75 0.00 - 0.85 K/uL    Eos (Absolute) 0.11 0.00 - 0.51 K/uL    Baso (Absolute) 0.03 0.00 - 0.12 K/uL    Immature Granulocytes (abs) 0.04 0.00 - 0.11 K/uL    NRBC (Absolute) 0.00 K/uL   ESTIMATED GFR    Collection Time: 08/01/19  3:37 AM   Result Value Ref Range    GFR If African American >60 >60 mL/min/1.73 m 2    GFR If Non African American >60 >60 mL/min/1.73 m 2   CBC WITH DIFFERENTIAL    Collection Time: 08/02/19  2:27 AM   Result Value Ref Range    WBC 8.9 4.8 - 10.8 K/uL    RBC 4.41 (L) 4.70 - 6.10 M/uL    Hemoglobin 11.9 (L) 14.0 - 18.0 g/dL    Hematocrit 36.7 (L) 42.0 - 52.0 %    MCV 83.2 81.4 - 97.8 fL    MCH 27.0 27.0 - 33.0 pg    MCHC 32.4 (L) 33.7 - 35.3 g/dL    RDW 39.0 35.9 - 50.0 fL    Platelet Count 364 164 - 446 K/uL    MPV 8.3 (L) 9.0 - 12.9 fL    Neutrophils-Polys 70.80 44.00 - 72.00 %    Lymphocytes 21.90 (L) 22.00 - 41.00 %    Monocytes 6.60 0.00 - 13.40 %    Eosinophils 0.10 0.00 - 6.90 %    Basophils 0.30 0.00 - 1.80 %    Immature Granulocytes 0.30 0.00 - 0.90 %    Nucleated RBC 0.00 /100 WBC    Neutrophils (Absolute) 6.32 1.82 - 7.42 K/uL    Lymphs (Absolute) 1.96 1.00 - 4.80 K/uL    Monos (Absolute) 0.59 0.00 - 0.85 K/uL    Eos (Absolute) 0.01 0.00 - 0.51 K/uL    Baso (Absolute) 0.03 0.00 - 0.12 K/uL    Immature Granulocytes (abs) 0.03 0.00 - 0.11 K/uL    NRBC (Absolute) 0.00 K/uL   Basic Metabolic Panel    Collection Time: 08/02/19  2:27 AM   Result Value Ref Range    Sodium 134 (L) 135 - 145 mmol/L    Potassium 3.8 3.6 - 5.5 mmol/L    Chloride 103 96 - 112 mmol/L    Co2 20 20 - 33 mmol/L    Glucose 100 (H) 65 - 99 mg/dL    Bun 8 8 - 22 mg/dL    Creatinine 0.63 0.50 - 1.40 mg/dL    Calcium 8.6 8.5 - 10.5 mg/dL    Anion Gap 11.0 0.0 - 11.9   ESTIMATED GFR    Collection Time: 08/02/19  2:27 AM   Result Value Ref Range    GFR If African American >60 >60 mL/min/1.73 m 2    GFR If Non African American >60 >60 mL/min/1.73 m 2   VANCOMYCIN TROUGH LEVEL    Collection Time: 08/02/19  9:43 AM    Result Value Ref Range    Vancomycin Trough 15.0 10.0 - 20.0 ug/mL       Imaging  DX-CHEST-LIMITED (1 VIEW)   Final Result      No evidence of acute cardiopulmonary process.      MR-LUMBAR SPINE-WITH & W/O   Final Result      1.  Stable postsurgical changes L3-L5 laminectomies. Interval ill-defined nonenhancing fluid within the laminectomy bed most pronounced at the L4-L5 level. Infection should be excluded clinically.   2.  There is diffuse enhancement of the cauda equina nerve roots which is presumably infectious/inflammatory.   3.  No evidence of discitis osteomyelitis or epidural abscess.   4.  Degenerative changes as detailed above with persistent moderate thecal sac stenosis at L2-L3 and L4-L5.          Assessment/Plan  * Cardiac arrest (HCC)  Assessment & Plan  Not a TTM candidate given fully neurologically intact  Post intubation had V. tach requiring shock x3  Status post 150 mg amiodarone  Stat EKG  Stat electrolytes  Patient fully intact at this time, uncertain if actual pulses were lost  Continuous cardiac monitoring  Repeat echocardiogram    Morbidly obese (HCC)  Assessment & Plan  Recommend weight loss    Infected surgical wound- (present on admission)  Assessment & Plan  Had plan for I&D today however was aborted post cardiac arrest  MRSA on culture 7/31  Continue empiric antibiotics  Wound care  Follow-up NS recommendations  Currently without bowel/bladder involvement and intact strength and sensation of lower extremities    Sinus tachycardia- (present on admission)  Assessment & Plan  Uncertain etiology, appears has been persistent chronic for a long duration  Was initiated on metoprolol on last hospitalization  Echo 6/2019 unremarkable  TSH in June was WNL      Discussed patient condition and risk of morbidity and/or mortality with RN, RT, Pharmacy and Patient.    The patient remains critically ill.  Critical care time = 45 minutes in directly providing and coordinating critical care and  extensive data review.  No time overlap and excludes procedures.

## 2019-08-03 NOTE — ANESTHESIA PROCEDURE NOTES
Arterial Line  Performed by: Kenneth Ybarra M.D.  Authorized by: Kenneth Ybarra M.D.     Start Time:  8/2/2019 5:00 PM  End Time:  8/2/2019 5:05 PM  Catheter Size:  20 G  Seldinger Technique?: No    Laterality:  Right  Site:  Radial artery  Line Secured:  Transparent dressing  Events: patient tolerated procedure well with no complications

## 2019-08-03 NOTE — ANESTHESIA TIME REPORT
Anesthesia Start and Stop Event Times     Date Time Event    8/2/2019 1456 Ready for Procedure     1626 Anesthesia Start     1714 Anesthesia Stop        Responsible Staff    No responsible staff documented.       Preop Diagnosis (Free Text):  Pre-op Diagnosis             Preop Diagnosis (Codes):    Post op Diagnosis  Cardiac arrest      Premium Reason  A. 3PM - 7AM    Comments: ventricular tachardic arrest post intubation and successful resustation

## 2019-08-03 NOTE — ASSESSMENT & PLAN NOTE
Not a TTM candidate given fully neurologically intact  8/2 post intubation had V. tach requiring shock x3  Status post 150 mg amiodarone  EKG with sinus tachycardia, normal QTC  Continuous cardiac monitoring  Echo 8/2 showed hyperdynamic state normal EF

## 2019-08-03 NOTE — PROGRESS NOTES
"Cardiology Follow-up Consult Note    Date of Service:    8/3/2019      Consulting Physician: Humberto Davis M.D.    Patient ID:    Name:   Sammy Martin   YOB: 1995  Age:   23 y.o.  male   MRN:   [id number]      Reason for Consultation: cardiac arrest    HPI/Patient ID:    Mr. Edmonds is a 22 yo M with morbid obesity and former cigarette smoking who underwent lumbar laminectomy on 7/12/2019 complicated by post-operative infection. He presented to the ER on 7/31/2019 and was taken to the OR for I+D on 8/2/2019. Shortly after intubation he developed ventricular tachycardia (no strips available) requiring defibrillation. He then had two episodes of ventricular fibrillation (strips in the chart) which resolved after two further defibrillations.     Interim Events:  # Remains in sinus tachycardia  # has MSK chest pain and mild abdominal pain  # no recurrent ventricular arrhythmias.       Past medical, surgical, social, and family history reviewed and unchanged from admission except as noted in assessment and plan.    Medications: Reviewed in MAR      Allergies   Allergen Reactions   • Other Misc      Cat allergy- \"start sneezing\"             Physical Exam  Body mass index is 41.26 kg/m².  BP (!) 94/61   Pulse (!) 106   Temp 36.4 °C (97.6 °F) (Temporal)   Resp (!) 29   Ht 1.829 m (6')   Wt (!) 138 kg (304 lb 3.8 oz)   SpO2 99%   Vitals:    08/03/19 0300 08/03/19 0400 08/03/19 0500 08/03/19 0600   BP: 117/68 126/68 126/68 (!) 94/61   Pulse: (!) 112 (!) 115 (!) 104 (!) 106   Resp: (!) 35 (!) 37 (!) 31 (!) 29   Temp:  36.5 °C (97.7 °F)  36.4 °C (97.6 °F)   TempSrc:  Temporal  Temporal   SpO2: 100% 100% 99% 99%   Weight:       Height:         Oxygen Therapy:  Pulse Oximetry: 99 %, O2 (LPM): 2, O2 Delivery: Nasal Cannula    General: No apparent distress  Eyes: nl conjunctiva  ENT: OP clear  Neck: JVP indeterminate  Lungs: normal respiratory effort, CTAB anteriorly, tachypneic.   Heart: " tachycardic, regular, RRR, distant heart sounds, no murmurs, no rubs or gallops, trace edema bilateral lower extremities. No LV/RV heave on cardiac palpatation. 2+ bilateral radial pulses.  2+ bilateral dp pulses.   Abdomen: soft, mild LUQ ttp non distended, no masses, normal bowel sounds.  No HSM.  Extremities/MSK: no clubbing, no cyanosis  Neurological: No focal sensory deficits  Psychiatric: Appropriate affect, A/O x 3  Skin: Warm extremities      Labs (personally reviewed and notable for):   Lab Results   Component Value Date/Time    SODIUM 136 08/03/2019 03:16 AM    POTASSIUM 3.9 08/03/2019 03:16 AM    CHLORIDE 104 08/03/2019 03:16 AM    CO2 24 08/03/2019 03:16 AM    GLUCOSE 130 (H) 08/03/2019 03:16 AM    BUN 10 08/03/2019 03:16 AM    CREATININE 0.70 08/03/2019 03:16 AM      Lab Results   Component Value Date/Time    WBC 8.0 08/03/2019 03:16 AM    RBC 3.74 (L) 08/03/2019 03:16 AM    HEMOGLOBIN 9.6 (L) 08/03/2019 03:16 AM    HEMATOCRIT 30.8 (L) 08/03/2019 03:16 AM    MCV 82.4 08/03/2019 03:16 AM    MCH 25.7 (L) 08/03/2019 03:16 AM    MCHC 31.2 (L) 08/03/2019 03:16 AM    MPV 8.4 (L) 08/03/2019 03:16 AM    NEUTSPOLYS 70.70 08/03/2019 03:16 AM    LYMPHOCYTES 21.90 (L) 08/03/2019 03:16 AM    MONOCYTES 6.70 08/03/2019 03:16 AM    EOSINOPHILS 0.10 08/03/2019 03:16 AM    BASOPHILS 0.20 08/03/2019 03:16 AM              Cardiac Imaging and Procedures Review:    EKG dated 6/15/2019 through 8/2/2019:   Personally reviewed and each showed sinus tachycardia.     Echo dated 6/21/2019:   Personally reviewed,   CONCLUSIONS  No prior study is available for comparison.   Normal left ventricular systolic function.  Left ventricular ejection fraction is visually estimated to be 60%.  No significant valve abnormalities.     Repeat echo personally reviewed from 8/2/2019 and showed significant tachycardia of 140, RV enlargement not well seen on the previous study, otherwise unchanged.     Radiology test Review:  CXR: 8/1/2019     The  cardiac silhouette and mediastinal contours are stable.    No discrete opacity, pleural fluid, or pneumothorax. The left costophrenic angle is excluded from the image.    No suspicious bony lesions.      Impression and Medical Decision Making:  # Ventricular tachycardia cardiac arrest : post-intubation in the OR after succ and ainsley were given. No pre-disposing cardiac abnormality or history, no FH of cardiac issues, and normal electrolytes were noted day of surgery (low potassiums were noted after insulin given in OR)  # Elevated troponin, peak 98, possibly just due to shocking.   # Sinus tachycardia - chronic since he was diagnosed with congenital spinal stenosis. Suspect inappropriate sinus tachycardia vs secondary to pain. Could also be due to acute blood loss which will be monitored closely or pulmonary embolism which I do think we should rule out with CT PE protocol.    # Anemia - acute, resolving.   # Morbid obesity, BMI 41  # Recent lumbar laminectomy 7/12/2019 complicated by post-operative infection.   # Acute Transaminitis    Recommendations:  # Discussed case with Dr. Dc from EP. Given no clear reason for his VT arrest, he would be a candidate for ICD for secondary prevention, however this cannot be placed during acute infection. Would aggressively treat infection and if surgery is needed he is medically optimized for surgery and it should proceed. If he does have recurrent VT or VF, he should be shocked and given amiodarone gtt and surgery should proceed. Currently no indication for ischemic work-up given his age and normal EKG and echo pre-and post arrest.  Please contact EP prior to discharge to schedule ICD placement prior to discharge after his infection has resolved.   # restart metoprolol tartrate 25mg PO bid for inappropriate sinus tachycardia  # check CT PE protocol and TSH  # recommend outpatient sleep study.     Discussed with Dr. Smith.       Thank you for allowing me to participate in the  care of this patient, Cardiology will sign off for now, please call back when patient is ready for DC and no longer has an acute infection to be evaluated and scheduled by EP for ICD. Discussed with Dr. Dc.  Please contact me with any questions.      Sea Mcghee MD  Cardiologist, [facility]   [phone]

## 2019-08-03 NOTE — ASSESSMENT & PLAN NOTE
S/p cardiac arrest prior to I&D, 3 shocks, surgery canceled  Cards consulted  EP Recomendations:  They will not place the ICD until after his spinal infection is drained and treated

## 2019-08-03 NOTE — ASSESSMENT & PLAN NOTE
Uncertain etiology, appears has been persistent chronic for a long duration  Was initiated on metoprolol on last hospitalization  Echo 6/2019 unremarkable  TSH in June was WNL  CT PE study negative

## 2019-08-03 NOTE — PROGRESS NOTES
Critical Care Progress Note    Date of admission  7/31/2019    Chief Complaint  23 y.o. male admitted 7/31/2019 with V. tach/V. fib cardiac arrest    Hospital Course    23 y.o. male who presented 7/31/2019 with infection of lumbar laminectomy.  Patient presented 6/17/2019 with congenital spinal stenosis and severe L4-L5 stenosis with disc herniation, L2-L3 moderate to severe stenosis, and L5-S1 moderate to severe stenosis with morbid obesity and intractable pain.  At that time he had L4 and L5 and S1 decompressive laminectomy, bilateral medial facetectomies and bilateral foraminotomies with aborted L2-L3 and L3-L4 laminectomies due to loss of signal.  He subsequently returned on 7/12 to have L2, L3, L4, L5, S1 laminectomy, and a L4-L5 microdiscectomy and was hospitalized 7/5-22.  Further during that hospitalization he was noted to have persistent sinus tachycardia, had echocardiogram which was unremarkable, and was initiated on metoprolol.       Patient presented to ED on 7/27 after indicating leaking wound VAC for which he had wound drainage and cleanout performed 7/25.  Patient returned again on 7/31 with ongoing concerns of wound drainage and at which point was admitted and found to have ill-defined nonenhancing fluid within the laminectomy bed most pronounced at L4-L5 level as well as diffuse enhancement of the cauda equina nerve roots presumably infectious/inflammatory on MRI.  Wound culture grew MRSA.  Plan today was for I&D of wound.     Patient went to the OR for I&D and apparently after being intubated yet prior to initiation of surgical procedure patient went into a ventricular tachycardic rhythm followed by reported VChun fib, he had a total of 3 shocks before reestablishing sinus rhythm further he also was given 150 mg of amiodarone.  As there was concern of possible hyperkalemia post succinylcholine as paralytic he was given 20 of IV insulin and calcium.  Immediately after returning to sinus rhythm patient  was awake agitated and looking to pull out ET tube.  He was subsequently was extubated and surgery canceled.     At this time he complains of unchanged back pain, denies any palpitations or chest discomfort, denies significant shortness of breath, no visual changes or lightheadedness.      Interval Problem Update  Reviewed last 24 hour events:  T-max 100  +5 L over the last 24 hours, +3.4 L since admit  Echo 8/2 65% and hyperdynamic  Hb 9.6  K3.9  Troponin 98-> 42  TEG reviewed and unremarkable    Back wound CX 7/31 MRSA    Vancomycin 8/1-P    NS@125    99% on 2 L  Last BM PTA    Review of Systems  Review of Systems   Constitutional: Positive for malaise/fatigue. Negative for chills and fever.   HENT: Negative for congestion.    Eyes: Negative for blurred vision.   Respiratory: Positive for shortness of breath. Negative for cough and sputum production.    Cardiovascular: Positive for chest pain (From CPR). Negative for palpitations.   Gastrointestinal: Negative for abdominal pain, nausea and vomiting.   Neurological: Negative for tingling and focal weakness.   Psychiatric/Behavioral: Negative for depression.   All other systems reviewed and are negative.       Vital Signs for last 24 hours   Temp:  [36.4 °C (97.6 °F)-37.8 °C (100 °F)] 36.4 °C (97.6 °F)  Pulse:  [104-149] 106  Resp:  [16-42] 29  BP: ()/(49-84) 94/61  SpO2:  [92 %-100 %] 99 %    Hemodynamic parameters for last 24 hours       Respiratory Information for the last 24 hours       Physical Exam   Physical Exam   Constitutional: He appears well-developed and well-nourished.   HENT:   Head: Normocephalic and atraumatic.   Eyes: Pupils are equal, round, and reactive to light. Conjunctivae are normal.   Neck: No tracheal deviation present.   Cardiovascular: Intact distal pulses.   Tachycardic   Pulmonary/Chest: He has no wheezes.   Diminished   Abdominal: Soft. He exhibits no distension. There is no tenderness.   Musculoskeletal: He exhibits no edema.    Neurological: No cranial nerve deficit.   Grossly intact sensation of lower extremities, 4+/5 strength   Skin: Skin is warm and dry.   Psychiatric: He has a normal mood and affect.   Nursing note and vitals reviewed.      Medications  Current Facility-Administered Medications   Medication Dose Route Frequency Provider Last Rate Last Dose   • Respiratory Care per Protocol   Nebulization Continuous RT Dallin Smith M.D.       • HYDROmorphone (DILAUDID) injection 0.5 mg  0.5 mg Intravenous Q4HRS PRN Humberto Davis M.D.   0.5 mg at 08/02/19 2345   • gabapentin (NEURONTIN) capsule 100 mg  100 mg Oral TID Humberto Davis M.D.   100 mg at 08/03/19 0521   • MD Alert...Vancomycin per Pharmacy   Other PHARMACY TO DOSE Humberto Davis M.D.       • NS infusion   Intravenous Continuous Jeremy M Gonda, M.D. 125 mL/hr at 08/03/19 0213     • vancomycin (VANCOCIN) 2 g in  mL IVPB  2 g Intravenous Q12HR Humberto Davis M.D.   Stopped at 08/03/19 0507   • HYDROcodone-acetaminophen (NORCO) 5-325 MG per tablet 1 Tab  1 Tab Oral Q4HRS PRN Chadwick Green M.D.   1 Tab at 08/03/19 0224   • methocarbamol (ROBAXIN) tablet 1,000 mg  1,000 mg Oral TID Chadwick Green M.D.   1,000 mg at 08/03/19 0522   • senna-docusate (PERICOLACE or SENOKOT S) 8.6-50 MG per tablet 2 Tab  2 Tab Oral BID Chadwick Green M.D.   Stopped at 08/02/19 1800    And   • polyethylene glycol/lytes (MIRALAX) PACKET 1 Packet  1 Packet Oral QDAY PRN Chadwick Green M.D.        And   • magnesium hydroxide (MILK OF MAGNESIA) suspension 30 mL  30 mL Oral QDAY PRN Chadwick Green M.D.        And   • bisacodyl (DULCOLAX) suppository 10 mg  10 mg Rectal QDAY PRN Chadwick Green M.D.       • acetaminophen (TYLENOL) tablet 650 mg  650 mg Oral Q6HRS PRN Chadwick Green M.D.   650 mg at 08/01/19 0506   • ondansetron (ZOFRAN) syringe/vial injection 4 mg  4 mg Intravenous Q4HRS PRN Chadwick Green M.D.       • ondansetron (ZOFRAN  ODT) dispertab 4 mg  4 mg Oral Q4HRS PRN Chadwick Green M.D.       • promethazine (PHENERGAN) tablet 12.5-25 mg  12.5-25 mg Oral Q4HRS PRN Chadwick Green M.D.       • promethazine (PHENERGAN) suppository 12.5-25 mg  12.5-25 mg Rectal Q4HRS PRN Chadwick Green M.D.       • prochlorperazine (COMPAZINE) injection 5-10 mg  5-10 mg Intravenous Q4HRS PRN Chadwick Green M.D.           Fluids    Intake/Output Summary (Last 24 hours) at 8/3/2019 0717  Last data filed at 8/3/2019 0600  Gross per 24 hour   Intake 6488.22 ml   Output 1425 ml   Net 5063.22 ml       Laboratory  Recent Labs     08/02/19  1718   ISTATAPH 7.386*   ISTATAPCO2 33.0   ISTATAPO2 186*   ISTATATCO2 21   TFTZLVH8WPH 100*   ISTATARTHCO3 19.8   ISTATARTBE -5*   ISTATTEMP see below   ISTATFIO2 100   ISTATSPEC Arterial         Recent Labs     08/02/19 0227 08/02/19 1830 08/03/19  0316   SODIUM 134* 137 136   POTASSIUM 3.8 2.8* 3.9   CHLORIDE 103 111 104   CO2 20 17* 24   BUN 8 8 10   CREATININE 0.63 0.63 0.70   MAGNESIUM  --  2.1 2.0   CALCIUM 8.6 7.0* 8.1*     Recent Labs     07/31/19  1152 08/02/19 0227 08/02/19 1830 08/03/19  0316   ALTSGPT 38  --   --  229*  --    ASTSGOT 16  --   --  182*  --    ALKPHOSPHAT 65  --   --  52  --    TBILIRUBIN 0.3  --   --  0.5  --    GLUCOSE 141*   < > 100* 113* 130*    < > = values in this interval not displayed.     Recent Labs     07/31/19  1152 08/02/19 0227 08/02/19 1830 08/03/19  0316   WBC 9.9   < > 8.9 17.3* 8.0   NEUTSPOLYS 71.80   < > 70.80 89.00* 70.70   LYMPHOCYTES 22.00   < > 21.90* 5.60* 21.90*   MONOCYTES 5.00   < > 6.60 4.50 6.70   EOSINOPHILS 0.70   < > 0.10 0.10 0.10   BASOPHILS 0.20   < > 0.30 0.20 0.20   ASTSGOT 16  --   --  182*  --    ALTSGPT 38  --   --  229*  --    ALKPHOSPHAT 65  --   --  52  --    TBILIRUBIN 0.3  --   --  0.5  --     < > = values in this interval not displayed.     Recent Labs     08/02/19 0227 08/02/19 1830 08/02/19 2050 08/02/19 2116  08/03/19  0316   RBC 4.41* 2.98*  --   --  3.74*   HEMOGLOBIN 11.9* 7.9*  --  10.5* 9.6*   HEMATOCRIT 36.7* 24.9*  --  31.9* 30.8*   PLATELETCT 364 300  --   --  318   PROTHROMBTM  --   --  15.4*  --   --    INR  --   --  1.19*  --   --        Imaging  X-Ray:  I have personally reviewed the images and compared with prior images.    Assessment/Plan  * Cardiac arrest (HCC)  Assessment & Plan  Not a TTM candidate given fully neurologically intact  8/2 post intubation had V. tach requiring shock x3  Status post 150 mg amiodarone  EKG with sinus tachycardia, normal QTC  Continuous cardiac monitoring  Echo 8/2 showed hyperdynamic state normal EF    Elevated liver enzymes  Assessment & Plan  Suspect secondary to hypoperfusion  Continue to trend    Morbidly obese (HCC)  Assessment & Plan  Recommend weight loss    Infected surgical wound- (present on admission)  Assessment & Plan  MRSA on culture 7/31  Continue empiric antibiotics  Wound care  Currently without bowel/bladder involvement and intact strength and sensation of lower extremities  Will need identified plan for next I&D as it was reported secondary to V. tach cardiac arrest 8/2    Sinus tachycardia- (present on admission)  Assessment & Plan  Uncertain etiology, appears has been persistent chronic for a long duration  Was initiated on metoprolol on last hospitalization  Echo 6/2019 unremarkable  TSH in June was WNL       VTE:  Contraindicated  Ulcer: Not Indicated  Lines: Arterial Line  Ongoing indication addressed    I have performed a physical exam and reviewed and updated ROS and Plan today (8/3/2019). In review of yesterday's note (8/2/2019), there are no changes except as documented above.     Discussed patient condition and risk of morbidity and/or mortality with Hospitalist, RN, RT, Pharmacy, Patient and cardiology and infectious disease

## 2019-08-03 NOTE — ANESTHESIA PROCEDURE NOTES
Arterial Line  Performed by: Kenneth Ybarra M.D.  Authorized by: Kenneth Ybarra M.D.     End Time:  8/2/2019 5:00 PM  Localization: surface landmarks    Patient Location:  OR  Indication: continuous blood pressure monitoring and blood sampling needed    Catheter Size:  20 G  Seldinger Technique?: No    Laterality:  Right  Site:  Radial artery  Line Secured:  Transparent dressing and antimicrobial disc  Events: patient tolerated procedure well with no complications

## 2019-08-03 NOTE — ANESTHESIA POSTPROCEDURE EVALUATION
Patient: Sammy Edmonds    Procedure Summary     Date:  08/02/19 Room / Location:  [place] [address] / SURGERY [facility] ORS    Anesthesia Start:  1626 Anesthesia Stop:  1714    Procedure:  IRRIGATION AND DEBRIDEMENT, WOUND - LUMBAR WOUND W/POSS WOUND VAC Diagnosis:      Surgeon:  Julito Jacob D.O. Responsible Provider:      Anesthesia Type:  general ASA Status:  3          Final Anesthesia Type: general  Last vitals  BP   Blood Pressure: 121/84    Temp   36.9 °C (98.5 °F)    Pulse   Pulse: (!) 122   Resp   16    SpO2   92 %      Anesthesia Post Evaluation    Patient location during evaluation: bedside  Patient participation: complete - patient participated  Level of consciousness: awake and alert  Pain score: 0    Airway patency: patent  Anesthetic complications: no  Cardiovascular status: hemodynamically stable  Respiratory status: acceptable  Hydration status: euvolemic    PONV: none           Nurse Pain Score: 2 (NPRS)

## 2019-08-03 NOTE — CONSULTS
DATE OF SERVICE:  08/02/2019    CHIEF COMPLAINT:  Evaluation of arrhythmia.    HISTORY OF PRESENT ILLNESS:  The patient is a 23-year-old gentleman seen at   the request of Dr. Dallin Smith for evaluation of arrhythmia.  The patient   has a complicated recent history with lumbar laminectomy complicated by   postoperative infection.  His initial spinal surgery was on 07/12/2019.  He   presented to the emergency room on 07/31/2019 because of drainage from the   incision site.    Today, the patient was taken to the operating room for I and D of the wound.    Shortly after being intubated and before the procedure was performed, he   developed ventricular arrhythmia.  The patient was cardioverted a total of 3   times.  According to verbal report, he initially had ventricular tachycardia   followed by ventricular fibrillation.  I have personally reviewed one of the   rhythm strips that showed ventricular fibrillation followed by cardioversion   with brief resumption of sinus rhythm for 3-4 weeks and then followed by   second bout of ventricular fibrillation and a successful second cardioversion.    It is not certain if these 2 cardioversions were the first and second   cardioversions or the second and third cardioversion.  In any event, he had   resumption of sinus rhythm after successful cardioversion and was given   amiodarone 150 mg bolus.    Bedside echo demonstrates hyperkinetic left ventricle with EF in the 70%   range, the RV is not enlarged and is not thickened.  The IVC is collapsed   compatible with volume depletion.    History was obtained from the patient.  Chart and father indicate that he is a   healthy young man.  He is a former cigarette smoker.  He has no history of   asthma or wheezing.  No history of syncope or presyncope.  No family history   of premature sudden death.  His EKG post-cardioversion is reviewed and my   interpretation is in sinus rhythm at 144 per minute, no acute changes and no   QT  prolongation.  There is also no evidence of delta wave.    There was initial concern by anesthesia that the patient may have developed   acute hyperkalemia from succinylcholine and this was treated appropriately   emergently with IV insulin and calcium.  However, potassium dropped   immediately after the event, was only 3.8.    The patient has a history of sinus tachycardia of unknown etiology and EKG   from 06/19/2019 reveals a heart rate of 124 per minute.  He was started on   beta-blocker during the last hospitalization.    PAST MEDICAL HISTORY:  MEDICATIONS:  On admission, cyclobenzaprine 10 mg 3 times a day, Norco 1   tablet every 4 hours as needed, naproxen as needed, Maeve-Colace as needed, and   Robaxin 3 times a day.    ILLNESSES:  History of spinal stenosis, history of MRSA infection   postoperatively.    SOCIAL HISTORY:  The patient is a former cigarette smoker, denies any drug   use.    FAMILY HISTORY:  Negative for any family history of premature coronary artery   disease or sudden cardiac death.    REVIEW OF SYSTEMS:  The patient states he was in good health before his back   surgery.  No history of childhood asthma, exercise intolerance, palpitations,   edema, or syncope.    PHYSICAL EXAMINATION:  GENERAL:  Reveals obese young gentleman, in no respiratory distress.  He is   complaining of pain from the potassium infusion.  VITAL SIGNS:  On the monitor, he is in sinus tachycardia at 135 beats per   minute.  Blood pressure is 121/84.  HEENT:  Pupils are equal, gaze conjugate, sclerae are nonicteric.  NECK:  Obese.  LUNGS:  Clear to auscultation.  HEART:  Regular rate and rhythm, rapid rate.  No S3, no murmur.  ABDOMEN:  Obese, soft.  No hepatomegaly.  No masses, no pain to palpation.    The abdomen is not rigid.  EXTREMITIES:  No edema.  Posterior tibial pulses are 2+.  SKIN:  Warm and dry.    PERTINENT LABORATORY:  Hemoglobin has dropped from 11.9 earlier today to 7.9,   hematocrit dropped from 37% to  25%, white count is now 17,300.  Potassium   initially as 3.8 and by report was 3.8 after the event, it is now 2.8, SGPT is   229, SGOT 182, and BUN is 8.  GFR is greater than 60.    EKG is as described above.    IMPRESSION AND PLAN:  1.  Ventricular fibrillation.  The rhythm strip was reviewed for a portion of   the event.  The initial rhythm strip is not available.  He had ventricular   fibrillation and was cardioverted twice with successful resolution of his   ventricular fibrillation and restoration of sinus rhythm.  The cause of the   event is uncertain.  I think succinylcholine induced hyperkalemia is certainly   a consideration, but other etiologies are possibly more unlikely.  There is   no evidence of RV enlargement or thickening to suggest arrhythmogenic RV   dysplasia and no family history of sudden cardiac death.  EKG does not show   any evidence of preexcitation or prolonged QT syndrome.  His LV function is   hyperdynamic.  So therefore nonischemic cardiomyopathy is not the substrate   for his ventricular arrhythmia.  Recommend monitor his rhythm closely.    Consider MRI of the heart to determine if he has any infiltrative process.  We   will recommend EP consult when they are available to see him.  2.  Anemia.  The patient has sudden drop in his hemoglobin as discussed above.    Etiology of this is uncertain.  I discussed with the intensivist via phone.  3.  Leukocytosis -- no obvious source of infection at this point.  4.  Hypokalemia, this is being repleted.  Magnesium at this point is normal.    We will continue to monitor his rhythm.       ____________________________________     MD AIRAM ELENA / JESSICA    DD:  08/02/2019 19:45:32  DT:  08/03/2019 01:01:30    D#:  [id number]  Job#:  [id number]

## 2019-08-03 NOTE — ANESTHESIA PROCEDURE NOTES
Airway  Date/Time: 8/2/2019 4:32 PM  Performed by: Kenneth Ybarra M.D.  Authorized by: Kenneth Ybarra M.D.     Location:  OR  Urgency:  Elective  Indications for Airway Management:  Anesthesia  Spontaneous Ventilation: absent    Sedation Level:  Deep  Preoxygenated: Yes    Patient Position:  Sniffing  Final Airway Type:  Endotracheal airway  Final Endotracheal Airway:  ETT  Cuffed: Yes    Technique Used for Successful ETT Placement:  Lighted stylet  Insertion Site:  Oral  Blade Type:  Torres  Laryngoscope Blade/Videolaryngoscope Blade Size:  2  ETT Size (mm):  8.5  Measured from:  Lips  ETT to Lips (cm):  24  Placement Verified by: auscultation and capnometry    Number of Attempts at Approach:  1

## 2019-08-03 NOTE — PROGRESS NOTES
Neurosurgery Progress Note    Subjective:  NO NEW C/O,  NO FURTHER EPISODE OF SUSTAINED ARRHYTHMIA PER RN    Exam:  NM STABLE with left foot weakness  Incision; being packed with nugauze, serosang drainage on dressing      BP  Min: 88/50  Max: 126/68  Pulse  Av.5  Min: 104  Max: 149  Resp  Av.7  Min: 16  Max: 42  Temp  Av.7 °C (98.1 °F)  Min: 36.2 °C (97.2 °F)  Max: 37.8 °C (100 °F)  SpO2  Av.8 %  Min: 92 %  Max: 100 %    No data recorded    Recent Labs     19   WBC 8.9 17.3*  --  8.0   RBC 4.41* 2.98*  --  3.74*   HEMOGLOBIN 11.9* 7.9* 10.5* 9.6*   HEMATOCRIT 36.7* 24.9* 31.9* 30.8*   MCV 83.2 83.6  --  82.4   MCH 27.0 26.5*  --  25.7*   MCHC 32.4* 31.7*  --  31.2*   RDW 39.0 39.1  --  39.3   PLATELETCT 364 300  --  318   MPV 8.3* 8.3*  --  8.4*     Recent Labs     19   SODIUM 134* 137 136   POTASSIUM 3.8 2.8* 3.9   CHLORIDE 103 111 104   CO2 20 17* 24   GLUCOSE 100* 113* 130*   BUN 8 8 10   CREATININE 0.63 0.63 0.70   CALCIUM 8.6 7.0* 8.1*     Recent Labs     19   INR 1.19*     Recent Labs     19   REACTMIN 4.6*   CLOTKINET 1.0   CLOTANGL 77.0*   MAXCLOTS 82.0*   [id number] 0.0   PRCINADP 43.0   PRCINAA 37.4       Intake/Output       19 07 - 19 0659 19 07 - 19 0659      1900-0659 Total [id number] 1930-2019 Total       Intake    P.O.  --  400 400  --  -- --    P.O. -- 400 400 -- -- --    I.V.  2066.5  1371.7 3438.2  375  -- 375    Propofol Volume 300 -- 300 -- -- --    Volume (mL) (NS infusion) 1766.5 1371.7 3138.2 375 -- 375    Volume (mL) (lactated ringers infusion) 0 -- 0 -- -- --    Other  --  50 50  --  -- --    Medications (PO/Enteral Liquids) -- 50 50 -- -- --    IV Piggyback  0  2600 2600  --  -- --    Volume (mL) (lactated ringer BOLUS infusion) -- 1000 1000 -- -- --    Volume (mL) (vancomycin (VANCOCIN) 2 g in  mL IVPB) --  1500 1500 -- -- --    Volume (mL) (potassium chloride (KCL) ivpb 10 mEq) 0 100 100 -- -- --    Total Intake 2066.5 4421.7 6488.2 375 -- 375       Output    Urine  1075  350 1425  550  -- 550    Number of Times Voided -- 1 x 1 x 1 x -- 1 x    Urine Void (mL) 2550 866 3118 550 -- 550    Total Output 6868 309 8716 550 -- 550       Net I/O     991.5 4071.7 5063.2 -175 -- -175            Intake/Output Summary (Last 24 hours) at 8/3/2019 1145  Last data filed at 8/3/2019 0900  Gross per 24 hour   Intake 6863.22 ml   Output 1150 ml   Net 5713.22 ml            • Respiratory Care per Protocol   Continuous RT   • HYDROmorphone  0.5 mg Q4HRS PRN   • gabapentin  100 mg TID   • MD Alert...Vancomycin per Pharmacy   PHARMACY TO DOSE   • NS   Continuous   • vancomycin  2 g Q12HR   • HYDROcodone-acetaminophen  1 Tab Q4HRS PRN   • methocarbamol  1,000 mg TID   • senna-docusate  2 Tab BID    And   • polyethylene glycol/lytes  1 Packet QDAY PRN    And   • magnesium hydroxide  30 mL QDAY PRN    And   • bisacodyl  10 mg QDAY PRN   • acetaminophen  650 mg Q6HRS PRN   • ondansetron  4 mg Q4HRS PRN   • ondansetron  4 mg Q4HRS PRN   • promethazine  12.5-25 mg Q4HRS PRN   • promethazine  12.5-25 mg Q4HRS PRN   • prochlorperazine  5-10 mg Q4HRS PRN       Assessment and Plan:  Hospital day # 4 r/t to wound dehiscence with + MRSA cultures from superficial wound swab, blood cultures negative  On Vancomycin since 8/1/19    Sp aborted I & D r/t to cardiac arrest  Ongoing ST per monitor: was placed on beta blocker during previous hospitalization    Post arrest  Anemia: per hospitalist team    Prophylactic anticoagulation: no         Start date/time: related to anemia of unknown etiology    Ho of LS spine surgery x 2: last laminectomy 7/12/19     will continue to follow along with hospitalist team.   Seen earlier by Dr. Jacob

## 2019-08-03 NOTE — PROGRESS NOTES
Patient brought to T630 via hospital bed, attached to IV fluids, and monitoring equipment. Patient accompanied by ACLS RN, CCT, and family member with belongings. Patient attached to in room monitoring equipment, assessment started.

## 2019-08-03 NOTE — PROGRESS NOTES
Monitor Summary:     ST: 100-120's with ocassional PAC's.    0.16/0.10/0.32    12 hour chart check.

## 2019-08-03 NOTE — CARE PLAN
Problem: Infection  Goal: Will remain free from infection  Intervention: Assess signs and symptoms of infection  Note:   RN monitors Pt VS and lab values to observe for S/S of infection.  Hand hygiene implemented before and after Pt care.       Problem: Pain Management  Goal: Pain level will decrease to patient's comfort goal  Description  Pain medicated per MAR to achieve patient's comfort goal   Intervention: Follow pain managment plan developed in collaboration with patient and Interdisciplinary Team  Note:   Assessing pain level frequently using 0 to 10 scale.  Providing Dilaudid & Norco per MAR.  Providing non-pharmacological intervention, therapeutic communication.

## 2019-08-03 NOTE — FLOWSHEET NOTE
Respiratory Rapid Response Note    Symptoms , Coded in OR after anesthesia     O2 (LPM): 6lpm    Transferred to ICU , yes to SICU.    RRT called and when arrived, patient had coded in OR.  Pt improved and Dr extubated. Pt placed on NC at 6lpm.  Pt was eventually transported to SICU

## 2019-08-03 NOTE — ASSESSMENT & PLAN NOTE
MRSA on culture 7/31  Continue empiric antibiotics  Wound care  Currently without bowel/bladder involvement and intact strength and sensation of lower extremities  Will need identified plan for next I&D as it was reported secondary to V. tach cardiac arrest 8/2  ID consulting

## 2019-08-03 NOTE — PROGRESS NOTES
Pharmacy Kinetics 23 y.o. male on vancomycin day # 3 8/3/2019    Currently on Vancomycin 2000 mg iv q12hr (0200/1400)  Other antibiotics: none     Indication for Treatment: MRSA infected surgical wound    Pertinent history per medical record: Admitted on 2019 for wound dehiscense after laminectomy on .  Plan is for I&D in the OR; however, patient had VF arrest after intubation in OR and surgery was canceled.  ID was consulted today. MRI per ID note negative for discitis, OM or epidural abscess.     Allergies: Other misc     List concerns for renal function: obese (BMI 42)    Pertinent cultures to date:   19 - wound cx: MRSA (vanc PARRISH 1)   8/3/19 - Peripheral BC x 2: ordered    Recent Labs     19  0337 19  0227 19  1830 19  0316   WBC 9.6 8.9 17.3* 8.0   NEUTSPOLYS 63.30 70.80 89.00* 70.70     Recent Labs     19  0337 19  0227 19  1830 19  0316   BUN 10 8 8 10   CREATININE 0.67 0.63 0.63 0.70   ALBUMIN  --   --  2.7*  --      Recent Labs     19  0943 19  1350   VANCOTROUGH 15.0 8.3*       Intake/Output Summary (Last 24 hours) at 8/3/2019 1528  Last data filed at 8/3/2019 0900  Gross per 24 hour   Intake 6863.22 ml   Output 1150 ml   Net 5713.22 ml      BP (!) 94/61   Pulse (!) 117   Temp 36.2 °C (97.2 °F) (Temporal)   Resp (!) 31   Ht 1.829 m (6')   Wt (!) 138 kg (304 lb 3.8 oz)   SpO2 95%  Temp (24hrs), Av.7 °C (98 °F), Min:36.2 °C (97.2 °F), Max:37.8 °C (100 °F)      A/P   1. Vancomycin dose change: 1600 mg IV q8h (2200/0600/1400)  2. Next vancomycin level:  at 1330, prior to 3rd dose of new regimen (ordered)   3. Goal trough: 16-20 mcg/mL   4. Comments: Sub-therapeutic trough level ordered prior to 5th total dose.  Level drawn ~20 minutes late - true trough expected to be sightly higher.  Will increase dosing to 1600 mg IV q8h since level so low and even with accumulation, will remain sub-therapeutic.  Do not anticipate patient  will tolerate q8h dosing for long given body habitus.  Will closely monitor levels while on this regimen.      Uzma Kline, PharmD, BCPS, BCCCP

## 2019-08-03 NOTE — CONSULTS
Consults   INFECTIOUS DISEASES INPATIENT CONSULT NOTE     Date of Service: 8/3/2019    Consult Requested By: Humberto Davis M.D.    Reason for Consultation: Post laminectomy wound infection, MRSA     History of Present Illness:   Sammy Edmonds is a 23 y.o.  admitted 7/31/2019. Pt has a past medical history of congenital spinal stenosis and severe L4-L5 stenosis with disc herniation as well as stenosis L2-L3 and L5-S5 with obesity and chronic pain.  The patient had L4, L5 and S1 decompressive laminectomy, bilateral medial facetectomies and bilateral foraminoctomy's with aborted L2-L4 laminectomies due to loss of signal on 6/17/2019.  He then went back to the OR on 7/12 for L2- S1 laminectomy and L4-L5 microdiscectomy.  Per note there were no complications.   He had MRI spine on 7/8 with a new 10 x 3 x 1 similar deep subcutaneous fat fluid collection thought to be seroma but superimposed infection not ruled out.    He he presented to the ED on 7/27 with leaking wound VAC for which she had had wound drainage and cleanout on 7/25.  He again returned on 7/31 due to concerns for ongoing worsening wound drainage and was admitted.      Hospital Course:   Patient has been afebrile, he did have a one-time activity elevation to 100.  Wound cultures were obtained on 7/31 with MRSA patient had an MRI spine on 8/1 as below. Patient was the OR for I&D which was complicated by an episode of V. tach followed by reported V. fib.  He was given 3 shocks before establishing sinus rhythm and amiodarone.  After this he was awake and agitated and attempting to pull out ET tube so subsequent surgery was canceled and he was extubated.  He was admitted to the ICU.  Patient was started on vancomycin on 8/1 and remains on to date.       Review Of Systems:  Review of Systems   Constitutional: Positive for malaise/fatigue. Negative for chills and fever.   HENT: Negative for hearing loss.    Eyes: Negative for blurred vision and double  vision.   Respiratory: Negative for cough, sputum production and shortness of breath.    Cardiovascular: Negative for chest pain.   Gastrointestinal: Negative for abdominal pain, constipation, diarrhea, nausea and vomiting.   Genitourinary: Negative for dysuria.   Musculoskeletal: Positive for myalgias. Negative for back pain and joint pain.   Skin: Negative for rash.   Neurological: Negative for headaches.   Psychiatric/Behavioral: The patient is not nervous/anxious.          PMH:   Past Medical History:   Diagnosis Date   • Morbid obesity (HCC)    • Tachycardia        PSH:  Past Surgical History:   Procedure Laterality Date   • LUMBAR LAMINECTOMY DISKECTOMY  2019    Procedure: LAMINECTOMY, SPINE, LUMBAR, WITH DISCECTOMY - POSTERIOR L2-S1 WITH RE-DO L4-5 LAMINECTOMY;  Surgeon: Samir Ruelas M.D.;  Location: SURGERY [facility];  Service: Neurosurgery   • LUMBAR LAMINECTOMY DISKECTOMY N/A 2019    Procedure: LUMBAR 2 - SACRAL 1 LAMINECTOMY AND DISCECTOMY;  Surgeon: Samir Ruelas M.D.;  Location: SURGERY [facility];  Service: Neurosurgery       FAMILY HX:  History reviewed. No pertinent family history.    SOCIAL HX:  Social History     Socioeconomic History   • Marital status: Single     Spouse name: Not on file   • Number of children: Not on file   • Years of education: Not on file   • Highest education level: Not on file   Occupational History   • Not on file   Social Needs   • Financial resource strain: Not on file   • Food insecurity:     Worry: Not on file     Inability: Not on file   • Transportation needs:     Medical: Not on file     Non-medical: Not on file   Tobacco Use   • Smoking status: Former Smoker     Packs/day: 0.25     Types: Cigarettes     Last attempt to quit: 2018     Years since quittin.5   • Smokeless tobacco: Never Used   Substance and Sexual Activity   • Alcohol use: Yes     Comment: twice weekly   • Drug use: Yes     Types: Inhaled     Comment: occasional  "marijuana   • Sexual activity: Not on file   Lifestyle   • Physical activity:     Days per week: Not on file     Minutes per session: Not on file   • Stress: Not on file   Relationships   • Social connections:     Talks on phone: Not on file     Gets together: Not on file     Attends Church service: Not on file     Active member of club or organization: Not on file     Attends meetings of clubs or organizations: Not on file     Relationship status: Not on file   • Intimate partner violence:     Fear of current or ex partner: Not on file     Emotionally abused: Not on file     Physically abused: Not on file     Forced sexual activity: Not on file   Other Topics Concern   • Not on file   Social History Narrative   • Not on file     Social History     Tobacco Use   Smoking Status Former Smoker   • Packs/day: 0.25   • Types: Cigarettes   • Last attempt to quit: 2018   • Years since quittin.5   Smokeless Tobacco Never Used     Social History     Substance and Sexual Activity   Alcohol Use Yes    Comment: twice weekly       Allergies/Intolerances:  Allergies   Allergen Reactions   • Other Misc      Cat allergy- \"start sneezing\"         History reviewed with the patient     Other Current Medications:    Current Facility-Administered Medications:   •  Respiratory Care per Protocol, , Nebulization, Continuous RT, Dallin Smith M.D.  •  HYDROmorphone (DILAUDID) injection 0.5 mg, 0.5 mg, Intravenous, Q4HRS PRN, Humberto Davis M.D., 0.5 mg at 19 2345  •  gabapentin (NEURONTIN) capsule 100 mg, 100 mg, Oral, TID, Humberto Davis M.D., 100 mg at 19 1125  •  MD Alert...Vancomycin per Pharmacy, , Other, PHARMACY TO DOSE, Humberto Davis M.D.  •  NS infusion, , Intravenous, Continuous, Jeremy M Gonda, M.D., Last Rate: 125 mL/hr at 19 0801  •  vancomycin (VANCOCIN) 2 g in  mL IVPB, 2 g, Intravenous, Q12HR, Humberto Davis M.D., Stopped at 19 0507  •  HYDROcodone-acetaminophen (NORCO) " 5-325 MG per tablet 1 Tab, 1 Tab, Oral, Q4HRS PRN, Chadwick Green M.D., 1 Tab at 19 0224  •  methocarbamol (ROBAXIN) tablet 1,000 mg, 1,000 mg, Oral, TID, Chadwick Green M.D., 1,000 mg at 19 1133  •  senna-docusate (PERICOLACE or SENOKOT S) 8.6-50 MG per tablet 2 Tab, 2 Tab, Oral, BID, Stopped at 19 1800 **AND** polyethylene glycol/lytes (MIRALAX) PACKET 1 Packet, 1 Packet, Oral, QDAY PRN **AND** magnesium hydroxide (MILK OF MAGNESIA) suspension 30 mL, 30 mL, Oral, QDAY PRN **AND** bisacodyl (DULCOLAX) suppository 10 mg, 10 mg, Rectal, QDAY PRN, Chadwick Green M.D.  •  acetaminophen (TYLENOL) tablet 650 mg, 650 mg, Oral, Q6HRS PRN, Chadwick Green M.D., 650 mg at 19 0506  •  ondansetron (ZOFRAN) syringe/vial injection 4 mg, 4 mg, Intravenous, Q4HRS PRN, Chadwick Green M.D.  •  ondansetron (ZOFRAN ODT) dispertab 4 mg, 4 mg, Oral, Q4HRS PRN, Chadwick Green M.D.  •  promethazine (PHENERGAN) tablet 12.5-25 mg, 12.5-25 mg, Oral, Q4HRS PRN, Chadwick Green M.D.  •  promethazine (PHENERGAN) suppository 12.5-25 mg, 12.5-25 mg, Rectal, Q4HRS PRN, Chadwick Green M.D.  •  prochlorperazine (COMPAZINE) injection 5-10 mg, 5-10 mg, Intravenous, Q4HRS PRN, Chadwick Green M.D.  [unfilled]    Most Recent Vital Signs:  BP (!) 94/61   Pulse (!) 119   Temp 36.2 °C (97.2 °F) (Temporal)   Resp (!) 21   Ht 1.829 m (6')   Wt (!) 138 kg (304 lb 3.8 oz)   SpO2 97%   BMI 41.26 kg/m²   Temp  Av.7 °C (98 °F)  Min: 36.1 °C (97 °F)  Max: 37.8 °C (100 °F)    Physical Exam:  Physical Exam   Constitutional: He is oriented to person, place, and time and well-developed, well-nourished, and in no distress.   HENT:   Head: Atraumatic.   Eyes: Pupils are equal, round, and reactive to light. Conjunctivae and EOM are normal.   Cardiovascular: Normal rate, regular rhythm and normal heart sounds.   Pulmonary/Chest: Effort normal and breath sounds normal.    Abdominal: Soft. Bowel sounds are normal.   Musculoskeletal: He exhibits edema.   Neurological: He is alert and oriented to person, place, and time.   No obvious focal neurologic deficits, moving arms and legs well, no loss of sensation   Skin:   Reviewed photos of wound   Psychiatric: Affect and judgment normal.           Pertinent Lab Results:  Recent Labs     08/02/19 0227 08/02/19 1830 08/03/19  0316   WBC 8.9 17.3* 8.0      Recent Labs     08/02/19 0227 08/02/19  1830 08/02/19  2116 08/03/19  0316   HEMOGLOBIN 11.9* 7.9* 10.5* 9.6*   HEMATOCRIT 36.7* 24.9* 31.9* 30.8*   MCV 83.2 83.6  --  82.4   MCH 27.0 26.5*  --  25.7*   PLATELETCT 364 300  --  318         Recent Labs     08/02/19 0227 08/02/19 1830 08/03/19 0316   SODIUM 134* 137 136   POTASSIUM 3.8 2.8* 3.9   CHLORIDE 103 111 104   CO2 20 17* 24   CREATININE 0.63 0.63 0.70        Recent Labs     08/02/19 1830   ALBUMIN 2.7*        Pertinent Micro:  Results     Procedure Component Value Units Date/Time    CULTURE WOUND W/ GRAM STAIN [[id number]]  (Abnormal)  (Susceptibility) Collected:  07/31/19 1245    Order Status:  Completed Specimen:  Wound from Back Updated:  08/02/19 0932     Significant Indicator POS     Source WND     Site BACK     Culture Result Light growth mixed skin zander.     Gram Stain Result Few WBCs.  Few Gram positive cocci.  Few Gram positive rods.       Culture Result Methicillin Resistant Staphylococcus aureus  Heavy growth  Methicillin resistant by screening method  This isolate is presumed to be clindamycin resistant based on  detection of inducible resistance.  Clindamycin may still  be effective in some patients.      Narrative:       CALL  Silva  GAY tel. [phone],  CALLED  GAY tel. [phone] 08/02/2019, 09:31, RB PERF. RESULTS CALLED  TO:[id number] RN    Susceptibility     Methicillin resistant staphylococcus aureus (1)     Antibiotic Interpretation Microscan Method Status    Clindamycin Resistant <=0.5 mcg/mL PARRISH Final     Daptomycin Sensitive <=0.5 mcg/mL PARRISH Final    Erythromycin Resistant >4 mcg/mL PARRISH Final    Moxifloxacin Resistant >4 mcg/mL PARRISH Final    Ampicillin/sulbactam Resistant 16/8 mcg/mL PARRISH Final    Oxacillin Resistant >2 mcg/mL PARRISH Final    Vancomycin Sensitive 1 mcg/mL PARRISH Final    Penicillin Resistant >8 mcg/mL PARRISH Final    Trimeth/Sulfa Sensitive <=0.5/9.5 mcg/mL PARRISH Final    Tetracycline Sensitive <=4 mcg/mL PARRISH Final                   GRAM STAIN [[id number]] Collected:  07/31/19 1245    Order Status:  Completed Specimen:  Wound Updated:  07/31/19 2317     Significant Indicator .     Source WND     Site BACK     Gram Stain Result Few WBCs.  Few Gram positive cocci.  Few Gram positive rods.      Blood Culture,Hold [[id number]] Collected:  07/31/19 1152    Order Status:  Completed Updated:  07/31/19 2207     Blood Culture Hold Collected        Blood Culture Hold   Date Value Ref Range Status   07/31/2019 Collected  Final        Studies:  Dx-chest-limited (1 View)    Result Date: 8/2/2019 8/2/2019 5:42 PM HISTORY/REASON FOR EXAM:  Shortness of Breath TECHNIQUE/EXAM DESCRIPTION AND NUMBER OF VIEWS: Single AP view of the chest. COMPARISON: 6/19/2019 FINDINGS: The cardiac silhouette and mediastinal contours are stable. No discrete opacity, pleural fluid, or pneumothorax. The left costophrenic angle is excluded from the image. No suspicious bony lesions.     No evidence of acute cardiopulmonary process.    Dx-spine-any One View    Result Date: 7/12/2019 7/12/2019 12:03 PM HISTORY/REASON FOR EXAM:  Intraoperative images for procedural navigation. TECHNIQUE/EXAM DESCRIPTION AND NUMBER OF VIEWS:  2 view(s) of the lumbosacral spine.     Fluoroscopic image(s) obtained during posterior lumbar laminectomy. Please see the patient's chart for full procedural details. Fluoroscopy time 2 seconds.     Mr-lumbar Spine-with & W/o    Result Date: 8/1/2019 8/1/2019 3:24 PM HISTORY/REASON FOR EXAM:  Recent back surgery with wound  drainage and elevated sed rate TECHNIQUE/EXAM DESCRIPTION: MRI of the lumbar spine without and with contrast. The study was performed on a [employer] 1.5 Gena MRI scanner. T1 sagittal, T2 fast spin-echo sagittal, and T2 axial images were obtained of the lumbar spine. T1 postcontrast fat-suppressed sagittal images were obtained. 14 mL Gadavist contrast was administered intravenously. COMPARISON:  7/8/2019 FINDINGS: Redemonstrated are postsurgical changes status post laminectomies from L2 L3-L5 S1. There is extensive enhancement and edema in the midline posterior soft tissues and the lower paraspinal musculature which is similar to prior study and may represent postsurgical changes or infection. The previously seen superficial fluid collection appears slightly decreased in size. There is a new ill-defined rim-enhancing fluid in the deep soft tissues laminectomy bed most conspicuous at the L5 level measuring approximately 3 x 2.1 cm. Developing abscess is not excluded. There is extensive enhancement of the cauda equina nerve roots extending from the visualized conus medullaris through the S1 level which is concerning for infection. Conus medullaris terminates at T12-L1. T12-L2: Canal and foramina are patent. L2-L3: Disc bulge and posterior central disc extrusion again noted resulting in moderate spinal stenosis. L3-L4: Facet degeneration. No significant spinal stenosis. L4-L5: Disc degeneration, disc osteophyte and facet degeneration. There is moderate thecal sac stenosis. L5-S1: Posterior central left paracentral disc protrusion which flattens the ventral thecal sac. Bilateral facet degeneration. No significant spinal stenosis. Bilateral foraminal stenosis.     1.  Stable postsurgical changes L3-L5 laminectomies. Interval ill-defined nonenhancing fluid within the laminectomy bed most pronounced at the L4-L5 level. Infection should be excluded clinically. 2.  There is diffuse enhancement of the cauda equina nerve  roots which is presumably infectious/inflammatory. 3.  No evidence of discitis osteomyelitis or epidural abscess. 4.  Degenerative changes as detailed above with persistent moderate thecal sac stenosis at L2-L3 and L4-L5.    Mr-lumbar Spine-with & W/o    Result Date: 7/8/2019 7/8/2019 11:34 AM HISTORY/REASON FOR EXAM:  Back pain or radiculopathy, > 6 wks; Back pain, cauda equina syndrome suspected Laminectomy June 17. New weakness for 3 days. TECHNIQUE/EXAM DESCRIPTION: MRI of the lumbar spine without and with contrast. The study was performed on a [employer] 1.5 Gena MRI scanner. T1 sagittal, T2 fast spin-echo sagittal, and T2 axial images were obtained of the lumbar spine. T1 postcontrast fat-suppressed sagittal images were obtained. 15 mL Gadavist contrast was administered intravenously. COMPARISON:  6/19/2019 MRI. FINDINGS: Motion degrades the study. Straightening of the normal lordosis is again seen Multiple Schmorl's nodes are again demonstrated No marrow edema is seen No T2 hyperintensity is seen suspicious for discitis Conus terminates normal in position at L1. Visualized nerve roots show no abnormal clumping to suggest arachnoiditis. There is however degenerative disc disease causing central stenosis as discussed below. T2 hyperintense fluid collection is newly identified in the deep subcutaneous fat posteriorly spanning L2/3-L5 measuring 10 x 2.8 x 1.5 cm and there is rim enhancement. No abnormal epidural fluid collection is seen to suggest abscess. L3-L5 laminectomies have been performed. Findings by level: T12/L1: No stenosis L1/2: No stenosis L2/3: Mild disc height loss, right paracentral protrusion, ligamentum flavum redundancy, facet arthropathy, short pedicles. This results in unchanged moderate to severe central stenosis, especially on the right. There is some epidural enhancement ventrally which is unchanged and likely related to the protrusion. Borderline foraminal stenosis L3/4: Stable facet  arthropathy and ligamentum flavum redundancy with a mild disc bulge, short pedicles. This results in persistent moderate central and borderline foraminal stenosis L4/5: Stable right paracentral protrusion, disc bulge. Laminectomy with partial facetectomy. No significant central stenosis but the right greater than left foramina are again moderately narrowed L5/S1: Stable left paracentral protrusion, facet arthropathy, mild-moderate central and mild foraminal stenoses as before     1.  New 10 x 3 x 1 cm deep subcutaneous fat fluid collection differential includes seroma with or without superimposed infection cannot 2.  Otherwise stable lumbar spine MRI with contrast following L3-L5 laminectomies 3.  Moderate multilevel central stenoses greatest at L2/3 are again seen, mostly secondary to facet arthropathy and developmentally short pedicles as specifically detailed above 4.  Lesser foraminal stenoses are unchanged as detailed above    Mr-thoracic Spine-with & W/o    Result Date: 7/8/2019 7/8/2019 11:41 AM HISTORY/REASON FOR EXAM:  Mid-back pain; Bilateral LE weakness TECHNIQUE/EXAM DESCRIPTION: MRI of the thoracic spine without and with contrast. The study was performed on a [employer] 1.5 Gena MRI scanner. T1 sagittal, T2 fast spin-echo sagittal, and T2 axial images were obtained of the thoracic spine. T1 post-contrast fat suppressed sagittal images were obtained. Optional T1 post-contrast axial images may be obtained. 15 mL Gadavist contrast was administered intravenously. COMPARISON:  None. FINDINGS: Straightening of the normal kyphosis. Several small nonedematous Schmorl's nodes are seen Normal alignment The visualized spinal cord is normal in signal and morphology There are multiple disc protrusions but at no point is central stenosis moderate or greater in severity. Several levels of cord abutment are identified, noted best anteriorly on the left at T9/10. Protrusions are also seen on the right at T2/3, T4/5,  left T5/6, left and right at T8/9. Mild right T2/3 foraminal stenosis related to disc protrusion Otherwise patent foramina Paraspinous soft tissues are normal in appearance After contrast is administered no abnormal enhancement is seen No abnormal fluid collection. No epidural abscess. No abnormal disc signal to suggest discitis     Multilevel degenerative disc disease results in cord abutment but no cord compression Straightening of the normal lordosis with multiple chronic Schmorl's nodes Mild right T2/3 foraminal stenosis    Dx-portable Fluoro > 1 Hour    Result Date: 7/15/2019  7/12/2019 12:03 PM HISTORY/REASON FOR EXAM:  Posterior lumbar laminectomy L3-S1, Main OR TECHNIQUE/EXAM DESCRIPTION AND NUMBER OF VIEWS: Portable fluoroscopy for greater than one hour FINDINGS:      The portable fluoroscopy unit was obligated to the procedure for greater than one hour.   Actual fluoro time was 2 seconds.     Portable fluoroscopy utilized for 2 seconds.    Ec-echocardiogram Complete W/ Cont    Result Date: 8/2/2019  Transthoracic Echo Report Echocardiography Laboratory CONCLUSIONS Prior echocardiogram 6/21/2019.Technically difficult study - adequate information is obtained. Contrast was used to enhance visualization of the endocardial border. Normal left ventricular systolic function. Left ventricular ejection fraction is visually estimated to be 65%. Normal regional wall motion. Right ventricle not well visualized but visually appears Mildly enlarged. The left atrium is normal in size. The mitral valve is not well visualized. No stenosis or regurgitation seen. Structurally normal aortic valve without significant stenosis or regurgitation. Normal pericardium without effusion. JOHNSON PEOPLES Exam Date:         08/02/2019                    19:04 Exam Location:     Inpatient Priority:          Routine Ordering Physician:        NICCI HERNANDEZ Referring Physician: Sonographer:               Candy James RDCS Age:     23     Gender:    M MRN:    [id number] :    1995 BSA:    2.55   Ht (in):    72     Wt (lb):    304 Exam Type:     Complete, Contrast Indications:     Cardiac arrest, cause unspecified ICD Codes:       I46.9 CPT Codes:       18044,  BP:   121    /   84     HR:   140 Technical Quality:       Technically difficult study -                          adequate information is obtained MEASUREMENTS  (Male / Female) Normal Values 2D ECHO LV Diastolic Diameter PLAX        4.2 cm                4.2 - 5.9 / 3.9 - 5.3 cm LV Systolic Diameter PLAX         3.7 cm                2.1 - 4.0 cm IVS Diastolic Thickness           1.3 cm                LVPW Diastolic Thickness          1 cm                  LVOT Diameter                     2.4 cm                Estimated LV Ejection Fraction    65 %                  LV Ejection Fraction MOD BP       52.1 %                >= 55  % LV Ejection Fraction MOD 4C       56.7 %                LV Ejection Fraction MOD 2C       47.6 %                IVC Diameter                      0.79 cm               DOPPLER AV Peak Velocity                  0.94 m/s              AV Peak Gradient                  3.6 mmHg              AV Mean Gradient                  2.2 mmHg              LVOT Peak Velocity                0.89 m/s              AV Area Cont Eq vti               4.1 cm²               MV Velocity Time Integral         11 cm                 Mitral E Point Velocity           0.84 m/s              MV Pressure Half Time             28.8 ms               MV Area PHT                       7.6 cm²               * Indicates values subject to auto-interpretation LV EF:  65    % FINDINGS Left Ventricle Contrast was used to enhance visualization of the endocardial border. 3 mL of contrast was administered. Existing IV was used. Located at the left hand. Normal left ventricular chamber size. Normal left ventricular wall thickness. Normal left ventricular systolic function. Left ventricular  ejection fraction is visually estimated to be 65%. Normal regional wall motion. Diastolic function is difficult to assess with tachycardia. Right Ventricle Right ventricle not well visualized but visually appears Mildly enlarged. Right Atrium The right atrium is normal in size. Inferior vena cava small in size and collapsed due to patient's fluids being low. Left Atrium The left atrium is normal in size. Left atrial volume index is 8 mL/sq m. Mitral Valve The mitral valve is not well visualized. No stenosis or regurgitation seen. Aortic Valve Structurally normal aortic valve without significant stenosis or regurgitation. Tricuspid Valve Structurally normal tricuspid valve without significant stenosis or regurgitation. Pulmonic Valve Structurally normal pulmonic valve without significant stenosis or regurgitation. Pericardium Normal pericardium without effusion. Fat pad present. Aorta The aortic root is normal. Ascending aorta diameter is 2.8 cm. Serg Lopes M.D. (Electronically Signed) Final Date:     02 August 2019                 22:34      ASSESSMENT/PLAN:     Sammy Edmonds is a 23 y.o.  admitted 7/31/2019. Pt has a past medical history of congenital spinal stenosis and severe L4-L5 stenosis with disc herniation as well as stenosis L2-L3 and L5-S5 with obesity and chronic pain.  The patient had L4, L5 and S1 decompressive laminectomy, bilateral medial facetectomies and bilateral foraminoctomy's with aborted L2-L4 laminectomies due to loss of signal on 6/17/2019.  He then went back to the OR on 7/12 for L2- S1 laminectomy and L4-L5 microdiscectomy.  Per note there were no complications.   He had MRI spine on 7/8 with a new 10 x 3 x 1 similar deep subcutaneous fat fluid collection thought to be seroma but superimposed infection not ruled out.    He he presented to the ED on 7/27 with leaking wound VAC for which she had had wound drainage and cleanout on 7/25.  He again returned on 7/31 due to concerns  for ongoing worsening wound drainage and was admitted.      Hospital Course:   Patient has been afebrile, he did have a one-time activity elevation to 100.  Wound cultures were obtained on 7/31 with MRSA patient had an MRI spine on 8/1 as below. Patient was the OR for I&D which was complicated by an episode of V. tach followed by reported V. fib.  He was given 3 shocks before establishing sinus rhythm and amiodarone.  After this he was awake and agitated and attempting to pull out ET tube so subsequent surgery was canceled and he was extubated.  He was admitted to the ICU.  Patient was started on vancomycin on 8/1 and remains on to date.     Sepsis, due to MRSA wound infection  Fever, 1x overnight - 100, hypotensive-improved    Postsurgical infection at L4/L5 near laminectomy as well as involvement of cauda equina nerve roots-wound culture on 7/31 with MRSA  -MRI spine on 8/1 stable postsurgical changes.  Again noted postsurgical changes with extensive enhancement and edema possibly secondary to surgery or infection at L2-S1.  A previously seen superficial fluid collection had slightly decreased in size however a new rim-enhancing fluid in the deep soft tissues of the laminectomy bed was noted near L5 measuring 5 x 3 x 2.1 cm with developing abscess not excluded. Also with diffuse enhancement of the cauda equina nerve roots.  No evidence of discitis, ostium mellitus or epidural abscess.  -TTE on 8/2, no significant valvular abnormalities or vegetations    Leukocytosis, peaked 17.2 on 8/2-now normalized    --- Continue vancomycin  --- Will examine wound with nursing tomorrow  --- Follow-up cultures  --- Follow CBC and closely monitor kidney function in setting of vancomycin        Plan of care discussed with YENI Davis M.D.. Will continue to follow    Cecilia Adhikari M.D.

## 2019-08-04 PROBLEM — J96.01 ACUTE RESPIRATORY FAILURE WITH HYPOXIA (HCC): Status: ACTIVE | Noted: 2019-08-04

## 2019-08-04 LAB
ALBUMIN SERPL BCP-MCNC: 3.5 G/DL (ref 3.2–4.9)
ALP SERPL-CCNC: 64 U/L (ref 30–99)
ALT SERPL-CCNC: 222 U/L (ref 2–50)
ANION GAP SERPL CALC-SCNC: 8 MMOL/L (ref 0–11.9)
AST SERPL-CCNC: 68 U/L (ref 12–45)
BASOPHILS # BLD AUTO: 0.2 % (ref 0–1.8)
BASOPHILS # BLD: 0.02 K/UL (ref 0–0.12)
BILIRUB CONJ SERPL-MCNC: 0.1 MG/DL (ref 0.1–0.5)
BILIRUB INDIRECT SERPL-MCNC: 0.3 MG/DL (ref 0–1)
BILIRUB SERPL-MCNC: 0.4 MG/DL (ref 0.1–1.5)
BUN SERPL-MCNC: 6 MG/DL (ref 8–22)
CALCIUM SERPL-MCNC: 8.4 MG/DL (ref 8.5–10.5)
CHLORIDE SERPL-SCNC: 103 MMOL/L (ref 96–112)
CO2 SERPL-SCNC: 25 MMOL/L (ref 20–33)
CREAT SERPL-MCNC: 0.49 MG/DL (ref 0.5–1.4)
EOSINOPHIL # BLD AUTO: 0.06 K/UL (ref 0–0.51)
EOSINOPHIL NFR BLD: 0.7 % (ref 0–6.9)
ERYTHROCYTE [DISTWIDTH] IN BLOOD BY AUTOMATED COUNT: 39.2 FL (ref 35.9–50)
GLUCOSE SERPL-MCNC: 105 MG/DL (ref 65–99)
HCT VFR BLD AUTO: 30 % (ref 42–52)
HGB BLD-MCNC: 9.4 G/DL (ref 14–18)
IMM GRANULOCYTES # BLD AUTO: 0.04 K/UL (ref 0–0.11)
IMM GRANULOCYTES NFR BLD AUTO: 0.5 % (ref 0–0.9)
LYMPHOCYTES # BLD AUTO: 2.51 K/UL (ref 1–4.8)
LYMPHOCYTES NFR BLD: 29.4 % (ref 22–41)
MAGNESIUM SERPL-MCNC: 1.7 MG/DL (ref 1.5–2.5)
MCH RBC QN AUTO: 26 PG (ref 27–33)
MCHC RBC AUTO-ENTMCNC: 31.3 G/DL (ref 33.7–35.3)
MCV RBC AUTO: 82.9 FL (ref 81.4–97.8)
MONOCYTES # BLD AUTO: 0.86 K/UL (ref 0–0.85)
MONOCYTES NFR BLD AUTO: 10.1 % (ref 0–13.4)
NEUTROPHILS # BLD AUTO: 5.04 K/UL (ref 1.82–7.42)
NEUTROPHILS NFR BLD: 59.1 % (ref 44–72)
NRBC # BLD AUTO: 0 K/UL
NRBC BLD-RTO: 0 /100 WBC
PLATELET # BLD AUTO: 291 K/UL (ref 164–446)
PMV BLD AUTO: 8.2 FL (ref 9–12.9)
POTASSIUM SERPL-SCNC: 4 MMOL/L (ref 3.6–5.5)
PROT SERPL-MCNC: 7.1 G/DL (ref 6–8.2)
RBC # BLD AUTO: 3.62 M/UL (ref 4.7–6.1)
SODIUM SERPL-SCNC: 136 MMOL/L (ref 135–145)
WBC # BLD AUTO: 8.5 K/UL (ref 4.8–10.8)

## 2019-08-04 PROCEDURE — 99233 SBSQ HOSP IP/OBS HIGH 50: CPT | Performed by: INTERNAL MEDICINE

## 2019-08-04 PROCEDURE — 80048 BASIC METABOLIC PNL TOTAL CA: CPT

## 2019-08-04 PROCEDURE — 80076 HEPATIC FUNCTION PANEL: CPT

## 2019-08-04 PROCEDURE — 700102 HCHG RX REV CODE 250 W/ 637 OVERRIDE(OP): Performed by: INTERNAL MEDICINE

## 2019-08-04 PROCEDURE — 700105 HCHG RX REV CODE 258: Performed by: HOSPITALIST

## 2019-08-04 PROCEDURE — 700105 HCHG RX REV CODE 258: Performed by: INTERNAL MEDICINE

## 2019-08-04 PROCEDURE — 700102 HCHG RX REV CODE 250 W/ 637 OVERRIDE(OP): Performed by: HOSPITALIST

## 2019-08-04 PROCEDURE — 94668 MNPJ CHEST WALL SBSQ: CPT

## 2019-08-04 PROCEDURE — 99233 SBSQ HOSP IP/OBS HIGH 50: CPT | Performed by: HOSPITALIST

## 2019-08-04 PROCEDURE — 85025 COMPLETE CBC W/AUTO DIFF WBC: CPT

## 2019-08-04 PROCEDURE — 700111 HCHG RX REV CODE 636 W/ 250 OVERRIDE (IP): Performed by: HOSPITALIST

## 2019-08-04 PROCEDURE — 94667 MNPJ CHEST WALL 1ST: CPT

## 2019-08-04 PROCEDURE — A9270 NON-COVERED ITEM OR SERVICE: HCPCS | Performed by: INTERNAL MEDICINE

## 2019-08-04 PROCEDURE — 83735 ASSAY OF MAGNESIUM: CPT

## 2019-08-04 PROCEDURE — 770020 HCHG ROOM/CARE - TELE (206)

## 2019-08-04 PROCEDURE — A9270 NON-COVERED ITEM OR SERVICE: HCPCS | Performed by: HOSPITALIST

## 2019-08-04 PROCEDURE — 700111 HCHG RX REV CODE 636 W/ 250 OVERRIDE (IP): Performed by: INTERNAL MEDICINE

## 2019-08-04 PROCEDURE — 94760 N-INVAS EAR/PLS OXIMETRY 1: CPT

## 2019-08-04 RX ORDER — MAGNESIUM SULFATE HEPTAHYDRATE 40 MG/ML
2 INJECTION, SOLUTION INTRAVENOUS ONCE
Status: COMPLETED | OUTPATIENT
Start: 2019-08-04 | End: 2019-08-04

## 2019-08-04 RX ORDER — FUROSEMIDE 10 MG/ML
20 INJECTION INTRAMUSCULAR; INTRAVENOUS ONCE
Status: COMPLETED | OUTPATIENT
Start: 2019-08-04 | End: 2019-08-04

## 2019-08-04 RX ORDER — LINEZOLID 600 MG/1
600 TABLET, FILM COATED ORAL EVERY 12 HOURS
Status: DISCONTINUED | OUTPATIENT
Start: 2019-08-04 | End: 2019-08-05 | Stop reason: ALTCHOICE

## 2019-08-04 RX ADMIN — VANCOMYCIN HYDROCHLORIDE 1600 MG: 500 INJECTION, POWDER, LYOPHILIZED, FOR SOLUTION INTRAVENOUS at 06:25

## 2019-08-04 RX ADMIN — LINEZOLID 600 MG: 600 TABLET, FILM COATED ORAL at 20:46

## 2019-08-04 RX ADMIN — GABAPENTIN 100 MG: 100 CAPSULE ORAL at 17:22

## 2019-08-04 RX ADMIN — HYDROMORPHONE HYDROCHLORIDE 0.5 MG: 2 INJECTION INTRAMUSCULAR; INTRAVENOUS; SUBCUTANEOUS at 11:58

## 2019-08-04 RX ADMIN — METHOCARBAMOL TABLETS 1000 MG: 500 TABLET, COATED ORAL at 12:20

## 2019-08-04 RX ADMIN — SODIUM CHLORIDE: 9 INJECTION, SOLUTION INTRAVENOUS at 06:26

## 2019-08-04 RX ADMIN — MAGNESIUM SULFATE IN WATER 2 G: 40 INJECTION, SOLUTION INTRAVENOUS at 09:33

## 2019-08-04 RX ADMIN — METHOCARBAMOL TABLETS 1000 MG: 500 TABLET, COATED ORAL at 17:22

## 2019-08-04 RX ADMIN — SENNOSIDES AND DOCUSATE SODIUM 2 TABLET: 8.6; 5 TABLET ORAL at 06:25

## 2019-08-04 RX ADMIN — FUROSEMIDE 20 MG: 10 INJECTION, SOLUTION INTRAMUSCULAR; INTRAVENOUS at 09:33

## 2019-08-04 RX ADMIN — LINEZOLID 600 MG: 600 TABLET, FILM COATED ORAL at 13:11

## 2019-08-04 RX ADMIN — GABAPENTIN 100 MG: 100 CAPSULE ORAL at 06:25

## 2019-08-04 RX ADMIN — GABAPENTIN 100 MG: 100 CAPSULE ORAL at 12:20

## 2019-08-04 RX ADMIN — METOPROLOL TARTRATE 25 MG: 25 TABLET, FILM COATED ORAL at 17:22

## 2019-08-04 RX ADMIN — METOPROLOL TARTRATE 25 MG: 25 TABLET, FILM COATED ORAL at 06:25

## 2019-08-04 RX ADMIN — HYDROCODONE BITARTRATE AND ACETAMINOPHEN 1 TABLET: 5; 325 TABLET ORAL at 01:18

## 2019-08-04 RX ADMIN — SENNOSIDES AND DOCUSATE SODIUM 2 TABLET: 8.6; 5 TABLET ORAL at 17:22

## 2019-08-04 RX ADMIN — METHOCARBAMOL TABLETS 1000 MG: 500 TABLET, COATED ORAL at 06:25

## 2019-08-04 ASSESSMENT — ENCOUNTER SYMPTOMS
SHORTNESS OF BREATH: 1
BACK PAIN: 1
BLURRED VISION: 0
CONSTIPATION: 1
HEADACHES: 0
SPUTUM PRODUCTION: 0
TINGLING: 0
DIZZINESS: 0
SENSORY CHANGE: 1
SORE THROAT: 0
NAUSEA: 0
FOCAL WEAKNESS: 0
DEPRESSION: 0
CONSTIPATION: 0
COUGH: 0
DOUBLE VISION: 0
SHORTNESS OF BREATH: 0
DIARRHEA: 0
VOMITING: 0
PALPITATIONS: 0
CHILLS: 0
MYALGIAS: 0
FEVER: 0
LOSS OF CONSCIOUSNESS: 0
ABDOMINAL PAIN: 0

## 2019-08-04 NOTE — PROGRESS NOTES
[facility] Medicine Daily Progress Note    Date of Service  8/4/2019    Chief Complaint  23 y.o. male admitted 7/31/2019 with drainage from surgical wound    Hospital Course    Hx of Lmbar laminectomy and microdiscectomy on 7/12 by Dr Ruelas.  Came to the ED with increased drainage from wound vac.   Taken to the OR on 8/2 for I&D.  After induction pt went into VTach and VFib requiring cardioversion x 3.  GIven IV AMio and Cardiology consulted.         Interval Problem Update  Pt notes some back pain but feels it's less then a few days ago.  No F or C.  Hasn't had a BM since admission however does not feel he needs to go and notes very little po intake since admission  Sinus on the monitor  Sinus 100-120s  UOP 1700ml/24hrs  D4 total Abx's: changing from Vanc to Linezolid    DW ID, Neuro Surgery    Consultants/Specialty  Neurosurgery  ID    Code Status  full    Disposition  OK to floor    Review of Systems  Review of Systems   Constitutional: Negative for chills and fever.   HENT: Negative for nosebleeds and sore throat.    Eyes: Negative for blurred vision and double vision.   Respiratory: Negative for cough and shortness of breath.    Cardiovascular: Negative for chest pain, palpitations and leg swelling.   Gastrointestinal: Positive for constipation. Negative for abdominal pain, diarrhea, nausea and vomiting.   Genitourinary: Negative for dysuria, frequency and urgency.   Musculoskeletal: Positive for back pain.   Skin: Negative for rash.   Neurological: Positive for sensory change. Negative for dizziness, focal weakness, loss of consciousness and headaches.        Some numbness on volar aspect of L foot; present since the surgery and unchanged        Physical Exam  Temp:  [36.2 °C (97.2 °F)-37.5 °C (99.5 °F)] 37.5 °C (99.5 °F)  Pulse:  [100-129] 115  Resp:  [18-56] 18  BP: ()/(50-81) 147/61  SpO2:  [89 %-100 %] 93 %    Physical Exam   Constitutional: He is oriented to person, place, and time. He appears  well-developed and well-nourished. No distress.   HENT:   Head: Normocephalic and atraumatic.   Nose: Nose normal.   Mouth/Throat: Oropharynx is clear and moist.   Eyes: Conjunctivae are normal.   Neck: No JVD present.   Cardiovascular: Normal rate. Exam reveals no gallop.   No murmur heard.  Pulmonary/Chest: Effort normal. No stridor. No respiratory distress. He has no wheezes. He has no rales.   Abdominal: Soft. There is no tenderness. There is no rebound and no guarding.   Musculoskeletal: He exhibits no edema.   Neurological: He is alert and oriented to person, place, and time.   Strength 5/5 B lower Ext  Sensation intact to light touch   Skin: Skin is warm and dry. No rash noted. He is not diaphoretic.   Psychiatric: He has a normal mood and affect. His behavior is normal. Judgment and thought content normal.   Nursing note and vitals reviewed.      Fluids    Intake/Output Summary (Last 24 hours) at 8/4/2019 0557  Last data filed at 8/4/2019 0200  Gross per 24 hour   Intake 4229.17 ml   Output 2700 ml   Net 1529.17 ml       Laboratory  Recent Labs     08/02/19  1830 08/02/19 2116 08/03/19 0316 08/04/19  0423   WBC 17.3*  --  8.0 8.5   RBC 2.98*  --  3.74* 3.62*   HEMOGLOBIN 7.9* 10.5* 9.6* 9.4*   HEMATOCRIT 24.9* 31.9* 30.8* 30.0*   MCV 83.6  --  82.4 82.9   MCH 26.5*  --  25.7* 26.0*   MCHC 31.7*  --  31.2* 31.3*   RDW 39.1  --  39.3 39.2   PLATELETCT 300  --  318 291   MPV 8.3*  --  8.4* 8.2*     Recent Labs     08/02/19 1830 08/03/19 0316 08/04/19  0423   SODIUM 137 136 136   POTASSIUM 2.8* 3.9 4.0   CHLORIDE 111 104 103   CO2 17* 24 25   GLUCOSE 113* 130* 105*   BUN 8 10 6*   CREATININE 0.63 0.70 0.49*   CALCIUM 7.0* 8.1* 8.4*     Recent Labs     08/02/19 2050   INR 1.19*               Imaging  CT-CTA CHEST PULMONARY ARTERY W/ RECONS   Final Result      1.  No definite evidence of pulmonary emboli, within the limitations described above.   2.  Bilateral lower lobe discoid atelectasis.   3.  Unusual  curvilinear lucency in the right hepatic lobe which may represent scarring. The appearance is not at least typical for a hepatic solid mass or cyst.            EC-ECHOCARDIOGRAM COMPLETE W/ CONT   Final Result      DX-CHEST-LIMITED (1 VIEW)   Final Result      No evidence of acute cardiopulmonary process.      MR-LUMBAR SPINE-WITH & W/O   Final Result      1.  Stable postsurgical changes L3-L5 laminectomies. Interval ill-defined nonenhancing fluid within the laminectomy bed most pronounced at the L4-L5 level. Infection should be excluded clinically.   2.  There is diffuse enhancement of the cauda equina nerve roots which is presumably infectious/inflammatory.   3.  No evidence of discitis osteomyelitis or epidural abscess.   4.  Degenerative changes as detailed above with persistent moderate thecal sac stenosis at L2-L3 and L4-L5.           Assessment/Plan  * Cardiac arrest (HCC)  Assessment & Plan  Cards consulted  EP Recomendations:  Needs ICD however not until infection is taken care of  Proceed with surgery.  If he goes into VT/VF; defibrilate and give Amiodarone.  Proceed with surgery  They will not place the ICD until after his spinal infection is drained and treated  Follow and maximize K, Mg, Phos    Elevated liver enzymes  Assessment & Plan  Secondary to VT/VF arrest most likely  Cont to follow, work up further if fail to normalize    Other inflammatory polyneuropathies (HCC)- (present on admission)  Assessment & Plan  Neurontin 100 mg 3 times daily    Infected surgical wound- (present on admission)  Assessment & Plan  Wound care    Pain management with IV Dilaudid    Antibiotics IV vancomycin    Plan for I&D as per Dr. crenshaw    Wound drainage- (present on admission)  Assessment & Plan  Infected with MRSA  Dc vanc due to dificult dosing in a large pt.  Change to linezolid  Consulted ID    Was taken to the OR however had VT/VF arrest with induction and procedure was aborted    Sinus tachycardia- (present on  admission)  Assessment & Plan  Beta-blocker      Acute bilateral back pain- (present on admission)  Assessment & Plan  Back pain is intractable    P.o. Vicodin    PRN IV Dilaudid       VTE prophylaxis: none due to recent surgery

## 2019-08-04 NOTE — PROGRESS NOTES
Dr. Smith bedside.  Ordered to stop NS continuous infusion of 125ml/hr and give 20 mg lasix IVP.  Potassium of 4.0 from AM labs.

## 2019-08-04 NOTE — PROGRESS NOTES
Received report from CICU RN (from room T630), pt care assumed, VSS, pt assessment complete. Pt AAOx4, pt c/o neck pain at this time. Pt in contact isolation for MRSA. No signs of acute distress noted at this time. POC discussed with pt and verbalizes no questions. Pt denies any additional needs at this time. Bed in lowest position, pt educated on fall risk and verbalized understanding, call light within reach, hourly rounding initiated.     Bariatric bed ordered.

## 2019-08-04 NOTE — PROGRESS NOTES
Neurosurgery Progress Note    Subjective:  Pain controlled with Norco 5 mg    Exam:  Posterior lumbar dressing in place as directed by wound care team    NM: left DF 4/5, rest w/o focal deficit    BP  Min: 104/51  Max: 147/61  Pulse  Av.3  Min: 100  Max: 129  Resp  Av  Min: 7  Max: 56  Temp  Av.7 °C (98.1 °F)  Min: 36.2 °C (97.2 °F)  Max: 37.5 °C (99.5 °F)  SpO2  Av.1 %  Min: 89 %  Max: 100 %    No data recorded    Recent Labs     19   WBC 17.3*  --  8.0 8.5   RBC 2.98*  --  3.74* 3.62*   HEMOGLOBIN 7.9* 10.5* 9.6* 9.4*   HEMATOCRIT 24.9* 31.9* 30.8* 30.0*   MCV 83.6  --  82.4 82.9   MCH 26.5*  --  25.7* 26.0*   MCHC 31.7*  --  31.2* 31.3*   RDW 39.1  --  39.3 39.2   PLATELETCT 300  --  318 291   MPV 8.3*  --  8.4* 8.2*     Recent Labs     19  0423   SODIUM 137 136 136   POTASSIUM 2.8* 3.9 4.0   CHLORIDE 111 104 103   CO2 17* 24 25   GLUCOSE 113* 130* 105*   BUN 8 10 6*   CREATININE 0.63 0.70 0.49*   CALCIUM 7.0* 8.1* 8.4*     Recent Labs     19   INR 1.19*     Recent Labs     19   REACTMIN 4.6*   CLOTKINET 1.0   CLOTANGL 77.0*   MAXCLOTS 82.0*   [id number] 0.0   PRCINADP 43.0   PRCINAA 37.4       Intake/Output       19 07 - 19 0659 19 07 - 1959      6401-3145 9996-1390 Total [id number] 6354-4678 Total       Intake    P.O.  --  250 250  --  -- --    P.O. -- 250 250 -- -- --    I.V.  375  2625 3000  --  -- --    Volume (mL) (NS infusion) 375 2625 3000 -- -- --    IV Piggyback  --  1000 1000  --  -- --    Volume (mL) (vancomycin (VANCOCIN) 2 g in  mL IVPB) -- 500 500 -- -- --    Volume (mL) (vancomycin (VANCOCIN) 1,600 mg in  mL IVPB) -- 500 500 -- -- --    Total Intake 375 0015 4250 -- -- --       Output    Urine  1600  1700 3300  --  -- --    Number of Times Voided 2 x 3 x 5 x -- -- --    Urine Void (mL) 1600 1700 3300 -- -- --    Total  Output 1600 1700 3300 -- -- --       Net I/O     -1225 2175 950 -- -- --            Intake/Output Summary (Last 24 hours) at 8/4/2019 0931  Last data filed at 8/4/2019 0600  Gross per 24 hour   Intake 3875 ml   Output 2750 ml   Net 1125 ml            • furosemide  20 mg Once   • magnesium sulfate  2 g Once   • vancomycin  1,600 mg Q8HR   • metoprolol  25 mg TWICE DAILY   • Respiratory Care per Protocol   Continuous RT   • HYDROmorphone  0.5 mg Q4HRS PRN   • gabapentin  100 mg TID   • MD Alert...Vancomycin per Pharmacy   PHARMACY TO DOSE   • HYDROcodone-acetaminophen  1 Tab Q4HRS PRN   • methocarbamol  1,000 mg TID   • senna-docusate  2 Tab BID    And   • polyethylene glycol/lytes  1 Packet QDAY PRN    And   • magnesium hydroxide  30 mL QDAY PRN    And   • bisacodyl  10 mg QDAY PRN   • acetaminophen  650 mg Q6HRS PRN   • ondansetron  4 mg Q4HRS PRN   • ondansetron  4 mg Q4HRS PRN   • promethazine  12.5-25 mg Q4HRS PRN   • promethazine  12.5-25 mg Q4HRS PRN   • prochlorperazine  5-10 mg Q4HRS PRN       Assessment and Plan:  Hospital day # 5 r/t to wound dehiscence with + MRSA cultures from superficial wound swab, blood cultures negative  On Vancomycin since 8/1/19,  Sp ID consult: Vancomycin to be continued     Sp aborted I & D 8/2/19 r/t to cardiac arrest  Ongoing ST per monitor: was placed on beta blocker during previous hospitalization     Post arrest  Anemia: per hospitalist team, Hgb preop 11.9, now 9.4     Prophylactic anticoagulation: yes  from Nsx view       Start date/time: per hospital team and indicated related to anemia of unknown etiology     Ho of LS spine surgery x 2: last laminectomy 7/12/19      will continue to follow along with hospitalist team. May consider resumption of wound vac.

## 2019-08-04 NOTE — PROGRESS NOTES
Infectious Disease Progress Note    Author: Ceiclia Adhikari M.D. Date & Time of service: 2019  8:36 AM    Chief Complaint:  Post laminectomy wound infection, MRSA     Interval History:    Review of Systems:  Review of Systems   Constitutional: Negative for chills, fever and malaise/fatigue.   Respiratory: Positive for shortness of breath. Negative for cough.    Gastrointestinal: Negative for abdominal pain, constipation, diarrhea, nausea and vomiting.   Musculoskeletal: Negative for joint pain and myalgias.   Skin: Negative for rash.       Hemodynamics:  Temp (24hrs), Av.7 °C (98.1 °F), Min:36.2 °C (97.2 °F), Max:37.5 °C (99.5 °F)  Temperature: 36.8 °C (98.2 °F)  Pulse  Av.6  Min: 100  Max: 149   Blood Pressure: 119/67, Arterial BP: 132/61       Physical Exam:  Physical Exam   Constitutional: He is oriented to person, place, and time. He appears well-developed and well-nourished.   Obese   HENT:   Head: Normocephalic and atraumatic.   Eyes: Pupils are equal, round, and reactive to light. Conjunctivae and EOM are normal.   Cardiovascular: Normal rate, regular rhythm and normal heart sounds.   Pulmonary/Chest: Effort normal.   Diminished breath soundsl lung fields, worse in bases   Abdominal: Soft. Bowel sounds are normal. He exhibits no distension. There is no tenderness. There is no rebound and no guarding.   Musculoskeletal: He exhibits no edema.   Neurological: He is alert and oriented to person, place, and time.   Skin: Skin is warm.   Surgical incision with ongoing drainage, some areas of dehiscence and mild erythema.  No odor.  No surrounding tissue with erythema, warmth or tenderness.   Psychiatric: He has a normal mood and affect. His behavior is normal.       Meds:    Current Facility-Administered Medications:   •  furosemide  •  vancomycin  •  metoprolol  •  Respiratory Care per Protocol  •  HYDROmorphone  •  gabapentin  •  MD Alert...Vancomycin per Pharmacy  •   HYDROcodone-acetaminophen  •  methocarbamol  •  senna-docusate **AND** polyethylene glycol/lytes **AND** magnesium hydroxide **AND** bisacodyl  •  acetaminophen  •  ondansetron  •  ondansetron  •  promethazine  •  promethazine  •  prochlorperazine    Labs:  Recent Labs     08/02/19 1830 08/02/19 2116 08/03/19 0316 08/04/19  0423   WBC 17.3*  --  8.0 8.5   RBC 2.98*  --  3.74* 3.62*   HEMOGLOBIN 7.9* 10.5* 9.6* 9.4*   HEMATOCRIT 24.9* 31.9* 30.8* 30.0*   MCV 83.6  --  82.4 82.9   MCH 26.5*  --  25.7* 26.0*   RDW 39.1  --  39.3 39.2   PLATELETCT 300  --  318 291   MPV 8.3*  --  8.4* 8.2*   NEUTSPOLYS 89.00*  --  70.70 59.10   LYMPHOCYTES 5.60*  --  21.90* 29.40   MONOCYTES 4.50  --  6.70 10.10   EOSINOPHILS 0.10  --  0.10 0.70   BASOPHILS 0.20  --  0.20 0.20     Recent Labs     08/02/19 1830 08/03/19 0316 08/04/19  0423   SODIUM 137 136 136   POTASSIUM 2.8* 3.9 4.0   CHLORIDE 111 104 103   CO2 17* 24 25   GLUCOSE 113* 130* 105*   BUN 8 10 6*     Recent Labs     08/02/19 1830 08/03/19 0316 08/04/19  0423   ALBUMIN 2.7*  --   --    TBILIRUBIN 0.5  --   --    ALKPHOSPHAT 52  --   --    TOTPROTEIN 5.6*  --   --    ALTSGPT 229*  --   --    ASTSGOT 182*  --   --    CREATININE 0.63 0.70 0.49*       Imaging:  Dx-chest-limited (1 View)    Result Date: 8/2/2019 8/2/2019 5:42 PM HISTORY/REASON FOR EXAM:  Shortness of Breath TECHNIQUE/EXAM DESCRIPTION AND NUMBER OF VIEWS: Single AP view of the chest. COMPARISON: 6/19/2019 FINDINGS: The cardiac silhouette and mediastinal contours are stable. No discrete opacity, pleural fluid, or pneumothorax. The left costophrenic angle is excluded from the image. No suspicious bony lesions.     No evidence of acute cardiopulmonary process.    Dx-spine-any One View    Result Date: 7/12/2019 7/12/2019 12:03 PM HISTORY/REASON FOR EXAM:  Intraoperative images for procedural navigation. TECHNIQUE/EXAM DESCRIPTION AND NUMBER OF VIEWS:  2 view(s) of the lumbosacral spine.     Fluoroscopic  image(s) obtained during posterior lumbar laminectomy. Please see the patient's chart for full procedural details. Fluoroscopy time 2 seconds.     Mr-lumbar Spine-with & W/o    Result Date: 8/1/2019 8/1/2019 3:24 PM HISTORY/REASON FOR EXAM:  Recent back surgery with wound drainage and elevated sed rate TECHNIQUE/EXAM DESCRIPTION: MRI of the lumbar spine without and with contrast. The study was performed on a [employer] Signa 1.5 Gena MRI scanner. T1 sagittal, T2 fast spin-echo sagittal, and T2 axial images were obtained of the lumbar spine. T1 postcontrast fat-suppressed sagittal images were obtained. 14 mL Gadavist contrast was administered intravenously. COMPARISON:  7/8/2019 FINDINGS: Redemonstrated are postsurgical changes status post laminectomies from L2 L3-L5 S1. There is extensive enhancement and edema in the midline posterior soft tissues and the lower paraspinal musculature which is similar to prior study and may represent postsurgical changes or infection. The previously seen superficial fluid collection appears slightly decreased in size. There is a new ill-defined rim-enhancing fluid in the deep soft tissues laminectomy bed most conspicuous at the L5 level measuring approximately 3 x 2.1 cm. Developing abscess is not excluded. There is extensive enhancement of the cauda equina nerve roots extending from the visualized conus medullaris through the S1 level which is concerning for infection. Conus medullaris terminates at T12-L1. T12-L2: Canal and foramina are patent. L2-L3: Disc bulge and posterior central disc extrusion again noted resulting in moderate spinal stenosis. L3-L4: Facet degeneration. No significant spinal stenosis. L4-L5: Disc degeneration, disc osteophyte and facet degeneration. There is moderate thecal sac stenosis. L5-S1: Posterior central left paracentral disc protrusion which flattens the ventral thecal sac. Bilateral facet degeneration. No significant spinal stenosis. Bilateral foraminal  stenosis.     1.  Stable postsurgical changes L3-L5 laminectomies. Interval ill-defined nonenhancing fluid within the laminectomy bed most pronounced at the L4-L5 level. Infection should be excluded clinically. 2.  There is diffuse enhancement of the cauda equina nerve roots which is presumably infectious/inflammatory. 3.  No evidence of discitis osteomyelitis or epidural abscess. 4.  Degenerative changes as detailed above with persistent moderate thecal sac stenosis at L2-L3 and L4-L5.    Mr-lumbar Spine-with & W/o    Result Date: 7/8/2019 7/8/2019 11:34 AM HISTORY/REASON FOR EXAM:  Back pain or radiculopathy, > 6 wks; Back pain, cauda equina syndrome suspected Laminectomy June 17. New weakness for 3 days. TECHNIQUE/EXAM DESCRIPTION: MRI of the lumbar spine without and with contrast. The study was performed on a [employer] 1.5 Gena MRI scanner. T1 sagittal, T2 fast spin-echo sagittal, and T2 axial images were obtained of the lumbar spine. T1 postcontrast fat-suppressed sagittal images were obtained. 15 mL Gadavist contrast was administered intravenously. COMPARISON:  6/19/2019 MRI. FINDINGS: Motion degrades the study. Straightening of the normal lordosis is again seen Multiple Schmorl's nodes are again demonstrated No marrow edema is seen No T2 hyperintensity is seen suspicious for discitis Conus terminates normal in position at L1. Visualized nerve roots show no abnormal clumping to suggest arachnoiditis. There is however degenerative disc disease causing central stenosis as discussed below. T2 hyperintense fluid collection is newly identified in the deep subcutaneous fat posteriorly spanning L2/3-L5 measuring 10 x 2.8 x 1.5 cm and there is rim enhancement. No abnormal epidural fluid collection is seen to suggest abscess. L3-L5 laminectomies have been performed. Findings by level: T12/L1: No stenosis L1/2: No stenosis L2/3: Mild disc height loss, right paracentral protrusion, ligamentum flavum redundancy, facet  arthropathy, short pedicles. This results in unchanged moderate to severe central stenosis, especially on the right. There is some epidural enhancement ventrally which is unchanged and likely related to the protrusion. Borderline foraminal stenosis L3/4: Stable facet arthropathy and ligamentum flavum redundancy with a mild disc bulge, short pedicles. This results in persistent moderate central and borderline foraminal stenosis L4/5: Stable right paracentral protrusion, disc bulge. Laminectomy with partial facetectomy. No significant central stenosis but the right greater than left foramina are again moderately narrowed L5/S1: Stable left paracentral protrusion, facet arthropathy, mild-moderate central and mild foraminal stenoses as before     1.  New 10 x 3 x 1 cm deep subcutaneous fat fluid collection differential includes seroma with or without superimposed infection cannot 2.  Otherwise stable lumbar spine MRI with contrast following L3-L5 laminectomies 3.  Moderate multilevel central stenoses greatest at L2/3 are again seen, mostly secondary to facet arthropathy and developmentally short pedicles as specifically detailed above 4.  Lesser foraminal stenoses are unchanged as detailed above    Mr-thoracic Spine-with & W/o    Result Date: 7/8/2019 7/8/2019 11:41 AM HISTORY/REASON FOR EXAM:  Mid-back pain; Bilateral LE weakness TECHNIQUE/EXAM DESCRIPTION: MRI of the thoracic spine without and with contrast. The study was performed on a [employer] Signa 1.5 Gena MRI scanner. T1 sagittal, T2 fast spin-echo sagittal, and T2 axial images were obtained of the thoracic spine. T1 post-contrast fat suppressed sagittal images were obtained. Optional T1 post-contrast axial images may be obtained. 15 mL Gadavist contrast was administered intravenously. COMPARISON:  None. FINDINGS: Straightening of the normal kyphosis. Several small nonedematous Schmorl's nodes are seen Normal alignment The visualized spinal cord is normal in signal  and morphology There are multiple disc protrusions but at no point is central stenosis moderate or greater in severity. Several levels of cord abutment are identified, noted best anteriorly on the left at T9/10. Protrusions are also seen on the right at T2/3, T4/5, left T5/6, left and right at T8/9. Mild right T2/3 foraminal stenosis related to disc protrusion Otherwise patent foramina Paraspinous soft tissues are normal in appearance After contrast is administered no abnormal enhancement is seen No abnormal fluid collection. No epidural abscess. No abnormal disc signal to suggest discitis     Multilevel degenerative disc disease results in cord abutment but no cord compression Straightening of the normal lordosis with multiple chronic Schmorl's nodes Mild right T2/3 foraminal stenosis    Dx-portable Fluoro > 1 Hour    Result Date: 7/15/2019  7/12/2019 12:03 PM HISTORY/REASON FOR EXAM:  Posterior lumbar laminectomy L3-S1, Main OR TECHNIQUE/EXAM DESCRIPTION AND NUMBER OF VIEWS: Portable fluoroscopy for greater than one hour FINDINGS:      The portable fluoroscopy unit was obligated to the procedure for greater than one hour.   Actual fluoro time was 2 seconds.     Portable fluoroscopy utilized for 2 seconds.    Ct-cta Chest Pulmonary Artery W/ Recons    Result Date: 8/3/2019  8/3/2019 9:11 PM HISTORY/REASON FOR EXAM:  Shortness of breath; tachycardia, post-op, high risk for PE. TECHNIQUE/EXAM DESCRIPTION: CT angiogram scan for pulmonary embolism with contrast, with reconstructions. 1.25 mm helical sections were obtained from the lung apices through the lung bases following the rapid bolus administration of 80 mL of Omnipaque 350 nonionic contrast. Thin-section overlapping reconstruction interval was utilized. Coronal reconstructions were generated from the axial data. MIP post processing was performed and utilized for the interpretation. Low dose optimization technique was utilized for this CT exam including  automated exposure control and adjustment of the mA and/or kV according to patient size. COMPARISON: None FINDINGS: Pulmonary Embolism: There is somewhat suboptimal opacification of the pulmonary arterial structures, there is no definite evidence of large or central pulmonary emboli. Small peripheral emboli cannot be absolutely excluded. . Lungs: Bilateral lower lobe discoid atelectasis.. Pleura: No pleural effusion. Nodes: No enlarged lymph nodes. Additional findings: Unusual curvilinear lucency in the right hepatic lobe measuring about 4 x 1.9 cm diameter..     1.  No definite evidence of pulmonary emboli, within the limitations described above. 2.  Bilateral lower lobe discoid atelectasis. 3.  Unusual curvilinear lucency in the right hepatic lobe which may represent scarring. The appearance is not at least typical for a hepatic solid mass or cyst.     Ec-echocardiogram Complete W/ Cont    Result Date: 2019  Transthoracic Echo Report Echocardiography Laboratory CONCLUSIONS Prior echocardiogram 2019.Technically difficult study - adequate information is obtained. Contrast was used to enhance visualization of the endocardial border. Normal left ventricular systolic function. Left ventricular ejection fraction is visually estimated to be 65%. Normal regional wall motion. Right ventricle not well visualized but visually appears Mildly enlarged. The left atrium is normal in size. The mitral valve is not well visualized. No stenosis or regurgitation seen. Structurally normal aortic valve without significant stenosis or regurgitation. Normal pericardium without effusion. JOHNSON PEOPLES Exam Date:         2019                    19:04 Exam Location:     Inpatient Priority:          Routine Ordering Physician:        NICCI HERNANDEZ Referring Physician: Sonographer:               Candy James RDCS Age:    23     Gender:    M MRN:    [id number] :    1995 BSA:    2.55   Ht (in):    72     Wt  (lb):    304 Exam Type:     Complete, Contrast Indications:     Cardiac arrest, cause unspecified ICD Codes:       I46.9 CPT Codes:       34420,  BP:   121    /   84     HR:   140 Technical Quality:       Technically difficult study -                          adequate information is obtained MEASUREMENTS  (Male / Female) Normal Values 2D ECHO LV Diastolic Diameter PLAX        4.2 cm                4.2 - 5.9 / 3.9 - 5.3 cm LV Systolic Diameter PLAX         3.7 cm                2.1 - 4.0 cm IVS Diastolic Thickness           1.3 cm                LVPW Diastolic Thickness          1 cm                  LVOT Diameter                     2.4 cm                Estimated LV Ejection Fraction    65 %                  LV Ejection Fraction MOD BP       52.1 %                >= 55  % LV Ejection Fraction MOD 4C       56.7 %                LV Ejection Fraction MOD 2C       47.6 %                IVC Diameter                      0.79 cm               DOPPLER AV Peak Velocity                  0.94 m/s              AV Peak Gradient                  3.6 mmHg              AV Mean Gradient                  2.2 mmHg              LVOT Peak Velocity                0.89 m/s              AV Area Cont Eq vti               4.1 cm²               MV Velocity Time Integral         11 cm                 Mitral E Point Velocity           0.84 m/s              MV Pressure Half Time             28.8 ms               MV Area PHT                       7.6 cm²               * Indicates values subject to auto-interpretation LV EF:  65    % FINDINGS Left Ventricle Contrast was used to enhance visualization of the endocardial border. 3 mL of contrast was administered. Existing IV was used. Located at the left hand. Normal left ventricular chamber size. Normal left ventricular wall thickness. Normal left ventricular systolic function. Left ventricular ejection fraction is visually estimated to be 65%. Normal regional wall motion. Diastolic function  "is difficult to assess with tachycardia. Right Ventricle Right ventricle not well visualized but visually appears Mildly enlarged. Right Atrium The right atrium is normal in size. Inferior vena cava small in size and collapsed due to patient's fluids being low. Left Atrium The left atrium is normal in size. Left atrial volume index is 8 mL/sq m. Mitral Valve The mitral valve is not well visualized. No stenosis or regurgitation seen. Aortic Valve Structurally normal aortic valve without significant stenosis or regurgitation. Tricuspid Valve Structurally normal tricuspid valve without significant stenosis or regurgitation. Pulmonic Valve Structurally normal pulmonic valve without significant stenosis or regurgitation. Pericardium Normal pericardium without effusion. Fat pad present. Aorta The aortic root is normal. Ascending aorta diameter is 2.8 cm. Serg Lopes M.D. (Electronically Signed) Final Date:     02 August 2019                 22:34      Micro:  Results     Procedure Component Value Units Date/Time    BLOOD CULTURE [[id number]] Collected:  08/03/19 1550    Order Status:  Completed Specimen:  Blood from Peripheral Updated:  08/04/19 0741     Significant Indicator NEG     Source BLD     Site PERIPHERAL     Culture Result No Growth  Note: Blood cultures are incubated for 5 days and  are monitored continuously.Positive blood cultures  are called to the RN and reported as soon as  they are identified.      Narrative:       [id number] RAYA CHRISTIANSEN  Per Hospital Policy: Only change Specimen Src: to \"Line\" if  specified by physician order.  Right Hand    BLOOD CULTURE [[id number]] Collected:  08/03/19 1550    Order Status:  Completed Specimen:  Blood from Peripheral Updated:  08/04/19 0741     Significant Indicator NEG     Source BLD     Site PERIPHERAL     Culture Result No Growth  Note: Blood cultures are incubated for 5 days and  are monitored continuously.Positive blood cultures  are called to the RN " "and reported as soon as  they are identified.      Narrative:       [id number] RAYA CHRISTIANSEN  Per Hospital Policy: Only change Specimen Src: to \"Line\" if  specified by physician order.  Left Hand    CULTURE WOUND W/ GRAM STAIN [[id number]]  (Abnormal)  (Susceptibility) Collected:  07/31/19 1245    Order Status:  Completed Specimen:  Wound from Back Updated:  08/02/19 0932     Significant Indicator POS     Source WND     Site BACK     Culture Result Light growth mixed skin zander.     Gram Stain Result Few WBCs.  Few Gram positive cocci.  Few Gram positive rods.       Culture Result Methicillin Resistant Staphylococcus aureus  Heavy growth  Methicillin resistant by screening method  This isolate is presumed to be clindamycin resistant based on  detection of inducible resistance.  Clindamycin may still  be effective in some patients.      Narrative:       CALL  Silva  GAY tel. [phone],  CALLED  GAY tel. [phone] 08/02/2019, 09:31, RB PERF. RESULTS CALLED  TO:[id number] RN    Susceptibility     Methicillin resistant staphylococcus aureus (1)     Antibiotic Interpretation Microscan Method Status    Clindamycin Resistant <=0.5 mcg/mL PARRISH Final    Daptomycin Sensitive <=0.5 mcg/mL PARRISH Final    Erythromycin Resistant >4 mcg/mL PARRISH Final    Moxifloxacin Resistant >4 mcg/mL PARRISH Final    Ampicillin/sulbactam Resistant 16/8 mcg/mL PARRISH Final    Oxacillin Resistant >2 mcg/mL PARRISH Final    Vancomycin Sensitive 1 mcg/mL PARRISH Final    Penicillin Resistant >8 mcg/mL PARRISH Final    Trimeth/Sulfa Sensitive <=0.5/9.5 mcg/mL PARRISH Final    Tetracycline Sensitive <=4 mcg/mL PARRISH Final                   GRAM STAIN [[id number]] Collected:  07/31/19 1245    Order Status:  Completed Specimen:  Wound Updated:  07/31/19 2317     Significant Indicator .     Source WND     Site BACK     Gram Stain Result Few WBCs.  Few Gram positive cocci.  Few Gram positive rods.      Blood Culture,Hold [[id number]] Collected:  07/31/19 1152    Order Status:  " Completed Updated:  07/31/19 2207     Blood Culture Hold Collected          Assessment:  Active Hospital Problems    Diagnosis   • *Cardiac arrest (HCC) [I46.9]   • Elevated liver enzymes [R74.8]   • Morbidly obese (HCC) [E66.01]   • Infected surgical wound [T81.49XA]   • Other inflammatory polyneuropathies (HCC) [G61.89]   • Wound drainage [T14.8XXA]   • Sinus tachycardia [R00.0]   • Acute bilateral back pain [M54.9]        ASSESSMENT/PLAN:      Sammy Edmonds is a 23 y.o.  admitted 7/31/2019. Pt has a past medical history of congenital spinal stenosis and severe L4-L5 stenosis with disc herniation as well as stenosis L2-L3 and L5-S5 with obesity and chronic pain.  The patient had L4, L5 and S1 decompressive laminectomy, bilateral medial facetectomies and bilateral foraminoctomy's with aborted L2-L4 laminectomies due to loss of signal on 6/17/2019.  He had MRI spine on 7/8 with a new 10 x 3 x 1 cm deep subcutaneous fat fluid collection thought to be seroma but superimposed infection not ruled out. He then went back to the OR on 7/12 for L2- S1 laminectomy and L4-L5 microdiscectomy.  Per note there were no complications.       He has leaking wound vac with drainage and clean-out on 7/25.  He presented to the ED on 7/27 again with leaking wound VAC but was not admitted. He again returned on 7/31 due to concerns for ongoing worsening wound drainage and was admitted for further work-up.      Hospital Course:   Patient has been afebrile.  Wound cultures were obtained on 7/31 with MRSA. Patient had an MRI spine on 8/1 as below. Went to OR for I&D which was complicated by an episode of V. tach followed by reported V. fib.  He was given 3 shocks before establishing sinus rhythm and amiodarone.  After this he was awake and agitated and attempting to pull out ET tube so subsequent surgery was canceled and he was extubated.  He was admitted to the ICU.  Patient was started on vancomycin on 8/1 and remains on to date.      Interval 24 hour assessment:    AF, O2 2 L NC  Labs reviewed, vanco trough subtherapeutic at 8.3 on 8/3  Imaging personally reviewed both images and report.  Concern for PE so CT chest was done on 8/3.  Somewhat suboptimal, but no PE.  Bilateral lower lobe atelectasis.  No consolidations.  Micro reviewed    Will stop vancomycin and transition to linezolid.  Reviewed photos of wound and examined with nurse today.  Wound seems to be improving.  Discussed with ICU team and will likely go back to the OR in the next few days for I&D.         Sepsis, due to MRSA wound infection  Fever, 1x overnight - 100, hypotensive-improved     Postsurgical infection at L4/L5 near laminectomy as well as involvement of cauda equina nerve roots-wound culture on 7/31 with MRSA, no hardware  -MRI spine on 8/1 stable postsurgical changes.  Again noted postsurgical changes with extensive enhancement and edema possibly secondary to surgery or infection at L2-S1.  A previously seen superficial fluid collection had slightly decreased in size however a new rim-enhancing fluid in the deep soft tissues of the laminectomy bed was noted near L5 measuring 5 x 3 x 2.1 cm with developing abscess not excluded. Also with diffuse enhancement of the cauda equina nerve roots.  No evidence of discitis, ostium mellitus or epidural abscess.  -TTE on 8/2, no significant valvular abnormalities or vegetations     Leukocytosis, peaked 17.2 on 8/2-now normalized     --- Stop vancomycin, difficulty obtaining therapeutic trough - start linezolid, as patient will likely multiple weeks of antibiotics can see if he can tolerate linezolid and also have Bactrim as another oral option  --- Follow-up cultures  --- Follow CBC and closely monitor kidney function   --- Agree with plan for I&D         Discussed with ICU attending.  ID will follow.

## 2019-08-04 NOTE — CARE PLAN
Problem: Hyperinflation:  Goal: Prevent or improve atelectasis  Outcome: PROGRESSING AS EXPECTED   PEP QID, IS 1250

## 2019-08-04 NOTE — PROGRESS NOTES
Lab called with critical result of positive blood cultures: MRSA by PCR taken on 8/3/19. Critical lab result read back to Xenia Turner RN.   Dr. Friend notified of critical lab result at 1501.  Critical lab result read back by Dr. Friend. No orders received at this time.

## 2019-08-04 NOTE — WOUND TEAM
Wound team spoke with RN re dressing changes.  Dressing changes are continuing per nursing orders.  RN states wound is looking better.  Wound team will return to do bedside dressing change in the next couple of days to ensure dressing orders continue to be appropriate for this wound.      Order for wound VAC unit canceled by wound team since surgery was aborted due to VT arrest.

## 2019-08-04 NOTE — PROGRESS NOTES
Patient off unit to T7.  Report given to Receiving Tele RN.  Contact iso continued.  Tele RN bedside.

## 2019-08-04 NOTE — PROGRESS NOTES
2 RN skin check done with Ameena.     Redness and slow to savannah left elbow. Dressing to lower back. Per CICU RN, dressing was changed today. Next dressing change 8/5.

## 2019-08-04 NOTE — PROGRESS NOTES
Critical Care Progress Note    Date of admission  7/31/2019    Chief Complaint  23 y.o. male admitted 7/31/2019 with V. tach/V. fib cardiac arrest    Hospital Course    23 y.o. male who presented 7/31/2019 with infection of lumbar laminectomy.  Patient presented 6/17/2019 with congenital spinal stenosis and severe L4-L5 stenosis with disc herniation, L2-L3 moderate to severe stenosis, and L5-S1 moderate to severe stenosis with morbid obesity and intractable pain.  At that time he had L4 and L5 and S1 decompressive laminectomy, bilateral medial facetectomies and bilateral foraminotomies with aborted L2-L3 and L3-L4 laminectomies due to loss of signal.  He subsequently returned on 7/12 to have L2, L3, L4, L5, S1 laminectomy, and a L4-L5 microdiscectomy and was hospitalized 7/5-22.  Further during that hospitalization he was noted to have persistent sinus tachycardia, had echocardiogram which was unremarkable, and was initiated on metoprolol.       Patient presented to ED on 7/27 after indicating leaking wound VAC for which he had wound drainage and cleanout performed 7/25.  Patient returned again on 7/31 with ongoing concerns of wound drainage and at which point was admitted and found to have ill-defined nonenhancing fluid within the laminectomy bed most pronounced at L4-L5 level as well as diffuse enhancement of the cauda equina nerve roots presumably infectious/inflammatory on MRI.  Wound culture grew MRSA.  Plan today was for I&D of wound.     Patient went to the OR for I&D and apparently after being intubated yet prior to initiation of surgical procedure patient went into a ventricular tachycardic rhythm followed by reported VChun fib, he had a total of 3 shocks before reestablishing sinus rhythm further he also was given 150 mg of amiodarone.  As there was concern of possible hyperkalemia post succinylcholine as paralytic he was given 20 of IV insulin and calcium.  Immediately after returning to sinus rhythm patient  was awake agitated and looking to pull out ET tube.  He was subsequently was extubated and surgery canceled.     At this time he complains of unchanged back pain, denies any palpitations or chest discomfort, denies significant shortness of breath, no visual changes or lightheadedness.      Interval Problem Update  Reviewed last 24 hour events:  T-max 99.5  + 950 cc over the last 24 hours, +4.4 L since admit  CT PE study negative for PE, bilateral ATX  Echo 8/2 65% and hyperdynamic  Mag 1.7  Refusing bowel protocol    BC X 8/3 and process  Back wound CX 7/31 MRSA    Vancomycin 8/1-P    NS@125    99% on 2 L  Last BM PTA    Review of Systems  Review of Systems   Constitutional: Positive for malaise/fatigue. Negative for chills and fever.   HENT: Negative for congestion.    Eyes: Negative for blurred vision.   Respiratory: Positive for shortness of breath. Negative for cough and sputum production.    Cardiovascular: Positive for chest pain (From CPR). Negative for palpitations.   Gastrointestinal: Negative for abdominal pain, nausea and vomiting.   Neurological: Negative for tingling and focal weakness.   Psychiatric/Behavioral: Negative for depression.   All other systems reviewed and are negative.       Vital Signs for last 24 hours   Temp:  [36.2 °C (97.2 °F)-37.5 °C (99.5 °F)] 36.8 °C (98.2 °F)  Pulse:  [100-129] 107  Resp:  [7-56] 26  BP: (104-147)/(50-81) 119/67  SpO2:  [89 %-100 %] 99 %    Hemodynamic parameters for last 24 hours       Respiratory Information for the last 24 hours       Physical Exam   Physical Exam   Constitutional: He appears well-developed and well-nourished.   HENT:   Head: Normocephalic and atraumatic.   Eyes: Pupils are equal, round, and reactive to light. Conjunctivae are normal.   Neck: No tracheal deviation present.   Cardiovascular: Intact distal pulses.   Tachycardic   Pulmonary/Chest: He has no wheezes.   Diminished   Abdominal: Soft. He exhibits no distension. There is no tenderness.    Musculoskeletal: He exhibits edema.   Neurological: No cranial nerve deficit.   Grossly intact sensation of lower extremities, 4+/5 strength   Skin: Skin is warm and dry.   Psychiatric: He has a normal mood and affect.   Nursing note and vitals reviewed.      Medications  Current Facility-Administered Medications   Medication Dose Route Frequency Provider Last Rate Last Dose   • vancomycin (VANCOCIN) 1,600 mg in  mL IVPB  1,600 mg Intravenous Q8HR Kevin Yuen D.O. 250 mL/hr at 08/04/19 0625 1,600 mg at 08/04/19 0625   • metoprolol (LOPRESSOR) tablet 25 mg  25 mg Oral TWICE DAILY Sea Mcghee M.D.   25 mg at 08/04/19 0625   • Respiratory Care per Protocol   Nebulization Continuous RT Dallin Smith M.D.       • HYDROmorphone (DILAUDID) injection 0.5 mg  0.5 mg Intravenous Q4HRS PRN Humberto Davis M.D.   0.5 mg at 08/02/19 2345   • gabapentin (NEURONTIN) capsule 100 mg  100 mg Oral TID Humberto Davis M.D.   100 mg at 08/04/19 0625   • MD Alert...Vancomycin per Pharmacy   Other PHARMACY TO DOSE Humberto Davis M.D.       • NS infusion   Intravenous Continuous Jeremy M Gonda, M.D. 125 mL/hr at 08/04/19 0626     • HYDROcodone-acetaminophen (NORCO) 5-325 MG per tablet 1 Tab  1 Tab Oral Q4HRS PRN Chadwick Green M.D.   1 Tab at 08/04/19 0118   • methocarbamol (ROBAXIN) tablet 1,000 mg  1,000 mg Oral TID Chadwick Green M.D.   1,000 mg at 08/04/19 0625   • senna-docusate (PERICOLACE or SENOKOT S) 8.6-50 MG per tablet 2 Tab  2 Tab Oral BID Chadwick Green M.D.   2 Tab at 08/04/19 0625    And   • polyethylene glycol/lytes (MIRALAX) PACKET 1 Packet  1 Packet Oral QDAY PRN Chadwick Green M.D.        And   • magnesium hydroxide (MILK OF MAGNESIA) suspension 30 mL  30 mL Oral QDAY PRN Chadwick Green M.D.        And   • bisacodyl (DULCOLAX) suppository 10 mg  10 mg Rectal QDAY PRN Chadwick Green M.D.       • acetaminophen (TYLENOL) tablet 650 mg  650 mg Oral Q6HRS PRN  Chadwick Green M.D.   650 mg at 08/01/19 0506   • ondansetron (ZOFRAN) syringe/vial injection 4 mg  4 mg Intravenous Q4HRS PRN Chadwick Green M.D.       • ondansetron (ZOFRAN ODT) dispertab 4 mg  4 mg Oral Q4HRS PRN Chadwick Green M.D.       • promethazine (PHENERGAN) tablet 12.5-25 mg  12.5-25 mg Oral Q4HRS PRN Chadwick Green M.D.       • promethazine (PHENERGAN) suppository 12.5-25 mg  12.5-25 mg Rectal Q4HRS PRN Chadwick Green M.D.       • prochlorperazine (COMPAZINE) injection 5-10 mg  5-10 mg Intravenous Q4HRS PRN Chadwick Green M.D.           Fluids    Intake/Output Summary (Last 24 hours) at 8/4/2019 0721  Last data filed at 8/4/2019 0600  Gross per 24 hour   Intake 4250 ml   Output 3300 ml   Net 950 ml       Laboratory  Recent Labs     08/02/19  1718   ISTATAPH 7.386*   ISTATAPCO2 33.0   ISTATAPO2 186*   ISTATATCO2 21   KOVPGCI0MHH 100*   ISTATARTHCO3 19.8   ISTATARTBE -5*   ISTATTEMP see below   ISTATFIO2 100   ISTATSPEC Arterial         Recent Labs     08/02/19  1830 08/03/19 0316 08/04/19  0423   SODIUM 137 136 136   POTASSIUM 2.8* 3.9 4.0   CHLORIDE 111 104 103   CO2 17* 24 25   BUN 8 10 6*   CREATININE 0.63 0.70 0.49*   MAGNESIUM 2.1 2.0 1.7   CALCIUM 7.0* 8.1* 8.4*     Recent Labs     08/02/19  1830 08/03/19  0316 08/04/19  0423   ALTSGPT 229*  --   --    ASTSGOT 182*  --   --    ALKPHOSPHAT 52  --   --    TBILIRUBIN 0.5  --   --    GLUCOSE 113* 130* 105*     Recent Labs     08/02/19  1830 08/03/19 0316 08/04/19  0423   WBC 17.3* 8.0 8.5   NEUTSPOLYS 89.00* 70.70 59.10   LYMPHOCYTES 5.60* 21.90* 29.40   MONOCYTES 4.50 6.70 10.10   EOSINOPHILS 0.10 0.10 0.70   BASOPHILS 0.20 0.20 0.20   ASTSGOT 182*  --   --    ALTSGPT 229*  --   --    ALKPHOSPHAT 52  --   --    TBILIRUBIN 0.5  --   --      Recent Labs     08/02/19  1830 08/02/19 2050 08/02/19 2116 08/03/19 0316 08/04/19  0423   RBC 2.98*  --   --  3.74* 3.62*   HEMOGLOBIN 7.9*  --  10.5* 9.6* 9.4*   HEMATOCRIT  24.9*  --  31.9* 30.8* 30.0*   PLATELETCT 300  --   --  318 291   PROTHROMBTM  --  15.4*  --   --   --    INR  --  1.19*  --   --   --        Imaging  CT:    Reviewed    Assessment/Plan  * Cardiac arrest (HCC)  Assessment & Plan  Not a TTM candidate given fully neurologically intact  8/2 post intubation had V. tach requiring shock x3  Status post 150 mg amiodarone  EKG with sinus tachycardia, normal QTC  Continuous cardiac monitoring  Echo 8/2 showed hyperdynamic state normal EF    Acute respiratory failure with hypoxia (HCC)  Assessment & Plan  RT/O2 protocols  DC IVF  Lasix 20 mg  I-S/PEP    Elevated liver enzymes  Assessment & Plan  Suspect secondary to hypoperfusion  Continue to trend    Morbidly obese (HCC)  Assessment & Plan  Recommend weight loss    Infected surgical wound- (present on admission)  Assessment & Plan  MRSA on culture 7/31  Continue empiric antibiotics  Wound care  Currently without bowel/bladder involvement and intact strength and sensation of lower extremities  Will need identified plan for next I&D as it was reported secondary to V. tach cardiac arrest 8/2  ID consulting    Sinus tachycardia- (present on admission)  Assessment & Plan  Uncertain etiology, appears has been persistent chronic for a long duration  Was initiated on metoprolol on last hospitalization  Echo 6/2019 unremarkable  TSH in June was WNL  CT PE study negative       VTE:  Contraindicated  Ulcer: Not Indicated  Lines: None    I have performed a physical exam and reviewed and updated ROS and Plan today (8/4/2019). In review of yesterday's note (8/3/2019), there are no changes except as documented above.     Discussed patient condition and risk of morbidity and/or mortality with Hospitalist, RN, RT, Pharmacy, Patient and infectious disease

## 2019-08-04 NOTE — CARE PLAN
Pt able to communicate needs effectively, calls appropriately, and has been oriented to surroundings.

## 2019-08-05 ENCOUNTER — ANESTHESIA (OUTPATIENT)
Dept: SURGERY | Facility: MEDICAL CENTER | Age: 24
DRG: 856 | End: 2019-08-05
Payer: COMMERCIAL

## 2019-08-05 ENCOUNTER — ANESTHESIA EVENT (OUTPATIENT)
Dept: SURGERY | Facility: MEDICAL CENTER | Age: 24
DRG: 856 | End: 2019-08-05
Payer: COMMERCIAL

## 2019-08-05 PROBLEM — R78.81 MRSA BACTEREMIA: Status: ACTIVE | Noted: 2019-08-05

## 2019-08-05 PROBLEM — B95.62 MRSA BACTEREMIA: Status: ACTIVE | Noted: 2019-08-05

## 2019-08-05 LAB
ANION GAP SERPL CALC-SCNC: 10 MMOL/L (ref 0–11.9)
BASOPHILS # BLD AUTO: 0.4 % (ref 0–1.8)
BASOPHILS # BLD: 0.03 K/UL (ref 0–0.12)
BUN SERPL-MCNC: 8 MG/DL (ref 8–22)
CALCIUM SERPL-MCNC: 8.9 MG/DL (ref 8.5–10.5)
CHLORIDE SERPL-SCNC: 101 MMOL/L (ref 96–112)
CK SERPL-CCNC: 43 U/L (ref 0–154)
CO2 SERPL-SCNC: 25 MMOL/L (ref 20–33)
CREAT SERPL-MCNC: 0.52 MG/DL (ref 0.5–1.4)
EOSINOPHIL # BLD AUTO: 0.14 K/UL (ref 0–0.51)
EOSINOPHIL NFR BLD: 1.7 % (ref 0–6.9)
ERYTHROCYTE [DISTWIDTH] IN BLOOD BY AUTOMATED COUNT: 38.5 FL (ref 35.9–50)
GLUCOSE SERPL-MCNC: 108 MG/DL (ref 65–99)
HCT VFR BLD AUTO: 31.4 % (ref 42–52)
HGB BLD-MCNC: 10.1 G/DL (ref 14–18)
IMM GRANULOCYTES # BLD AUTO: 0.04 K/UL (ref 0–0.11)
IMM GRANULOCYTES NFR BLD AUTO: 0.5 % (ref 0–0.9)
LYMPHOCYTES # BLD AUTO: 2.53 K/UL (ref 1–4.8)
LYMPHOCYTES NFR BLD: 30.5 % (ref 22–41)
MAGNESIUM SERPL-MCNC: 1.9 MG/DL (ref 1.5–2.5)
MCH RBC QN AUTO: 26.4 PG (ref 27–33)
MCHC RBC AUTO-ENTMCNC: 32.2 G/DL (ref 33.7–35.3)
MCV RBC AUTO: 82 FL (ref 81.4–97.8)
MONOCYTES # BLD AUTO: 0.81 K/UL (ref 0–0.85)
MONOCYTES NFR BLD AUTO: 9.8 % (ref 0–13.4)
NEUTROPHILS # BLD AUTO: 4.74 K/UL (ref 1.82–7.42)
NEUTROPHILS NFR BLD: 57.1 % (ref 44–72)
NRBC # BLD AUTO: 0 K/UL
NRBC BLD-RTO: 0 /100 WBC
PLATELET # BLD AUTO: 346 K/UL (ref 164–446)
PMV BLD AUTO: 8.9 FL (ref 9–12.9)
POTASSIUM SERPL-SCNC: 3.9 MMOL/L (ref 3.6–5.5)
RBC # BLD AUTO: 3.83 M/UL (ref 4.7–6.1)
SODIUM SERPL-SCNC: 136 MMOL/L (ref 135–145)
WBC # BLD AUTO: 8.3 K/UL (ref 4.8–10.8)

## 2019-08-05 PROCEDURE — 83735 ASSAY OF MAGNESIUM: CPT

## 2019-08-05 PROCEDURE — A9270 NON-COVERED ITEM OR SERVICE: HCPCS | Performed by: INTERNAL MEDICINE

## 2019-08-05 PROCEDURE — 87205 SMEAR GRAM STAIN: CPT

## 2019-08-05 PROCEDURE — 160036 HCHG PACU - EA ADDL 30 MINS PHASE I: Performed by: NEUROLOGICAL SURGERY

## 2019-08-05 PROCEDURE — 99233 SBSQ HOSP IP/OBS HIGH 50: CPT | Performed by: INTERNAL MEDICINE

## 2019-08-05 PROCEDURE — 700111 HCHG RX REV CODE 636 W/ 250 OVERRIDE (IP): Performed by: ANESTHESIOLOGY

## 2019-08-05 PROCEDURE — 501445 HCHG STAPLER, SKIN DISP: Performed by: NEUROLOGICAL SURGERY

## 2019-08-05 PROCEDURE — 500885 HCHG PACK, JACKSON TABLE: Performed by: NEUROLOGICAL SURGERY

## 2019-08-05 PROCEDURE — 700101 HCHG RX REV CODE 250: Performed by: ANESTHESIOLOGY

## 2019-08-05 PROCEDURE — 160035 HCHG PACU - 1ST 60 MINS PHASE I: Performed by: NEUROLOGICAL SURGERY

## 2019-08-05 PROCEDURE — 501488 HCHG SUCTION CANN, WOUNDVAC TRAC: Performed by: NEUROLOGICAL SURGERY

## 2019-08-05 PROCEDURE — 87186 SC STD MICRODIL/AGAR DIL: CPT

## 2019-08-05 PROCEDURE — 700111 HCHG RX REV CODE 636 W/ 250 OVERRIDE (IP): Performed by: INTERNAL MEDICINE

## 2019-08-05 PROCEDURE — 500452 HCHG DRESSING, WOUND VAC MED.: Performed by: NEUROLOGICAL SURGERY

## 2019-08-05 PROCEDURE — 700102 HCHG RX REV CODE 250 W/ 637 OVERRIDE(OP): Performed by: ANESTHESIOLOGY

## 2019-08-05 PROCEDURE — 160009 HCHG ANES TIME/MIN: Performed by: NEUROLOGICAL SURGERY

## 2019-08-05 PROCEDURE — 700105 HCHG RX REV CODE 258: Performed by: ANESTHESIOLOGY

## 2019-08-05 PROCEDURE — 85025 COMPLETE CBC W/AUTO DIFF WBC: CPT

## 2019-08-05 PROCEDURE — 700102 HCHG RX REV CODE 250 W/ 637 OVERRIDE(OP): Performed by: HOSPITALIST

## 2019-08-05 PROCEDURE — 99233 SBSQ HOSP IP/OBS HIGH 50: CPT | Performed by: HOSPITALIST

## 2019-08-05 PROCEDURE — 87075 CULTR BACTERIA EXCEPT BLOOD: CPT

## 2019-08-05 PROCEDURE — 80048 BASIC METABOLIC PNL TOTAL CA: CPT

## 2019-08-05 PROCEDURE — 0JD70ZZ EXTRACTION OF BACK SUBCUTANEOUS TISSUE AND FASCIA, OPEN APPROACH: ICD-10-PCS | Performed by: NEUROLOGICAL SURGERY

## 2019-08-05 PROCEDURE — 500444 HCHG HEMOSTAT, SURGICEL 2X3: Performed by: NEUROLOGICAL SURGERY

## 2019-08-05 PROCEDURE — 87070 CULTURE OTHR SPECIMN AEROBIC: CPT

## 2019-08-05 PROCEDURE — A9270 NON-COVERED ITEM OR SERVICE: HCPCS | Performed by: HOSPITALIST

## 2019-08-05 PROCEDURE — 700111 HCHG RX REV CODE 636 W/ 250 OVERRIDE (IP): Performed by: HOSPITALIST

## 2019-08-05 PROCEDURE — 700102 HCHG RX REV CODE 250 W/ 637 OVERRIDE(OP): Performed by: INTERNAL MEDICINE

## 2019-08-05 PROCEDURE — 700105 HCHG RX REV CODE 258: Performed by: INTERNAL MEDICINE

## 2019-08-05 PROCEDURE — 94760 N-INVAS EAR/PLS OXIMETRY 1: CPT

## 2019-08-05 PROCEDURE — 87077 CULTURE AEROBIC IDENTIFY: CPT

## 2019-08-05 PROCEDURE — 36415 COLL VENOUS BLD VENIPUNCTURE: CPT

## 2019-08-05 PROCEDURE — 160039 HCHG SURGERY MINUTES - EA ADDL 1 MIN LEVEL 3: Performed by: NEUROLOGICAL SURGERY

## 2019-08-05 PROCEDURE — 160048 HCHG OR STATISTICAL LEVEL 1-5: Performed by: NEUROLOGICAL SURGERY

## 2019-08-05 PROCEDURE — 82550 ASSAY OF CK (CPK): CPT

## 2019-08-05 PROCEDURE — 160028 HCHG SURGERY MINUTES - 1ST 30 MINS LEVEL 3: Performed by: NEUROLOGICAL SURGERY

## 2019-08-05 PROCEDURE — 160002 HCHG RECOVERY MINUTES (STAT): Performed by: NEUROLOGICAL SURGERY

## 2019-08-05 PROCEDURE — 770020 HCHG ROOM/CARE - TELE (206)

## 2019-08-05 PROCEDURE — 94668 MNPJ CHEST WALL SBSQ: CPT

## 2019-08-05 PROCEDURE — A9270 NON-COVERED ITEM OR SERVICE: HCPCS | Performed by: ANESTHESIOLOGY

## 2019-08-05 RX ORDER — ONDANSETRON 2 MG/ML
4 INJECTION INTRAMUSCULAR; INTRAVENOUS
Status: COMPLETED | OUTPATIENT
Start: 2019-08-05 | End: 2019-08-05

## 2019-08-05 RX ORDER — SODIUM CHLORIDE, SODIUM LACTATE, POTASSIUM CHLORIDE, CALCIUM CHLORIDE 600; 310; 30; 20 MG/100ML; MG/100ML; MG/100ML; MG/100ML
INJECTION, SOLUTION INTRAVENOUS
Status: DISCONTINUED | OUTPATIENT
Start: 2019-08-05 | End: 2019-08-05 | Stop reason: SURG

## 2019-08-05 RX ORDER — OXYCODONE HCL 5 MG/5 ML
5 SOLUTION, ORAL ORAL
Status: COMPLETED | OUTPATIENT
Start: 2019-08-05 | End: 2019-08-05

## 2019-08-05 RX ORDER — HALOPERIDOL 5 MG/ML
1 INJECTION INTRAMUSCULAR
Status: DISCONTINUED | OUTPATIENT
Start: 2019-08-05 | End: 2019-08-05 | Stop reason: HOSPADM

## 2019-08-05 RX ORDER — SODIUM CHLORIDE, SODIUM LACTATE, POTASSIUM CHLORIDE, CALCIUM CHLORIDE 600; 310; 30; 20 MG/100ML; MG/100ML; MG/100ML; MG/100ML
INJECTION, SOLUTION INTRAVENOUS CONTINUOUS
Status: DISCONTINUED | OUTPATIENT
Start: 2019-08-05 | End: 2019-08-05 | Stop reason: HOSPADM

## 2019-08-05 RX ORDER — DIPHENHYDRAMINE HYDROCHLORIDE 50 MG/ML
12.5 INJECTION INTRAMUSCULAR; INTRAVENOUS
Status: DISCONTINUED | OUTPATIENT
Start: 2019-08-05 | End: 2019-08-05 | Stop reason: HOSPADM

## 2019-08-05 RX ORDER — LIDOCAINE HYDROCHLORIDE 40 MG/ML
SOLUTION TOPICAL PRN
Status: DISCONTINUED | OUTPATIENT
Start: 2019-08-05 | End: 2019-08-05 | Stop reason: SURG

## 2019-08-05 RX ORDER — MAGNESIUM SULFATE HEPTAHYDRATE 40 MG/ML
2 INJECTION, SOLUTION INTRAVENOUS ONCE
Status: COMPLETED | OUTPATIENT
Start: 2019-08-05 | End: 2019-08-05

## 2019-08-05 RX ORDER — OXYCODONE HCL 5 MG/5 ML
10 SOLUTION, ORAL ORAL
Status: COMPLETED | OUTPATIENT
Start: 2019-08-05 | End: 2019-08-05

## 2019-08-05 RX ADMIN — METHOCARBAMOL TABLETS 1000 MG: 500 TABLET, COATED ORAL at 04:48

## 2019-08-05 RX ADMIN — ROCURONIUM BROMIDE 40 MG: 10 INJECTION, SOLUTION INTRAVENOUS at 18:58

## 2019-08-05 RX ADMIN — SENNOSIDES AND DOCUSATE SODIUM 2 TABLET: 8.6; 5 TABLET ORAL at 04:48

## 2019-08-05 RX ADMIN — LIDOCAINE HYDROCHLORIDE 160 MG: 40 SOLUTION TOPICAL at 19:00

## 2019-08-05 RX ADMIN — PROPOFOL 100 MG: 10 INJECTION, EMULSION INTRAVENOUS at 18:59

## 2019-08-05 RX ADMIN — FENTANYL CITRATE 50 MCG: 50 INJECTION, SOLUTION INTRAMUSCULAR; INTRAVENOUS at 20:08

## 2019-08-05 RX ADMIN — METOPROLOL TARTRATE 25 MG: 25 TABLET, FILM COATED ORAL at 04:49

## 2019-08-05 RX ADMIN — DAPTOMYCIN 1090 MG: 500 INJECTION, POWDER, LYOPHILIZED, FOR SOLUTION INTRAVENOUS at 12:14

## 2019-08-05 RX ADMIN — SODIUM CHLORIDE, POTASSIUM CHLORIDE, SODIUM LACTATE AND CALCIUM CHLORIDE: 600; 310; 30; 20 INJECTION, SOLUTION INTRAVENOUS at 20:12

## 2019-08-05 RX ADMIN — LINEZOLID 600 MG: 600 TABLET, FILM COATED ORAL at 04:48

## 2019-08-05 RX ADMIN — SUGAMMADEX 200 MG: 100 INJECTION, SOLUTION INTRAVENOUS at 19:36

## 2019-08-05 RX ADMIN — ONDANSETRON 4 MG: 2 INJECTION INTRAMUSCULAR; INTRAVENOUS at 18:56

## 2019-08-05 RX ADMIN — FENTANYL CITRATE 250 MCG: 50 INJECTION, SOLUTION INTRAMUSCULAR; INTRAVENOUS at 18:57

## 2019-08-05 RX ADMIN — OXYCODONE HYDROCHLORIDE 10 MG: 5 SOLUTION ORAL at 20:07

## 2019-08-05 RX ADMIN — MIDAZOLAM HYDROCHLORIDE 2 MG: 1 INJECTION, SOLUTION INTRAMUSCULAR; INTRAVENOUS at 18:56

## 2019-08-05 RX ADMIN — GABAPENTIN 100 MG: 100 CAPSULE ORAL at 12:14

## 2019-08-05 RX ADMIN — METHOCARBAMOL TABLETS 1000 MG: 500 TABLET, COATED ORAL at 21:51

## 2019-08-05 RX ADMIN — PROPOFOL 100 MG: 10 INJECTION, EMULSION INTRAVENOUS at 18:58

## 2019-08-05 RX ADMIN — GABAPENTIN 100 MG: 100 CAPSULE ORAL at 21:51

## 2019-08-05 RX ADMIN — GABAPENTIN 100 MG: 100 CAPSULE ORAL at 04:48

## 2019-08-05 RX ADMIN — FENTANYL CITRATE 25 MCG: 50 INJECTION, SOLUTION INTRAMUSCULAR; INTRAVENOUS at 20:33

## 2019-08-05 RX ADMIN — SODIUM CHLORIDE, POTASSIUM CHLORIDE, SODIUM LACTATE AND CALCIUM CHLORIDE: 600; 310; 30; 20 INJECTION, SOLUTION INTRAVENOUS at 18:45

## 2019-08-05 RX ADMIN — METOPROLOL TARTRATE 25 MG: 25 TABLET, FILM COATED ORAL at 21:51

## 2019-08-05 RX ADMIN — METHOCARBAMOL TABLETS 1000 MG: 500 TABLET, COATED ORAL at 12:14

## 2019-08-05 RX ADMIN — MAGNESIUM SULFATE IN WATER 2 G: 40 INJECTION, SOLUTION INTRAVENOUS at 15:36

## 2019-08-05 ASSESSMENT — ENCOUNTER SYMPTOMS
SENSORY CHANGE: 1
DEPRESSION: 0
COUGH: 0
FOCAL WEAKNESS: 0
DIARRHEA: 0
BLURRED VISION: 0
VOMITING: 0
NAUSEA: 0
ABDOMINAL PAIN: 0
DOUBLE VISION: 0
CONSTIPATION: 0
MYALGIAS: 0
SHORTNESS OF BREATH: 0
SORE THROAT: 0
BACK PAIN: 1
LOSS OF CONSCIOUSNESS: 0
DIZZINESS: 0
NECK PAIN: 0
HEADACHES: 0
CHILLS: 0
PALPITATIONS: 0
FEVER: 0
TINGLING: 0

## 2019-08-05 ASSESSMENT — PAIN SCALES - GENERAL: PAIN_LEVEL: 5

## 2019-08-05 NOTE — WOUND TEAM
[facility]  Inpatient Services  Wound and Skin Care Progress Note    Admission Date: 7/31/2019     Consult Date: 07/31/19   HPI, PMH, SH: Reviewed    Unit where seen by Wound Team: [id number]    WOUND CONSULT RELATED TO:  Back incision      Self Report / Pain Level:  Tolerated well.         OBJECTIVE:  In low airloss bed, dressing in place    WOUND TYPE, LOCATION, CHARACTERISTICS (Pressure Injuries: location, stage, POA or date identified)    Wound 07/31/19 Incision Back dehisced incision  (Active)   Wound Image       Site Assessment Yellow;Red    Maeve-wound Assessment Intact    Margins Attached edges    Wound Length (cm) 13.5 cm    Wound Width (cm) 0.5 cm    Wound Depth (cm) 1 cm    Wound Surface Area (cm^2) 6.75 cm^2    Closure Sutures    Drainage Amount Scant    Drainage Description Serosanguineous    Non-staged Wound Description Full thickness    Treatments Cleansed;Site care    Cleansing Approved Wound Cleanser    Periwound Protectant Not Applicable    Dressing Options Dry Gauze;Tape;Strip Iodoform    Dressing Cleansing/Solutions Not Applicable    Dressing Changed New    Dressing Status Clean;Dry;Intact    Dressing Change Frequency Daily    NEXT Dressing Change  08/06/19    NEXT Weekly Photo (Inpatient Only) 08/12/19    WOUND NURSE ONLY - Odor None    WOUND NURSE ONLY - Tissue Type and Percentage Red/yellow slough    WOUND NURSE ONLY - Time Spent with Patient (mins) 60      DELLA:      NA    Culture:   Obtained 07/31, Positive for Heavy growth of MRSA    MRI:                                        Completed 08/01/2019,   1.  Stable postsurgical changes L3-L5 laminectomies. Interval ill-defined nonenhancing fluid within the laminectomy bed most pronounced at the L4-L5 level. Infection should be excluded clinically.  2.  There is diffuse enhancement of the cauda equina nerve roots which is presumably infectious/inflammatory.  3.  No evidence of discitis osteomyelitis or epidural abscess.  4.   Degenerative changes as detailed above with persistent moderate thecal sac stenosis at L2-L3 and L4-L5.    INTERVENTIONS BY WOUND TEAM:  Dressing removed. Wound cleansed with wound cleanser and probed for measurements. Pt had severe pain with probing wound. Pt stated he is going to surgery tonight. Photograph  taken.  Gently filled with cut piece of iodoform gauze, 1 piece into each open area leaving wick.  Covered with island dressing      Dressing selection:  Iodoform gauze, Island dressing         Interdisciplinary consultation: Patient, Bedside, ER Doctor     EVALUATION: patient had surgery with Dr. Ruelas recently, Prevena incisional VAC was placed and patient discharged.  Patient came returned to the ER for wound drainage/odor.  Dr. Ruelas was consulted, patient had MRI.  Pt will be going to OR for I&D with wound vac placement.    Factors affecting wound healing: infection, obesity   Goals: Steady decrease in wound area and depth weekly.    NURSING PLAN OF CARE ORDERS (X):    Dressing changes: See Dressing Care orders: X  Skin care: See Skin Care orders: patient independent with movement   Rectal tube care: See Rectal Tube Care orders:   Other orders:    RSKIN: CURRENT (X) ORDERED (O):   Q shift Ramses:  X  Q shift pressure point assessments:  X  Pressure redistribution mattress     JAKE  X        Bariatric JAKE         Bariatric foam           Heel float boots          Float Heels off Bed with Pillows  X             Barrier wipes         Barrier Cream         Barrier paste          Sacral silicone dressing         Silicone O2 tubing X        Anchorfast         Cannula fixation Device (Tender )          Gray Foam Ear protectors           Trach with Optifoam split foam                 Waffle cushion        Waffle Overlay         Rectal tube or BMS         Antifungal tx      Interdry          Reposition q 2 hours Self mobile in bed       Up to chair        Ambulate      PT/OT        Dietician        Diabetes  Education      PO     TF     TPN     NPO X  # days 0.5 for surgery  Other        WOUND TEAM PLAN OF CARE (X):   NPWT change 3 x week:        Dressing changes by wound team:       Follow up as needed:     Wound team to follow up  Other (explain):     Anticipated discharge plans (X): TBA - unclear at this time if patient will go to surgery  SNF:           Home Care:           Outpatient Wound Center:            Self Care:            Other:

## 2019-08-05 NOTE — PROGRESS NOTES
Infectious Disease Progress Note    Author: Opal Dwyer M.D. Date & Time of service: 2019  9:39 AM    Chief Complaint:  Post laminectomy wound infection, MRSA     Interval History:  2019-no fevers.  WBC is 8.3 and creatinine is 0.52.  Last sed rate is 87 his ALT is up to 2022 and AST is 68.  His blood cultures are positive from 8/3/2019  Review of Systems:  Review of Systems   Constitutional: Negative for chills, fever and malaise/fatigue.   Respiratory: Negative for cough and shortness of breath.         Complains of chest pain where he had chest compressions   Gastrointestinal: Negative for abdominal pain, constipation, diarrhea, nausea and vomiting.   Musculoskeletal: Negative for joint pain and myalgias.   Skin: Negative for rash.   Neurological:        Complains of some numbness in the bottom of his foot       Hemodynamics:  Temp (24hrs), Av.5 °C (97.7 °F), Min:36.1 °C (97 °F), Max:37.1 °C (98.8 °F)  Temperature: 36.1 °C (97 °F)  Pulse  Av.5  Min: 97  Max: 149   Blood Pressure: 129/74       Physical Exam:  Physical Exam   Constitutional: He is oriented to person, place, and time. He appears well-developed and well-nourished.   Obese   HENT:   Head: Normocephalic and atraumatic.   Eyes: Pupils are equal, round, and reactive to light. Conjunctivae and EOM are normal.   Cardiovascular: Normal rate, regular rhythm and normal heart sounds.   Pulmonary/Chest: Effort normal.   Diminished breath soundsl lung fields, worse in bases   Abdominal: Soft. Bowel sounds are normal. He exhibits no distension. There is no tenderness. There is no rebound and no guarding.   Musculoskeletal: He exhibits no edema.   The back incision is clean   Neurological: He is alert and oriented to person, place, and time.   Skin: Skin is warm.   Psychiatric: He has a normal mood and affect. His behavior is normal.   Vitals reviewed.      Meds:    Current Facility-Administered Medications:   •  linezolid  •   metoprolol  •  Respiratory Care per Protocol  •  HYDROmorphone  •  gabapentin  •  HYDROcodone-acetaminophen  •  methocarbamol  •  senna-docusate **AND** polyethylene glycol/lytes **AND** magnesium hydroxide **AND** bisacodyl  •  acetaminophen  •  ondansetron  •  ondansetron  •  promethazine  •  promethazine  •  prochlorperazine    Labs:  Recent Labs     08/03/19  0316 08/04/19  0423 08/05/19  0510   WBC 8.0 8.5 8.3   RBC 3.74* 3.62* 3.83*   HEMOGLOBIN 9.6* 9.4* 10.1*   HEMATOCRIT 30.8* 30.0* 31.4*   MCV 82.4 82.9 82.0   MCH 25.7* 26.0* 26.4*   RDW 39.3 39.2 38.5   PLATELETCT 318 291 346   MPV 8.4* 8.2* 8.9*   NEUTSPOLYS 70.70 59.10 57.10   LYMPHOCYTES 21.90* 29.40 30.50   MONOCYTES 6.70 10.10 9.80   EOSINOPHILS 0.10 0.70 1.70   BASOPHILS 0.20 0.20 0.40     Recent Labs     08/03/19  0316 08/04/19  0423 08/05/19  0510   SODIUM 136 136 136   POTASSIUM 3.9 4.0 3.9   CHLORIDE 104 103 101   CO2 24 25 25   GLUCOSE 130* 105* 108*   BUN 10 6* 8     Recent Labs     08/02/19  1830 08/03/19 0316 08/04/19  0423 08/04/19  0950 08/05/19  0510   ALBUMIN 2.7*  --   --  3.5  --    TBILIRUBIN 0.5  --   --  0.4  --    ALKPHOSPHAT 52  --   --  64  --    TOTPROTEIN 5.6*  --   --  7.1  --    ALTSGPT 229*  --   --  222*  --    ASTSGOT 182*  --   --  68*  --    CREATININE 0.63 0.70 0.49*  --  0.52       Imaging:  Dx-chest-limited (1 View)    Result Date: 8/2/2019 8/2/2019 5:42 PM HISTORY/REASON FOR EXAM:  Shortness of Breath TECHNIQUE/EXAM DESCRIPTION AND NUMBER OF VIEWS: Single AP view of the chest. COMPARISON: 6/19/2019 FINDINGS: The cardiac silhouette and mediastinal contours are stable. No discrete opacity, pleural fluid, or pneumothorax. The left costophrenic angle is excluded from the image. No suspicious bony lesions.     No evidence of acute cardiopulmonary process.    Dx-spine-any One View    Result Date: 7/12/2019 7/12/2019 12:03 PM HISTORY/REASON FOR EXAM:  Intraoperative images for procedural navigation. TECHNIQUE/EXAM  DESCRIPTION AND NUMBER OF VIEWS:  2 view(s) of the lumbosacral spine.     Fluoroscopic image(s) obtained during posterior lumbar laminectomy. Please see the patient's chart for full procedural details. Fluoroscopy time 2 seconds.     Mr-lumbar Spine-with & W/o    Result Date: 8/1/2019 8/1/2019 3:24 PM HISTORY/REASON FOR EXAM:  Recent back surgery with wound drainage and elevated sed rate TECHNIQUE/EXAM DESCRIPTION: MRI of the lumbar spine without and with contrast. The study was performed on a [employer] 1.5 Gena MRI scanner. T1 sagittal, T2 fast spin-echo sagittal, and T2 axial images were obtained of the lumbar spine. T1 postcontrast fat-suppressed sagittal images were obtained. 14 mL Gadavist contrast was administered intravenously. COMPARISON:  7/8/2019 FINDINGS: Redemonstrated are postsurgical changes status post laminectomies from L2 L3-L5 S1. There is extensive enhancement and edema in the midline posterior soft tissues and the lower paraspinal musculature which is similar to prior study and may represent postsurgical changes or infection. The previously seen superficial fluid collection appears slightly decreased in size. There is a new ill-defined rim-enhancing fluid in the deep soft tissues laminectomy bed most conspicuous at the L5 level measuring approximately 3 x 2.1 cm. Developing abscess is not excluded. There is extensive enhancement of the cauda equina nerve roots extending from the visualized conus medullaris through the S1 level which is concerning for infection. Conus medullaris terminates at T12-L1. T12-L2: Canal and foramina are patent. L2-L3: Disc bulge and posterior central disc extrusion again noted resulting in moderate spinal stenosis. L3-L4: Facet degeneration. No significant spinal stenosis. L4-L5: Disc degeneration, disc osteophyte and facet degeneration. There is moderate thecal sac stenosis. L5-S1: Posterior central left paracentral disc protrusion which flattens the ventral thecal  sac. Bilateral facet degeneration. No significant spinal stenosis. Bilateral foraminal stenosis.     1.  Stable postsurgical changes L3-L5 laminectomies. Interval ill-defined nonenhancing fluid within the laminectomy bed most pronounced at the L4-L5 level. Infection should be excluded clinically. 2.  There is diffuse enhancement of the cauda equina nerve roots which is presumably infectious/inflammatory. 3.  No evidence of discitis osteomyelitis or epidural abscess. 4.  Degenerative changes as detailed above with persistent moderate thecal sac stenosis at L2-L3 and L4-L5.    Mr-lumbar Spine-with & W/o    Result Date: 7/8/2019 7/8/2019 11:34 AM HISTORY/REASON FOR EXAM:  Back pain or radiculopathy, > 6 wks; Back pain, cauda equina syndrome suspected Laminectomy June 17. New weakness for 3 days. TECHNIQUE/EXAM DESCRIPTION: MRI of the lumbar spine without and with contrast. The study was performed on a [employer] 1.5 Gena MRI scanner. T1 sagittal, T2 fast spin-echo sagittal, and T2 axial images were obtained of the lumbar spine. T1 postcontrast fat-suppressed sagittal images were obtained. 15 mL Gadavist contrast was administered intravenously. COMPARISON:  6/19/2019 MRI. FINDINGS: Motion degrades the study. Straightening of the normal lordosis is again seen Multiple Schmorl's nodes are again demonstrated No marrow edema is seen No T2 hyperintensity is seen suspicious for discitis Conus terminates normal in position at L1. Visualized nerve roots show no abnormal clumping to suggest arachnoiditis. There is however degenerative disc disease causing central stenosis as discussed below. T2 hyperintense fluid collection is newly identified in the deep subcutaneous fat posteriorly spanning L2/3-L5 measuring 10 x 2.8 x 1.5 cm and there is rim enhancement. No abnormal epidural fluid collection is seen to suggest abscess. L3-L5 laminectomies have been performed. Findings by level: T12/L1: No stenosis L1/2: No stenosis L2/3:  Mild disc height loss, right paracentral protrusion, ligamentum flavum redundancy, facet arthropathy, short pedicles. This results in unchanged moderate to severe central stenosis, especially on the right. There is some epidural enhancement ventrally which is unchanged and likely related to the protrusion. Borderline foraminal stenosis L3/4: Stable facet arthropathy and ligamentum flavum redundancy with a mild disc bulge, short pedicles. This results in persistent moderate central and borderline foraminal stenosis L4/5: Stable right paracentral protrusion, disc bulge. Laminectomy with partial facetectomy. No significant central stenosis but the right greater than left foramina are again moderately narrowed L5/S1: Stable left paracentral protrusion, facet arthropathy, mild-moderate central and mild foraminal stenoses as before     1.  New 10 x 3 x 1 cm deep subcutaneous fat fluid collection differential includes seroma with or without superimposed infection cannot 2.  Otherwise stable lumbar spine MRI with contrast following L3-L5 laminectomies 3.  Moderate multilevel central stenoses greatest at L2/3 are again seen, mostly secondary to facet arthropathy and developmentally short pedicles as specifically detailed above 4.  Lesser foraminal stenoses are unchanged as detailed above    Mr-thoracic Spine-with & W/o    Result Date: 7/8/2019 7/8/2019 11:41 AM HISTORY/REASON FOR EXAM:  Mid-back pain; Bilateral LE weakness TECHNIQUE/EXAM DESCRIPTION: MRI of the thoracic spine without and with contrast. The study was performed on a [employer] 1.5 Gena MRI scanner. T1 sagittal, T2 fast spin-echo sagittal, and T2 axial images were obtained of the thoracic spine. T1 post-contrast fat suppressed sagittal images were obtained. Optional T1 post-contrast axial images may be obtained. 15 mL Gadavist contrast was administered intravenously. COMPARISON:  None. FINDINGS: Straightening of the normal kyphosis. Several small nonedematous  Schmorl's nodes are seen Normal alignment The visualized spinal cord is normal in signal and morphology There are multiple disc protrusions but at no point is central stenosis moderate or greater in severity. Several levels of cord abutment are identified, noted best anteriorly on the left at T9/10. Protrusions are also seen on the right at T2/3, T4/5, left T5/6, left and right at T8/9. Mild right T2/3 foraminal stenosis related to disc protrusion Otherwise patent foramina Paraspinous soft tissues are normal in appearance After contrast is administered no abnormal enhancement is seen No abnormal fluid collection. No epidural abscess. No abnormal disc signal to suggest discitis     Multilevel degenerative disc disease results in cord abutment but no cord compression Straightening of the normal lordosis with multiple chronic Schmorl's nodes Mild right T2/3 foraminal stenosis    Dx-portable Fluoro > 1 Hour    Result Date: 7/15/2019  7/12/2019 12:03 PM HISTORY/REASON FOR EXAM:  Posterior lumbar laminectomy L3-S1, Main OR TECHNIQUE/EXAM DESCRIPTION AND NUMBER OF VIEWS: Portable fluoroscopy for greater than one hour FINDINGS:      The portable fluoroscopy unit was obligated to the procedure for greater than one hour.   Actual fluoro time was 2 seconds.     Portable fluoroscopy utilized for 2 seconds.    Ct-cta Chest Pulmonary Artery W/ Recons    Result Date: 8/3/2019  8/3/2019 9:11 PM HISTORY/REASON FOR EXAM:  Shortness of breath; tachycardia, post-op, high risk for PE. TECHNIQUE/EXAM DESCRIPTION: CT angiogram scan for pulmonary embolism with contrast, with reconstructions. 1.25 mm helical sections were obtained from the lung apices through the lung bases following the rapid bolus administration of 80 mL of Omnipaque 350 nonionic contrast. Thin-section overlapping reconstruction interval was utilized. Coronal reconstructions were generated from the axial data. MIP post processing was performed and utilized for the  interpretation. Low dose optimization technique was utilized for this CT exam including automated exposure control and adjustment of the mA and/or kV according to patient size. COMPARISON: None FINDINGS: Pulmonary Embolism: There is somewhat suboptimal opacification of the pulmonary arterial structures, there is no definite evidence of large or central pulmonary emboli. Small peripheral emboli cannot be absolutely excluded. . Lungs: Bilateral lower lobe discoid atelectasis.. Pleura: No pleural effusion. Nodes: No enlarged lymph nodes. Additional findings: Unusual curvilinear lucency in the right hepatic lobe measuring about 4 x 1.9 cm diameter..     1.  No definite evidence of pulmonary emboli, within the limitations described above. 2.  Bilateral lower lobe discoid atelectasis. 3.  Unusual curvilinear lucency in the right hepatic lobe which may represent scarring. The appearance is not at least typical for a hepatic solid mass or cyst.     Ec-echocardiogram Complete W/ Cont    Result Date: 8/2/2019  Transthoracic Echo Report Echocardiography Laboratory CONCLUSIONS Prior echocardiogram 6/21/2019.Technically difficult study - adequate information is obtained. Contrast was used to enhance visualization of the endocardial border. Normal left ventricular systolic function. Left ventricular ejection fraction is visually estimated to be 65%. Normal regional wall motion. Right ventricle not well visualized but visually appears Mildly enlarged. The left atrium is normal in size. The mitral valve is not well visualized. No stenosis or regurgitation seen. Structurally normal aortic valve without significant stenosis or regurgitation. Normal pericardium without effusion. JOHNSON PEOPLES Exam Date:         08/02/2019                    19:04 Exam Location:     Inpatient Priority:          Routine Ordering Physician:        NICCI HERNANDEZ Referring Physician: Sonographer:               Candy James RDCS Age:    23      Gender:    M MRN:    [id number] :    1995 BSA:    2.55   Ht (in):    72     Wt (lb):    304 Exam Type:     Complete, Contrast Indications:     Cardiac arrest, cause unspecified ICD Codes:       I46.9 CPT Codes:       47857,  BP:   121    /   84     HR:   140 Technical Quality:       Technically difficult study -                          adequate information is obtained MEASUREMENTS  (Male / Female) Normal Values 2D ECHO LV Diastolic Diameter PLAX        4.2 cm                4.2 - 5.9 / 3.9 - 5.3 cm LV Systolic Diameter PLAX         3.7 cm                2.1 - 4.0 cm IVS Diastolic Thickness           1.3 cm                LVPW Diastolic Thickness          1 cm                  LVOT Diameter                     2.4 cm                Estimated LV Ejection Fraction    65 %                  LV Ejection Fraction MOD BP       52.1 %                >= 55  % LV Ejection Fraction MOD 4C       56.7 %                LV Ejection Fraction MOD 2C       47.6 %                IVC Diameter                      0.79 cm               DOPPLER AV Peak Velocity                  0.94 m/s              AV Peak Gradient                  3.6 mmHg              AV Mean Gradient                  2.2 mmHg              LVOT Peak Velocity                0.89 m/s              AV Area Cont Eq vti               4.1 cm²               MV Velocity Time Integral         11 cm                 Mitral E Point Velocity           0.84 m/s              MV Pressure Half Time             28.8 ms               MV Area PHT                       7.6 cm²               * Indicates values subject to auto-interpretation LV EF:  65    % FINDINGS Left Ventricle Contrast was used to enhance visualization of the endocardial border. 3 mL of contrast was administered. Existing IV was used. Located at the left hand. Normal left ventricular chamber size. Normal left ventricular wall thickness. Normal left ventricular systolic function. Left ventricular ejection  "fraction is visually estimated to be 65%. Normal regional wall motion. Diastolic function is difficult to assess with tachycardia. Right Ventricle Right ventricle not well visualized but visually appears Mildly enlarged. Right Atrium The right atrium is normal in size. Inferior vena cava small in size and collapsed due to patient's fluids being low. Left Atrium The left atrium is normal in size. Left atrial volume index is 8 mL/sq m. Mitral Valve The mitral valve is not well visualized. No stenosis or regurgitation seen. Aortic Valve Structurally normal aortic valve without significant stenosis or regurgitation. Tricuspid Valve Structurally normal tricuspid valve without significant stenosis or regurgitation. Pulmonic Valve Structurally normal pulmonic valve without significant stenosis or regurgitation. Pericardium Normal pericardium without effusion. Fat pad present. Aorta The aortic root is normal. Ascending aorta diameter is 2.8 cm. Serg Lopes M.D. (Electronically Signed) Final Date:     02 August 2019                 22:34      Micro:  Results     Procedure Component Value Units Date/Time    BLOOD CULTURE [[id number]]  (Abnormal) Collected:  08/03/19 1550    Order Status:  Completed Specimen:  Blood from Peripheral Updated:  08/04/19 1205     Significant Indicator POS     Source BLD     Site PERIPHERAL     Culture Result Growth detected by Bactec instrument. 08/04/2019  12:03  Gram Stain: Gram positive cocci: Possible Staphylococcus sp.      Narrative:       [id number] RAYA CHRISTIANSEN  Per Hospital Policy: Only change Specimen Src: to \"Line\" if  specified by physician order.  Right Hand    BLOOD CULTURE [[id number]] Collected:  08/03/19 1550    Order Status:  Completed Specimen:  Blood from Peripheral Updated:  08/04/19 0741     Significant Indicator NEG     Source BLD     Site PERIPHERAL     Culture Result No Growth  Note: Blood cultures are incubated for 5 days and  are monitored continuously.Positive " "blood cultures  are called to the RN and reported as soon as  they are identified.      Narrative:       [id number] RAYA FERNANDEZ DChun  Per Hospital Policy: Only change Specimen Src: to \"Line\" if  specified by physician order.  Left Hand    CULTURE WOUND W/ GRAM STAIN [[id number]]  (Abnormal)  (Susceptibility) Collected:  07/31/19 1245    Order Status:  Completed Specimen:  Wound from Back Updated:  08/02/19 0932     Significant Indicator POS     Source WND     Site BACK     Culture Result Light growth mixed skin zander.     Gram Stain Result Few WBCs.  Few Gram positive cocci.  Few Gram positive rods.       Culture Result Methicillin Resistant Staphylococcus aureus  Heavy growth  Methicillin resistant by screening method  This isolate is presumed to be clindamycin resistant based on  detection of inducible resistance.  Clindamycin may still  be effective in some patients.      Narrative:       CALL  Silva  AGY tel. [phone],  CALLED  GAY tel. [phone] 08/02/2019, 09:31, RB PERF. RESULTS CALLED  TO:[id number] RN    Susceptibility     Methicillin resistant staphylococcus aureus (1)     Antibiotic Interpretation Microscan Method Status    Clindamycin Resistant <=0.5 mcg/mL PARRISH Final    Daptomycin Sensitive <=0.5 mcg/mL PARRISH Final    Erythromycin Resistant >4 mcg/mL PARRISH Final    Moxifloxacin Resistant >4 mcg/mL PARRISH Final    Ampicillin/sulbactam Resistant 16/8 mcg/mL PARRISH Final    Oxacillin Resistant >2 mcg/mL PARRISH Final    Vancomycin Sensitive 1 mcg/mL PARRISH Final    Penicillin Resistant >8 mcg/mL PARRISH Final    Trimeth/Sulfa Sensitive <=0.5/9.5 mcg/mL PARRISH Final    Tetracycline Sensitive <=4 mcg/mL PARRISH Final                   GRAM STAIN [[id number]] Collected:  07/31/19 1245    Order Status:  Completed Specimen:  Wound Updated:  07/31/19 2317     Significant Indicator .     Source WND     Site BACK     Gram Stain Result Few WBCs.  Few Gram positive cocci.  Few Gram positive rods.      Blood Culture,Hold [[id number]] Collected:  " 07/31/19 1152    Order Status:  Completed Updated:  07/31/19 2207     Blood Culture Hold Collected          Assessment:  Active Hospital Problems    Diagnosis   • *Cardiac arrest (HCC) [I46.9]   • Elevated liver enzymes [R74.8]   • Morbidly obese (HCC) [E66.01]   • Infected surgical wound [T81.49XA]   • Other inflammatory polyneuropathies (HCC) [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  admitted 7/31/2019. Pt has a past medical history of congenital spinal stenosis and severe L4-L5 stenosis with disc herniation as well as stenosis L2-L3 and L5-S5 with obesity and chronic pain.  The patient had L4, L5 and S1 decompressive laminectomy, bilateral medial facetectomies and bilateral foraminoctomy's with aborted L2-L4 laminectomies due to loss of signal on 6/17/2019.  He had MRI spine on 7/8 with a new 10 x 3 x 1 cm deep subcutaneous fat fluid collection thought to be seroma but superimposed infection not ruled out. He then went back to the OR on 7/12 for L2- S1 laminectomy and L4-L5 microdiscectomy.  Per note there were no complications.       He has leaking wound vac with drainage and clean-out on 7/25.  He presented to the ED on 7/27 again with leaking wound VAC but was not admitted. He again returned on 7/31 due to concerns for ongoing worsening wound drainage and was admitted for further work-up.      Hospital Course:   Patient has been afebrile.  Wound cultures were obtained on 7/31 with MRSA. Patient had an MRI spine on 8/1 as below. Went to OR for I&D which was complicated by an episode of V. tach followed by reported V. fib.  He was given 3 shocks before establishing sinus rhythm and amiodarone.  After this he was awake and agitated and attempting to pull out ET tube so subsequent surgery was canceled and he was extubated.  He was admitted to the ICU.  Patient was  started on vancomycin on 8/1 and remains on to date.   His blood cultures are also positive    Plan    Staph bacteremia  Blood cultures are positive from 8/3/2019  continue the Zyvox and restart vancomycin in view of staph bacteremia  Repeat blood cultures in a.m.    Back infection  Antibiotics as above    Increased LFTs  Monitor    Congenital spinal stenosis  Status post surgeries as above     Discussed with internal medicine Dr. Mayfield

## 2019-08-05 NOTE — PROGRESS NOTES
Neurosurgery Progress Note    Subjective:  Pain controlled, improved bed mobility per patient, feels ready to start PT    Exam:  Incision: covered with dressing  Remains with left DF/EHL weakness 4- to 4/5  CTA lungs: negative for PE    BP  Min: 129/74  Max: 146/83  Pulse  Av.4  Min: 97  Max: 126  Resp  Av.5  Min: 16  Max: 37  Temp  Av.5 °C (97.7 °F)  Min: 36.1 °C (97 °F)  Max: 37.1 °C (98.8 °F)  SpO2  Av.2 %  Min: 90 %  Max: 97 %    No data recorded    Recent Labs     19  0510   WBC 8.0 8.5 8.3   RBC 3.74* 3.62* 3.83*   HEMOGLOBIN 9.6* 9.4* 10.1*   HEMATOCRIT 30.8* 30.0* 31.4*   MCV 82.4 82.9 82.0   MCH 25.7* 26.0* 26.4*   MCHC 31.2* 31.3* 32.2*   RDW 39.3 39.2 38.5   PLATELETCT 318 291 346   MPV 8.4* 8.2* 8.9*     Recent Labs     19  0510   SODIUM 136 136 136   POTASSIUM 3.9 4.0 3.9   CHLORIDE 104 103 101   CO2 24 25 25   GLUCOSE 130* 105* 108*   BUN 10 6* 8   CREATININE 0.70 0.49* 0.52   CALCIUM 8.1* 8.4* 8.9     Recent Labs     19   INR 1.19*     Recent Labs     19   REACTMIN 4.6*   CLOTKINET 1.0   CLOTANGL 77.0*   MAXCLOTS 82.0*   DLZ36IZN 0.0   PRCINADP 43.0   PRCINAA 37.4       Intake/Output       19 - 19 - 19 Total  Total       Intake    P.O.  120  -- 120  --  -- --    P.O. 120 -- 120 -- -- --    Total Intake 120 -- 120 -- -- --       Output    Urine  2600  1280 3880  --  -- --    Number of Times Voided 404 x 4 x 408 x -- -- --    Urine Void (mL) 2600 1280 3880 -- -- --    Stool  --  -- --  --  -- --    Number of Times Stooled 1 x 0 x 1 x -- -- --    Total Output 2600 1280 3880 -- -- --       Net I/O     -8030 -1280 -7680 -- -- --            Intake/Output Summary (Last 24 hours) at 2019 0934  Last data filed at 2019 0542  Gross per 24 hour   Intake 120 ml   Output 1880 ml   Net -1760 ml             • linezolid  600 mg Q12HRS   • metoprolol  25 mg TWICE DAILY   • Respiratory Care per Protocol   Continuous RT   • HYDROmorphone  0.5 mg Q4HRS PRN   • gabapentin  100 mg TID   • HYDROcodone-acetaminophen  1 Tab Q4HRS PRN   • methocarbamol  1,000 mg TID   • senna-docusate  2 Tab BID    And   • polyethylene glycol/lytes  1 Packet QDAY PRN    And   • magnesium hydroxide  30 mL QDAY PRN    And   • bisacodyl  10 mg QDAY PRN   • acetaminophen  650 mg Q6HRS PRN   • ondansetron  4 mg Q4HRS PRN   • ondansetron  4 mg Q4HRS PRN   • promethazine  12.5-25 mg Q4HRS PRN   • promethazine  12.5-25 mg Q4HRS PRN   • prochlorperazine  5-10 mg Q4HRS PRN       Assessment and Plan:  Hospital day # 6  r/t to wound dehiscence with + MRSA cultures from superficial wound swab, blood cultures negative  On Vancomycin since 8/1/19, now replaced with Linezolid r/t difficulty obtaining therapeutic levels    Sp ID consult: Vancomycin to be continued     Sp aborted I & D 8/2/19 r/t to cardiac arrest  Ongoing ST per monitor: was placed on beta blocker during previous hospitalization     Post arrest  Anemia: per hospitalist team, Hgb preop 11.9, post event 7.9, today 10.1, unclear etiology     Prophylactic anticoagulation: yes  from Nsx view       Start date/time: per hospital team and indicated  until potential surgery     Ho of LS spine surgery x 2: last laminectomy 7/12/19    Ho of ST known since inititial hospitalization: on beta-blocker CTA negative for PE    Per cardiology/hospitalist notes:  Needs ICD however not until infection is taken care of  Proceed with surgery.  If he goes into VT/VF; defibrilate and give Amiodarone.  Proceed with surgery  Cardiology will not place the ICD until after his spinal infection is drained and treated      will continue to follow along with hospitalist team. Options d/w patient today.     Addendum 14:00  Blood cultures are now reported to be MRSA +: ABX coverage changed by ID to Daptomycin.  Has been NPO  since this AM  per OR notification to floor, no chemical DVT prophylaxis has been initiated.    Dr. Ruelas is considering surgery either later today or on 8/6/19.  Final plan to be determined.

## 2019-08-05 NOTE — CARE PLAN
Problem: Knowledge Deficit  Goal: Knowledge of disease process/condition, treatment plan, diagnostic tests, and medications will improve  Note:   Pt educated about disease process. Reason why medications are taken. And informed about treatment plan.      Problem: Pain Management  Goal: Pain level will decrease to patient's comfort goal  Description  Pain medicated per MAR to achieve patient's comfort goal   Note:   Medicated per MAR. Educated on pain scale. implemented non pharmacological methods: distraction, reposition, rest.

## 2019-08-05 NOTE — PROGRESS NOTES
Assumed care of PT A&O 4. Pt resting in bed with no signs of labored breathing. On 2l n.c . Tele monitor in place, cardiac rhythm being monitored. Call light within reach, bed in lowest position, upper bed rails up. Pt was updated on plan of care for the day. Will continue to monitor.

## 2019-08-05 NOTE — ASSESSMENT & PLAN NOTE
Blood cultures +, along with wound cultures therefore now started on daptomycin  Repeat bcx   Id on board  monitor

## 2019-08-05 NOTE — PROGRESS NOTES
2 RN skin check complete with Xenia.  Devices in place intact.  Skin assessed under devices intact.  Confirmed pressure ulcers found on- none.  New potential pressure ulcers noted on- none.  Lower back incision. Dressing was changed by this RN.  Skin intact no sores or tears noted. Pt able to turn himself.   Wound following.

## 2019-08-05 NOTE — PROGRESS NOTES
[facility] Medicine Daily Progress Note    Date of Service  8/5/2019    Chief Complaint  23 y.o. male admitted 7/31/2019 with drainage from surgical wound    Hospital Course     23 y.o. male who presented 7/31/2019 with infection of lumbar laminectomy.  Patient presented 6/17/2019 with congenital spinal stenosis and severe L4-L5 stenosis with disc herniation, L2-L3 moderate to severe stenosis, and L5-S1 moderate to severe stenosis with morbid obesity and intractable pain.  At that time he had L4 and L5 and S1 decompressive laminectomy, bilateral medial facetectomies and bilateral foraminotomies with aborted L2-L3 and L3-L4 laminectomies due to loss of signal.  He subsequently returned on 7/12 to have L2, L3, L4, L5, S1 laminectomy, and a L4-L5 microdiscectomy and was hospitalized 7/5-22.  Further during that hospitalization he was noted to have persistent sinus tachycardia, had echocardiogram which was unremarkable, and was initiated on metoprolol.       Patient presented to ED on 7/27 after indicating leaking wound VAC for which he had wound drainage and cleanout performed 7/25.  Patient returned again on 7/31 with ongoing concerns of wound drainage and at which point was admitted and found to have ill-defined nonenhancing fluid within the laminectomy bed most pronounced at L4-L5 level as well as diffuse enhancement of the cauda equina nerve roots presumably infectious/inflammatory on MRI.  Wound culture grew MRSA.  Plan today was for I&D of wound.     Patient went to the OR for I&D and apparently after being intubated yet prior to initiation of surgical procedure patient went into a ventricular tachycardic rhythm followed by reported RENE levin, he had a total of 3 shocks before reestablishing sinus rhythm further he also was given 150 mg of amiodarone.  As there was concern of possible hyperkalemia post succinylcholine as paralytic he was given 20 of IV insulin and calcium.  Immediately after returning to sinus rhythm  patient was awake agitated and looking to pull out ET tube.  He was subsequently was extubated and surgery canceled.     At this time he complains of unchanged back pain, denies any palpitations or chest discomfort, denies significant shortness of breath, no visual changes or lightheadedness.           Interval Problem Update  Patient seen and examined, he is doing well, in good spirits. Does not c/o of any pain, he is neurologically intact.  no fevers.  WBC is 8.3 and creatinine is 0.52.  Last sed rate is 87 his ALT is up to 6/2/2022 and AST is 68.  His blood cultures are positive from 8/3/2019 MRSA   Consultants/Specialty  Neurosurgery  ID    Code Status  full    Disposition  tbd    Review of Systems  Review of Systems   Constitutional: Negative for chills and fever.   HENT: Negative for hearing loss, nosebleeds and sore throat.    Eyes: Negative for blurred vision and double vision.   Respiratory: Negative for cough and shortness of breath.    Cardiovascular: Negative for chest pain, palpitations and leg swelling.   Gastrointestinal: Negative for abdominal pain, constipation, diarrhea, nausea and vomiting.   Genitourinary: Negative for dysuria, frequency and urgency.   Musculoskeletal: Positive for back pain. Negative for myalgias and neck pain.   Skin: Negative for rash.   Neurological: Positive for sensory change. Negative for dizziness, tingling, focal weakness, loss of consciousness and headaches.        Some numbness on volar aspect of L foot; present since the surgery and unchanged   Psychiatric/Behavioral: Negative for depression.        Physical Exam  Temp:  [36.1 °C (97 °F)-37.1 °C (98.8 °F)] 36.4 °C (97.6 °F)  Pulse:  [] 90  Resp:  [16-21] 18  BP: (129-142)/(61-84) 140/81  SpO2:  [90 %-97 %] 90 %    Physical Exam   Constitutional: He is oriented to person, place, and time. He appears well-developed and well-nourished. No distress.   HENT:   Head: Normocephalic and atraumatic.   Nose: Nose normal.    Mouth/Throat: Oropharynx is clear and moist.   Eyes: Conjunctivae are normal.   Neck: No JVD present.   Cardiovascular: Normal rate. Exam reveals no gallop.   No murmur heard.  Pulmonary/Chest: Effort normal. No stridor. No respiratory distress. He has no wheezes. He has no rales.   Abdominal: Soft. There is no tenderness. There is no rebound and no guarding.   Musculoskeletal: He exhibits no edema.   Neurological: He is alert and oriented to person, place, and time.   Strength 5/5 B lower Ext  Sensation intact to light touch   Skin: Skin is warm and dry. No rash noted. He is not diaphoretic.   Psychiatric: He has a normal mood and affect. His behavior is normal. Judgment and thought content normal.   Nursing note and vitals reviewed.      Fluids    Intake/Output Summary (Last 24 hours) at 8/5/2019 1422  Last data filed at 8/5/2019 0542  Gross per 24 hour   Intake --   Output 1880 ml   Net -1880 ml       Laboratory  Recent Labs     08/03/19 0316 08/04/19  0423 08/05/19  0510   WBC 8.0 8.5 8.3   RBC 3.74* 3.62* 3.83*   HEMOGLOBIN 9.6* 9.4* 10.1*   HEMATOCRIT 30.8* 30.0* 31.4*   MCV 82.4 82.9 82.0   MCH 25.7* 26.0* 26.4*   MCHC 31.2* 31.3* 32.2*   RDW 39.3 39.2 38.5   PLATELETCT 318 291 346   MPV 8.4* 8.2* 8.9*     Recent Labs     08/03/19 0316 08/04/19  0423 08/05/19  0510   SODIUM 136 136 136   POTASSIUM 3.9 4.0 3.9   CHLORIDE 104 103 101   CO2 24 25 25   GLUCOSE 130* 105* 108*   BUN 10 6* 8   CREATININE 0.70 0.49* 0.52   CALCIUM 8.1* 8.4* 8.9     Recent Labs     08/02/19 2050   INR 1.19*               Imaging  CT-CTA CHEST PULMONARY ARTERY W/ RECONS   Final Result      1.  No definite evidence of pulmonary emboli, within the limitations described above.   2.  Bilateral lower lobe discoid atelectasis.   3.  Unusual curvilinear lucency in the right hepatic lobe which may represent scarring. The appearance is not at least typical for a hepatic solid mass or cyst.            EC-ECHOCARDIOGRAM COMPLETE W/ CONT    Final Result      DX-CHEST-LIMITED (1 VIEW)   Final Result      No evidence of acute cardiopulmonary process.      MR-LUMBAR SPINE-WITH & W/O   Final Result      1.  Stable postsurgical changes L3-L5 laminectomies. Interval ill-defined nonenhancing fluid within the laminectomy bed most pronounced at the L4-L5 level. Infection should be excluded clinically.   2.  There is diffuse enhancement of the cauda equina nerve roots which is presumably infectious/inflammatory.   3.  No evidence of discitis osteomyelitis or epidural abscess.   4.  Degenerative changes as detailed above with persistent moderate thecal sac stenosis at L2-L3 and L4-L5.           Assessment/Plan  * Cardiac arrest (HCC)  Assessment & Plan  S/p cardiac arrest prior to I&D, 3 shocks, surgery canceled  Cards consulted  EP Recomendations:  Needs ICD however not until infection is taken care of  Proceed with surgery.  If he goes into VT/VF; defibrilate and give Amiodarone.  Proceed with surgery  They will not place the ICD until after his spinal infection is drained and treated  Follow and maximize K, Mg, Phos    MRSA bacteremia  Assessment & Plan  Blood cultures +, along with wound cultures therefore now started on daptomycin  Repeat bcx   Id on board  monitor    Elevated liver enzymes  Assessment & Plan  Secondary to VT/VF arrest most likely  Cont to follow, work up further if fail to normalize    Other inflammatory polyneuropathies (HCC)- (present on admission)  Assessment & Plan  Neurontin 100 mg 3 times daily    Infected surgical wound- (present on admission)  Assessment & Plan  Wound culures MSRA, and now even blood cx are +  ID on board, changed abx to Daptomycin, monitor closely  Wound care  Pain management with IV Dilaudid    Dr. Ruelas is considering surgery either later today or on 8/6/19.  Final plan to be determined.       Wound drainage- (present on admission)  Assessment & Plan  Infected with MRSA, now on daptomycin  Dr. Ruelas is  considering surgery either later today or on 8/6/19.  Final plan to be determined.       Sinus tachycardia- (present on admission)  Assessment & Plan  Beta-blocker      Acute bilateral back pain- (present on admission)  Assessment & Plan  Back pain is intractable    P.o. Vicodin    PRN IV Dilaudid       VTE prophylaxis: none due to recent surgery    Plan discussed with patient, nursing and ID.  Dr. Ruelas is considering surgery either later today or on 8/6/19.  Final plan to be determined.

## 2019-08-06 ENCOUNTER — APPOINTMENT (OUTPATIENT)
Dept: WOUND CARE | Facility: MEDICAL CENTER | Age: 24
End: 2019-08-06
Payer: COMMERCIAL

## 2019-08-06 LAB
ANION GAP SERPL CALC-SCNC: 10 MMOL/L (ref 0–11.9)
BACTERIA BLD CULT: ABNORMAL
BACTERIA BLD CULT: ABNORMAL
BASOPHILS # BLD AUTO: 0.5 % (ref 0–1.8)
BASOPHILS # BLD: 0.04 K/UL (ref 0–0.12)
BUN SERPL-MCNC: 10 MG/DL (ref 8–22)
CALCIUM SERPL-MCNC: 8.7 MG/DL (ref 8.5–10.5)
CHLORIDE SERPL-SCNC: 103 MMOL/L (ref 96–112)
CO2 SERPL-SCNC: 26 MMOL/L (ref 20–33)
CREAT SERPL-MCNC: 0.53 MG/DL (ref 0.5–1.4)
EOSINOPHIL # BLD AUTO: 0.17 K/UL (ref 0–0.51)
EOSINOPHIL NFR BLD: 2 % (ref 0–6.9)
ERYTHROCYTE [DISTWIDTH] IN BLOOD BY AUTOMATED COUNT: 39.1 FL (ref 35.9–50)
GLUCOSE SERPL-MCNC: 107 MG/DL (ref 65–99)
GRAM STN SPEC: NORMAL
HCT VFR BLD AUTO: 31.2 % (ref 42–52)
HGB BLD-MCNC: 9.8 G/DL (ref 14–18)
IMM GRANULOCYTES # BLD AUTO: 0.05 K/UL (ref 0–0.11)
IMM GRANULOCYTES NFR BLD AUTO: 0.6 % (ref 0–0.9)
LYMPHOCYTES # BLD AUTO: 3.01 K/UL (ref 1–4.8)
LYMPHOCYTES NFR BLD: 34.6 % (ref 22–41)
MAGNESIUM SERPL-MCNC: 1.8 MG/DL (ref 1.5–2.5)
MCH RBC QN AUTO: 26.1 PG (ref 27–33)
MCHC RBC AUTO-ENTMCNC: 31.4 G/DL (ref 33.7–35.3)
MCV RBC AUTO: 83.2 FL (ref 81.4–97.8)
MONOCYTES # BLD AUTO: 0.68 K/UL (ref 0–0.85)
MONOCYTES NFR BLD AUTO: 7.8 % (ref 0–13.4)
NEUTROPHILS # BLD AUTO: 4.74 K/UL (ref 1.82–7.42)
NEUTROPHILS NFR BLD: 54.5 % (ref 44–72)
NRBC # BLD AUTO: 0 K/UL
NRBC BLD-RTO: 0 /100 WBC
PLATELET # BLD AUTO: 340 K/UL (ref 164–446)
PMV BLD AUTO: 8.6 FL (ref 9–12.9)
POTASSIUM SERPL-SCNC: 3.7 MMOL/L (ref 3.6–5.5)
RBC # BLD AUTO: 3.75 M/UL (ref 4.7–6.1)
SIGNIFICANT IND 70042: ABNORMAL
SIGNIFICANT IND 70042: NORMAL
SITE SITE: ABNORMAL
SITE SITE: NORMAL
SODIUM SERPL-SCNC: 139 MMOL/L (ref 135–145)
SOURCE SOURCE: ABNORMAL
SOURCE SOURCE: NORMAL
WBC # BLD AUTO: 8.7 K/UL (ref 4.8–10.8)

## 2019-08-06 PROCEDURE — A9270 NON-COVERED ITEM OR SERVICE: HCPCS | Performed by: HOSPITALIST

## 2019-08-06 PROCEDURE — 83735 ASSAY OF MAGNESIUM: CPT

## 2019-08-06 PROCEDURE — 85025 COMPLETE CBC W/AUTO DIFF WBC: CPT

## 2019-08-06 PROCEDURE — 700102 HCHG RX REV CODE 250 W/ 637 OVERRIDE(OP): Performed by: INTERNAL MEDICINE

## 2019-08-06 PROCEDURE — 770020 HCHG ROOM/CARE - TELE (206)

## 2019-08-06 PROCEDURE — 97162 PT EVAL MOD COMPLEX 30 MIN: CPT

## 2019-08-06 PROCEDURE — 700111 HCHG RX REV CODE 636 W/ 250 OVERRIDE (IP): Performed by: INTERNAL MEDICINE

## 2019-08-06 PROCEDURE — A9270 NON-COVERED ITEM OR SERVICE: HCPCS | Performed by: INTERNAL MEDICINE

## 2019-08-06 PROCEDURE — 99233 SBSQ HOSP IP/OBS HIGH 50: CPT | Performed by: INTERNAL MEDICINE

## 2019-08-06 PROCEDURE — 36415 COLL VENOUS BLD VENIPUNCTURE: CPT

## 2019-08-06 PROCEDURE — 80048 BASIC METABOLIC PNL TOTAL CA: CPT

## 2019-08-06 PROCEDURE — 700105 HCHG RX REV CODE 258: Performed by: INTERNAL MEDICINE

## 2019-08-06 PROCEDURE — 87040 BLOOD CULTURE FOR BACTERIA: CPT

## 2019-08-06 PROCEDURE — 700102 HCHG RX REV CODE 250 W/ 637 OVERRIDE(OP): Performed by: HOSPITALIST

## 2019-08-06 PROCEDURE — 99232 SBSQ HOSP IP/OBS MODERATE 35: CPT | Performed by: HOSPITALIST

## 2019-08-06 RX ORDER — SODIUM CHLORIDE 9 MG/ML
INJECTION, SOLUTION INTRAVENOUS
Status: ACTIVE
Start: 2019-08-06 | End: 2019-08-07

## 2019-08-06 RX ADMIN — METHOCARBAMOL TABLETS 1000 MG: 500 TABLET, COATED ORAL at 11:53

## 2019-08-06 RX ADMIN — SENNOSIDES AND DOCUSATE SODIUM 2 TABLET: 8.6; 5 TABLET ORAL at 05:12

## 2019-08-06 RX ADMIN — DAPTOMYCIN 1090 MG: 500 INJECTION, POWDER, LYOPHILIZED, FOR SOLUTION INTRAVENOUS at 13:45

## 2019-08-06 RX ADMIN — METOPROLOL TARTRATE 25 MG: 25 TABLET, FILM COATED ORAL at 05:12

## 2019-08-06 RX ADMIN — HYDROCODONE BITARTRATE AND ACETAMINOPHEN 1 TABLET: 5; 325 TABLET ORAL at 17:54

## 2019-08-06 RX ADMIN — SENNOSIDES AND DOCUSATE SODIUM 2 TABLET: 8.6; 5 TABLET ORAL at 17:54

## 2019-08-06 RX ADMIN — HYDROCODONE BITARTRATE AND ACETAMINOPHEN 1 TABLET: 5; 325 TABLET ORAL at 11:53

## 2019-08-06 RX ADMIN — METHOCARBAMOL TABLETS 1000 MG: 500 TABLET, COATED ORAL at 17:54

## 2019-08-06 RX ADMIN — GABAPENTIN 100 MG: 100 CAPSULE ORAL at 11:53

## 2019-08-06 RX ADMIN — GABAPENTIN 100 MG: 100 CAPSULE ORAL at 17:54

## 2019-08-06 RX ADMIN — METHOCARBAMOL TABLETS 1000 MG: 500 TABLET, COATED ORAL at 05:12

## 2019-08-06 RX ADMIN — METOPROLOL TARTRATE 25 MG: 25 TABLET, FILM COATED ORAL at 17:54

## 2019-08-06 RX ADMIN — GABAPENTIN 100 MG: 100 CAPSULE ORAL at 05:12

## 2019-08-06 RX ADMIN — HYDROCODONE BITARTRATE AND ACETAMINOPHEN 1 TABLET: 5; 325 TABLET ORAL at 05:21

## 2019-08-06 ASSESSMENT — ENCOUNTER SYMPTOMS
HEADACHES: 0
BLURRED VISION: 0
FEVER: 0
CHILLS: 0
SORE THROAT: 0
SEVERE DYSPNEA: 1
CONSTIPATION: 0
COUGH: 0
TINGLING: 0
BACK PAIN: 1
DEBILITATING PAIN: 1
ABDOMINAL PAIN: 0
SENSORY CHANGE: 1
FOCAL WEAKNESS: 0
SHORTNESS OF BREATH: 0
NECK PAIN: 0
DOUBLE VISION: 0
MYALGIAS: 0
DEPRESSION: 0
DIARRHEA: 0
PALPITATIONS: 0
NAUSEA: 0
LOSS OF CONSCIOUSNESS: 0
VOMITING: 0
DIZZINESS: 0

## 2019-08-06 ASSESSMENT — COGNITIVE AND FUNCTIONAL STATUS - GENERAL
STANDING UP FROM CHAIR USING ARMS: A LITTLE
CLIMB 3 TO 5 STEPS WITH RAILING: A LOT
MOVING TO AND FROM BED TO CHAIR: A LOT
WALKING IN HOSPITAL ROOM: A LITTLE
SUGGESTED CMS G CODE MODIFIER MOBILITY: CL
TURNING FROM BACK TO SIDE WHILE IN FLAT BAD: A LOT
MOBILITY SCORE: 14
MOVING FROM LYING ON BACK TO SITTING ON SIDE OF FLAT BED: A LOT

## 2019-08-06 ASSESSMENT — GAIT ASSESSMENTS
ASSISTIVE DEVICE: FRONT WHEEL WALKER
DISTANCE (FEET): 3
GAIT LEVEL OF ASSIST: MINIMAL ASSIST
DEVIATION: BRADYKINETIC

## 2019-08-06 NOTE — PROGRESS NOTES
Infectious Disease Progress Note    Author: Opal Dwyer M.D. Date & Time of service: 2019  2:55 PM    Chief Complaint:  Post laminectomy wound infection, MRSA     Interval History:  2019-no fevers.  WBC is 8.3 and creatinine is 0.52.  Last sed rate is 87 his ALT is up to 2022 and AST is 68.  His blood cultures are positive from 8/3/2019  2019-no fevers.  Continues to complain of back pain and numbness on his feet.  WBC is 8.7 no CMP available today  Review of Systems:  Review of Systems   Constitutional: Negative for chills, fever and malaise/fatigue.   Respiratory: Negative for cough and shortness of breath.         Complains of chest pain where he had chest compressions   Gastrointestinal: Negative for abdominal pain, constipation, diarrhea, nausea and vomiting.   Musculoskeletal: Positive for back pain. Negative for joint pain and myalgias.   Skin: Negative for rash.   Neurological:        Complains of some numbness in the bottom of his foot       Hemodynamics:  Temp (24hrs), Av.4 °C (97.5 °F), Min:36 °C (96.8 °F), Max:36.8 °C (98.2 °F)  Temperature: 36.1 °C (96.9 °F)  Pulse  Av.6  Min: 69  Max: 149   Blood Pressure: 141/80       Physical Exam:  Physical Exam   Constitutional: He is oriented to person, place, and time. He appears well-developed and well-nourished.   Obese   HENT:   Head: Normocephalic and atraumatic.   Eyes: Pupils are equal, round, and reactive to light. Conjunctivae and EOM are normal.   Cardiovascular: Normal rate, regular rhythm and normal heart sounds.   Pulmonary/Chest: Effort normal.   Diminished breath soundsl lung fields, worse in bases   Abdominal: Soft. Bowel sounds are normal. He exhibits no distension. There is no tenderness. There is no rebound and no guarding.   Musculoskeletal: He exhibits no edema.   The back incision is clean   Neurological: He is alert and oriented to person, place, and time.   Skin: Skin is warm.   Psychiatric: He has a normal  mood and affect. His behavior is normal.   Vitals reviewed.      Meds:    Current Facility-Administered Medications:   •  NS  •  DAPTOmycin IVPB ([facility] only)  •  metoprolol  •  Respiratory Care per Protocol  •  HYDROmorphone  •  gabapentin  •  HYDROcodone-acetaminophen  •  methocarbamol  •  senna-docusate **AND** polyethylene glycol/lytes **AND** magnesium hydroxide **AND** bisacodyl  •  acetaminophen  •  ondansetron  •  ondansetron  •  promethazine  •  promethazine  •  prochlorperazine    Labs:  Recent Labs     08/04/19 0423 08/05/19  0510 08/06/19  0500   WBC 8.5 8.3 8.7   RBC 3.62* 3.83* 3.75*   HEMOGLOBIN 9.4* 10.1* 9.8*   HEMATOCRIT 30.0* 31.4* 31.2*   MCV 82.9 82.0 83.2   MCH 26.0* 26.4* 26.1*   RDW 39.2 38.5 39.1   PLATELETCT 291 346 340   MPV 8.2* 8.9* 8.6*   NEUTSPOLYS 59.10 57.10 54.50   LYMPHOCYTES 29.40 30.50 34.60   MONOCYTES 10.10 9.80 7.80   EOSINOPHILS 0.70 1.70 2.00   BASOPHILS 0.20 0.40 0.50     Recent Labs     08/04/19 0423 08/05/19  0510 08/06/19  0500   SODIUM 136 136 139   POTASSIUM 4.0 3.9 3.7   CHLORIDE 103 101 103   CO2 25 25 26   GLUCOSE 105* 108* 107*   BUN 6* 8 10   CPKTOTAL  --  43  --      Recent Labs     08/04/19 0423 08/04/19  0950 08/05/19  0510 08/06/19  0500   ALBUMIN  --  3.5  --   --    TBILIRUBIN  --  0.4  --   --    ALKPHOSPHAT  --  64  --   --    TOTPROTEIN  --  7.1  --   --    ALTSGPT  --  222*  --   --    ASTSGOT  --  68*  --   --    CREATININE 0.49*  --  0.52 0.53       Imaging:  Dx-chest-limited (1 View)    Result Date: 8/2/2019 8/2/2019 5:42 PM HISTORY/REASON FOR EXAM:  Shortness of Breath TECHNIQUE/EXAM DESCRIPTION AND NUMBER OF VIEWS: Single AP view of the chest. COMPARISON: 6/19/2019 FINDINGS: The cardiac silhouette and mediastinal contours are stable. No discrete opacity, pleural fluid, or pneumothorax. The left costophrenic angle is excluded from the image. No suspicious bony lesions.     No evidence of acute cardiopulmonary process.    Dx-spine-any One  View    Result Date: 7/12/2019 7/12/2019 12:03 PM HISTORY/REASON FOR EXAM:  Intraoperative images for procedural navigation. TECHNIQUE/EXAM DESCRIPTION AND NUMBER OF VIEWS:  2 view(s) of the lumbosacral spine.     Fluoroscopic image(s) obtained during posterior lumbar laminectomy. Please see the patient's chart for full procedural details. Fluoroscopy time 2 seconds.     Mr-lumbar Spine-with & W/o    Result Date: 8/1/2019 8/1/2019 3:24 PM HISTORY/REASON FOR EXAM:  Recent back surgery with wound drainage and elevated sed rate TECHNIQUE/EXAM DESCRIPTION: MRI of the lumbar spine without and with contrast. The study was performed on a [employer] Signa 1.5 Gena MRI scanner. T1 sagittal, T2 fast spin-echo sagittal, and T2 axial images were obtained of the lumbar spine. T1 postcontrast fat-suppressed sagittal images were obtained. 14 mL Gadavist contrast was administered intravenously. COMPARISON:  7/8/2019 FINDINGS: Redemonstrated are postsurgical changes status post laminectomies from L2 L3-L5 S1. There is extensive enhancement and edema in the midline posterior soft tissues and the lower paraspinal musculature which is similar to prior study and may represent postsurgical changes or infection. The previously seen superficial fluid collection appears slightly decreased in size. There is a new ill-defined rim-enhancing fluid in the deep soft tissues laminectomy bed most conspicuous at the L5 level measuring approximately 3 x 2.1 cm. Developing abscess is not excluded. There is extensive enhancement of the cauda equina nerve roots extending from the visualized conus medullaris through the S1 level which is concerning for infection. Conus medullaris terminates at T12-L1. T12-L2: Canal and foramina are patent. L2-L3: Disc bulge and posterior central disc extrusion again noted resulting in moderate spinal stenosis. L3-L4: Facet degeneration. No significant spinal stenosis. L4-L5: Disc degeneration, disc osteophyte and facet  degeneration. There is moderate thecal sac stenosis. L5-S1: Posterior central left paracentral disc protrusion which flattens the ventral thecal sac. Bilateral facet degeneration. No significant spinal stenosis. Bilateral foraminal stenosis.     1.  Stable postsurgical changes L3-L5 laminectomies. Interval ill-defined nonenhancing fluid within the laminectomy bed most pronounced at the L4-L5 level. Infection should be excluded clinically. 2.  There is diffuse enhancement of the cauda equina nerve roots which is presumably infectious/inflammatory. 3.  No evidence of discitis osteomyelitis or epidural abscess. 4.  Degenerative changes as detailed above with persistent moderate thecal sac stenosis at L2-L3 and L4-L5.    Mr-lumbar Spine-with & W/o    Result Date: 7/8/2019 7/8/2019 11:34 AM HISTORY/REASON FOR EXAM:  Back pain or radiculopathy, > 6 wks; Back pain, cauda equina syndrome suspected Laminectomy June 17. New weakness for 3 days. TECHNIQUE/EXAM DESCRIPTION: MRI of the lumbar spine without and with contrast. The study was performed on a [employer] 1.5 Gena MRI scanner. T1 sagittal, T2 fast spin-echo sagittal, and T2 axial images were obtained of the lumbar spine. T1 postcontrast fat-suppressed sagittal images were obtained. 15 mL Gadavist contrast was administered intravenously. COMPARISON:  6/19/2019 MRI. FINDINGS: Motion degrades the study. Straightening of the normal lordosis is again seen Multiple Schmorl's nodes are again demonstrated No marrow edema is seen No T2 hyperintensity is seen suspicious for discitis Conus terminates normal in position at L1. Visualized nerve roots show no abnormal clumping to suggest arachnoiditis. There is however degenerative disc disease causing central stenosis as discussed below. T2 hyperintense fluid collection is newly identified in the deep subcutaneous fat posteriorly spanning L2/3-L5 measuring 10 x 2.8 x 1.5 cm and there is rim enhancement. No abnormal epidural fluid  collection is seen to suggest abscess. L3-L5 laminectomies have been performed. Findings by level: T12/L1: No stenosis L1/2: No stenosis L2/3: Mild disc height loss, right paracentral protrusion, ligamentum flavum redundancy, facet arthropathy, short pedicles. This results in unchanged moderate to severe central stenosis, especially on the right. There is some epidural enhancement ventrally which is unchanged and likely related to the protrusion. Borderline foraminal stenosis L3/4: Stable facet arthropathy and ligamentum flavum redundancy with a mild disc bulge, short pedicles. This results in persistent moderate central and borderline foraminal stenosis L4/5: Stable right paracentral protrusion, disc bulge. Laminectomy with partial facetectomy. No significant central stenosis but the right greater than left foramina are again moderately narrowed L5/S1: Stable left paracentral protrusion, facet arthropathy, mild-moderate central and mild foraminal stenoses as before     1.  New 10 x 3 x 1 cm deep subcutaneous fat fluid collection differential includes seroma with or without superimposed infection cannot 2.  Otherwise stable lumbar spine MRI with contrast following L3-L5 laminectomies 3.  Moderate multilevel central stenoses greatest at L2/3 are again seen, mostly secondary to facet arthropathy and developmentally short pedicles as specifically detailed above 4.  Lesser foraminal stenoses are unchanged as detailed above    Mr-thoracic Spine-with & W/o    Result Date: 7/8/2019 7/8/2019 11:41 AM HISTORY/REASON FOR EXAM:  Mid-back pain; Bilateral LE weakness TECHNIQUE/EXAM DESCRIPTION: MRI of the thoracic spine without and with contrast. The study was performed on a [employer] Signa 1.5 Gena MRI scanner. T1 sagittal, T2 fast spin-echo sagittal, and T2 axial images were obtained of the thoracic spine. T1 post-contrast fat suppressed sagittal images were obtained. Optional T1 post-contrast axial images may be obtained. 15 mL  Gadavist contrast was administered intravenously. COMPARISON:  None. FINDINGS: Straightening of the normal kyphosis. Several small nonedematous Schmorl's nodes are seen Normal alignment The visualized spinal cord is normal in signal and morphology There are multiple disc protrusions but at no point is central stenosis moderate or greater in severity. Several levels of cord abutment are identified, noted best anteriorly on the left at T9/10. Protrusions are also seen on the right at T2/3, T4/5, left T5/6, left and right at T8/9. Mild right T2/3 foraminal stenosis related to disc protrusion Otherwise patent foramina Paraspinous soft tissues are normal in appearance After contrast is administered no abnormal enhancement is seen No abnormal fluid collection. No epidural abscess. No abnormal disc signal to suggest discitis     Multilevel degenerative disc disease results in cord abutment but no cord compression Straightening of the normal lordosis with multiple chronic Schmorl's nodes Mild right T2/3 foraminal stenosis    Dx-portable Fluoro > 1 Hour    Result Date: 7/15/2019  7/12/2019 12:03 PM HISTORY/REASON FOR EXAM:  Posterior lumbar laminectomy L3-S1, Main OR TECHNIQUE/EXAM DESCRIPTION AND NUMBER OF VIEWS: Portable fluoroscopy for greater than one hour FINDINGS:      The portable fluoroscopy unit was obligated to the procedure for greater than one hour.   Actual fluoro time was 2 seconds.     Portable fluoroscopy utilized for 2 seconds.    Ct-cta Chest Pulmonary Artery W/ Recons    Result Date: 8/3/2019  8/3/2019 9:11 PM HISTORY/REASON FOR EXAM:  Shortness of breath; tachycardia, post-op, high risk for PE. TECHNIQUE/EXAM DESCRIPTION: CT angiogram scan for pulmonary embolism with contrast, with reconstructions. 1.25 mm helical sections were obtained from the lung apices through the lung bases following the rapid bolus administration of 80 mL of Omnipaque 350 nonionic contrast. Thin-section overlapping reconstruction  interval was utilized. Coronal reconstructions were generated from the axial data. MIP post processing was performed and utilized for the interpretation. Low dose optimization technique was utilized for this CT exam including automated exposure control and adjustment of the mA and/or kV according to patient size. COMPARISON: None FINDINGS: Pulmonary Embolism: There is somewhat suboptimal opacification of the pulmonary arterial structures, there is no definite evidence of large or central pulmonary emboli. Small peripheral emboli cannot be absolutely excluded. . Lungs: Bilateral lower lobe discoid atelectasis.. Pleura: No pleural effusion. Nodes: No enlarged lymph nodes. Additional findings: Unusual curvilinear lucency in the right hepatic lobe measuring about 4 x 1.9 cm diameter..     1.  No definite evidence of pulmonary emboli, within the limitations described above. 2.  Bilateral lower lobe discoid atelectasis. 3.  Unusual curvilinear lucency in the right hepatic lobe which may represent scarring. The appearance is not at least typical for a hepatic solid mass or cyst.     Ec-echocardiogram Complete W/ Cont    Result Date: 8/2/2019  Transthoracic Echo Report Echocardiography Laboratory CONCLUSIONS Prior echocardiogram 6/21/2019.Technically difficult study - adequate information is obtained. Contrast was used to enhance visualization of the endocardial border. Normal left ventricular systolic function. Left ventricular ejection fraction is visually estimated to be 65%. Normal regional wall motion. Right ventricle not well visualized but visually appears Mildly enlarged. The left atrium is normal in size. The mitral valve is not well visualized. No stenosis or regurgitation seen. Structurally normal aortic valve without significant stenosis or regurgitation. Normal pericardium without effusion. JOHNSON PEOPLES Exam Date:         08/02/2019                    19:04 Exam Location:     Inpatient Priority:           Routine Ordering Physician:        NICCI HERNANDEZ Referring Physician: Sonographer:               Candy James RDCS Age:    23     Gender:    M MRN:    [id number] :    1995 BSA:    2.55   Ht (in):    72     Wt (lb):    304 Exam Type:     Complete, Contrast Indications:     Cardiac arrest, cause unspecified ICD Codes:       I46.9 CPT Codes:       54927,  BP:   121    /   84     HR:   140 Technical Quality:       Technically difficult study -                          adequate information is obtained MEASUREMENTS  (Male / Female) Normal Values 2D ECHO LV Diastolic Diameter PLAX        4.2 cm                4.2 - 5.9 / 3.9 - 5.3 cm LV Systolic Diameter PLAX         3.7 cm                2.1 - 4.0 cm IVS Diastolic Thickness           1.3 cm                LVPW Diastolic Thickness          1 cm                  LVOT Diameter                     2.4 cm                Estimated LV Ejection Fraction    65 %                  LV Ejection Fraction MOD BP       52.1 %                >= 55  % LV Ejection Fraction MOD 4C       56.7 %                LV Ejection Fraction MOD 2C       47.6 %                IVC Diameter                      0.79 cm               DOPPLER AV Peak Velocity                  0.94 m/s              AV Peak Gradient                  3.6 mmHg              AV Mean Gradient                  2.2 mmHg              LVOT Peak Velocity                0.89 m/s              AV Area Cont Eq vti               4.1 cm²               MV Velocity Time Integral         11 cm                 Mitral E Point Velocity           0.84 m/s              MV Pressure Half Time             28.8 ms               MV Area PHT                       7.6 cm²               * Indicates values subject to auto-interpretation LV EF:  65    % FINDINGS Left Ventricle Contrast was used to enhance visualization of the endocardial border. 3 mL of contrast was administered. Existing IV was used. Located at the left hand. Normal left  ventricular chamber size. Normal left ventricular wall thickness. Normal left ventricular systolic function. Left ventricular ejection fraction is visually estimated to be 65%. Normal regional wall motion. Diastolic function is difficult to assess with tachycardia. Right Ventricle Right ventricle not well visualized but visually appears Mildly enlarged. Right Atrium The right atrium is normal in size. Inferior vena cava small in size and collapsed due to patient's fluids being low. Left Atrium The left atrium is normal in size. Left atrial volume index is 8 mL/sq m. Mitral Valve The mitral valve is not well visualized. No stenosis or regurgitation seen. Aortic Valve Structurally normal aortic valve without significant stenosis or regurgitation. Tricuspid Valve Structurally normal tricuspid valve without significant stenosis or regurgitation. Pulmonic Valve Structurally normal pulmonic valve without significant stenosis or regurgitation. Pericardium Normal pericardium without effusion. Fat pad present. Aorta The aortic root is normal. Ascending aorta diameter is 2.8 cm. Serg Lopes M.D. (Electronically Signed) Final Date:     02 August 2019                 22:34      Micro:  Results     Procedure Component Value Units Date/Time    CULTURE WOUND W/ GRAM STAIN [[id number]] Collected:  08/05/19 1919    Order Status:  Completed Specimen:  Wound Updated:  08/06/19 1425     Significant Indicator NEG     Source WND     Site Back     Culture Result Culture in progress.     Gram Stain Result Few WBCs.  Few Gram positive cocci.      Narrative:       Surgery - swabs received    BLOOD CULTURE [[id number]]  (Abnormal)  (Susceptibility) Collected:  08/03/19 1550    Order Status:  Completed Specimen:  Blood from Peripheral Updated:  08/06/19 1028     Significant Indicator POS     Source BLD     Site PERIPHERAL     Culture Result Growth detected by Bactec instrument. 08/04/2019  12:03  Methicillin Resistant Staphylococcus aureus  "(MRSA)  detected by PCR.        Methicillin Resistant Staphylococcus aureus  This isolate is presumed to be clindamycin resistant based on  detection of inducible resistance.  Clindamycin may still  be effective in some patients.      Narrative:       CALL  Silva  171 tel. [phone],  CALLED  171 tel. [phone] 08/04/2019, 16:21, RB PERF. RESULTS CALLED TO:  [phone]  [id number] RAYA CHRISTIANSEN  Per Hospital Policy: Only change Specimen Src: to \"Line\" if  specified by physician order.  Right Hand    Susceptibility     Methicillin resistant staphylococcus aureus (1)     Antibiotic Interpretation Microscan Method Status    Clindamycin Resistant <=0.5 mcg/mL PARRISH Final    Daptomycin Sensitive 1 mcg/mL PARRISH Final    Erythromycin Resistant >4 mcg/mL PARRISH Final    Moxifloxacin Resistant >4 mcg/mL PARRISH Final    Ampicillin/sulbactam Resistant 16/8 mcg/mL PARRISH Final    Oxacillin Resistant >2 mcg/mL PARRISH Final    Vancomycin Sensitive 2 mcg/mL PARRISH Final    Penicillin Resistant >8 mcg/mL PARRISH Final    Trimeth/Sulfa Sensitive <=0.5/9.5 mcg/mL PARRISH Final    Tetracycline Sensitive <=4 mcg/mL PARRISH Final                   BLOOD CULTURE [[id number]] Collected:  08/06/19 0500    Order Status:  Completed Specimen:  Blood from Peripheral Updated:  08/06/19 0506    Narrative:       Contact  Per Hospital Policy: Only change Specimen Src: to \"Line\" if  specified by physician order.    BLOOD CULTURE [[id number]] Collected:  08/06/19 0500    Order Status:  Completed Specimen:  Blood from Peripheral Updated:  08/06/19 0506    Narrative:       Contact  Per Hospital Policy: Only change Specimen Src: to \"Line\" if  specified by physician order.    GRAM STAIN [[id number]] Collected:  08/05/19 1919    Order Status:  Completed Specimen:  Wound Updated:  08/06/19 0108     Significant Indicator .     Source WND     Site Back     Gram Stain Result Few WBCs.  Few Gram positive cocci.      Narrative:       Surgery - swabs received    Anaerobic Culture [[id number]] " "Collected:  08/05/19 1919    Order Status:  Completed Specimen:  Other Updated:  08/05/19 2022    BLOOD CULTURE [[id number]] Collected:  08/03/19 1550    Order Status:  Completed Specimen:  Blood from Peripheral Updated:  08/04/19 0741     Significant Indicator NEG     Source BLD     Site PERIPHERAL     Culture Result No Growth  Note: Blood cultures are incubated for 5 days and  are monitored continuously.Positive blood cultures  are called to the RN and reported as soon as  they are identified.      Narrative:       [id number] RAYA CHRISTIANSEN  Per Hospital Policy: Only change Specimen Src: to \"Line\" if  specified by physician order.  Left Hand    CULTURE WOUND W/ GRAM STAIN [[id number]]  (Abnormal)  (Susceptibility) Collected:  07/31/19 1245    Order Status:  Completed Specimen:  Wound from Back Updated:  08/02/19 0932     Significant Indicator POS     Source WND     Site BACK     Culture Result Light growth mixed skin zander.     Gram Stain Result Few WBCs.  Few Gram positive cocci.  Few Gram positive rods.       Culture Result Methicillin Resistant Staphylococcus aureus  Heavy growth  Methicillin resistant by screening method  This isolate is presumed to be clindamycin resistant based on  detection of inducible resistance.  Clindamycin may still  be effective in some patients.      Narrative:       CALL  Silva  GAY tel. [phone],  CALLED  GAY tel. [phone] 08/02/2019, 09:31, RB PERF. RESULTS CALLED  TO:[id number] RN    Susceptibility     Methicillin resistant staphylococcus aureus (1)     Antibiotic Interpretation Microscan Method Status    Clindamycin Resistant <=0.5 mcg/mL PARRISH Final    Daptomycin Sensitive <=0.5 mcg/mL PARRISH Final    Erythromycin Resistant >4 mcg/mL PARRISH Final    Moxifloxacin Resistant >4 mcg/mL PARRISH Final    Ampicillin/sulbactam Resistant 16/8 mcg/mL PARRISH Final    Oxacillin Resistant >2 mcg/mL PARRISH Final    Vancomycin Sensitive 1 mcg/mL PARRISH Final    Penicillin Resistant >8 mcg/mL PARRISH Final    " Trimeth/Sulfa Sensitive <=0.5/9.5 mcg/mL PARRISH Final    Tetracycline Sensitive <=4 mcg/mL PARRISH Final                   GRAM STAIN [[id number]] Collected:  07/31/19 1245    Order Status:  Completed Specimen:  Wound Updated:  07/31/19 2317     Significant Indicator .     Source WND     Site BACK     Gram Stain Result Few WBCs.  Few Gram positive cocci.  Few Gram positive rods.      Blood Culture,Hold [[id number]] Collected:  07/31/19 1152    Order Status:  Completed Updated:  07/31/19 2207     Blood Culture Hold Collected          Assessment:  Active Hospital Problems    Diagnosis   • *Cardiac arrest (HCC) [I46.9]   • Elevated liver enzymes [R74.8]   • Morbidly obese (HCC) [E66.01]   • Infected surgical wound [T81.49XA]   • Other inflammatory polyneuropathies (HCC) [G61.89]   • Wound drainage [T14.8XXA]   • Sinus tachycardia [R00.0]   • Acute bilateral back pain [M54.9]   Staph bacteremia     ASSESSMENT/PLAN:      Sammy BERGMAN Luis Alberto Grey is a 23 y.o.  admitted 7/31/2019. Pt has a past medical history of congenital spinal stenosis and severe L4-L5 stenosis with disc herniation as well as stenosis L2-L3 and L5-S5 with obesity and chronic pain.  The patient had L4, L5 and S1 decompressive laminectomy, bilateral medial facetectomies and bilateral foraminoctomy's with aborted L2-L4 laminectomies due to loss of signal on 6/17/2019.  He had MRI spine on 7/8 with a new 10 x 3 x 1 cm deep subcutaneous fat fluid collection thought to be seroma but superimposed infection not ruled out. He then went back to the OR on 7/12 for L2- S1 laminectomy and L4-L5 microdiscectomy.  Per note there were no complications.       He has leaking wound vac with drainage and clean-out on 7/25.  He presented to the ED on 7/27 again with leaking wound VAC but was not admitted. He again returned on 7/31 due to concerns for ongoing worsening wound drainage and was admitted for further work-up.        Plan    MRSA bacteremia  Blood cultures are positive from  8/3/2019  Patient was started on daptomycin yesterday  Repeat blood cultures x2    Back infection  Antibiotics as above  Underwent I&D on 8/5/2019  Those OR cultures are negative so far    Cardiac arrest  On 8/2/2019 patient had developed ventricular tachycardia followed by ventricular fibrillation  He is being followed by cardiology    Increased LFTs  Monitor in a.m.  May be as consequence of above    Congenital spinal stenosis  Status post surgeries as above

## 2019-08-06 NOTE — OR NURSING
Betadine nasal swabs completed per protocol, pt developed bloody nose from right nare and spitting up small amounts bright red sputum, yankar suction given to pt for coughing and spitting, bleeding from nostril stopped per pt, resting comfortably at this time in pre op.

## 2019-08-06 NOTE — ANESTHESIA TIME REPORT
Anesthesia Start and Stop Event Times     Date Time Event    8/5/2019 1846 Ready for Procedure     1852 Anesthesia Start     1947 Anesthesia Stop        Responsible Staff  08/05/19    Name Role Begin End    Wolfgang Saxena M.D. Anesth 1852 1947        Preop Diagnosis (Free Text):  Pre-op Diagnosis     Wound dehiscence        Preop Diagnosis (Codes):    Post op Diagnosis  Wound dehiscence      Premium Reason  A. 3PM - 7AM    Comments:

## 2019-08-06 NOTE — CARE PLAN
Problem: Communication  Goal: The ability to communicate needs accurately and effectively will improve  Outcome: PROGRESSING AS EXPECTED   POC reviewed and all questions answered. Pt verbalized understanding of treatment and medications. Calls appropriately with needs and asks questions regarding care.       Problem: Psychosocial Needs:  Goal: Level of anxiety will decrease  Outcome: PROGRESSING AS EXPECTED   Discussed anxiety triggers and coping mechanisms with patient. Verbalized understanding of proper coping mechanisms.

## 2019-08-06 NOTE — ANESTHESIA PROCEDURE NOTES
Airway  Date/Time: 8/5/2019 7:00 PM  Performed by: Wolfgang Saxena M.D.  Authorized by: Wolfgang Saxena M.D.     Location:  OR  Urgency:  Elective  Indications for Airway Management:  Anesthesia  Spontaneous Ventilation: absent    Sedation Level:  Deep  Preoxygenated: Yes    Patient Position:  Sniffing  Final Airway Type:  Endotracheal airway  Final Endotracheal Airway:  ETT  Cuffed: Yes    Technique Used for Successful ETT Placement:  Direct laryngoscopy  Insertion Site:  Oral  Blade Type:  Marsha  Laryngoscope Blade/Videolaryngoscope Blade Size:  4  ETT Size (mm):  8.5  Measured from:  Lips  ETT to Lips (cm):  24  Placement Verified by: auscultation and capnometry    Cormack-Lehane Classification:  Grade IIa - partial view of glottis  Number of Attempts at Approach:  1

## 2019-08-06 NOTE — OP REPORT
Posterior lumbar interbody fusion  DATE OF SURGERY: 8/5/2019  SURGEON: Dr. Jacob  ASSISTANT: Dr. Ruelas  PREOPERATIVE DIAGNOSIS: Wound dehiscence   POSTOPERATIVE DIAGNOSIS: Wound dehiscence  PROCEDURE PERFORMED:  1. Wound Incision and drainage  2. Wound VAC placement     ANESTHESIA: GETA.  ESTIMATED BLOOD LOSS: 10 cc.  FINDINGS: Fascia intact. Washed out and placed wound VAC with good air seal  DRAINS: None.  COMPLICATIONS: None.  DISPOSITION: Stable to the PACU.   INDICATIONS FOR THE PROCEDURE     HISTORY: Mr Edmonds, is a 23 yr old male with prior lumbar laminectomy for severe lumbar stenosis. His midline laminectomy wound dehisced due to morbid obesity, lack of ambulation and poor nutrition. The patients LE symptoms greatly improved after the first surgery for decompression. He now ambulates much better, and his LE symptoms have improved. The patients wound needs to be washed out and wound VAC placed for secondary intention. The patient agrees and wished to proceed with the procedure.  Given the progression of the symptoms, the patient decided to proceed with wound wash out and wound VAC placement     SURGICAL RISKS: The patient were well apprised of all objectives, benefits, risks and potential complications of the procedure, including but not limited to: worsening of current status, the possible need for further procedures, the risk of infection, headaches, CSF leak, possible spinal nerve injury resulting in paralysis, infection, injury to major vessels causing hemorrhage, stroke, loss of language function, coma and even death. No assurance was given whether symptoms would improve following the procedure. Informed consent was obtained and secured in the chart after the patient  voiced understanding of these risks and decided to proceed with the operation.     DESCRIPTION OF THE PROCEDURE   The patient was transferred to the operating room.He was given preoperative prophylactic IV antibiotics.     ANESTHESIA:  The patient was sedated and intubated without difficulty by the anesthesia service. Eyes were taped shut after ointment was applied to prevent corneal abrasion. A Pauly Hugger was placed over the upper body to maintain control of core body temperature.      POSITIONING: The patient was turned prone on silver table. All pressure points were carefully padded.     OPERATIVE TECHNIQUE   The patient was prepped and draped in the standard sterile fashion. The wound was opened and it was clear the fascia is intact except for a small 1 cm portion inferiorly. Culture swap was obtained from deep and sent for microbiology. The wound was next copiously washed with antibiotic impregnated pulse lavage with 2 liters of fluid. Next the wound edges were inspected to ensure viable dermis and epidermis. Next a medium silver impregnated wound sponge was cut to proper size and placed in the wound. Next the wound was covered and sealed. It was ensured the wound VAC had a proper seal and worked well. Next the wound VAC was turned on continuous suction at 125 mmhg. Next the patient was rolled back onto his regular bed. The department of anesthesia extubated the patient and rolled him into the PACU.    All sponge counts, needle counts and instrument counts were correct at the end of the case times two. The patient tolerated the procedure well, without any complications and was transferred in stable condition to the recovery room.

## 2019-08-06 NOTE — OR NURSING
Called report to floor RN who will assume care of pt. Pt transported to room with this RN and transport on monitor. Pt has no belonging in PACU or pre-op, he said he left everything in his previous room in T725. Pt states that his dad is in his room waiting for him, attempted to call pt's dad left VM. VSS. Pt states that his pain is tolerable at 1/10. Wound vac to back in place, suction at 125 mmHg, with scant drainage

## 2019-08-06 NOTE — ANESTHESIA PREPROCEDURE EVALUATION
Relevant Problems   CARDIAC   (+) Cardiac arrest (HCC)   (+) Essential hypertension       Physical Exam    Airway   Mallampati: II  TM distance: >3 FB  Neck ROM: limited       Cardiovascular - normal exam  Rhythm: regular  Rate: normal  (-) murmur     Dental - normal exam         Pulmonary - normal exam  Breath sounds clear to auscultation     Abdominal   (+) obese     Neurological - normal exam                 Anesthesia Plan    ASA 3   ASA physical status 3 criteria: morbid obesity - BMI greater than or equal to 40    Plan - general       Airway plan will be ETT        Induction: intravenous    Postoperative Plan: Postoperative administration of opioids is intended.    Pertinent diagnostic labs and testing reviewed    Informed Consent:    Anesthetic plan and risks discussed with patient.    Use of blood products discussed with: patient whom consented to blood products.

## 2019-08-06 NOTE — ANESTHESIA QCDR
2019 [facility] Clinical Data Registry (for Quality Improvement)     Postoperative nausea/vomiting risk protocol (Adult = 18 yrs and Pediatric 3-17 yrs)- (430 and 463)  General inhalation anesthetic (NOT TIVA) with PONV risk factors: No  Provision of anti-emetic therapy with at least 2 different classes of agents: N/A  Patient DID NOT receive anti-emetic therapy and reason is documented in Medical Record: N/A    Multimodal Pain Management- (AQI59)  Patient undergoing Elective Surgery (i.e. Outpatient, or ASC, or Prescheduled Surgery prior to Hospital Admission): No  Use of Multimodal Pain Management, two or more drugs and/or interventions, NOT including systemic opioids: N/A  Exception: Documented allergy to multiple classes of analgesics: N/A    PACU assessment of acute postoperative pain prior to Anesthesia Care End- Applies to Patients Age = 18- (ABG7)  Initial PACU pain score is which of the following: < 7/10  Patient unable to report pain score: N/A    Post-anesthetic transfer of care checklist/protocol to PACU/ICU- (426 and 427)  Upon conclusion of case, patient transferred to which of the following locations: PACU/Non-ICU  Use of transfer checklist/protocol: Yes  Exclusion: Service Performed in Patient Hospital Room (and thus did not require transfer): N/A    PACU Reintubation- (AQI31)  General anesthesia requiring endotracheal intubation (ETT) along with subsequent extubation in OR or PACU: No  Required reintubation in the PACU: N/A  Extubation was a planned trial documented in the medical record prior to removal of the original airway device: N/A    Unplanned admission to ICU related to anesthesia service up through end of PACU care- (MD51)  Unplanned admission to ICU (not initially anticipated at anesthesia start time): No

## 2019-08-06 NOTE — OR NURSING
Pt is AOx4. Pain is tolerable at 3/10; medicated pt per MAR. No numbness/tingling. Wound vac on lower back intact and in place. VSS. Called and left voice message for pt's father, Dallin, to update him on pt's status.

## 2019-08-06 NOTE — DISCHARGE PLANNING
"Anticipated Discharge Disposition: Home with home health for skilled therapies, including possible IV antibiotic therapy post discharge.    Action: RN CM met with pt to discuss dc plan. Daptomycin started today with end date of 8/15/19. Wound vac in place. Pt uses [facility] on [address] and denies issues with copays. Pt states his \"bright\" will be his ride home on day of dc. Evelyn with [employer] notified of wound vac. Pt's personal walker is at bedside. Currently on oxygen at 2 liters. Pt [facility] before admission and would like to resume with them at dc.    Barriers to Discharge: Medical clearance.    Plan: Continue to assist with any dc needs that may be identified.    "

## 2019-08-06 NOTE — PROGRESS NOTES
Neurosurgery Progress Note    Subjective:  Doing well, no acute event over night,intraop    Exam:  Left foot DF/PF improved ROM, strength 4/5, EHL 4-5,    Wound vac: serosang drainage in reservoir    BP  Min: 107/60  Max: 146/91  Pulse  Av.4  Min: 69  Max: 112  Resp  Av.7  Min: 10  Max: 22  Temp  Av.4 °C (97.5 °F)  Min: 36 °C (96.8 °F)  Max: 36.8 °C (98.2 °F)  SpO2  Av.3 %  Min: 90 %  Max: 100 %    No data recorded    Recent Labs     19  0510 19  0500   WBC 8.5 8.3 8.7   RBC 3.62* 3.83* 3.75*   HEMOGLOBIN 9.4* 10.1* 9.8*   HEMATOCRIT 30.0* 31.4* 31.2*   MCV 82.9 82.0 83.2   MCH 26.0* 26.4* 26.1*   MCHC 31.3* 32.2* 31.4*   RDW 39.2 38.5 39.1   PLATELETCT 291 346 340   MPV 8.2* 8.9* 8.6*     Recent Labs     19  0510 19  0500   SODIUM 136 136 139   POTASSIUM 4.0 3.9 3.7   CHLORIDE 103 101 103   CO2 25 25 26   GLUCOSE 105* 108* 107*   BUN 6* 8 10   CREATININE 0.49* 0.52 0.53   CALCIUM 8.4* 8.9 8.7               Intake/Output       19 - 19 - 1959       Total  Total       Intake    P.O.  --  120 120  --  -- --    P.O. -- 120 120 -- -- --    I.V.  --  700 700  --  -- --    Volume (mL) (lactated ringers infusion) -- 700 700 -- -- --    Total Intake -- 820 820 -- -- --       Output    Urine  --  1100 1100  --  -- --    Urine Void (mL) -- 1100 1100 -- -- --    Blood  --  10 10  --  -- --    Est. Blood Loss -- 10 10 -- -- --    Total Output -- 1110 1110 -- -- --       Net I/O     -- -290 -290 -- -- --            Intake/Output Summary (Last 24 hours) at 2019 1116  Last data filed at 2019 0300  Gross per 24 hour   Intake 820 ml   Output 1110 ml   Net -290 ml            • DAPTOmycin IVPB ([facility] only)  8 mg/kg Q24HR   • metoprolol  25 mg TWICE DAILY   • Respiratory Care per Protocol   Continuous RT   • HYDROmorphone  0.5 mg Q4HRS PRN   • gabapentin  100 mg  TID   • HYDROcodone-acetaminophen  1 Tab Q4HRS PRN   • methocarbamol  1,000 mg TID   • senna-docusate  2 Tab BID    And   • polyethylene glycol/lytes  1 Packet QDAY PRN    And   • magnesium hydroxide  30 mL QDAY PRN    And   • bisacodyl  10 mg QDAY PRN   • acetaminophen  650 mg Q6HRS PRN   • ondansetron  4 mg Q4HRS PRN   • ondansetron  4 mg Q4HRS PRN   • promethazine  12.5-25 mg Q4HRS PRN   • promethazine  12.5-25 mg Q4HRS PRN   • prochlorperazine  5-10 mg Q4HRS PRN       Assessment and Plan:  Hospital day # 7: lumbar wound dehiscence  POD # 1 I & D lumbar spine wound: no gross evidence of infection noted per Dr. Jacob    intraop wound cultures: few Gram + cocci/WBC      MRSA bacteremia/wound cultures preop: ABX per ID     Prophylactic anticoagulation: yes         Start date/time: as inidcated per hospitalist team       Sp aborted I & D 8/2/19 r/t to cardiac arrest    Need for defibrillator implant after completion of ABX treatment for LS spine wound  Infection    Intermittent ST per monitor: was placed on beta blocker during previous hospitalization     Post arrest  Anemia: unclear etiology, per hospitalist team, Hgb preop 11.9, post event 7.9, today 9.8, improving        Ho of LS spine surgery x 2: last laminectomy 7/12/19

## 2019-08-06 NOTE — CARE PLAN
Problem: Venous Thromboembolism (VTW)/Deep Vein Thrombosis (DVT) Prevention:  Goal: Patient will participate in Venous Thrombosis (VTE)/Deep Vein Thrombosis (DVT)Prevention Measures  Description  SCDs in use   Outcome: PROGRESSING AS EXPECTED  Note:   SCDs on and educated on importance of ambulating to decrease risk of DVTs and PE

## 2019-08-06 NOTE — THERAPY
"Physical Therapy Evaluation completed.   Bed Mobility:  Supine to Sit: Supervised  Transfers: Sit to Stand: Supervised  Gait: Level Of Assist: 3ft with Minimal Assist with Front-Wheel Walker       Plan of Care: Will benefit from Physical Therapy 4 times per week  Discharge Recommendations: Equipment: Will Continue to Assess for Equipment Needs. May need postacute placement, may progress home with home health PT, will continue to follow.     See \"Rehab Therapy-Acute\" Patient Summary Report for complete documentation.     Pt is a 24yo male admitted with wound infection after previous lumbar sx on 7/12. Pt with hx of lumbar sx on 6/17. Pt now s/p wound I&D and wound vac placement on 8/5. Pt performed supine > sit via log roll, reliance on bed features, and extra time and effort to perform. Pt insisting he perform mobility on his own. Pt completed STS to FWW with SPV and was able to take small steps to bedside chair with min A for safety. Session limited by pain, but pt motivated to return to PLOF and return to work. Will continue to follow in acute setting. Pt may require postacute placement, but will continue to assess as pt progresses. Pt could benefit from OT consult to assist in ADLs and self care to return home.   "

## 2019-08-06 NOTE — OR SURGEON
Immediate Post OP Note    PreOp Diagnosis: Wound Dehiscence     PostOp Diagnosis: Wound Dehiscence     Procedure(s):  INCISION AND DRAINAGE-LUMBAR WOUND WITH VAC - Wound Class: Dirty or Infected    Surgeon(s):  JACOBO Coon M.D.    Anesthesiologist/Type of Anesthesia:  Anesthesiologist: Wolfgang Saxena M.D./General    Surgical Staff:  Circulator: Robe Jacobs R.N.  Scrub Person: Haley Garcia  [place]: Krystle Oropeza R.N.    Specimens removed if any:  ID Type Source Tests Collected by Time Destination   1 : Back wound Other Other AEROBIC/ANAEROBIC CULTURE (SURGERY) Julito Jacob D.O. 8/5/2019  7:19 PM        Estimated Blood Loss: 10 cc    Findings: Fascia intact, some drainage, open sueriorly and infreiorly. WoundVAC sponge placed    Complications: None        8/5/2019 7:41 PM Julito Jacob D.O.

## 2019-08-06 NOTE — ANESTHESIA POSTPROCEDURE EVALUATION
Patient: Sammy Edmonds    Procedure Summary     Date:  08/05/19 Room / Location:  [facility] OR 05 / SURGERY [facility]    Anesthesia Start:  1852 Anesthesia Stop:  1947    Procedure:  INCISION AND DRAINAGE-LUMBAR WOUND WITH VAC (N/A ) Diagnosis:  (Wound dehiscence)    Surgeon:  Julito Jacob D.O. Responsible Provider:  Wolfgang Saxena M.D.    Anesthesia Type:  general ASA Status:  3          Final Anesthesia Type: general  Last vitals  BP   Blood Pressure: 119/82    Temp   36.8 °C (98.2 °F)    Pulse   Pulse: (!) 111   Resp   15    SpO2   96 %      Anesthesia Post Evaluation    Patient location during evaluation: PACU  Patient participation: complete - patient participated  Level of consciousness: awake and alert  Pain score: 5    Airway patency: patent  Anesthetic complications: no  Cardiovascular status: hemodynamically stable  Respiratory status: acceptable  Hydration status: euvolemic    PONV: none           Nurse Pain Score: 3 (NPRS)

## 2019-08-06 NOTE — PROGRESS NOTES
Pt arrived back in room 725 from surgery. Placed on tele monitor, monitor room notified, pt is sinus tach. Post op vitals in place, VSS. Pt denies pain at this time. Wound vac to back in place, suction 125 mmHg, scant drainage.

## 2019-08-07 ENCOUNTER — APPOINTMENT (OUTPATIENT)
Dept: RADIOLOGY | Facility: MEDICAL CENTER | Age: 24
DRG: 856 | End: 2019-08-07
Attending: HOSPITALIST
Payer: COMMERCIAL

## 2019-08-07 LAB
ANION GAP SERPL CALC-SCNC: 12 MMOL/L (ref 0–11.9)
BACTERIA WND AEROBE CULT: ABNORMAL
BACTERIA WND AEROBE CULT: ABNORMAL
BUN SERPL-MCNC: 11 MG/DL (ref 8–22)
CALCIUM SERPL-MCNC: 8.9 MG/DL (ref 8.5–10.5)
CHLORIDE SERPL-SCNC: 101 MMOL/L (ref 96–112)
CO2 SERPL-SCNC: 24 MMOL/L (ref 20–33)
CREAT SERPL-MCNC: 0.59 MG/DL (ref 0.5–1.4)
GLUCOSE SERPL-MCNC: 94 MG/DL (ref 65–99)
GRAM STN SPEC: ABNORMAL
POTASSIUM SERPL-SCNC: 4 MMOL/L (ref 3.6–5.5)
SIGNIFICANT IND 70042: ABNORMAL
SITE SITE: ABNORMAL
SODIUM SERPL-SCNC: 137 MMOL/L (ref 135–145)
SOURCE SOURCE: ABNORMAL

## 2019-08-07 PROCEDURE — A9270 NON-COVERED ITEM OR SERVICE: HCPCS | Performed by: HOSPITALIST

## 2019-08-07 PROCEDURE — 770020 HCHG ROOM/CARE - TELE (206)

## 2019-08-07 PROCEDURE — 700111 HCHG RX REV CODE 636 W/ 250 OVERRIDE (IP): Performed by: HOSPITALIST

## 2019-08-07 PROCEDURE — 700102 HCHG RX REV CODE 250 W/ 637 OVERRIDE(OP): Performed by: HOSPITALIST

## 2019-08-07 PROCEDURE — 306372 DRESSING,VAC SIMPLACE MED: Performed by: HOSPITALIST

## 2019-08-07 PROCEDURE — 36415 COLL VENOUS BLD VENIPUNCTURE: CPT

## 2019-08-07 PROCEDURE — 700102 HCHG RX REV CODE 250 W/ 637 OVERRIDE(OP): Performed by: INTERNAL MEDICINE

## 2019-08-07 PROCEDURE — A9270 NON-COVERED ITEM OR SERVICE: HCPCS | Performed by: INTERNAL MEDICINE

## 2019-08-07 PROCEDURE — 99232 SBSQ HOSP IP/OBS MODERATE 35: CPT | Performed by: HOSPITALIST

## 2019-08-07 PROCEDURE — 700105 HCHG RX REV CODE 258: Performed by: INTERNAL MEDICINE

## 2019-08-07 PROCEDURE — 700111 HCHG RX REV CODE 636 W/ 250 OVERRIDE (IP): Performed by: INTERNAL MEDICINE

## 2019-08-07 PROCEDURE — G0180 MD CERTIFICATION HHA PATIENT: HCPCS | Performed by: FAMILY MEDICINE

## 2019-08-07 PROCEDURE — 97116 GAIT TRAINING THERAPY: CPT

## 2019-08-07 PROCEDURE — 80048 BASIC METABOLIC PNL TOTAL CA: CPT

## 2019-08-07 PROCEDURE — 99232 SBSQ HOSP IP/OBS MODERATE 35: CPT | Performed by: INTERNAL MEDICINE

## 2019-08-07 RX ORDER — SULFAMETHOXAZOLE AND TRIMETHOPRIM 800; 160 MG/1; MG/1
1 TABLET ORAL EVERY 12 HOURS
Status: DISCONTINUED | OUTPATIENT
Start: 2019-08-07 | End: 2019-08-16 | Stop reason: HOSPADM

## 2019-08-07 RX ADMIN — GABAPENTIN 100 MG: 100 CAPSULE ORAL at 06:07

## 2019-08-07 RX ADMIN — GABAPENTIN 100 MG: 100 CAPSULE ORAL at 12:35

## 2019-08-07 RX ADMIN — SENNOSIDES AND DOCUSATE SODIUM 2 TABLET: 8.6; 5 TABLET ORAL at 06:07

## 2019-08-07 RX ADMIN — SULFAMETHOXAZOLE AND TRIMETHOPRIM 1 TABLET: 800; 160 TABLET ORAL at 17:52

## 2019-08-07 RX ADMIN — GABAPENTIN 100 MG: 100 CAPSULE ORAL at 17:51

## 2019-08-07 RX ADMIN — HYDROCODONE BITARTRATE AND ACETAMINOPHEN 1 TABLET: 5; 325 TABLET ORAL at 12:48

## 2019-08-07 RX ADMIN — DAPTOMYCIN 1090 MG: 500 INJECTION, POWDER, LYOPHILIZED, FOR SOLUTION INTRAVENOUS at 12:35

## 2019-08-07 RX ADMIN — METOPROLOL TARTRATE 25 MG: 25 TABLET, FILM COATED ORAL at 17:51

## 2019-08-07 RX ADMIN — HYDROMORPHONE HYDROCHLORIDE 0.5 MG: 2 INJECTION INTRAMUSCULAR; INTRAVENOUS; SUBCUTANEOUS at 14:01

## 2019-08-07 RX ADMIN — METHOCARBAMOL TABLETS 1000 MG: 500 TABLET, COATED ORAL at 17:51

## 2019-08-07 RX ADMIN — METHOCARBAMOL TABLETS 1000 MG: 500 TABLET, COATED ORAL at 06:07

## 2019-08-07 RX ADMIN — METHOCARBAMOL TABLETS 1000 MG: 500 TABLET, COATED ORAL at 12:35

## 2019-08-07 RX ADMIN — METOPROLOL TARTRATE 25 MG: 25 TABLET, FILM COATED ORAL at 06:07

## 2019-08-07 RX ADMIN — SENNOSIDES AND DOCUSATE SODIUM 2 TABLET: 8.6; 5 TABLET ORAL at 18:00

## 2019-08-07 ASSESSMENT — ENCOUNTER SYMPTOMS
NECK PAIN: 0
PALPITATIONS: 0
MYALGIAS: 0
NAUSEA: 0
VOMITING: 0
ABDOMINAL PAIN: 0
CONSTIPATION: 0
HEADACHES: 0
BACK PAIN: 1
SHORTNESS OF BREATH: 0
TINGLING: 0
FOCAL WEAKNESS: 0
DIARRHEA: 0
COUGH: 0
DIZZINESS: 0
FEVER: 0
CHILLS: 0
SENSORY CHANGE: 1

## 2019-08-07 ASSESSMENT — GAIT ASSESSMENTS
GAIT LEVEL OF ASSIST: MINIMAL ASSIST
DISTANCE (FEET): 20
ASSISTIVE DEVICE: FRONT WHEEL WALKER

## 2019-08-07 ASSESSMENT — COGNITIVE AND FUNCTIONAL STATUS - GENERAL
MOBILITY SCORE: 14
CLIMB 3 TO 5 STEPS WITH RAILING: A LOT
WALKING IN HOSPITAL ROOM: A LITTLE
STANDING UP FROM CHAIR USING ARMS: A LITTLE
TURNING FROM BACK TO SIDE WHILE IN FLAT BAD: A LOT
MOVING TO AND FROM BED TO CHAIR: A LOT
MOVING FROM LYING ON BACK TO SITTING ON SIDE OF FLAT BED: A LOT
SUGGESTED CMS G CODE MODIFIER MOBILITY: CL

## 2019-08-07 NOTE — THERAPY
"Physical Therapy Treatment completed.   Bed Mobility:  Supine to Sit: Supervised  Transfers: Sit to Stand: Supervised  Gait: Level Of Assist: Minimal Assist with Front-Wheel Walker       Plan of Care: Will benefit from Physical Therapy 5 times per week  Discharge Recommendations: Equipment: Will Continue to Assess for Equipment Needs. Anticipate return home with continued acute PT, however may require placement if unable to demonstrate improved gait and stair negotiation.    See \"Rehab Therapy-Acute\" Patient Summary Report for complete documentation.     Pt motivated to participate in therapy today. Continues to be limited by low back pain and increased fatigue/effort with exertion. Pt performed bed mobility with SPV but relied on bed features. Ambulated in-room with FWW and min A for safety. Pt with increased difficulty managing FWW during turns causing some unsteadiness. Pt adamant on returning home and states \"I will be fine when I get home.\" Discussed with pt that he would need to ambulate further in the hallway without assistance and negotiate stairs to ensure safe return home, otherwise may require continued therapy. Will continue to follow in acute setting.   "

## 2019-08-07 NOTE — CARE PLAN
Problem: Skin Integrity  Goal: Risk for impaired skin integrity will decrease  Outcome: PROGRESSING AS EXPECTED  Note:   Patient able to turn self from side to side. Educated on importance of moving around in bed and ambulating to help decrease risk for skin breakdown and pressure ulcers.      Problem: Mobility  Goal: Risk for activity intolerance will decrease  Outcome: PROGRESSING SLOWER THAN EXPECTED  Note:   Patient still slow and timid to mobilize more than to the chair and commode. Educated on importance of mobilization to build strength, decrease risk for PE/DVT, and as a discharge barrier.

## 2019-08-07 NOTE — WOUND TEAM
[facility]  Inpatient Services  Wound and Skin Care Progress Note    Admission Date: 7/31/2019     Consult Date: 07/31/19   HPI, PMH, SH: Reviewed    Unit where seen by Wound Team: T725/00    WOUND CONSULT RELATED TO:  Back incision post I&D, vac dressing change    Self Report / Pain Level:  10/10 despite PO Oxy and IV Dilaudid         OBJECTIVE:  In low airloss bed, Wound vac dressing in place    WOUND TYPE, LOCATION, CHARACTERISTICS (Pressure Injuries: location, stage, POA or date identified)     Negative Pressure Wound Therapy Back Lower;Mid (Active)   NPWT Pump Mode / Pressure Setting 125 mmHg;Continuous    Dressing Type Medium;Black foam    Number of Foam Pieces Used 2    Canister Changed No    VAC VeraFlo Pressure (mm/Hg) 125 mmHg      Wound 07/31/19 Incision Back dehisced incision  (Active)   Wound Image       Site Assessment Red;Bleeding;Painful    Maeve-wound Assessment Clean;Intact;Dry;Pink    Margins Attached edges;Defined edges    Wound Length (cm) 12.5 cm    Wound Width (cm) 1.2 cm    Wound Depth (cm) 5.2 cm    Wound Surface Area (cm^2) 15 cm^2    Closure KAY    Drainage Amount Small    Drainage Description Serosanguineous    Non-staged Wound Description Full thickness    Treatments Cleansed;Site care    Cleansing Approved Wound Cleanser    Periwound Protectant Benzoin;Drape    Dressing Options Wound Vac    Dressing Cleansing/Solutions Not Applicable    Dressing Changed New    Dressing Status Clean;Dry;Intact    Dressing Change Frequency Monday, Wednesday, Friday    NEXT Dressing Change  08/09/19    NEXT Weekly Photo (Inpatient Only) 08/14/19    WOUND NURSE ONLY - Odor None    WOUND NURSE ONLY - Exposed Structures Adipose;Muscle    WOUND NURSE ONLY - Tissue Type and Percentage 100% Red    WOUND NURSE ONLY - Time Spent with Patient (mins) 120      DELLA:      NA    Culture:   Obtained 07/31, Positive for Heavy growth of MRSA    MRI:                                        Completed  "08/01/2019,   1.  Stable postsurgical changes L3-L5 laminectomies. Interval ill-defined nonenhancing fluid within the laminectomy bed most pronounced at the L4-L5 level. Infection should be excluded clinically.  2.  There is diffuse enhancement of the cauda equina nerve roots which is presumably infectious/inflammatory.  3.  No evidence of discitis osteomyelitis or epidural abscess.  4.  Degenerative changes as detailed above with persistent moderate thecal sac stenosis at L2-L3 and L4-L5.    INTERVENTIONS BY WOUND TEAM:  Chart reviewed. Wound vac paused. Drape removed, black foam copiously irrigated to remove. Large piece of black foam was removed. Smaller piece at base of wound the again copiously irrigated and removed. Pt had extreme pain and was tearful with dressing removal and developed a small bloody nose. This RN stopped an allowed time for pt to relax before continuing. Wound was then cleansed with wound cleanser and gauze. Wound fully inspected and no retained foam visible. Measurements and pictures obtained. Maeve-wound prepped with benzoin and drape. Drape continued up to the right side for bridged trac pad. 1 piece of spiraled black foam \"zig zagged\" into the wound bed and then out and tracked out to the right side to bridge trac pad. One piece of halved circular black foam applied to the right side to use as a button for trac pad. Trac pad applied. Suction resumed at 125mmhg continuous. No leaks identified. Pt and father were educated on how to repair leaks and fix blockage alarms. Pts father was educated on wet to dry dressing if unable to get a seal within 2 hours of an alarm. Supplies were given to pts father for wet to dry dressing. All questions answered.    Dressing selection:  2 Pieces of Black foam from a medium black foam kit for NPWT at 125mmhg continuous.        Interdisciplinary consultation: Patient, Bedside RN (Tony Field)     EVALUATION: patient had surgery with Dr. Ruelas recently, " Prevena incisional VAC was placed and patient discharged.  Patient came returned to the ER for wound drainage/odor.  Dr. Ruelas was consulted, patient had MRI.  Pt went to surgery for I&D and had wound vac placed. Wound vac will assist in managing exudate while assisting in granular tissue development. Pt had severe pain with wound vac change, recommend patient staying for one more dressing change to ensure he will be able to tolerate at home with only oral pain medications.     Factors affecting wound healing: infection, obesity   Goals: Steady decrease in wound area and depth weekly.    NURSING PLAN OF CARE ORDERS (X):    Dressing changes: See Dressing Care orders: X  Skin care: See Skin Care orders: patient independent with movement   Rectal tube care: See Rectal Tube Care orders:   Other orders:    WOUND TEAM PLAN OF CARE (X):   NPWT change 3 x week:        Dressing changes by wound team:       Follow up as needed:     Wound team to follow up 3x weekly for dressing changes  Other (explain):     Anticipated discharge plans (X):   SNF:           Home Care: X, pt will require wound vac dressing changes through home health 3x weekly.          Outpatient Wound Center:            Self Care:            Other:

## 2019-08-07 NOTE — PROGRESS NOTES
Neurosurgery Progress Note    Subjective:  Stood at bedside and transitioned to chair, felt too weak to ambulate    Exam:  Wound vac in place  RLE: 5/5, LLE: foot strength 4/5, EHL 4-5,    BP  Min: 102/67  Max: 141/78  Pulse  Av.9  Min: 95  Max: 118  Resp  Av  Min: 18  Max: 18  Temp  Av.6 °C (97.8 °F)  Min: 36.1 °C (96.9 °F)  Max: 37.2 °C (98.9 °F)  SpO2  Av.7 %  Min: 92 %  Max: 96 %    No data recorded    Recent Labs     19  0510 19  0500   WBC 8.3 8.7   RBC 3.83* 3.75*   HEMOGLOBIN 10.1* 9.8*   HEMATOCRIT 31.4* 31.2*   MCV 82.0 83.2   MCH 26.4* 26.1*   MCHC 32.2* 31.4*   RDW 38.5 39.1   PLATELETCT 346 340   MPV 8.9* 8.6*     Recent Labs     19  0510 19  0500 19  0248   SODIUM 136 139 137   POTASSIUM 3.9 3.7 4.0   CHLORIDE 101 103 101   CO2 25 26 24   GLUCOSE 108* 107* 94   BUN 8 10 11   CREATININE 0.52 0.53 0.59   CALCIUM 8.9 8.7 8.9               Intake/Output       19 07 - 1959 19 - 19 Total  Total       Intake    IV Piggyback  50  -- 50  --  -- --    Volume (mL) (DAPTOmycin (CUBICIN) 1,090 mg in NS 50 mL IVPB) 50 -- 50 -- -- --    Total Intake 50 -- 50 -- -- --       Output    Urine  550  150 700  --  -- --    Urine Void (mL) 550 150 700 -- -- --    Total Output 550 150 700 -- -- --       Net I/O     -500 -150 -650 -- -- --            Intake/Output Summary (Last 24 hours) at 2019 0904  Last data filed at 2019 2100  Gross per 24 hour   Intake 50 ml   Output 700 ml   Net -650 ml            • DAPTOmycin IVPB ([facility] only)  8 mg/kg Q24HR   • metoprolol  25 mg TWICE DAILY   • Respiratory Care per Protocol   Continuous RT   • HYDROmorphone  0.5 mg Q4HRS PRN   • gabapentin  100 mg TID   • HYDROcodone-acetaminophen  1 Tab Q4HRS PRN   • methocarbamol  1,000 mg TID   • senna-docusate  2 Tab BID    And   • polyethylene glycol/lytes  1 Packet QDAY PRN    And   • magnesium  hydroxide  30 mL QDAY PRN    And   • bisacodyl  10 mg QDAY PRN   • acetaminophen  650 mg Q6HRS PRN   • ondansetron  4 mg Q4HRS PRN   • ondansetron  4 mg Q4HRS PRN   • promethazine  12.5-25 mg Q4HRS PRN   • promethazine  12.5-25 mg Q4HRS PRN   • prochlorperazine  5-10 mg Q4HRS PRN       Assessment and Plan:  Hospital day # 8   POD # 2    I & D lumbar spine wound: no gross evidence of infection noted per Dr. Jacob     intraop wound cultures: few Gram + cocci/WBC      MRSA bacteremia/wound cultures preop: ABX per ID      Prophylactic anticoagulation: yes         Start date/time: as inidcated per hospitalist team     Sp aborted I & D 8/2/19 r/t to cardiac arrest     Need for defibrillator implant after completion of ABX treatment for LS spine wound  Infection     Intermittent ST per monitor: was placed on beta blocker during previous hospitalization     Post arrest  Anemia: unclear etiology, per hospitalist team, Hgb preop 11.9, post event 7.9, stable, improving      Ho of LS spine surgery x 2: last laminectomy 7/12/19    Patient may be discharged or transferred any time from [facility] as indicated based on needs for ABX coverage and ongoing wound care.

## 2019-08-07 NOTE — PROGRESS NOTES
[facility] Medicine Daily Progress Note    Date of Service  8/6/2019    Chief Complaint  23 y.o. male admitted 7/31/2019 with drainage from surgical wound    Hospital Course     23 y.o. male who presented 7/31/2019 with infection of lumbar laminectomy.  Patient presented 6/17/2019 with congenital spinal stenosis and severe L4-L5 stenosis with disc herniation, L2-L3 moderate to severe stenosis, and L5-S1 moderate to severe stenosis with morbid obesity and intractable pain.  At that time he had L4 and L5 and S1 decompressive laminectomy, bilateral medial facetectomies and bilateral foraminotomies with aborted L2-L3 and L3-L4 laminectomies due to loss of signal.  He subsequently returned on 7/12 to have L2, L3, L4, L5, S1 laminectomy, and a L4-L5 microdiscectomy and was hospitalized 7/5-22.  Further during that hospitalization he was noted to have persistent sinus tachycardia, had echocardiogram which was unremarkable, and was initiated on metoprolol.       Patient presented to ED on 7/27 after indicating leaking wound VAC for which he had wound drainage and cleanout performed 7/25.  Patient returned again on 7/31 with ongoing concerns of wound drainage and at which point was admitted and found to have ill-defined nonenhancing fluid within the laminectomy bed most pronounced at L4-L5 level as well as diffuse enhancement of the cauda equina nerve roots presumably infectious/inflammatory on MRI.  Wound culture grew MRSA.     Patient went to the OR for I&D and apparently after being intubated yet prior to initiation of surgical procedure patient went into a ventricular tachycardic rhythm followed by reported V. fib, he had a total of 3 shocks before reestablishing sinus rhythm further he also was given 150 mg of amiodarone.  As there was concern of possible hyperkalemia post succinylcholine as paralytic he was given 20 of IV insulin and calcium.  Immediately after returning to sinus rhythm patient was awake agitated and  looking to pull out ET tube.  He was subsequently was extubated and surgery canceled.     Eventually had I&D on 8/5 with cultures sent.           Interval Problem Update  Patient seen and examined, he is doing well, in good spirits. Does not c/o of any pain, he is neurologically intact. no fevers. Started on daptomycin 8/5, had I&D 8/5, cultures NGTD. ID following. He will need IV access long term, will order midline.     Consultants/Specialty  Neurosurgery  ID    Code Status  full    Disposition  tbd    Review of Systems  Review of Systems   Constitutional: Negative for chills and fever.   HENT: Negative for hearing loss, nosebleeds and sore throat.    Eyes: Negative for blurred vision and double vision.   Respiratory: Negative for cough and shortness of breath.    Cardiovascular: Negative for chest pain, palpitations and leg swelling.   Gastrointestinal: Negative for abdominal pain, constipation, diarrhea, nausea and vomiting.   Genitourinary: Negative for dysuria, frequency and urgency.   Musculoskeletal: Positive for back pain. Negative for myalgias and neck pain.   Skin: Negative for rash.   Neurological: Positive for sensory change. Negative for dizziness, tingling, focal weakness, loss of consciousness and headaches.        Some numbness on volar aspect of L foot; present since the surgery and unchanged   Psychiatric/Behavioral: Negative for depression.        Physical Exam  Temp:  [36 °C (96.8 °F)-36.7 °C (98 °F)] 36.4 °C (97.6 °F)  Pulse:  [] 110  Resp:  [16-22] 18  BP: (106-144)/(52-83) 106/75  SpO2:  [93 %-99 %] 93 %    Physical Exam   Constitutional: He is oriented to person, place, and time. He appears well-developed and well-nourished. No distress.   HENT:   Head: Normocephalic and atraumatic.   Nose: Nose normal.   Mouth/Throat: Oropharynx is clear and moist.   Eyes: Conjunctivae are normal.   Neck: No JVD present.   Cardiovascular: Normal rate. Exam reveals no gallop.   No murmur  heard.  Pulmonary/Chest: Effort normal. No stridor. No respiratory distress. He has no wheezes. He has no rales.   Abdominal: Soft. There is no tenderness. There is no rebound and no guarding.   Musculoskeletal: He exhibits no edema.   Neurological: He is alert and oriented to person, place, and time.   Strength 5/5 B lower Ext  Sensation intact to light touch   Skin: Skin is warm and dry. No rash noted. He is not diaphoretic.   Psychiatric: He has a normal mood and affect. His behavior is normal. Judgment and thought content normal.   Nursing note and vitals reviewed.      Fluids    Intake/Output Summary (Last 24 hours) at 8/6/2019 2049  Last data filed at 8/6/2019 1800  Gross per 24 hour   Intake 50 ml   Output 1250 ml   Net -1200 ml       Laboratory  Recent Labs     08/04/19 0423 08/05/19  0510 08/06/19  0500   WBC 8.5 8.3 8.7   RBC 3.62* 3.83* 3.75*   HEMOGLOBIN 9.4* 10.1* 9.8*   HEMATOCRIT 30.0* 31.4* 31.2*   MCV 82.9 82.0 83.2   MCH 26.0* 26.4* 26.1*   MCHC 31.3* 32.2* 31.4*   RDW 39.2 38.5 39.1   PLATELETCT 291 346 340   MPV 8.2* 8.9* 8.6*     Recent Labs     08/04/19 0423 08/05/19  0510 08/06/19  0500   SODIUM 136 136 139   POTASSIUM 4.0 3.9 3.7   CHLORIDE 103 101 103   CO2 25 25 26   GLUCOSE 105* 108* 107*   BUN 6* 8 10   CREATININE 0.49* 0.52 0.53   CALCIUM 8.4* 8.9 8.7                   Imaging  CT-CTA CHEST PULMONARY ARTERY W/ RECONS   Final Result      1.  No definite evidence of pulmonary emboli, within the limitations described above.   2.  Bilateral lower lobe discoid atelectasis.   3.  Unusual curvilinear lucency in the right hepatic lobe which may represent scarring. The appearance is not at least typical for a hepatic solid mass or cyst.            EC-ECHOCARDIOGRAM COMPLETE W/ CONT   Final Result      DX-CHEST-LIMITED (1 VIEW)   Final Result      No evidence of acute cardiopulmonary process.      MR-LUMBAR SPINE-WITH & W/O   Final Result      1.  Stable postsurgical changes L3-L5 laminectomies.  Interval ill-defined nonenhancing fluid within the laminectomy bed most pronounced at the L4-L5 level. Infection should be excluded clinically.   2.  There is diffuse enhancement of the cauda equina nerve roots which is presumably infectious/inflammatory.   3.  No evidence of discitis osteomyelitis or epidural abscess.   4.  Degenerative changes as detailed above with persistent moderate thecal sac stenosis at L2-L3 and L4-L5.           Assessment/Plan  * Cardiac arrest (HCC)  Assessment & Plan  S/p cardiac arrest prior to I&D, 3 shocks, surgery canceled  Cards consulted  EP Recomendations:  Needs ICD however not until infection is taken care of  Proceed with surgery.  If he goes into VT/VF; defibrilate and give Amiodarone.  Proceed with surgery  They will not place the ICD until after his spinal infection is drained and treated  Follow and maximize K, Mg, Phos    MRSA bacteremia  Assessment & Plan  Blood cultures +, along with wound cultures therefore now started on daptomycin  Repeat bcx   Id on board  monitor    Elevated liver enzymes  Assessment & Plan  Secondary to VT/VF arrest most likely  Cont to follow, work up further if fail to normalize    Other inflammatory polyneuropathies (HCC)- (present on admission)  Assessment & Plan  Neurontin 100 mg 3 times daily    Infected surgical wound- (present on admission)  Assessment & Plan  Wound culures MSRA, and now even blood cx are +  ID on board, changed abx to Daptomycin, monitor closely  Wound care  Pain management with IV Dilaudid  Mgmt as above      Wound drainage- (present on admission)  Assessment & Plan  Infected with MRSA, now on daptomycin  NSx I&D with cultures 8/5  Will need long term IV abx    Sinus tachycardia- (present on admission)  Assessment & Plan  Continue Beta-blocker      Acute bilateral back pain- (present on admission)  Assessment & Plan  Back pain is intractable  P.o. Vicodin  PRN IV Dilaudid  Improving       VTE prophylaxis: none due to  recent surgery    Plan discussed with patient, nursing and ID.  Dr. Ruelas is considering surgery either later today or on 8/6/19.  Final plan to be determined.

## 2019-08-07 NOTE — CARE PLAN
Problem: Safety  Goal: Will remain free from falls  Outcome: PROGRESSING AS EXPECTED  Pt oriented to call light system and educated to call for assistance. Non slip socks on, bed alarm. Fall precautions in place. Verbalized understanding of safety measures. Bed in locked and low position, call light in reach.     Problem: Skin Integrity  Goal: Risk for impaired skin integrity will decrease  Outcome: PROGRESSING AS EXPECTED  Skin assessment completed. Pt educated on importance of turning self frequently to prevent breakdown. Wound vac to back side in place. Pt on low airloss bariatric bed, turns self frequently.

## 2019-08-07 NOTE — PROGRESS NOTES
Infectious Disease Progress Note    Author: Opal Dwyer M.D. Date & Time of service: 2019  3:06 PM    Chief Complaint:  Post laminectomy wound infection, MRSA     Interval History:  2019-no fevers.  WBC is 8.3 and creatinine is 0.52.  Last sed rate is 87 his ALT is up to 2022 and AST is 68.  His blood cultures are positive from 8/3/2019  2019-no fevers.  Continues to complain of back pain and numbness on his feet.  WBC is 8.7 no CMP available today  2019 no fevers.  Ongoing back pain.  WBCs 8.7 tetanus 0.59  Review of Systems:  Review of Systems   Constitutional: Negative for chills, fever and malaise/fatigue.   Respiratory: Negative for cough and shortness of breath.         Complains of chest pain where he had chest compressions   Gastrointestinal: Negative for abdominal pain, constipation, diarrhea, nausea and vomiting.   Musculoskeletal: Positive for back pain. Negative for joint pain and myalgias.   Skin: Negative for rash.   Neurological:        Complains of some numbness in the bottom of his foot       Hemodynamics:  Temp (24hrs), Av.6 °C (97.9 °F), Min:36.1 °C (96.9 °F), Max:37.2 °C (98.9 °F)  Temperature: 36.4 °C (97.6 °F)  Pulse  Av.2  Min: 69  Max: 149   Blood Pressure: 141/61       Physical Exam:  Physical Exam   Constitutional: He is oriented to person, place, and time. He appears well-developed and well-nourished.   Obese   HENT:   Head: Normocephalic and atraumatic.   Eyes: Pupils are equal, round, and reactive to light. Conjunctivae and EOM are normal.   Cardiovascular: Normal rate, regular rhythm and normal heart sounds.   Pulmonary/Chest: Effort normal. He has no wheezes. He has no rales.   Abdominal: Soft. Bowel sounds are normal. He exhibits no distension. There is no tenderness. There is no rebound and no guarding.   Musculoskeletal: He exhibits no edema.   The back incision is clean   Neurological: He is alert and oriented to person, place, and time.   No  gross focal logical deficit   Skin: Skin is warm.   Psychiatric: He has a normal mood and affect. His behavior is normal.   Vitals reviewed.      Meds:    Current Facility-Administered Medications:   •  DAPTOmycin IVPB ([facility] only)  •  metoprolol  •  Respiratory Care per Protocol  •  HYDROmorphone  •  gabapentin  •  HYDROcodone-acetaminophen  •  methocarbamol  •  senna-docusate **AND** polyethylene glycol/lytes **AND** magnesium hydroxide **AND** bisacodyl  •  acetaminophen  •  ondansetron  •  ondansetron  •  promethazine  •  promethazine  •  prochlorperazine    Labs:  Recent Labs     08/05/19  0510 08/06/19  0500   WBC 8.3 8.7   RBC 3.83* 3.75*   HEMOGLOBIN 10.1* 9.8*   HEMATOCRIT 31.4* 31.2*   MCV 82.0 83.2   MCH 26.4* 26.1*   RDW 38.5 39.1   PLATELETCT 346 340   MPV 8.9* 8.6*   NEUTSPOLYS 57.10 54.50   LYMPHOCYTES 30.50 34.60   MONOCYTES 9.80 7.80   EOSINOPHILS 1.70 2.00   BASOPHILS 0.40 0.50     Recent Labs     08/05/19  0510 08/06/19  0500 08/07/19  0248   SODIUM 136 139 137   POTASSIUM 3.9 3.7 4.0   CHLORIDE 101 103 101   CO2 25 26 24   GLUCOSE 108* 107* 94   BUN 8 10 11   CPKTOTAL 43  --   --      Recent Labs     08/05/19  0510 08/06/19  0500 08/07/19  0248   CREATININE 0.52 0.53 0.59       Imaging:  Dx-chest-limited (1 View)    Result Date: 8/2/2019 8/2/2019 5:42 PM HISTORY/REASON FOR EXAM:  Shortness of Breath TECHNIQUE/EXAM DESCRIPTION AND NUMBER OF VIEWS: Single AP view of the chest. COMPARISON: 6/19/2019 FINDINGS: The cardiac silhouette and mediastinal contours are stable. No discrete opacity, pleural fluid, or pneumothorax. The left costophrenic angle is excluded from the image. No suspicious bony lesions.     No evidence of acute cardiopulmonary process.    Dx-spine-any One View    Result Date: 7/12/2019 7/12/2019 12:03 PM HISTORY/REASON FOR EXAM:  Intraoperative images for procedural navigation. TECHNIQUE/EXAM DESCRIPTION AND NUMBER OF VIEWS:  2 view(s) of the lumbosacral spine.      Fluoroscopic image(s) obtained during posterior lumbar laminectomy. Please see the patient's chart for full procedural details. Fluoroscopy time 2 seconds.     Mr-lumbar Spine-with & W/o    Result Date: 8/1/2019 8/1/2019 3:24 PM HISTORY/REASON FOR EXAM:  Recent back surgery with wound drainage and elevated sed rate TECHNIQUE/EXAM DESCRIPTION: MRI of the lumbar spine without and with contrast. The study was performed on a [employer] Signa 1.5 Gena MRI scanner. T1 sagittal, T2 fast spin-echo sagittal, and T2 axial images were obtained of the lumbar spine. T1 postcontrast fat-suppressed sagittal images were obtained. 14 mL Gadavist contrast was administered intravenously. COMPARISON:  7/8/2019 FINDINGS: Redemonstrated are postsurgical changes status post laminectomies from L2 L3-L5 S1. There is extensive enhancement and edema in the midline posterior soft tissues and the lower paraspinal musculature which is similar to prior study and may represent postsurgical changes or infection. The previously seen superficial fluid collection appears slightly decreased in size. There is a new ill-defined rim-enhancing fluid in the deep soft tissues laminectomy bed most conspicuous at the L5 level measuring approximately 3 x 2.1 cm. Developing abscess is not excluded. There is extensive enhancement of the cauda equina nerve roots extending from the visualized conus medullaris through the S1 level which is concerning for infection. Conus medullaris terminates at T12-L1. T12-L2: Canal and foramina are patent. L2-L3: Disc bulge and posterior central disc extrusion again noted resulting in moderate spinal stenosis. L3-L4: Facet degeneration. No significant spinal stenosis. L4-L5: Disc degeneration, disc osteophyte and facet degeneration. There is moderate thecal sac stenosis. L5-S1: Posterior central left paracentral disc protrusion which flattens the ventral thecal sac. Bilateral facet degeneration. No significant spinal stenosis.  Bilateral foraminal stenosis.     1.  Stable postsurgical changes L3-L5 laminectomies. Interval ill-defined nonenhancing fluid within the laminectomy bed most pronounced at the L4-L5 level. Infection should be excluded clinically. 2.  There is diffuse enhancement of the cauda equina nerve roots which is presumably infectious/inflammatory. 3.  No evidence of discitis osteomyelitis or epidural abscess. 4.  Degenerative changes as detailed above with persistent moderate thecal sac stenosis at L2-L3 and L4-L5.    Mr-lumbar Spine-with & W/o    Result Date: 7/8/2019 7/8/2019 11:34 AM HISTORY/REASON FOR EXAM:  Back pain or radiculopathy, > 6 wks; Back pain, cauda equina syndrome suspected Laminectomy June 17. New weakness for 3 days. TECHNIQUE/EXAM DESCRIPTION: MRI of the lumbar spine without and with contrast. The study was performed on a [employer] 1.5 Gena MRI scanner. T1 sagittal, T2 fast spin-echo sagittal, and T2 axial images were obtained of the lumbar spine. T1 postcontrast fat-suppressed sagittal images were obtained. 15 mL Gadavist contrast was administered intravenously. COMPARISON:  6/19/2019 MRI. FINDINGS: Motion degrades the study. Straightening of the normal lordosis is again seen Multiple Schmorl's nodes are again demonstrated No marrow edema is seen No T2 hyperintensity is seen suspicious for discitis Conus terminates normal in position at L1. Visualized nerve roots show no abnormal clumping to suggest arachnoiditis. There is however degenerative disc disease causing central stenosis as discussed below. T2 hyperintense fluid collection is newly identified in the deep subcutaneous fat posteriorly spanning L2/3-L5 measuring 10 x 2.8 x 1.5 cm and there is rim enhancement. No abnormal epidural fluid collection is seen to suggest abscess. L3-L5 laminectomies have been performed. Findings by level: T12/L1: No stenosis L1/2: No stenosis L2/3: Mild disc height loss, right paracentral protrusion, ligamentum  flavum redundancy, facet arthropathy, short pedicles. This results in unchanged moderate to severe central stenosis, especially on the right. There is some epidural enhancement ventrally which is unchanged and likely related to the protrusion. Borderline foraminal stenosis L3/4: Stable facet arthropathy and ligamentum flavum redundancy with a mild disc bulge, short pedicles. This results in persistent moderate central and borderline foraminal stenosis L4/5: Stable right paracentral protrusion, disc bulge. Laminectomy with partial facetectomy. No significant central stenosis but the right greater than left foramina are again moderately narrowed L5/S1: Stable left paracentral protrusion, facet arthropathy, mild-moderate central and mild foraminal stenoses as before     1.  New 10 x 3 x 1 cm deep subcutaneous fat fluid collection differential includes seroma with or without superimposed infection cannot 2.  Otherwise stable lumbar spine MRI with contrast following L3-L5 laminectomies 3.  Moderate multilevel central stenoses greatest at L2/3 are again seen, mostly secondary to facet arthropathy and developmentally short pedicles as specifically detailed above 4.  Lesser foraminal stenoses are unchanged as detailed above    Mr-thoracic Spine-with & W/o    Result Date: 7/8/2019 7/8/2019 11:41 AM HISTORY/REASON FOR EXAM:  Mid-back pain; Bilateral LE weakness TECHNIQUE/EXAM DESCRIPTION: MRI of the thoracic spine without and with contrast. The study was performed on a [employer] Signa 1.5 Gena MRI scanner. T1 sagittal, T2 fast spin-echo sagittal, and T2 axial images were obtained of the thoracic spine. T1 post-contrast fat suppressed sagittal images were obtained. Optional T1 post-contrast axial images may be obtained. 15 mL Gadavist contrast was administered intravenously. COMPARISON:  None. FINDINGS: Straightening of the normal kyphosis. Several small nonedematous Schmorl's nodes are seen Normal alignment The visualized spinal  cord is normal in signal and morphology There are multiple disc protrusions but at no point is central stenosis moderate or greater in severity. Several levels of cord abutment are identified, noted best anteriorly on the left at T9/10. Protrusions are also seen on the right at T2/3, T4/5, left T5/6, left and right at T8/9. Mild right T2/3 foraminal stenosis related to disc protrusion Otherwise patent foramina Paraspinous soft tissues are normal in appearance After contrast is administered no abnormal enhancement is seen No abnormal fluid collection. No epidural abscess. No abnormal disc signal to suggest discitis     Multilevel degenerative disc disease results in cord abutment but no cord compression Straightening of the normal lordosis with multiple chronic Schmorl's nodes Mild right T2/3 foraminal stenosis    Dx-portable Fluoro > 1 Hour    Result Date: 7/15/2019  7/12/2019 12:03 PM HISTORY/REASON FOR EXAM:  Posterior lumbar laminectomy L3-S1, Main OR TECHNIQUE/EXAM DESCRIPTION AND NUMBER OF VIEWS: Portable fluoroscopy for greater than one hour FINDINGS:      The portable fluoroscopy unit was obligated to the procedure for greater than one hour.   Actual fluoro time was 2 seconds.     Portable fluoroscopy utilized for 2 seconds.    Ct-cta Chest Pulmonary Artery W/ Recons    Result Date: 8/3/2019  8/3/2019 9:11 PM HISTORY/REASON FOR EXAM:  Shortness of breath; tachycardia, post-op, high risk for PE. TECHNIQUE/EXAM DESCRIPTION: CT angiogram scan for pulmonary embolism with contrast, with reconstructions. 1.25 mm helical sections were obtained from the lung apices through the lung bases following the rapid bolus administration of 80 mL of Omnipaque 350 nonionic contrast. Thin-section overlapping reconstruction interval was utilized. Coronal reconstructions were generated from the axial data. MIP post processing was performed and utilized for the interpretation. Low dose optimization technique was utilized for this  CT exam including automated exposure control and adjustment of the mA and/or kV according to patient size. COMPARISON: None FINDINGS: Pulmonary Embolism: There is somewhat suboptimal opacification of the pulmonary arterial structures, there is no definite evidence of large or central pulmonary emboli. Small peripheral emboli cannot be absolutely excluded. . Lungs: Bilateral lower lobe discoid atelectasis.. Pleura: No pleural effusion. Nodes: No enlarged lymph nodes. Additional findings: Unusual curvilinear lucency in the right hepatic lobe measuring about 4 x 1.9 cm diameter..     1.  No definite evidence of pulmonary emboli, within the limitations described above. 2.  Bilateral lower lobe discoid atelectasis. 3.  Unusual curvilinear lucency in the right hepatic lobe which may represent scarring. The appearance is not at least typical for a hepatic solid mass or cyst.     Ec-echocardiogram Complete W/ Cont    Result Date: 2019  Transthoracic Echo Report Echocardiography Laboratory CONCLUSIONS Prior echocardiogram 2019.Technically difficult study - adequate information is obtained. Contrast was used to enhance visualization of the endocardial border. Normal left ventricular systolic function. Left ventricular ejection fraction is visually estimated to be 65%. Normal regional wall motion. Right ventricle not well visualized but visually appears Mildly enlarged. The left atrium is normal in size. The mitral valve is not well visualized. No stenosis or regurgitation seen. Structurally normal aortic valve without significant stenosis or regurgitation. Normal pericardium without effusion. JOHNSON PEOPLES Exam Date:         2019                    19:04 Exam Location:     Inpatient Priority:          Routine Ordering Physician:        NICCI HERNANDEZ Referring Physician: Sonographer:               Candy James RDCS Age:    23     Gender:    M MRN:    [id number] :    1995 BSA:    2.55   Ht  (in):    72     Wt (lb):    304 Exam Type:     Complete, Contrast Indications:     Cardiac arrest, cause unspecified ICD Codes:       I46.9 CPT Codes:       36144,  BP:   121    /   84     HR:   140 Technical Quality:       Technically difficult study -                          adequate information is obtained MEASUREMENTS  (Male / Female) Normal Values 2D ECHO LV Diastolic Diameter PLAX        4.2 cm                4.2 - 5.9 / 3.9 - 5.3 cm LV Systolic Diameter PLAX         3.7 cm                2.1 - 4.0 cm IVS Diastolic Thickness           1.3 cm                LVPW Diastolic Thickness          1 cm                  LVOT Diameter                     2.4 cm                Estimated LV Ejection Fraction    65 %                  LV Ejection Fraction MOD BP       52.1 %                >= 55  % LV Ejection Fraction MOD 4C       56.7 %                LV Ejection Fraction MOD 2C       47.6 %                IVC Diameter                      0.79 cm               DOPPLER AV Peak Velocity                  0.94 m/s              AV Peak Gradient                  3.6 mmHg              AV Mean Gradient                  2.2 mmHg              LVOT Peak Velocity                0.89 m/s              AV Area Cont Eq vti               4.1 cm²               MV Velocity Time Integral         11 cm                 Mitral E Point Velocity           0.84 m/s              MV Pressure Half Time             28.8 ms               MV Area PHT                       7.6 cm²               * Indicates values subject to auto-interpretation LV EF:  65    % FINDINGS Left Ventricle Contrast was used to enhance visualization of the endocardial border. 3 mL of contrast was administered. Existing IV was used. Located at the left hand. Normal left ventricular chamber size. Normal left ventricular wall thickness. Normal left ventricular systolic function. Left ventricular ejection fraction is visually estimated to be 65%. Normal regional wall motion.  Diastolic function is difficult to assess with tachycardia. Right Ventricle Right ventricle not well visualized but visually appears Mildly enlarged. Right Atrium The right atrium is normal in size. Inferior vena cava small in size and collapsed due to patient's fluids being low. Left Atrium The left atrium is normal in size. Left atrial volume index is 8 mL/sq m. Mitral Valve The mitral valve is not well visualized. No stenosis or regurgitation seen. Aortic Valve Structurally normal aortic valve without significant stenosis or regurgitation. Tricuspid Valve Structurally normal tricuspid valve without significant stenosis or regurgitation. Pulmonic Valve Structurally normal pulmonic valve without significant stenosis or regurgitation. Pericardium Normal pericardium without effusion. Fat pad present. Aorta The aortic root is normal. Ascending aorta diameter is 2.8 cm. Serg Lopes M.D. (Electronically Signed) Final Date:     02 August 2019                 22:34      Micro:  Results     Procedure Component Value Units Date/Time    GRAM STAIN [[id number]] Collected:  08/05/19 1919    Order Status:  Completed Specimen:  Wound Updated:  08/07/19 1027     Significant Indicator .     Source WND     Site Back     Gram Stain Result Few WBCs.  Few Gram positive cocci.      Narrative:       CALL  Silva  171 tel. [phone],  CALLED  171 tel. [phone] 08/07/2019, 10:22, RB PERF. RESULTS CALLED  TO:[phone]  Surgery - swabs received    CULTURE WOUND W/ GRAM STAIN [[id number]]  (Abnormal)  (Susceptibility) Collected:  08/05/19 1919    Order Status:  Completed Specimen:  Wound Updated:  08/07/19 1027     Significant Indicator POS     Source WND     Site Back     Culture Result Growth noted after further incubation, see below for  organism identification.       Gram Stain Result Few WBCs.  Few Gram positive cocci.       Culture Result Methicillin Resistant Staphylococcus aureus  Moderate growth  This isolate is presumed to be  "clindamycin resistant based on  detection of inducible resistance.  Clindamycin may still  be effective in some patients.      Narrative:       CALL  Silva  171 tel. [phone],  CALLED  171 tel. [phone] 08/07/2019, 10:22, RB PERF. RESULTS CALLED  TO:[phone]  Surgery - swabs received    Susceptibility     Methicillin resistant staphylococcus aureus (1)     Antibiotic Interpretation Microscan Method Status    Clindamycin Resistant <=0.5 mcg/mL PARRISH Final    Daptomycin Sensitive <=0.5 mcg/mL PARRISH Final    Erythromycin Resistant >4 mcg/mL PARRISH Final    Moxifloxacin Resistant >4 mcg/mL PARRISH Final    Ampicillin/sulbactam Resistant 16/8 mcg/mL PARRISH Final    Oxacillin Resistant >2 mcg/mL PARRISH Final    Vancomycin Sensitive 2 mcg/mL PARRISH Final    Penicillin Resistant >8 mcg/mL PARRISH Final    Trimeth/Sulfa Sensitive <=0.5/9.5 mcg/mL PARRISH Final    Tetracycline Sensitive <=4 mcg/mL PARRISH Final                   Anaerobic Culture [[id number]] Collected:  08/05/19 1919    Order Status:  Completed Specimen:  Wound Updated:  08/07/19 1027     Significant Indicator NEG     Source WND     Site Back     Culture Result Culture in progress.    Narrative:       CALL  Silva  171 tel. [phone],  CALLED  171 tel. [phone] 08/07/2019, 10:22, RB PERF. RESULTS CALLED  TO:[phone]  Surgery - swabs received    BLOOD CULTURE [[id number]] Collected:  08/06/19 0500    Order Status:  Completed Specimen:  Blood from Peripheral Updated:  08/07/19 0746     Significant Indicator NEG     Source BLD     Site PERIPHERAL     Culture Result No Growth  Note: Blood cultures are incubated for 5 days and  are monitored continuously.Positive blood cultures  are called to the RN and reported as soon as  they are identified.      Narrative:       Contact  Per Hospital Policy: Only change Specimen Src: to \"Line\" if  specified by physician order.  Right Hand    BLOOD CULTURE [[id number]] Collected:  08/06/19 0500    Order Status:  Completed Specimen:  Blood from Peripheral Updated: " " 08/07/19 0746     Significant Indicator NEG     Source BLD     Site PERIPHERAL     Culture Result No Growth  Note: Blood cultures are incubated for 5 days and  are monitored continuously.Positive blood cultures  are called to the RN and reported as soon as  they are identified.      Narrative:       Contact  Per Hospital Policy: Only change Specimen Src: to \"Line\" if  specified by physician order.  Left Hand    BLOOD CULTURE [[id number]]  (Abnormal)  (Susceptibility) Collected:  08/03/19 1550    Order Status:  Completed Specimen:  Blood from Peripheral Updated:  08/06/19 1028     Significant Indicator POS     Source BLD     Site PERIPHERAL     Culture Result Growth detected by Bactec instrument. 08/04/2019  12:03  Methicillin Resistant Staphylococcus aureus (MRSA)  detected by PCR.        Methicillin Resistant Staphylococcus aureus  This isolate is presumed to be clindamycin resistant based on  detection of inducible resistance.  Clindamycin may still  be effective in some patients.      Narrative:       CALL  Silva  171 tel. [phone],  CALLED  171 tel. [phone] 08/04/2019, 16:21, RB PERF. RESULTS CALLED TO:  [phone]  [id number] RAYA CHRISTIANSEN  Per Hospital Policy: Only change Specimen Src: to \"Line\" if  specified by physician order.  Right Hand    Susceptibility     Methicillin resistant staphylococcus aureus (1)     Antibiotic Interpretation Microscan Method Status    Clindamycin Resistant <=0.5 mcg/mL PARRISH Final    Daptomycin Sensitive 1 mcg/mL PARRISH Final    Erythromycin Resistant >4 mcg/mL PARRISH Final    Moxifloxacin Resistant >4 mcg/mL PARRISH Final    Ampicillin/sulbactam Resistant 16/8 mcg/mL PARRISH Final    Oxacillin Resistant >2 mcg/mL PARRISH Final    Vancomycin Sensitive 2 mcg/mL PARRISH Final    Penicillin Resistant >8 mcg/mL PARRISH Final    Trimeth/Sulfa Sensitive <=0.5/9.5 mcg/mL PARRISH Final    Tetracycline Sensitive <=4 mcg/mL PARRISH Final                   BLOOD CULTURE [[id number]] Collected:  08/03/19 1550    Order " "Status:  Completed Specimen:  Blood from Peripheral Updated:  08/04/19 0741     Significant Indicator NEG     Source BLD     Site PERIPHERAL     Culture Result No Growth  Note: Blood cultures are incubated for 5 days and  are monitored continuously.Positive blood cultures  are called to the RN and reported as soon as  they are identified.      Narrative:       [id number] RAYA CHRISTIANSEN  Per Hospital Policy: Only change Specimen Src: to \"Line\" if  specified by physician order.  Left Hand    CULTURE WOUND W/ GRAM STAIN [[id number]]  (Abnormal)  (Susceptibility) Collected:  07/31/19 1245    Order Status:  Completed Specimen:  Wound from Back Updated:  08/02/19 0932     Significant Indicator POS     Source WND     Site BACK     Culture Result Light growth mixed skin zander.     Gram Stain Result Few WBCs.  Few Gram positive cocci.  Few Gram positive rods.       Culture Result Methicillin Resistant Staphylococcus aureus  Heavy growth  Methicillin resistant by screening method  This isolate is presumed to be clindamycin resistant based on  detection of inducible resistance.  Clindamycin may still  be effective in some patients.      Narrative:       CALL  Silva  GAY tel. [phone],  CALLED  GAY tel. [phone] 08/02/2019, 09:31, RB PERF. RESULTS CALLED  TO:[phone] RN    Susceptibility     Methicillin resistant staphylococcus aureus (1)     Antibiotic Interpretation Microscan Method Status    Clindamycin Resistant <=0.5 mcg/mL PARRISH Final    Daptomycin Sensitive <=0.5 mcg/mL PARRISH Final    Erythromycin Resistant >4 mcg/mL PARRISH Final    Moxifloxacin Resistant >4 mcg/mL PARRISH Final    Ampicillin/sulbactam Resistant 16/8 mcg/mL PARRISH Final    Oxacillin Resistant >2 mcg/mL PARRISH Final    Vancomycin Sensitive 1 mcg/mL PARRISH Final    Penicillin Resistant >8 mcg/mL PARRISH Final    Trimeth/Sulfa Sensitive <=0.5/9.5 mcg/mL PARRISH Final    Tetracycline Sensitive <=4 mcg/mL PARRISH Final                   GRAM STAIN [[id number]] Collected:  07/31/19 1245 "    Order Status:  Completed Specimen:  Wound Updated:  07/31/19 2317     Significant Indicator .     Source WND     Site BACK     Gram Stain Result Few WBCs.  Few Gram positive cocci.  Few Gram positive rods.      Blood Culture,Hold [[id number]] Collected:  07/31/19 1152    Order Status:  Completed Updated:  07/31/19 2207     Blood Culture Hold Collected          Assessment:  Active Hospital Problems    Diagnosis   • *Cardiac arrest (HCC) [I46.9]   • Elevated liver enzymes [R74.8]   • Morbidly obese (HCC) [E66.01]   • Infected surgical wound [T81.49XA]   • Other inflammatory polyneuropathies (HCC) [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  admitted 7/31/2019. Pt has a past medical history of congenital spinal stenosis and severe L4-L5 stenosis with disc herniation as well as stenosis L2-L3 and L5-S5 with obesity and chronic pain.  The patient had L4, L5 and S1 decompressive laminectomy, bilateral medial facetectomies and bilateral foraminoctomy's with aborted L2-L4 laminectomies due to loss of signal on 6/17/2019.  He had MRI spine on 7/8 with a new 10 x 3 x 1 cm deep subcutaneous fat fluid collection thought to be seroma but superimposed infection not ruled out. He then went back to the OR on 7/12 for L2- S1 laminectomy and L4-L5 microdiscectomy.  Per note there were no complications.       He has leaking wound vac with drainage and clean-out on 7/25.  He presented to the ED on 7/27 again with leaking wound VAC but was not admitted. He again returned on 7/31 due to concerns for ongoing worsening wound drainage and was admitted for further work-up.        Plan    MRSA bacteremia  Blood cultures are positive from 8/3/2019  Patient was started on daptomycin   Repeat blood cultures x2 Are negative from 8/6/2019  Since the PARRISH is 1 2 daptomycin and p.o. Bactrim    Back  infection  Antibiotics as above  Underwent I&D on 8/5/2019  Those OR cultures are negative so far    Cardiac arrest  On 8/2/2019 patient had developed ventricular tachycardia followed by ventricular fibrillation  He is being followed by cardiology    Increased LFTs  Monitor in a.m.  May be as consequence of above    Congenital spinal stenosis  Status post surgeries as above

## 2019-08-07 NOTE — PROGRESS NOTES
Bedside report received, patient care assumed. Pt is A&Ox4, VSS, assessment complete. Tele box in place. C/o mild pain at this time. POC reviewed and questions answered. Bed in locked and low position, fall precautions in place. Call light in reach. Educated to call for assistance.

## 2019-08-07 NOTE — CARE PLAN
Problem: Venous Thromboembolism (VTW)/Deep Vein Thrombosis (DVT) Prevention:  Goal: Patient will participate in Venous Thrombosis (VTE)/Deep Vein Thrombosis (DVT)Prevention Measures  Description  SCDs in use   Outcome: PROGRESSING AS EXPECTED  Note:   SCDs on and educated on importance of ambulating to decrease risk of DVTs and PE     Problem: Bowel/Gastric:  Goal: Normal bowel function is maintained or improved  Outcome: PROGRESSING AS EXPECTED  Note:   Bowel protocol in place. Fluids and food intake adequate. Mobilizing per patient request.

## 2019-08-08 ENCOUNTER — APPOINTMENT (OUTPATIENT)
Dept: RADIOLOGY | Facility: MEDICAL CENTER | Age: 24
DRG: 856 | End: 2019-08-08
Attending: INTERNAL MEDICINE
Payer: COMMERCIAL

## 2019-08-08 LAB
ALBUMIN SERPL BCP-MCNC: 3.8 G/DL (ref 3.2–4.9)
ALP SERPL-CCNC: 67 U/L (ref 30–99)
ALT SERPL-CCNC: 107 U/L (ref 2–50)
ANION GAP SERPL CALC-SCNC: 11 MMOL/L (ref 0–11.9)
AST SERPL-CCNC: 29 U/L (ref 12–45)
BACTERIA BLD CULT: NORMAL
BACTERIA SPEC ANAEROBE CULT: NORMAL
BILIRUB CONJ SERPL-MCNC: 0.2 MG/DL (ref 0.1–0.5)
BILIRUB INDIRECT SERPL-MCNC: 0.2 MG/DL (ref 0–1)
BILIRUB SERPL-MCNC: 0.4 MG/DL (ref 0.1–1.5)
BUN SERPL-MCNC: 13 MG/DL (ref 8–22)
CALCIUM SERPL-MCNC: 9 MG/DL (ref 8.5–10.5)
CHLORIDE SERPL-SCNC: 102 MMOL/L (ref 96–112)
CO2 SERPL-SCNC: 23 MMOL/L (ref 20–33)
CREAT SERPL-MCNC: 0.62 MG/DL (ref 0.5–1.4)
GLUCOSE SERPL-MCNC: 106 MG/DL (ref 65–99)
POTASSIUM SERPL-SCNC: 4.1 MMOL/L (ref 3.6–5.5)
PROT SERPL-MCNC: 7.9 G/DL (ref 6–8.2)
SIGNIFICANT IND 70042: NORMAL
SIGNIFICANT IND 70042: NORMAL
SITE SITE: NORMAL
SITE SITE: NORMAL
SODIUM SERPL-SCNC: 136 MMOL/L (ref 135–145)
SOURCE SOURCE: NORMAL
SOURCE SOURCE: NORMAL

## 2019-08-08 PROCEDURE — 700102 HCHG RX REV CODE 250 W/ 637 OVERRIDE(OP): Performed by: HOSPITALIST

## 2019-08-08 PROCEDURE — A9270 NON-COVERED ITEM OR SERVICE: HCPCS | Performed by: HOSPITALIST

## 2019-08-08 PROCEDURE — A9270 NON-COVERED ITEM OR SERVICE: HCPCS | Performed by: INTERNAL MEDICINE

## 2019-08-08 PROCEDURE — 700105 HCHG RX REV CODE 258: Performed by: INTERNAL MEDICINE

## 2019-08-08 PROCEDURE — 99233 SBSQ HOSP IP/OBS HIGH 50: CPT | Performed by: HOSPITALIST

## 2019-08-08 PROCEDURE — 05HY33Z INSERTION OF INFUSION DEVICE INTO UPPER VEIN, PERCUTANEOUS APPROACH: ICD-10-PCS | Performed by: INTERNAL MEDICINE

## 2019-08-08 PROCEDURE — 700102 HCHG RX REV CODE 250 W/ 637 OVERRIDE(OP): Performed by: INTERNAL MEDICINE

## 2019-08-08 PROCEDURE — 700111 HCHG RX REV CODE 636 W/ 250 OVERRIDE (IP): Performed by: NURSE PRACTITIONER

## 2019-08-08 PROCEDURE — 80048 BASIC METABOLIC PNL TOTAL CA: CPT

## 2019-08-08 PROCEDURE — 99232 SBSQ HOSP IP/OBS MODERATE 35: CPT | Performed by: INTERNAL MEDICINE

## 2019-08-08 PROCEDURE — 36573 INSJ PICC RS&I 5 YR+: CPT

## 2019-08-08 PROCEDURE — 770020 HCHG ROOM/CARE - TELE (206)

## 2019-08-08 PROCEDURE — 36415 COLL VENOUS BLD VENIPUNCTURE: CPT

## 2019-08-08 PROCEDURE — 80076 HEPATIC FUNCTION PANEL: CPT

## 2019-08-08 PROCEDURE — 700111 HCHG RX REV CODE 636 W/ 250 OVERRIDE (IP): Performed by: INTERNAL MEDICINE

## 2019-08-08 RX ORDER — SODIUM CHLORIDE 9 MG/ML
INJECTION, SOLUTION INTRAVENOUS
Status: ACTIVE
Start: 2019-08-08 | End: 2019-08-09

## 2019-08-08 RX ADMIN — SULFAMETHOXAZOLE AND TRIMETHOPRIM 1 TABLET: 800; 160 TABLET ORAL at 17:22

## 2019-08-08 RX ADMIN — METHOCARBAMOL TABLETS 1000 MG: 500 TABLET, COATED ORAL at 11:58

## 2019-08-08 RX ADMIN — METHOCARBAMOL TABLETS 1000 MG: 500 TABLET, COATED ORAL at 05:30

## 2019-08-08 RX ADMIN — GABAPENTIN 100 MG: 100 CAPSULE ORAL at 05:30

## 2019-08-08 RX ADMIN — METOPROLOL TARTRATE 25 MG: 25 TABLET, FILM COATED ORAL at 05:30

## 2019-08-08 RX ADMIN — GABAPENTIN 100 MG: 100 CAPSULE ORAL at 11:58

## 2019-08-08 RX ADMIN — SENNOSIDES AND DOCUSATE SODIUM 2 TABLET: 8.6; 5 TABLET ORAL at 05:30

## 2019-08-08 RX ADMIN — HYDROCODONE BITARTRATE AND ACETAMINOPHEN 1 TABLET: 5; 325 TABLET ORAL at 15:01

## 2019-08-08 RX ADMIN — METOPROLOL TARTRATE 25 MG: 25 TABLET, FILM COATED ORAL at 17:22

## 2019-08-08 RX ADMIN — GABAPENTIN 100 MG: 100 CAPSULE ORAL at 17:22

## 2019-08-08 RX ADMIN — SULFAMETHOXAZOLE AND TRIMETHOPRIM 1 TABLET: 800; 160 TABLET ORAL at 05:30

## 2019-08-08 RX ADMIN — DAPTOMYCIN 1090 MG: 500 INJECTION, POWDER, LYOPHILIZED, FOR SOLUTION INTRAVENOUS at 12:39

## 2019-08-08 RX ADMIN — METHOCARBAMOL TABLETS 1000 MG: 500 TABLET, COATED ORAL at 17:22

## 2019-08-08 RX ADMIN — ENOXAPARIN SODIUM 40 MG: 100 INJECTION SUBCUTANEOUS at 11:58

## 2019-08-08 ASSESSMENT — ENCOUNTER SYMPTOMS
HEADACHES: 0
SHORTNESS OF BREATH: 0
FEVER: 0
MYALGIAS: 0
DIZZINESS: 0
COUGH: 0
SENSORY CHANGE: 0
FOCAL WEAKNESS: 1
NECK PAIN: 0
VOMITING: 0
PALPITATIONS: 0
NAUSEA: 0
ABDOMINAL PAIN: 0
CONSTIPATION: 0
DIARRHEA: 0
BACK PAIN: 1
CHILLS: 0

## 2019-08-08 NOTE — CARE PLAN
Problem: Safety  Goal: Will remain free from injury  Outcome: PROGRESSING AS EXPECTED  Note:   Pt mobility assessed at beginning of shift. Pt is x2 assist. Fall precautions in place. Non-slip socks on. Bed in lowest locked position. Bed alarm on. Call light within reach. Pt educated to call for assistance and verbalizes understanding.       Problem: Infection  Goal: Will remain free from infection  Outcome: PROGRESSING AS EXPECTED  Note:   Pt educated on importance of hand hygiene and oral care. Contact precautions in place.

## 2019-08-08 NOTE — CARE PLAN
Problem: Safety  Goal: Will remain free from injury  Outcome: PROGRESSING AS EXPECTED  Note:   Fall precautions in place. Patient aware of risks.      Problem: Infection  Goal: Will remain free from infection  Outcome: PROGRESSING AS EXPECTED  Note:   Contact precautions in place. Educated on precautions. Verbalized understanding.

## 2019-08-08 NOTE — FACE TO FACE
Face to Face Supporting Documentation - Home Health    The encounter with this patient was in whole or in part the primary reason for home health admission.    Date of encounter:   Patient:                    MRN:                       YOB: 2018  Sammy Edmonds  [id number]  1995     Home health to see patient for:  Skilled Nursing care for assessment, interventions & education, Wound Care, Home health aide, Physical Therapy evaluation and treatment and Occupational therapy evaluation and treatment    Skilled need for:  Recent Deterioration of Health Status recurrent infection, I&D, immoble     Skilled nursing interventions to include:  Home IV infusion therapy, Wound Care and Line/Drain/Airway education and care    Homebound status evidenced by:  Needs the assistance of another person in order to leave the home. Leaving home requires a considerable and taxing effort. There is a normal inability to leave the home.    Community Physician to provide follow up care: Pcp Pt States None     Optional Interventions? No      I certify the face to face encounter for this home health care referral meets the CMS requirements and the encounter/clinical assessment with the patient was, in whole, or in part, for the medical condition(s) listed above, which is the primary reason for home health care. Based on my clinical findings: the service(s) are medically necessary, support the need for home health care, and the homebound criteria are met.  I certify that this patient has had a face to face encounter by myself.  kM Ni M.D. - NPI: [id number]

## 2019-08-08 NOTE — PROGRESS NOTES
[facility] Medicine Daily Progress Note    Date of Service  8/7/2019    Chief Complaint  23 y.o. male admitted 7/31/2019 with drainage from surgical wound    Hospital Course     23 y.o. male who presented 7/31/2019 with infection of lumbar laminectomy.  Patient presented 6/17/2019 with congenital spinal stenosis and severe L4-L5 stenosis with disc herniation, L2-L3 moderate to severe stenosis, and L5-S1 moderate to severe stenosis with morbid obesity and intractable pain.  At that time he had L4 and L5 and S1 decompressive laminectomy, bilateral medial facetectomies and bilateral foraminotomies with aborted L2-L3 and L3-L4 laminectomies due to loss of signal.  He subsequently returned on 7/12 to have L2, L3, L4, L5, S1 laminectomy, and a L4-L5 microdiscectomy and was hospitalized 7/5-22.  Further during that hospitalization he was noted to have persistent sinus tachycardia, had echocardiogram which was unremarkable, and was initiated on metoprolol.       Patient presented to ED on 7/27 after indicating leaking wound VAC for which he had wound drainage and cleanout performed 7/25.  Patient returned again on 7/31 with ongoing concerns of wound drainage and at which point was admitted and found to have ill-defined nonenhancing fluid within the laminectomy bed most pronounced at L4-L5 level as well as diffuse enhancement of the cauda equina nerve roots presumably infectious/inflammatory on MRI.  Wound culture grew MRSA.     Patient went to the OR for I&D and apparently after being intubated yet prior to initiation of surgical procedure patient went into a ventricular tachycardic rhythm followed by reported V. fib, he had a total of 3 shocks before reestablishing sinus rhythm further he also was given 150 mg of amiodarone.  As there was concern of possible hyperkalemia post succinylcholine as paralytic he was given 20 of IV insulin and calcium.  Immediately after returning to sinus rhythm patient was awake agitated and  looking to pull out ET tube.  He was subsequently was extubated and surgery canceled.     Eventually had I&D on 8/5 with cultures sent.         Interval Problem Update  Patient seen and examined, he is doing well, in good spirits. No pain. Discussed with ID regarding addition of Bactrim. Started on daptomycin 8/5, had I&D 8/5, cultures NGTD. He will need IV access long term, will order midline. Will follow up with Cardiology after infection treated. Answered questions today.     Consultants/Specialty  Neurosurgery  ID    Code Status  full    Disposition  tbd    Review of Systems  Review of Systems   Constitutional: Negative for chills and fever.   Cardiovascular: Negative for chest pain and palpitations.   Gastrointestinal: Negative for abdominal pain, constipation, diarrhea, nausea and vomiting.   Genitourinary: Negative for dysuria.   Musculoskeletal: Positive for back pain. Negative for myalgias and neck pain.   Neurological: Positive for sensory change. Negative for dizziness, tingling, focal weakness and headaches.        Some numbness on volar aspect of L foot; present since the surgery and unchanged   All other systems reviewed and are negative.     Physical Exam  Temp:  [36.1 °C (96.9 °F)-37.2 °C (98.9 °F)] 36.3 °C (97.4 °F)  Pulse:  [] 118  Resp:  [18] 18  BP: (102-141)/(61-78) 117/78  SpO2:  [92 %-94 %] 94 %    Physical Exam   Constitutional: He is oriented to person, place, and time. He appears well-developed and well-nourished. No distress.   HENT:   Head: Normocephalic and atraumatic.   Nose: Nose normal.   Mouth/Throat: Oropharynx is clear and moist.   Eyes: Conjunctivae are normal.   Neck: No JVD present.   Cardiovascular: Normal rate. Exam reveals no gallop.   No murmur heard.  Pulmonary/Chest: Effort normal. No stridor. No respiratory distress. He has no wheezes. He has no rales.   Abdominal: Soft. There is no tenderness. There is no rebound and no guarding.   Musculoskeletal: He exhibits no  edema.   Neurological: He is alert and oriented to person, place, and time.   Strength 5/5 B lower Ext  Sensation intact to light touch   Skin: Skin is warm and dry. No rash noted. He is not diaphoretic.   Psychiatric: He has a normal mood and affect. His behavior is normal. Judgment and thought content normal.   Nursing note and vitals reviewed.    Fluids    Intake/Output Summary (Last 24 hours) at 8/7/2019 1937  Last data filed at 8/7/2019 1800  Gross per 24 hour   Intake 50 ml   Output 550 ml   Net -500 ml       Laboratory  Recent Labs     08/05/19  0510 08/06/19  0500   WBC 8.3 8.7   RBC 3.83* 3.75*   HEMOGLOBIN 10.1* 9.8*   HEMATOCRIT 31.4* 31.2*   MCV 82.0 83.2   MCH 26.4* 26.1*   MCHC 32.2* 31.4*   RDW 38.5 39.1   PLATELETCT 346 340   MPV 8.9* 8.6*     Recent Labs     08/05/19  0510 08/06/19  0500 08/07/19  0248   SODIUM 136 139 137   POTASSIUM 3.9 3.7 4.0   CHLORIDE 101 103 101   CO2 25 26 24   GLUCOSE 108* 107* 94   BUN 8 10 11   CREATININE 0.52 0.53 0.59   CALCIUM 8.9 8.7 8.9                   Imaging  CT-CTA CHEST PULMONARY ARTERY W/ RECONS   Final Result      1.  No definite evidence of pulmonary emboli, within the limitations described above.   2.  Bilateral lower lobe discoid atelectasis.   3.  Unusual curvilinear lucency in the right hepatic lobe which may represent scarring. The appearance is not at least typical for a hepatic solid mass or cyst.            EC-ECHOCARDIOGRAM COMPLETE W/ CONT   Final Result      DX-CHEST-LIMITED (1 VIEW)   Final Result      No evidence of acute cardiopulmonary process.      MR-LUMBAR SPINE-WITH & W/O   Final Result      1.  Stable postsurgical changes L3-L5 laminectomies. Interval ill-defined nonenhancing fluid within the laminectomy bed most pronounced at the L4-L5 level. Infection should be excluded clinically.   2.  There is diffuse enhancement of the cauda equina nerve roots which is presumably infectious/inflammatory.   3.  No evidence of discitis osteomyelitis  or epidural abscess.   4.  Degenerative changes as detailed above with persistent moderate thecal sac stenosis at L2-L3 and L4-L5.      IR-MIDLINE CATHETER INSERTION WO GUIDANCE > AGE 3    (Results Pending)        Assessment/Plan  * Cardiac arrest (HCC)  Assessment & Plan  S/p cardiac arrest prior to I&D, 3 shocks, surgery canceled  Cards consulted  EP Recomendations:  Needs ICD however not until infection is taken care of  Proceed with surgery.  If he goes into VT/VF; defibrilate and give Amiodarone.  Proceed with surgery  They will not place the ICD until after his spinal infection is drained and treated  Follow and maximize K, Mg, Phos    MRSA bacteremia  Assessment & Plan  Blood cultures +, along with wound cultures therefore now started on daptomycin  Repeat bcx   Id on board  monitor    Elevated liver enzymes  Assessment & Plan  Secondary to VT/VF arrest most likely  Cont to follow, work up further if fail to normalize    Other inflammatory polyneuropathies (HCC)- (present on admission)  Assessment & Plan  Neurontin 100 mg 3 times daily    Infected surgical wound- (present on admission)  Assessment & Plan  Wound culures MSRA, and now even blood cx are +  ID on board, changed abx to Daptomycin, monitor closely  Wound care  Pain management with IV Dilaudid  Mgmt as above      Wound drainage- (present on admission)  Assessment & Plan  Infected with MRSA, now on daptomycin  NSx I&D with cultures 8/5  Will need long term IV abx    Sinus tachycardia- (present on admission)  Assessment & Plan  Continue Beta-blocker      Acute bilateral back pain- (present on admission)  Assessment & Plan  Back pain is intractable  P.o. Vicodin  PRN IV Dilaudid  Improving       VTE prophylaxis: none due to recent surgery    Plan discussed with patient, nursing and ID  Obtain midline  Will follow up ID recs

## 2019-08-08 NOTE — PROGRESS NOTES
[facility] Medicine Daily Progress Note    Date of Service  8/8/2019    Chief Complaint  23 y.o. male admitted 7/31/2019 with drainage from surgical wound    Hospital Course     23 y.o. male who presented 7/31/2019 with infection of lumbar laminectomy.  Patient presented 6/17/2019 with congenital spinal stenosis and severe L4-L5 stenosis with disc herniation, L2-L3 moderate to severe stenosis, and L5-S1 moderate to severe stenosis with morbid obesity and intractable pain.  At that time he had L4 and L5 and S1 decompressive laminectomy, bilateral medial facetectomies and bilateral foraminotomies with aborted L2-L3 and L3-L4 laminectomies due to loss of signal.  He subsequently returned on 7/12 to have L2, L3, L4, L5, S1 laminectomy, and a L4-L5 microdiscectomy and was hospitalized 7/5-22.  Further during that hospitalization he was noted to have persistent sinus tachycardia, had echocardiogram which was unremarkable, and was initiated on metoprolol.       Patient presented to ED on 7/27 after indicating leaking wound VAC for which he had wound drainage and cleanout performed 7/25.  Patient returned again on 7/31 with ongoing concerns of wound drainage and at which point was admitted and found to have ill-defined nonenhancing fluid within the laminectomy bed most pronounced at L4-L5 level as well as diffuse enhancement of the cauda equina nerve roots presumably infectious/inflammatory on MRI.  Wound culture grew MRSA.     Patient went to the OR for I&D and apparently after being intubated yet prior to initiation of surgical procedure patient went into a ventricular tachycardic rhythm followed by reported V. fib, he had a total of 3 shocks before reestablishing sinus rhythm further he also was given 150 mg of amiodarone.  As there was concern of possible hyperkalemia post succinylcholine as paralytic he was given 20 of IV insulin and calcium.  Immediately after returning to sinus rhythm patient was awake agitated and  looking to pull out ET tube.  He was subsequently was extubated and surgery canceled.     Eventually had I&D on 8/5 with cultures sent.         Interval Problem Update  Patient seen and examined, he is doing well, in good spirits. No pain. Discussed with ID again regarding addition of Bactrim. Started on daptomycin 8/5, had I&D 8/5, cultures NGTD. He will need IV access long term, will order midline, to be placed today after 48 hours negative blood culture. Will follow up with Cardiology after infection treated. Answered questions today.  Patient adamant to return home, does not want to go to skilled nursing.  We will have to arrange extensive home health for patient.  Given all of the loose threads to tie up, would prefer to hold until tomorrow morning to ensure safe discharge to home.    Consultants/Specialty  Neurosurgery  ID    Code Status  full    Disposition  Hopefully home tomorrow     Review of Systems  Review of Systems   Constitutional: Negative for chills and fever.   Cardiovascular: Negative for chest pain and palpitations.   Gastrointestinal: Negative for abdominal pain, diarrhea and vomiting.   Genitourinary: Negative for dysuria.   Musculoskeletal: Positive for back pain and joint pain. Negative for myalgias and neck pain.   Neurological: Positive for focal weakness. Negative for dizziness, sensory change and headaches.        Some numbness on volar aspect of L foot; present since the surgery and unchanged   All other systems reviewed and are negative.     Physical Exam  Temp:  [36.3 °C (97.4 °F)-36.8 °C (98.2 °F)] 36.6 °C (97.8 °F)  Pulse:  [101-118] 101  Resp:  [18] 18  BP: (117-147)/(72-87) 118/78  SpO2:  [90 %-94 %] 94 %    Physical Exam   Constitutional: He is oriented to person, place, and time. He appears well-developed and well-nourished. No distress.   HENT:   Head: Normocephalic and atraumatic.   Nose: Nose normal.   Mouth/Throat: Oropharynx is clear and moist.   Eyes: Conjunctivae are  normal.   Neck: No JVD present.   Cardiovascular: Normal rate. Exam reveals no gallop.   No murmur heard.  Pulmonary/Chest: Effort normal. No stridor. No respiratory distress. He has no wheezes. He has no rales.   Abdominal: Soft. There is no tenderness. There is no rebound and no guarding.   Musculoskeletal: He exhibits no edema.   Neurological: He is alert and oriented to person, place, and time.   Strength 5/5 B lower Ext  Sensation intact to light touch   Skin: Skin is warm and dry. No rash noted. He is not diaphoretic.   Psychiatric: He has a normal mood and affect. His behavior is normal. Judgment and thought content normal.   Nursing note and vitals reviewed.    PICC to be placed today    Fluids    Intake/Output Summary (Last 24 hours) at 8/8/2019 1504  Last data filed at 8/7/2019 2000  Gross per 24 hour   Intake 290 ml   Output 400 ml   Net -110 ml       Laboratory  Recent Labs     08/06/19  0500   WBC 8.7   RBC 3.75*   HEMOGLOBIN 9.8*   HEMATOCRIT 31.2*   MCV 83.2   MCH 26.1*   MCHC 31.4*   RDW 39.1   PLATELETCT 340   MPV 8.6*     Recent Labs     08/06/19  0500 08/07/19  0248 08/08/19  0238   SODIUM 139 137 136   POTASSIUM 3.7 4.0 4.1   CHLORIDE 103 101 102   CO2 26 24 23   GLUCOSE 107* 94 106*   BUN 10 11 13   CREATININE 0.53 0.59 0.62   CALCIUM 8.7 8.9 9.0                   Imaging  IR-PICC LINE PLACEMENT W/ GUIDANCE > AGE 5   Final Result                  Ultrasound-guided PICC placement performed by qualified nursing staff as    above.          CT-CTA CHEST PULMONARY ARTERY W/ RECONS   Final Result      1.  No definite evidence of pulmonary emboli, within the limitations described above.   2.  Bilateral lower lobe discoid atelectasis.   3.  Unusual curvilinear lucency in the right hepatic lobe which may represent scarring. The appearance is not at least typical for a hepatic solid mass or cyst.            EC-ECHOCARDIOGRAM COMPLETE W/ CONT   Final Result      DX-CHEST-LIMITED (1 VIEW)   Final Result       No evidence of acute cardiopulmonary process.      MR-LUMBAR SPINE-WITH & W/O   Final Result      1.  Stable postsurgical changes L3-L5 laminectomies. Interval ill-defined nonenhancing fluid within the laminectomy bed most pronounced at the L4-L5 level. Infection should be excluded clinically.   2.  There is diffuse enhancement of the cauda equina nerve roots which is presumably infectious/inflammatory.   3.  No evidence of discitis osteomyelitis or epidural abscess.   4.  Degenerative changes as detailed above with persistent moderate thecal sac stenosis at L2-L3 and L4-L5.           Assessment/Plan  * Cardiac arrest (HCC)  Assessment & Plan  S/p cardiac arrest prior to I&D, 3 shocks, surgery canceled  Cards consulted  EP Recomendations:  They will not place the ICD until after his spinal infection is drained and treated  Follow and maximize K, Mg, Phos    MRSA bacteremia  Assessment & Plan  Blood cultures +, along with wound cultures therefore now started on daptomycin  Adding Bactrim  Repeat bcx negative x48 hours  Id on board  PICC placed today    Elevated liver enzymes  Assessment & Plan  Secondary to VT/VF arrest most likely  Cont to follow, work up further if fail to normalize    Other inflammatory polyneuropathies (HCC)- (present on admission)  Assessment & Plan  Neurontin 100 mg 3 times daily    Infected surgical wound- (present on admission)  Assessment & Plan  Wound culures MSRA, and now even blood cx are +  ID on board, changed abx to Daptomycin and Bactrim  Wound care  Pain management with IV Dilaudid  Mgmt as above    Wound drainage- (present on admission)  Assessment & Plan  Infected with MRSA, now on daptomycin and Bactrim  NSx I&D with cultures 8/5  Will need long term IV abx    Sinus tachycardia- (present on admission)  Assessment & Plan  Continue Beta-blocker    Acute bilateral back pain- (present on admission)  Assessment & Plan  Back pain is intractable  P.o. Vicodin  PRN IV  Steve  Improving     VTE prophylaxis: none due to recent surgery    Plan discussed with patient, nursing and ID  Obtain midline today  Will follow up ID recs, adding Bactrim  Needs home health  Anticipate DC in the morning    40 minutes were devoted to counseling and coordinating care including review of records, pertinent lab data and studies, as well as discussing diagnostic evaluation and work up, planned therapeutic interventions and future disposition of care. Where indicated, the assessment and plan reflect discussion of patient with consultants, other healthcare providers, family members, and additional research needed to obtain further information in formulating the plan of care for Sammy Edmonds. Time spent from 1030a through 1110a.

## 2019-08-08 NOTE — DISCHARGE PLANNING
Anticipated Discharge Disposition: Home with home health for skilled therapies (wound vac, iv antibiotic, wound), and home infusion for iv antibiotic therapy.    Action: RN CM went to obtain choice for infusion and to resume home health with [facility] from pt, however, pt is asleep.    Barriers to Discharge: Medical clearance, infusion arrangements pending.    Plan: RN CM will revisit pt when he is awake.

## 2019-08-08 NOTE — PROCEDURES
Vascular Access Team     Date of Insertion: 8/8/19  Arm Circumference: 42.5  Internal length: 43  External Length: hub  Vein Occupancy %: 32  Reason for PICC: Antibiotic therapy  Labs: WBC 8.7, , BUN 13, Cr 0.62, GFR >60, INR n/a    Consents confirmed, vessel patency confirmed with ultrasound. Risks and benefits of procedure explained to patient and education regarding central line associated bloodstream infections provided. Questions answered.     PICC placed in RUE per MD order with ultrasound guidance, initial arm circumference 42.5cm. 4 Fr, single lumen PICC placed in brachial vein after 1 attempt(s). 2 cc's of 1% lidocaine injected intradermally, 21 gauge microintroducer needle and modified Seldinger technique used. 43 cm catheter inserted with good blood return. Secured at hub. Each lumen flushed without resistance with 10 mL 0.9% normal saline. PICC line secured with Biopatch and Tegaderm.     PICC placement is confirmed by nurse using 3CG technology. PICC line is appropriate for use at this time. Patient experienced some pain, resolved post procedure, without complications.  Patient condition relayed to unit RN or ordering physician via this post procedure note in the EMR.     Ultrasound images uploaded to PACS and viewable in the EMR - yes  Ultrasound imaged printed and placed in paper chart - no     BARD Power PICC ref # [id number], Lot # [id number]

## 2019-08-08 NOTE — PROGRESS NOTES
Neurosurgery Progress Note    Subjective:  Able to walk a few steps with PT yesterday, mobility not adequate for DC home per PT note    Exam:  Per wound picture: wound bed with good granulation tissue    NM: no change to previous exam with persistent but improved left foot drop    BP  Min: 117/78  Max: 147/87  Pulse  Av  Min: 101  Max: 118  Resp  Av  Min: 18  Max: 18  Temp  Av.6 °C (97.8 °F)  Min: 36.3 °C (97.4 °F)  Max: 36.8 °C (98.2 °F)  SpO2  Av %  Min: 90 %  Max: 94 %    No data recorded    Recent Labs     19  0500   WBC 8.7   RBC 3.75*   HEMOGLOBIN 9.8*   HEMATOCRIT 31.2*   MCV 83.2   MCH 26.1*   MCHC 31.4*   RDW 39.1   PLATELETCT 340   MPV 8.6*     Recent Labs     19  0500 19  0248 19  0238   SODIUM 139 137 136   POTASSIUM 3.7 4.0 4.1   CHLORIDE 103 101 102   CO2 26 24 23   GLUCOSE 107* 94 106*   BUN 10 11 13   CREATININE 0.53 0.59 0.62   CALCIUM 8.7 8.9 9.0               Intake/Output       19 - 1959 19 - 19 0659       Total  Total       Intake    P.O.  --  240 240  --  -- --    P.O. -- 240 240 -- -- --    IV Piggyback  50  -- 50  --  -- --    Volume (mL) (DAPTOmycin (CUBICIN) 1,090 mg in NS 50 mL IVPB) 50 -- 50 -- -- --    Total Intake 50 240 290 -- -- --       Output    Urine  400  -- 400  --  -- --    Urine Void (mL) 400 -- 400 -- -- --    Stool  --  -- --  --  -- --    Number of Times Stooled 1 x -- 1 x -- -- --    Total Output 400 -- 400 -- -- --       Net I/O     -350 240 -110 -- -- --            Intake/Output Summary (Last 24 hours) at 2019 0954  Last data filed at 2019  Gross per 24 hour   Intake 290 ml   Output 400 ml   Net -110 ml            • enoxaparin (LOVENOX) injection  40 mg DAILY   • sulfamethoxazole-trimethoprim  1 Tab Q12HRS   • DAPTOmycin IVPB ([facility] only)  8 mg/kg Q24HR   • metoprolol  25 mg TWICE DAILY   • Respiratory Care per Protocol    Continuous RT   • HYDROmorphone  0.5 mg Q4HRS PRN   • gabapentin  100 mg TID   • HYDROcodone-acetaminophen  1 Tab Q4HRS PRN   • methocarbamol  1,000 mg TID   • senna-docusate  2 Tab BID    And   • polyethylene glycol/lytes  1 Packet QDAY PRN    And   • magnesium hydroxide  30 mL QDAY PRN    And   • bisacodyl  10 mg QDAY PRN   • acetaminophen  650 mg Q6HRS PRN   • ondansetron  4 mg Q4HRS PRN   • ondansetron  4 mg Q4HRS PRN   • promethazine  12.5-25 mg Q4HRS PRN   • promethazine  12.5-25 mg Q4HRS PRN   • prochlorperazine  5-10 mg Q4HRS PRN       Assessment and Plan:  Hospital day # 9   POD # 3   I & D lumbar spine wound: no gross evidence of infection noted per Dr. Jacob     intraop wound cultures: moderate growth of MRSAI      MRSA bacteremia/wound cultures preop: continued ABX per ID, target date to be determined     Prophylactic anticoagulation: yes  from [facility]           Start date/time: as inidcated per hospitalist team: Placed Lovenox order today with directions  to activate after hospitalist approval r/t anemia post cardiac arrest     Sp aborted I & D 8/2/19 r/t to cardiac arrest     Need for defibrillator implant after completion of ABX treatment for LS spine wound  Infection      ST per monitor: was placed on beta blocker during previous hospitalization     Post arrest  Anemia: unclear etiology, per hospitalist team, Hgb preop 11.8 , post event 7.9, on 8/6: 9.8, stable, improving        Ho of LS spine surgery x 2: last laminectomy 7/12/19     Patient may be discharged or transferred any time from [facility] as indicated based on needs for ABX coverage and ongoing wound care.     Will follow every other day.

## 2019-08-08 NOTE — PROGRESS NOTES
Infectious Disease Progress Note    Author: Opal Dwyer M.D. Date & Time of service: 2019  11:35 AM    Chief Complaint:  Post laminectomy wound infection, MRSA     Interval History:  2019-no fevers.  WBC is 8.3 and creatinine is 0.52.  Last sed rate is 87 his ALT is up to 2022 and AST is 68.  His blood cultures are positive from 8/3/2019  2019-no fevers.  Continues to complain of back pain and numbness on his feet.  WBC is 8.7 no CMP available today  2019 no fevers.  Ongoing back pain.  WBCs 8.7 tetanus 0.59 LFTs improved  2019-no fevers.  WBC 8.7 denies any increased back pain.  No new issues overnight.  Review of Systems:  Review of Systems   Constitutional: Negative for chills, fever and malaise/fatigue.   Respiratory: Negative for cough and shortness of breath.    Gastrointestinal: Negative for abdominal pain, constipation, diarrhea, nausea and vomiting.   Musculoskeletal: Positive for back pain. Negative for joint pain and myalgias.   Skin: Negative for rash.   Neurological:        Complains of some numbness in the bottom of his foot       Hemodynamics:  Temp (24hrs), Av.6 °C (97.8 °F), Min:36.3 °C (97.4 °F), Max:36.8 °C (98.2 °F)  Temperature: 36.6 °C (97.8 °F)  Pulse  Av.1  Min: 69  Max: 149   Blood Pressure: 118/78       Physical Exam:  Physical Exam   Constitutional: He is oriented to person, place, and time. He appears well-developed and well-nourished.   Obese   HENT:   Head: Normocephalic and atraumatic.   Eyes: Pupils are equal, round, and reactive to light. Conjunctivae and EOM are normal.   Cardiovascular: Normal rate, regular rhythm and normal heart sounds.   Pulmonary/Chest: Effort normal. He has no wheezes. He has no rales.   Abdominal: Soft. Bowel sounds are normal. He exhibits no distension. There is no tenderness. There is no rebound and no guarding.   Musculoskeletal: He exhibits no edema.   The back incision is clean   Neurological: He is alert and  oriented to person, place, and time.   No gross focal logical deficit   Skin: Skin is warm.   Psychiatric: He has a normal mood and affect. His behavior is normal.   Vitals reviewed.      Meds:    Current Facility-Administered Medications:   •  enoxaparin (LOVENOX) injection  •  sulfamethoxazole-trimethoprim  •  DAPTOmycin IVPB ([facility] only)  •  metoprolol  •  Respiratory Care per Protocol  •  HYDROmorphone  •  gabapentin  •  HYDROcodone-acetaminophen  •  methocarbamol  •  senna-docusate **AND** polyethylene glycol/lytes **AND** magnesium hydroxide **AND** bisacodyl  •  acetaminophen  •  ondansetron  •  ondansetron  •  promethazine  •  promethazine  •  prochlorperazine    Labs:  Recent Labs     08/06/19  0500   WBC 8.7   RBC 3.75*   HEMOGLOBIN 9.8*   HEMATOCRIT 31.2*   MCV 83.2   MCH 26.1*   RDW 39.1   PLATELETCT 340   MPV 8.6*   NEUTSPOLYS 54.50   LYMPHOCYTES 34.60   MONOCYTES 7.80   EOSINOPHILS 2.00   BASOPHILS 0.50     Recent Labs     08/06/19  0500 08/07/19  0248 08/08/19  0238   SODIUM 139 137 136   POTASSIUM 3.7 4.0 4.1   CHLORIDE 103 101 102   CO2 26 24 23   GLUCOSE 107* 94 106*   BUN 10 11 13     Recent Labs     08/06/19  0500 08/07/19  0248 08/08/19  0238   ALBUMIN  --   --  3.8   TBILIRUBIN  --   --  0.4   ALKPHOSPHAT  --   --  67   TOTPROTEIN  --   --  7.9   ALTSGPT  --   --  107*   ASTSGOT  --   --  29   CREATININE 0.53 0.59 0.62       Imaging:  Dx-chest-limited (1 View)    Result Date: 8/2/2019 8/2/2019 5:42 PM HISTORY/REASON FOR EXAM:  Shortness of Breath TECHNIQUE/EXAM DESCRIPTION AND NUMBER OF VIEWS: Single AP view of the chest. COMPARISON: 6/19/2019 FINDINGS: The cardiac silhouette and mediastinal contours are stable. No discrete opacity, pleural fluid, or pneumothorax. The left costophrenic angle is excluded from the image. No suspicious bony lesions.     No evidence of acute cardiopulmonary process.    Dx-spine-any One View    Result Date: 7/12/2019 7/12/2019 12:03 PM HISTORY/REASON FOR  EXAM:  Intraoperative images for procedural navigation. TECHNIQUE/EXAM DESCRIPTION AND NUMBER OF VIEWS:  2 view(s) of the lumbosacral spine.     Fluoroscopic image(s) obtained during posterior lumbar laminectomy. Please see the patient's chart for full procedural details. Fluoroscopy time 2 seconds.     Mr-lumbar Spine-with & W/o    Result Date: 8/1/2019 8/1/2019 3:24 PM HISTORY/REASON FOR EXAM:  Recent back surgery with wound drainage and elevated sed rate TECHNIQUE/EXAM DESCRIPTION: MRI of the lumbar spine without and with contrast. The study was performed on a [employer] Signa 1.5 Gena MRI scanner. T1 sagittal, T2 fast spin-echo sagittal, and T2 axial images were obtained of the lumbar spine. T1 postcontrast fat-suppressed sagittal images were obtained. 14 mL Gadavist contrast was administered intravenously. COMPARISON:  7/8/2019 FINDINGS: Redemonstrated are postsurgical changes status post laminectomies from L2 L3-L5 S1. There is extensive enhancement and edema in the midline posterior soft tissues and the lower paraspinal musculature which is similar to prior study and may represent postsurgical changes or infection. The previously seen superficial fluid collection appears slightly decreased in size. There is a new ill-defined rim-enhancing fluid in the deep soft tissues laminectomy bed most conspicuous at the L5 level measuring approximately 3 x 2.1 cm. Developing abscess is not excluded. There is extensive enhancement of the cauda equina nerve roots extending from the visualized conus medullaris through the S1 level which is concerning for infection. Conus medullaris terminates at T12-L1. T12-L2: Canal and foramina are patent. L2-L3: Disc bulge and posterior central disc extrusion again noted resulting in moderate spinal stenosis. L3-L4: Facet degeneration. No significant spinal stenosis. L4-L5: Disc degeneration, disc osteophyte and facet degeneration. There is moderate thecal sac stenosis. L5-S1: Posterior  central left paracentral disc protrusion which flattens the ventral thecal sac. Bilateral facet degeneration. No significant spinal stenosis. Bilateral foraminal stenosis.     1.  Stable postsurgical changes L3-L5 laminectomies. Interval ill-defined nonenhancing fluid within the laminectomy bed most pronounced at the L4-L5 level. Infection should be excluded clinically. 2.  There is diffuse enhancement of the cauda equina nerve roots which is presumably infectious/inflammatory. 3.  No evidence of discitis osteomyelitis or epidural abscess. 4.  Degenerative changes as detailed above with persistent moderate thecal sac stenosis at L2-L3 and L4-L5.    Mr-lumbar Spine-with & W/o    Result Date: 7/8/2019 7/8/2019 11:34 AM HISTORY/REASON FOR EXAM:  Back pain or radiculopathy, > 6 wks; Back pain, cauda equina syndrome suspected Laminectomy June 17. New weakness for 3 days. TECHNIQUE/EXAM DESCRIPTION: MRI of the lumbar spine without and with contrast. The study was performed on a [employer] Signa 1.5 Gena MRI scanner. T1 sagittal, T2 fast spin-echo sagittal, and T2 axial images were obtained of the lumbar spine. T1 postcontrast fat-suppressed sagittal images were obtained. 15 mL Gadavist contrast was administered intravenously. COMPARISON:  6/19/2019 MRI. FINDINGS: Motion degrades the study. Straightening of the normal lordosis is again seen Multiple Schmorl's nodes are again demonstrated No marrow edema is seen No T2 hyperintensity is seen suspicious for discitis Conus terminates normal in position at L1. Visualized nerve roots show no abnormal clumping to suggest arachnoiditis. There is however degenerative disc disease causing central stenosis as discussed below. T2 hyperintense fluid collection is newly identified in the deep subcutaneous fat posteriorly spanning L2/3-L5 measuring 10 x 2.8 x 1.5 cm and there is rim enhancement. No abnormal epidural fluid collection is seen to suggest abscess. L3-L5 laminectomies have been  performed. Findings by level: T12/L1: No stenosis L1/2: No stenosis L2/3: Mild disc height loss, right paracentral protrusion, ligamentum flavum redundancy, facet arthropathy, short pedicles. This results in unchanged moderate to severe central stenosis, especially on the right. There is some epidural enhancement ventrally which is unchanged and likely related to the protrusion. Borderline foraminal stenosis L3/4: Stable facet arthropathy and ligamentum flavum redundancy with a mild disc bulge, short pedicles. This results in persistent moderate central and borderline foraminal stenosis L4/5: Stable right paracentral protrusion, disc bulge. Laminectomy with partial facetectomy. No significant central stenosis but the right greater than left foramina are again moderately narrowed L5/S1: Stable left paracentral protrusion, facet arthropathy, mild-moderate central and mild foraminal stenoses as before     1.  New 10 x 3 x 1 cm deep subcutaneous fat fluid collection differential includes seroma with or without superimposed infection cannot 2.  Otherwise stable lumbar spine MRI with contrast following L3-L5 laminectomies 3.  Moderate multilevel central stenoses greatest at L2/3 are again seen, mostly secondary to facet arthropathy and developmentally short pedicles as specifically detailed above 4.  Lesser foraminal stenoses are unchanged as detailed above    Mr-thoracic Spine-with & W/o    Result Date: 7/8/2019 7/8/2019 11:41 AM HISTORY/REASON FOR EXAM:  Mid-back pain; Bilateral LE weakness TECHNIQUE/EXAM DESCRIPTION: MRI of the thoracic spine without and with contrast. The study was performed on a [employer] 1.5 Gena MRI scanner. T1 sagittal, T2 fast spin-echo sagittal, and T2 axial images were obtained of the thoracic spine. T1 post-contrast fat suppressed sagittal images were obtained. Optional T1 post-contrast axial images may be obtained. 15 mL Gadavist contrast was administered intravenously. COMPARISON:  None.  FINDINGS: Straightening of the normal kyphosis. Several small nonedematous Schmorl's nodes are seen Normal alignment The visualized spinal cord is normal in signal and morphology There are multiple disc protrusions but at no point is central stenosis moderate or greater in severity. Several levels of cord abutment are identified, noted best anteriorly on the left at T9/10. Protrusions are also seen on the right at T2/3, T4/5, left T5/6, left and right at T8/9. Mild right T2/3 foraminal stenosis related to disc protrusion Otherwise patent foramina Paraspinous soft tissues are normal in appearance After contrast is administered no abnormal enhancement is seen No abnormal fluid collection. No epidural abscess. No abnormal disc signal to suggest discitis     Multilevel degenerative disc disease results in cord abutment but no cord compression Straightening of the normal lordosis with multiple chronic Schmorl's nodes Mild right T2/3 foraminal stenosis    Dx-portable Fluoro > 1 Hour    Result Date: 7/15/2019  7/12/2019 12:03 PM HISTORY/REASON FOR EXAM:  Posterior lumbar laminectomy L3-S1, Main OR TECHNIQUE/EXAM DESCRIPTION AND NUMBER OF VIEWS: Portable fluoroscopy for greater than one hour FINDINGS:      The portable fluoroscopy unit was obligated to the procedure for greater than one hour.   Actual fluoro time was 2 seconds.     Portable fluoroscopy utilized for 2 seconds.    Ct-cta Chest Pulmonary Artery W/ Recons    Result Date: 8/3/2019  8/3/2019 9:11 PM HISTORY/REASON FOR EXAM:  Shortness of breath; tachycardia, post-op, high risk for PE. TECHNIQUE/EXAM DESCRIPTION: CT angiogram scan for pulmonary embolism with contrast, with reconstructions. 1.25 mm helical sections were obtained from the lung apices through the lung bases following the rapid bolus administration of 80 mL of Omnipaque 350 nonionic contrast. Thin-section overlapping reconstruction interval was utilized. Coronal reconstructions were generated from the  axial data. MIP post processing was performed and utilized for the interpretation. Low dose optimization technique was utilized for this CT exam including automated exposure control and adjustment of the mA and/or kV according to patient size. COMPARISON: None FINDINGS: Pulmonary Embolism: There is somewhat suboptimal opacification of the pulmonary arterial structures, there is no definite evidence of large or central pulmonary emboli. Small peripheral emboli cannot be absolutely excluded. . Lungs: Bilateral lower lobe discoid atelectasis.. Pleura: No pleural effusion. Nodes: No enlarged lymph nodes. Additional findings: Unusual curvilinear lucency in the right hepatic lobe measuring about 4 x 1.9 cm diameter..     1.  No definite evidence of pulmonary emboli, within the limitations described above. 2.  Bilateral lower lobe discoid atelectasis. 3.  Unusual curvilinear lucency in the right hepatic lobe which may represent scarring. The appearance is not at least typical for a hepatic solid mass or cyst.     Ec-echocardiogram Complete W/ Cont    Result Date: 8/2/2019  Transthoracic Echo Report Echocardiography Laboratory CONCLUSIONS Prior echocardiogram 6/21/2019.Technically difficult study - adequate information is obtained. Contrast was used to enhance visualization of the endocardial border. Normal left ventricular systolic function. Left ventricular ejection fraction is visually estimated to be 65%. Normal regional wall motion. Right ventricle not well visualized but visually appears Mildly enlarged. The left atrium is normal in size. The mitral valve is not well visualized. No stenosis or regurgitation seen. Structurally normal aortic valve without significant stenosis or regurgitation. Normal pericardium without effusion. JOHNSON PEOPLES Exam Date:         08/02/2019                    19:04 Exam Location:     Inpatient Priority:          Routine Ordering Physician:        NICCI HERNANDEZ Referring  Physician: Sonographer:               Candy James RDCS Age:    23     Gender:    M MRN:    [id number] :    1995 BSA:    2.55   Ht (in):    72     Wt (lb):    304 Exam Type:     Complete, Contrast Indications:     Cardiac arrest, cause unspecified ICD Codes:       I46.9 CPT Codes:       41782,  BP:   121    /   84     HR:   140 Technical Quality:       Technically difficult study -                          adequate information is obtained MEASUREMENTS  (Male / Female) Normal Values 2D ECHO LV Diastolic Diameter PLAX        4.2 cm                4.2 - 5.9 / 3.9 - 5.3 cm LV Systolic Diameter PLAX         3.7 cm                2.1 - 4.0 cm IVS Diastolic Thickness           1.3 cm                LVPW Diastolic Thickness          1 cm                  LVOT Diameter                     2.4 cm                Estimated LV Ejection Fraction    65 %                  LV Ejection Fraction MOD BP       52.1 %                >= 55  % LV Ejection Fraction MOD 4C       56.7 %                LV Ejection Fraction MOD 2C       47.6 %                IVC Diameter                      0.79 cm               DOPPLER AV Peak Velocity                  0.94 m/s              AV Peak Gradient                  3.6 mmHg              AV Mean Gradient                  2.2 mmHg              LVOT Peak Velocity                0.89 m/s              AV Area Cont Eq vti               4.1 cm²               MV Velocity Time Integral         11 cm                 Mitral E Point Velocity           0.84 m/s              MV Pressure Half Time             28.8 ms               MV Area PHT                       7.6 cm²               * Indicates values subject to auto-interpretation LV EF:  65    % FINDINGS Left Ventricle Contrast was used to enhance visualization of the endocardial border. 3 mL of contrast was administered. Existing IV was used. Located at the left hand. Normal left ventricular chamber size. Normal left ventricular wall thickness.  Normal left ventricular systolic function. Left ventricular ejection fraction is visually estimated to be 65%. Normal regional wall motion. Diastolic function is difficult to assess with tachycardia. Right Ventricle Right ventricle not well visualized but visually appears Mildly enlarged. Right Atrium The right atrium is normal in size. Inferior vena cava small in size and collapsed due to patient's fluids being low. Left Atrium The left atrium is normal in size. Left atrial volume index is 8 mL/sq m. Mitral Valve The mitral valve is not well visualized. No stenosis or regurgitation seen. Aortic Valve Structurally normal aortic valve without significant stenosis or regurgitation. Tricuspid Valve Structurally normal tricuspid valve without significant stenosis or regurgitation. Pulmonic Valve Structurally normal pulmonic valve without significant stenosis or regurgitation. Pericardium Normal pericardium without effusion. Fat pad present. Aorta The aortic root is normal. Ascending aorta diameter is 2.8 cm. Serg Lopes M.D. (Electronically Signed) Final Date:     02 August 2019                 22:34      Micro:  Results     Procedure Component Value Units Date/Time    CULTURE WOUND W/ GRAM STAIN [[id number]]  (Abnormal)  (Susceptibility) Collected:  08/05/19 1919    Order Status:  Completed Specimen:  Wound Updated:  08/08/19 0947     Significant Indicator POS     Source WND     Site Back     Culture Result Growth noted after further incubation, see below for  organism identification.       Gram Stain Result Few WBCs.  Few Gram positive cocci.       Culture Result Methicillin Resistant Staphylococcus aureus  Moderate growth  This isolate is presumed to be clindamycin resistant based on  detection of inducible resistance.  Clindamycin may still  be effective in some patients.      Narrative:       CALL  Silva  171 tel. [phone],  CALLED  171 tel. [phone] 08/07/2019, 10:22, RB PERF. RESULTS  "CALLED  TO:[id number]  Surgery - swabs received    Susceptibility     Methicillin resistant staphylococcus aureus (1)     Antibiotic Interpretation Microscan Method Status    Clindamycin Resistant <=0.5 mcg/mL PARRISH Final    Daptomycin Sensitive <=0.5 mcg/mL PARRISH Final    Erythromycin Resistant >4 mcg/mL PARRISH Final    Moxifloxacin Resistant >4 mcg/mL PARRISH Final    Ampicillin/sulbactam Resistant 16/8 mcg/mL PARRISH Final    Oxacillin Resistant >2 mcg/mL PARRISH Final    Vancomycin Sensitive 2 mcg/mL PARRISH Final    Penicillin Resistant >8 mcg/mL PARRISH Final    Trimeth/Sulfa Sensitive <=0.5/9.5 mcg/mL PARRISH Final    Tetracycline Sensitive <=4 mcg/mL PARRISH Final                   Anaerobic Culture [[id number]] Collected:  08/05/19 1919    Order Status:  Completed Specimen:  Wound Updated:  08/08/19 0947     Significant Indicator NEG     Source WND     Site Back     Culture Result No Anaerobes isolated.    Narrative:       CALL  Silva  171 tel. [phone],  CALLED  171 tel. [phone] 08/07/2019, 10:22, RB PERF. RESULTS CALLED  TO:[phone]  Surgery - swabs received    GRAM STAIN [[id number]] Collected:  08/05/19 1919    Order Status:  Completed Specimen:  Wound Updated:  08/07/19 1027     Significant Indicator .     Source WND     Site Back     Gram Stain Result Few WBCs.  Few Gram positive cocci.      Narrative:       CALL  Silva  171 tel. [phone],  CALLED  171 tel. [phone] 08/07/2019, 10:22, RB PERF. RESULTS CALLED  TO:[phone]  Surgery - swabs received    BLOOD CULTURE [[id number]] Collected:  08/06/19 0500    Order Status:  Completed Specimen:  Blood from Peripheral Updated:  08/07/19 0746     Significant Indicator NEG     Source BLD     Site PERIPHERAL     Culture Result No Growth  Note: Blood cultures are incubated for 5 days and  are monitored continuously.Positive blood cultures  are called to the RN and reported as soon as  they are identified.      Narrative:       Contact  Per Hospital Policy: Only change Specimen Src: to \"Line\" " "if  specified by physician order.  Right Hand    BLOOD CULTURE [[id number]] Collected:  08/06/19 0500    Order Status:  Completed Specimen:  Blood from Peripheral Updated:  08/07/19 0746     Significant Indicator NEG     Source BLD     Site PERIPHERAL     Culture Result No Growth  Note: Blood cultures are incubated for 5 days and  are monitored continuously.Positive blood cultures  are called to the RN and reported as soon as  they are identified.      Narrative:       Contact  Per Hospital Policy: Only change Specimen Src: to \"Line\" if  specified by physician order.  Left Hand    BLOOD CULTURE [[id number]]  (Abnormal)  (Susceptibility) Collected:  08/03/19 1550    Order Status:  Completed Specimen:  Blood from Peripheral Updated:  08/06/19 1028     Significant Indicator POS     Source BLD     Site PERIPHERAL     Culture Result Growth detected by Bactec instrument. 08/04/2019  12:03  Methicillin Resistant Staphylococcus aureus (MRSA)  detected by PCR.        Methicillin Resistant Staphylococcus aureus  This isolate is presumed to be clindamycin resistant based on  detection of inducible resistance.  Clindamycin may still  be effective in some patients.      Narrative:       CALL  Silva  171 tel. [phone],  CALLED  171 tel. [phone] 08/04/2019, 16:21, RB PERF. RESULTS CALLED TO:  [phone]  [id number] RAYA CHRISTIANSEN  Per Hospital Policy: Only change Specimen Src: to \"Line\" if  specified by physician order.  Right Hand    Susceptibility     Methicillin resistant staphylococcus aureus (1)     Antibiotic Interpretation Microscan Method Status    Clindamycin Resistant <=0.5 mcg/mL PARRISH Final    Daptomycin Sensitive 1 mcg/mL PARRISH Final    Erythromycin Resistant >4 mcg/mL PARRISH Final    Moxifloxacin Resistant >4 mcg/mL PARRISH Final    Ampicillin/sulbactam Resistant 16/8 mcg/mL PARRISH Final    Oxacillin Resistant >2 mcg/mL PARRISH Final    Vancomycin Sensitive 2 mcg/mL PARRISH Final    Penicillin Resistant >8 mcg/mL PARRISH Final    " "Trimeth/Sulfa Sensitive <=0.5/9.5 mcg/mL PARRISH Final    Tetracycline Sensitive <=4 mcg/mL PARRISH Final                   BLOOD CULTURE [[id number]] Collected:  08/03/19 1550    Order Status:  Completed Specimen:  Blood from Peripheral Updated:  08/04/19 0741     Significant Indicator NEG     Source BLD     Site PERIPHERAL     Culture Result No Growth  Note: Blood cultures are incubated for 5 days and  are monitored continuously.Positive blood cultures  are called to the RN and reported as soon as  they are identified.      Narrative:       [id number] RAYA CHRISTIANSNE  Per Hospital Policy: Only change Specimen Src: to \"Line\" if  specified by physician order.  Left Hand    CULTURE WOUND W/ GRAM STAIN [[id number]]  (Abnormal)  (Susceptibility) Collected:  07/31/19 1245    Order Status:  Completed Specimen:  Wound from Back Updated:  08/02/19 0932     Significant Indicator POS     Source WND     Site BACK     Culture Result Light growth mixed skin zander.     Gram Stain Result Few WBCs.  Few Gram positive cocci.  Few Gram positive rods.       Culture Result Methicillin Resistant Staphylococcus aureus  Heavy growth  Methicillin resistant by screening method  This isolate is presumed to be clindamycin resistant based on  detection of inducible resistance.  Clindamycin may still  be effective in some patients.      Narrative:       CALL  Silva  GAY tel. [phone],  CALLED  GAY tel. [phone] 08/02/2019, 09:31, RB PERF. RESULTS CALLED  TO:[phone] RN    Susceptibility     Methicillin resistant staphylococcus aureus (1)     Antibiotic Interpretation Microscan Method Status    Clindamycin Resistant <=0.5 mcg/mL PARRISH Final    Daptomycin Sensitive <=0.5 mcg/mL PARIRSH Final    Erythromycin Resistant >4 mcg/mL PARRISH Final    Moxifloxacin Resistant >4 mcg/mL PARRISH Final    Ampicillin/sulbactam Resistant 16/8 mcg/mL PARRISH Final    Oxacillin Resistant >2 mcg/mL PARRISH Final    Vancomycin Sensitive 1 mcg/mL PARRISH Final    Penicillin Resistant >8 mcg/mL " PARRISH Final    Trimeth/Sulfa Sensitive <=0.5/9.5 mcg/mL PARRISH Final    Tetracycline Sensitive <=4 mcg/mL PARRISH Final                         Assessment:  Active Hospital Problems    Diagnosis   • *Cardiac arrest (HCC) [I46.9]   • Elevated liver enzymes [R74.8]   • Morbidly obese (HCC) [E66.01]   • Infected surgical wound [T81.49XA]   • Other inflammatory polyneuropathies (HCC) [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  admitted 7/31/2019. Pt has a past medical history of congenital spinal stenosis and severe L4-L5 stenosis with disc herniation as well as stenosis L2-L3 and L5-S5 with obesity and chronic pain.  The patient had L4, L5 and S1 decompressive laminectomy, bilateral medial facetectomies and bilateral foraminoctomy's with aborted L2-L4 laminectomies due to loss of signal on 6/17/2019.  He had MRI spine on 7/8 with a new 10 x 3 x 1 cm deep subcutaneous fat fluid collection thought to be seroma but superimposed infection not ruled out. He then went back to the OR on 7/12 for L2- S1 laminectomy and L4-L5 microdiscectomy.  Per note there were no complications.       He has leaking wound vac with drainage and clean-out on 7/25.  He presented to the ED on 7/27 again with leaking wound VAC but was not admitted. He again returned on 7/31 due to concerns for ongoing worsening wound drainage and was admitted for further work-up.        Plan    MRSA bacteremia  Blood cultures are positive from 8/3/2019  Patient was started on daptomycin   Repeat blood cultures x2 Are negative from 8/6/2019  Since the PARRISH is 1 to daptomycin add p.o. Bactrim  PICC line  Aim for 6 weeks of antibiotics at least    Back infection  Antibiotics as above  Underwent I&D on 8/5/2019  Those OR cultures are now MRSA    Cardiac arrest  On 8/2/2019 patient had developed ventricular tachycardia followed by  ventricular fibrillation  He is being followed by cardiology    Increased LFTs  Monitor in a.m.  May be as consequence of above  Improving    Congenital spinal stenosis  Status post surgeries as above

## 2019-08-08 NOTE — DISCHARGE PLANNING
Received Choice form at [phone]  Agency/Facility Name: [facility]  Referral sent per Choice form @ [phone]

## 2019-08-08 NOTE — DIETARY
Nutrition Services: Update/Weekly Rescreen   Day 7 of admit.  Sammy Edmonds is a 23 y.o. male with admitting DX of wound drainage.    Pt is on a regular diet and per chart, pt is consistently consuming >50% of meals.  Pt is eating adequately.   Per Wound team note from 8/7, pt noted with dehisced wound to incision on back - RD reviewed clinical picture.  +Wound VAC.  Pt is eating >50%, though RD will provide additional protein snacks to aid with wound healing; pt would also benefit from vitamin therapy.      -Wts have fluctuated this admit - questions accuracy of bed scale wts.  Wt from 7/31 was 138 kg via bed scale, which is stable with wet from 8/7 of 138.2 kg via bed scale.    Malnutrition Risk: No risk identified.     Recommendations/Plan:  1. High protein snacks BID.  2. Encourage intake of meals and snacks.  3. Document intake of all meals and snacks as % taken in ADL's to provide interdisciplinary communication across all shifts.   4. Monitor weight.  5. Nutrition rep will continue to see patient for ongoing meal and snack preferences.  6. Daily Vitamin Therapy for wound healing: Multivitamin with minerals and 500 mg Vitamin C.    Please consult RD as needed.

## 2019-08-08 NOTE — PROGRESS NOTES
Assumed care of PT A&O 4. Pt resting in bed with no signs of labored breathing. On RA. Tele monitor in place, cardiac rhythm being monitored. Call light within reach, bed in lowest position, upper bed rails up. Pt was updated on plan of care for the day. Will continue to monitor.

## 2019-08-09 LAB
ANION GAP SERPL CALC-SCNC: 10 MMOL/L (ref 0–11.9)
BUN SERPL-MCNC: 12 MG/DL (ref 8–22)
CALCIUM SERPL-MCNC: 9.1 MG/DL (ref 8.5–10.5)
CHLORIDE SERPL-SCNC: 103 MMOL/L (ref 96–112)
CO2 SERPL-SCNC: 24 MMOL/L (ref 20–33)
CREAT SERPL-MCNC: 0.74 MG/DL (ref 0.5–1.4)
GLUCOSE SERPL-MCNC: 103 MG/DL (ref 65–99)
POTASSIUM SERPL-SCNC: 3.9 MMOL/L (ref 3.6–5.5)
SODIUM SERPL-SCNC: 137 MMOL/L (ref 135–145)

## 2019-08-09 PROCEDURE — 700111 HCHG RX REV CODE 636 W/ 250 OVERRIDE (IP): Performed by: INTERNAL MEDICINE

## 2019-08-09 PROCEDURE — 700111 HCHG RX REV CODE 636 W/ 250 OVERRIDE (IP): Performed by: HOSPITALIST

## 2019-08-09 PROCEDURE — A9270 NON-COVERED ITEM OR SERVICE: HCPCS | Performed by: INTERNAL MEDICINE

## 2019-08-09 PROCEDURE — 99232 SBSQ HOSP IP/OBS MODERATE 35: CPT | Performed by: HOSPITALIST

## 2019-08-09 PROCEDURE — 700102 HCHG RX REV CODE 250 W/ 637 OVERRIDE(OP): Performed by: INTERNAL MEDICINE

## 2019-08-09 PROCEDURE — 700102 HCHG RX REV CODE 250 W/ 637 OVERRIDE(OP): Performed by: HOSPITALIST

## 2019-08-09 PROCEDURE — A9270 NON-COVERED ITEM OR SERVICE: HCPCS | Performed by: HOSPITALIST

## 2019-08-09 PROCEDURE — 700105 HCHG RX REV CODE 258: Performed by: INTERNAL MEDICINE

## 2019-08-09 PROCEDURE — 770020 HCHG ROOM/CARE - TELE (206)

## 2019-08-09 PROCEDURE — 97606 NEG PRS WND THER DME>50 SQCM: CPT

## 2019-08-09 PROCEDURE — 80048 BASIC METABOLIC PNL TOTAL CA: CPT

## 2019-08-09 PROCEDURE — 700111 HCHG RX REV CODE 636 W/ 250 OVERRIDE (IP): Performed by: NURSE PRACTITIONER

## 2019-08-09 PROCEDURE — 99232 SBSQ HOSP IP/OBS MODERATE 35: CPT | Performed by: INTERNAL MEDICINE

## 2019-08-09 RX ORDER — AMOXICILLIN 250 MG
2 CAPSULE ORAL 2 TIMES DAILY
Qty: 30 TAB | Refills: 0 | Status: SHIPPED | OUTPATIENT
Start: 2019-08-09

## 2019-08-09 RX ORDER — ONDANSETRON 4 MG/1
4 TABLET, ORALLY DISINTEGRATING ORAL EVERY 4 HOURS PRN
Qty: 10 TAB | Refills: 0 | Status: SHIPPED | OUTPATIENT
Start: 2019-08-09

## 2019-08-09 RX ORDER — GABAPENTIN 100 MG/1
100 CAPSULE ORAL 3 TIMES DAILY
Qty: 90 CAP | Refills: 3 | Status: SHIPPED | OUTPATIENT
Start: 2019-08-09

## 2019-08-09 RX ORDER — SULFAMETHOXAZOLE AND TRIMETHOPRIM 800; 160 MG/1; MG/1
1 TABLET ORAL EVERY 12 HOURS
Qty: 52 TAB | Refills: 0 | Status: SHIPPED | OUTPATIENT
Start: 2019-08-09 | End: 2019-08-16

## 2019-08-09 RX ORDER — HYDROMORPHONE HYDROCHLORIDE 1 MG/ML
0.5 INJECTION, SOLUTION INTRAMUSCULAR; INTRAVENOUS; SUBCUTANEOUS EVERY 4 HOURS PRN
Status: DISCONTINUED | OUTPATIENT
Start: 2019-08-09 | End: 2019-08-16 | Stop reason: HOSPADM

## 2019-08-09 RX ORDER — ACETAMINOPHEN 325 MG/1
650 TABLET ORAL EVERY 6 HOURS PRN
Qty: 30 TAB | Refills: 0 | Status: SHIPPED | OUTPATIENT
Start: 2019-08-09 | End: 2019-08-17

## 2019-08-09 RX ADMIN — METHOCARBAMOL TABLETS 1000 MG: 500 TABLET, COATED ORAL at 17:29

## 2019-08-09 RX ADMIN — GABAPENTIN 100 MG: 100 CAPSULE ORAL at 17:29

## 2019-08-09 RX ADMIN — HYDROCODONE BITARTRATE AND ACETAMINOPHEN 1 TABLET: 5; 325 TABLET ORAL at 00:40

## 2019-08-09 RX ADMIN — METHOCARBAMOL TABLETS 1000 MG: 500 TABLET, COATED ORAL at 05:40

## 2019-08-09 RX ADMIN — GABAPENTIN 100 MG: 100 CAPSULE ORAL at 12:32

## 2019-08-09 RX ADMIN — METOPROLOL TARTRATE 25 MG: 25 TABLET, FILM COATED ORAL at 17:29

## 2019-08-09 RX ADMIN — DAPTOMYCIN 1090 MG: 500 INJECTION, POWDER, LYOPHILIZED, FOR SOLUTION INTRAVENOUS at 12:32

## 2019-08-09 RX ADMIN — HYDROMORPHONE HYDROCHLORIDE 0.5 MG: 2 INJECTION INTRAMUSCULAR; INTRAVENOUS; SUBCUTANEOUS at 13:24

## 2019-08-09 RX ADMIN — SULFAMETHOXAZOLE AND TRIMETHOPRIM 1 TABLET: 800; 160 TABLET ORAL at 05:39

## 2019-08-09 RX ADMIN — ENOXAPARIN SODIUM 40 MG: 100 INJECTION SUBCUTANEOUS at 05:39

## 2019-08-09 RX ADMIN — METOPROLOL TARTRATE 25 MG: 25 TABLET, FILM COATED ORAL at 05:41

## 2019-08-09 RX ADMIN — METHOCARBAMOL TABLETS 1000 MG: 500 TABLET, COATED ORAL at 12:32

## 2019-08-09 RX ADMIN — GABAPENTIN 100 MG: 100 CAPSULE ORAL at 05:40

## 2019-08-09 RX ADMIN — SULFAMETHOXAZOLE AND TRIMETHOPRIM 1 TABLET: 800; 160 TABLET ORAL at 17:29

## 2019-08-09 ASSESSMENT — ENCOUNTER SYMPTOMS
BACK PAIN: 1
NECK PAIN: 0
CONSTIPATION: 0
SHORTNESS OF BREATH: 0
CHILLS: 0
DIARRHEA: 0
PALPITATIONS: 0
SENSORY CHANGE: 0
ABDOMINAL PAIN: 0
VOMITING: 0
DIZZINESS: 0
HEADACHES: 0
COUGH: 0
FEVER: 0
NAUSEA: 0
FOCAL WEAKNESS: 1
MYALGIAS: 0

## 2019-08-09 ASSESSMENT — PAIN SCALES - WONG BAKER: WONGBAKER_NUMERICALRESPONSE: DOESN'T HURT AT ALL

## 2019-08-09 NOTE — PROGRESS NOTES
Report received from Marianne AZEVEDO. Patient asleep in bed. Monitor in place. NAD noted. Patient breathing regular and unlabored. Bed low and locked with call bell in reach. No needs at this time. Will continue to monitor.

## 2019-08-09 NOTE — DISCHARGE PLANNING
Interval History: 80 year old man with COPd exacerbation .  VQ scan is negative for PE (low probability)    HPI:  80 year old man with history of COPD on home oxygen and trilogy vent at home,pulmonary hypertension (PAP 78) from echo done 06/2016, s/p AVR , CHF -with EF 45-50% who was admitted with worsening dyspnea that started at around 1 pm on day of admission. He denies any history of long distance travel, calf pain but has cough . There is also associated history of fever -T max 101. Since admission, ER vitals showed     Initial Vitals  BP Pulse Resp  Temp                          SpO2    105/64 123 40 101.5 °F (38.6 °C) 63 %  Initial work up showed lactic acid-5.1,   CBC -wbc 12.9, plat 100 ,seg 74.3. ,serum creatinine 1.6.,T bili 2.0.  Chest x-ray did not show any acute findings.UA had normal nitrite and LE. BNP 1524  He was seen and examined at bedside with his wife.          Review of Systems   Constitutional: Negative for activity change, appetite change, chills, diaphoresis, fatigue and fever.   HENT: Negative for congestion, dental problem, ear discharge, ear pain and facial swelling.    Eyes: Negative for pain, discharge and itching.   Respiratory: Positive for cough and shortness of breath. Negative for apnea, chest tightness and wheezing.    Cardiovascular: Negative for chest pain and leg swelling.   Gastrointestinal: Negative for abdominal distention and abdominal pain.   Endocrine: Negative for cold intolerance, heat intolerance and polydipsia.   Genitourinary: Negative for difficulty urinating, dysuria and enuresis.   Musculoskeletal: Negative for arthralgias and back pain.   Skin: Negative for color change and pallor.   Allergic/Immunologic: Negative for environmental allergies and food allergies.   Neurological: Negative for dizziness, facial asymmetry, light-headedness and headaches.   Hematological: Negative for adenopathy. Does not bruise/bleed easily.   Psychiatric/Behavioral: Negative for  Received Choice form at 0820  Agency/Facility Name: [facility]  Referral sent per Choice form @ 0820    Agency/Facility Name: [facility]   Outcome: Sent infusion order.     @1035  Agency/Facility Name: [facility]  Outcome: Epic failed to send, resent.    @1245  Agency/Facility Name: [facility]  Outcome: Epic failed to send again, resent via rightfax and manually.     agitation and behavioral problems.     Objective:     Vital Signs (Most Recent):  Temp: 97.4 °F (36.3 °C) (01/04/17 2355)  Pulse: 65 (01/04/17 2355)  Resp: 20 (01/04/17 2355)  BP: (!) 97/48 (01/04/17 2355)  SpO2: (!) 90 % (01/04/17 2355) Vital Signs (24h Range):  Temp:  [97.4 °F (36.3 °C)-98.3 °F (36.8 °C)] 97.4 °F (36.3 °C)  Pulse:  [55-90] 65  Resp:  [0-27] 20  BP: ()/(46-73) 97/48     Weight: 88.5 kg (195 lb 1.7 oz)  Body mass index is 27.21 kg/(m^2).    Estimated Creatinine Clearance: 37.5 mL/min (based on Cr of 1.7).    Physical Exam   Constitutional: He is oriented to person, place, and time. He appears well-developed and well-nourished.   HENT:   Head: Normocephalic and atraumatic.   Nose: Nose normal.   Eyes: EOM are normal. Pupils are equal, round, and reactive to light.   Neck: Normal range of motion. Neck supple.   Cardiovascular: Regular rhythm.    tachycardia   Pulmonary/Chest: No respiratory distress. He has no wheezes. He has no rales.   Mild tachypnoea   Abdominal: There is no tenderness.   Musculoskeletal: Normal range of motion. He exhibits no edema.   Neurological: He is alert and oriented to person, place, and time.   Skin: Skin is dry.       Significant Labs:   Blood Culture:   Recent Labs  Lab 01/03/17  0210 01/03/17  0225   LABBLOO No Growth to date  No Growth to date No Growth to date  No Growth to date     All pertinent labs within the past 24 hours have been reviewed.    Significant Imaging: I have reviewed all pertinent imaging results/findings within the past 24 hours.

## 2019-08-09 NOTE — DISCHARGE PLANNING
ATTN: Case Management  RE: Referral for Home Health    Please inform Home Health when the patient is discharging.    As of 08/09/19, we have accepted the Home Health referral for the patient listed above.    [facility] will be out to see the patient within 48 hours. If you have any questions or concerns regarding the patient’s transition to Home Health, please do not hesitate to contact us at [phone].      We look forward to collaborating with you,  [facility] Team

## 2019-08-09 NOTE — PROGRESS NOTES
Neurosurgery Progress Note    Subjective:  No c/o, walked room distances, a few steps in hallway    Exam:  Wound vac in place with serosang. Drainage  Per wound picture: wound bed with good granulation tissue     NM: no change to previous exam with persistent but improved left foot drop at 4 to 4+/5 DF/PF, EHL 4/5      BP  Min: 108/74  Max: 152/82  Pulse  Av.2  Min: 90  Max: 122  Resp  Av.2  Min: 17  Max: 19  Temp  Av.2 °C (97.2 °F)  Min: 35.8 °C (96.5 °F)  Max: 36.6 °C (97.9 °F)  SpO2  Av.2 %  Min: 94 %  Max: 98 %    No data recorded        Recent Labs     19  0248 19  0238 19  0220   SODIUM 137 136 137   POTASSIUM 4.0 4.1 3.9   CHLORIDE 101 102 103   CO2 24 23 24   GLUCOSE 94 106* 103*   BUN 11 13 12   CREATININE 0.59 0.62 0.74   CALCIUM 8.9 9.0 9.1               Intake/Output       19 07 - 19 0659 19 07 - 08/10/19 0659       Total 1900-0659 Total       Intake    IV Piggyback  50  -- 50  --  -- --    Volume (mL) (DAPTOmycin (CUBICIN) 1,090 mg in NS 50 mL IVPB) 50 -- 50 -- -- --    Total Intake 50 -- 50 -- -- --       Output    Urine  200  850 1050  --  -- --    Urine Void (mL)  -- -- --    Stool  --  -- --  --  -- --    Number of Times Stooled 1 x -- 1 x -- -- --    Total Output  -- -- --       Net I/O     -150 -850 -1000 -- -- --            Intake/Output Summary (Last 24 hours) at 2019 1025  Last data filed at 2019 0542  Gross per 24 hour   Intake 50 ml   Output 1050 ml   Net -1000 ml            • enoxaparin (LOVENOX) injection  40 mg DAILY   • sulfamethoxazole-trimethoprim  1 Tab Q12HRS   • DAPTOmycin IVPB ([facility] only)  8 mg/kg Q24HR   • metoprolol  25 mg TWICE DAILY   • Respiratory Care per Protocol   Continuous RT   • HYDROmorphone  0.5 mg Q4HRS PRN   • gabapentin  100 mg TID   • HYDROcodone-acetaminophen  1 Tab Q4HRS PRN   • methocarbamol  1,000 mg TID   • senna-docusate  2 Tab  BID    And   • polyethylene glycol/lytes  1 Packet QDAY PRN    And   • magnesium hydroxide  30 mL QDAY PRN    And   • bisacodyl  10 mg QDAY PRN   • acetaminophen  650 mg Q6HRS PRN   • ondansetron  4 mg Q4HRS PRN   • ondansetron  4 mg Q4HRS PRN   • promethazine  12.5-25 mg Q4HRS PRN   • promethazine  12.5-25 mg Q4HRS PRN   • prochlorperazine  5-10 mg Q4HRS PRN       Assessment and Plan:  Hospital day # 10  POD # 4   I & D lumbar spine wound: no gross evidence of infection noted per Dr. Jacob     intraop wound cultures: moderate growth of MRSA      MRSA bacteremia/wound cultures preop: continued ABX per ID, minimum of 6 weeks coverage anticipated per ID note     Prophylactic anticoagulation: yes  from [facility]            Start date/time: started 8/8/19    Sp aborted I & D 8/2/19 r/t to cardiac arrest     Need for defibrillator implant after completion of ABX treatment for LS spine wound  Infection      ST per monitor: was placed on beta blocker during previous hospitalization     Post arrest  Anemia: unclear etiology, per hospitalist team, Hgb preop 11.8 , post event 7.9, on 8/6: 9.8, stable, improving        Ho of LS spine surgery x 2: last laminectomy 7/12/19     Patient may be discharged or transferred any time from [facility] as indicated based on needs for ABX coverage and ongoing wound care. Discharge process ongoing per chart review    Will f/u after weekend if still here.

## 2019-08-09 NOTE — PROGRESS NOTES
Bedside report received, Pt care assumed. Tele box on. VSS. Pt assessment complete. Pt AOX4, no signs of distress noted at this time.  Pt c/o of 0/10 pain. Pt denies any additional needs at this time. Bed in lowest position, ambulates with one assistance. POC discussed with Pt/family, verbalizes understanding, no questions at this time. Pt educated on fall risk and verbalizes understanding, call light within reach, will continue to monitor.

## 2019-08-09 NOTE — PROGRESS NOTES
Infectious Disease Progress Note    Author: Opal Dwyer M.D. Date & Time of service: 2019  4:42 PM    Chief Complaint:  Post laminectomy wound infection, MRSA     Interval History:  2019-no fevers.  WBC is 8.3 and creatinine is 0.52.  Last sed rate is 87 his ALT is up to 2022 and AST is 68.  His blood cultures are positive from 8/3/2019  2019-no fevers.  Continues to complain of back pain and numbness on his feet.  WBC is 8.7 no CMP available today  2019 no fevers.  Ongoing back pain.  WBCs 8.7 tetanus 0.59 LFTs improved  2019-no fevers.  WBC 8.7 denies any increased back pain.  No new issues overnight.  Review of Systems:  Review of Systems   Constitutional: Negative for chills, fever and malaise/fatigue.   Respiratory: Negative for cough and shortness of breath.    Gastrointestinal: Negative for abdominal pain, constipation, diarrhea, nausea and vomiting.   Musculoskeletal: Positive for back pain. Negative for joint pain and myalgias.        After wound VAC change   Skin: Negative for rash.   Neurological:        Complains of some numbness in the bottom of his foot       Hemodynamics:  Temp (24hrs), Av.3 °C (97.4 °F), Min:35.9 °C (96.7 °F), Max:36.6 °C (97.9 °F)  Temperature: 36.4 °C (97.6 °F)  Pulse  Av.6  Min: 69  Max: 149   Blood Pressure: 115/79       Physical Exam:  Physical Exam   Constitutional: He is oriented to person, place, and time. He appears well-developed and well-nourished.   Obese   HENT:   Head: Normocephalic and atraumatic.   Eyes: Pupils are equal, round, and reactive to light. Conjunctivae and EOM are normal.   Cardiovascular: Normal rate, regular rhythm and normal heart sounds.   Pulmonary/Chest: Effort normal. He has no wheezes. He has no rales.   Abdominal: Soft. Bowel sounds are normal. He exhibits no distension. There is no tenderness. There is no rebound and no guarding.   Musculoskeletal: He exhibits no edema.   The back incision has incisional VAC    Neurological: He is alert and oriented to person, place, and time.   No gross focal logical deficit   Skin: Skin is warm.   Psychiatric: He has a normal mood and affect. His behavior is normal.   Vitals reviewed.      Meds:    Current Facility-Administered Medications:   •  HYDROmorphone  •  enoxaparin (LOVENOX) injection  •  sulfamethoxazole-trimethoprim  •  DAPTOmycin IVPB ([facility] only)  •  metoprolol  •  Respiratory Care per Protocol  •  gabapentin  •  HYDROcodone-acetaminophen  •  methocarbamol  •  senna-docusate **AND** polyethylene glycol/lytes **AND** magnesium hydroxide **AND** bisacodyl  •  acetaminophen  •  ondansetron  •  ondansetron  •  promethazine  •  promethazine  •  prochlorperazine    Labs:  No results for input(s): WBC, RBC, HEMOGLOBIN, HEMATOCRIT, MCV, MCH, RDW, PLATELETCT, MPV, NEUTSPOLYS, LYMPHOCYTES, MONOCYTES, EOSINOPHILS, BASOPHILS, RBCMORPHOLO in the last 72 hours.  Recent Labs     08/07/19 0248 08/08/19 0238 08/09/19  0220   SODIUM 137 136 137   POTASSIUM 4.0 4.1 3.9   CHLORIDE 101 102 103   CO2 24 23 24   GLUCOSE 94 106* 103*   BUN 11 13 12     Recent Labs     08/07/19 0248 08/08/19 0238 08/09/19  0220   ALBUMIN  --  3.8  --    TBILIRUBIN  --  0.4  --    ALKPHOSPHAT  --  67  --    TOTPROTEIN  --  7.9  --    ALTSGPT  --  107*  --    ASTSGOT  --  29  --    CREATININE 0.59 0.62 0.74       Imaging:  Dx-chest-limited (1 View)    Result Date: 8/2/2019 8/2/2019 5:42 PM HISTORY/REASON FOR EXAM:  Shortness of Breath TECHNIQUE/EXAM DESCRIPTION AND NUMBER OF VIEWS: Single AP view of the chest. COMPARISON: 6/19/2019 FINDINGS: The cardiac silhouette and mediastinal contours are stable. No discrete opacity, pleural fluid, or pneumothorax. The left costophrenic angle is excluded from the image. No suspicious bony lesions.     No evidence of acute cardiopulmonary process.    Dx-spine-any One View    Result Date: 7/12/2019 7/12/2019 12:03 PM HISTORY/REASON FOR EXAM:  Intraoperative images  for procedural navigation. TECHNIQUE/EXAM DESCRIPTION AND NUMBER OF VIEWS:  2 view(s) of the lumbosacral spine.     Fluoroscopic image(s) obtained during posterior lumbar laminectomy. Please see the patient's chart for full procedural details. Fluoroscopy time 2 seconds.     Mr-lumbar Spine-with & W/o    Result Date: 8/1/2019 8/1/2019 3:24 PM HISTORY/REASON FOR EXAM:  Recent back surgery with wound drainage and elevated sed rate TECHNIQUE/EXAM DESCRIPTION: MRI of the lumbar spine without and with contrast. The study was performed on a [employer] Signa 1.5 Gena MRI scanner. T1 sagittal, T2 fast spin-echo sagittal, and T2 axial images were obtained of the lumbar spine. T1 postcontrast fat-suppressed sagittal images were obtained. 14 mL Gadavist contrast was administered intravenously. COMPARISON:  7/8/2019 FINDINGS: Redemonstrated are postsurgical changes status post laminectomies from L2 L3-L5 S1. There is extensive enhancement and edema in the midline posterior soft tissues and the lower paraspinal musculature which is similar to prior study and may represent postsurgical changes or infection. The previously seen superficial fluid collection appears slightly decreased in size. There is a new ill-defined rim-enhancing fluid in the deep soft tissues laminectomy bed most conspicuous at the L5 level measuring approximately 3 x 2.1 cm. Developing abscess is not excluded. There is extensive enhancement of the cauda equina nerve roots extending from the visualized conus medullaris through the S1 level which is concerning for infection. Conus medullaris terminates at T12-L1. T12-L2: Canal and foramina are patent. L2-L3: Disc bulge and posterior central disc extrusion again noted resulting in moderate spinal stenosis. L3-L4: Facet degeneration. No significant spinal stenosis. L4-L5: Disc degeneration, disc osteophyte and facet degeneration. There is moderate thecal sac stenosis. L5-S1: Posterior central left paracentral disc  protrusion which flattens the ventral thecal sac. Bilateral facet degeneration. No significant spinal stenosis. Bilateral foraminal stenosis.     1.  Stable postsurgical changes L3-L5 laminectomies. Interval ill-defined nonenhancing fluid within the laminectomy bed most pronounced at the L4-L5 level. Infection should be excluded clinically. 2.  There is diffuse enhancement of the cauda equina nerve roots which is presumably infectious/inflammatory. 3.  No evidence of discitis osteomyelitis or epidural abscess. 4.  Degenerative changes as detailed above with persistent moderate thecal sac stenosis at L2-L3 and L4-L5.    Mr-lumbar Spine-with & W/o    Result Date: 7/8/2019 7/8/2019 11:34 AM HISTORY/REASON FOR EXAM:  Back pain or radiculopathy, > 6 wks; Back pain, cauda equina syndrome suspected Laminectomy June 17. New weakness for 3 days. TECHNIQUE/EXAM DESCRIPTION: MRI of the lumbar spine without and with contrast. The study was performed on a [employer] Signa 1.5 Gena MRI scanner. T1 sagittal, T2 fast spin-echo sagittal, and T2 axial images were obtained of the lumbar spine. T1 postcontrast fat-suppressed sagittal images were obtained. 15 mL Gadavist contrast was administered intravenously. COMPARISON:  6/19/2019 MRI. FINDINGS: Motion degrades the study. Straightening of the normal lordosis is again seen Multiple Schmorl's nodes are again demonstrated No marrow edema is seen No T2 hyperintensity is seen suspicious for discitis Conus terminates normal in position at L1. Visualized nerve roots show no abnormal clumping to suggest arachnoiditis. There is however degenerative disc disease causing central stenosis as discussed below. T2 hyperintense fluid collection is newly identified in the deep subcutaneous fat posteriorly spanning L2/3-L5 measuring 10 x 2.8 x 1.5 cm and there is rim enhancement. No abnormal epidural fluid collection is seen to suggest abscess. L3-L5 laminectomies have been performed. Findings by level:  T12/L1: No stenosis L1/2: No stenosis L2/3: Mild disc height loss, right paracentral protrusion, ligamentum flavum redundancy, facet arthropathy, short pedicles. This results in unchanged moderate to severe central stenosis, especially on the right. There is some epidural enhancement ventrally which is unchanged and likely related to the protrusion. Borderline foraminal stenosis L3/4: Stable facet arthropathy and ligamentum flavum redundancy with a mild disc bulge, short pedicles. This results in persistent moderate central and borderline foraminal stenosis L4/5: Stable right paracentral protrusion, disc bulge. Laminectomy with partial facetectomy. No significant central stenosis but the right greater than left foramina are again moderately narrowed L5/S1: Stable left paracentral protrusion, facet arthropathy, mild-moderate central and mild foraminal stenoses as before     1.  New 10 x 3 x 1 cm deep subcutaneous fat fluid collection differential includes seroma with or without superimposed infection cannot 2.  Otherwise stable lumbar spine MRI with contrast following L3-L5 laminectomies 3.  Moderate multilevel central stenoses greatest at L2/3 are again seen, mostly secondary to facet arthropathy and developmentally short pedicles as specifically detailed above 4.  Lesser foraminal stenoses are unchanged as detailed above    Mr-thoracic Spine-with & W/o    Result Date: 7/8/2019 7/8/2019 11:41 AM HISTORY/REASON FOR EXAM:  Mid-back pain; Bilateral LE weakness TECHNIQUE/EXAM DESCRIPTION: MRI of the thoracic spine without and with contrast. The study was performed on a [employer] Signa 1.5 Gena MRI scanner. T1 sagittal, T2 fast spin-echo sagittal, and T2 axial images were obtained of the thoracic spine. T1 post-contrast fat suppressed sagittal images were obtained. Optional T1 post-contrast axial images may be obtained. 15 mL Gadavist contrast was administered intravenously. COMPARISON:  None. FINDINGS: Straightening of the  normal kyphosis. Several small nonedematous Schmorl's nodes are seen Normal alignment The visualized spinal cord is normal in signal and morphology There are multiple disc protrusions but at no point is central stenosis moderate or greater in severity. Several levels of cord abutment are identified, noted best anteriorly on the left at T9/10. Protrusions are also seen on the right at T2/3, T4/5, left T5/6, left and right at T8/9. Mild right T2/3 foraminal stenosis related to disc protrusion Otherwise patent foramina Paraspinous soft tissues are normal in appearance After contrast is administered no abnormal enhancement is seen No abnormal fluid collection. No epidural abscess. No abnormal disc signal to suggest discitis     Multilevel degenerative disc disease results in cord abutment but no cord compression Straightening of the normal lordosis with multiple chronic Schmorl's nodes Mild right T2/3 foraminal stenosis    Dx-portable Fluoro > 1 Hour    Result Date: 7/15/2019  7/12/2019 12:03 PM HISTORY/REASON FOR EXAM:  Posterior lumbar laminectomy L3-S1, Main OR TECHNIQUE/EXAM DESCRIPTION AND NUMBER OF VIEWS: Portable fluoroscopy for greater than one hour FINDINGS:      The portable fluoroscopy unit was obligated to the procedure for greater than one hour.   Actual fluoro time was 2 seconds.     Portable fluoroscopy utilized for 2 seconds.    Ct-cta Chest Pulmonary Artery W/ Recons    Result Date: 8/3/2019  8/3/2019 9:11 PM HISTORY/REASON FOR EXAM:  Shortness of breath; tachycardia, post-op, high risk for PE. TECHNIQUE/EXAM DESCRIPTION: CT angiogram scan for pulmonary embolism with contrast, with reconstructions. 1.25 mm helical sections were obtained from the lung apices through the lung bases following the rapid bolus administration of 80 mL of Omnipaque 350 nonionic contrast. Thin-section overlapping reconstruction interval was utilized. Coronal reconstructions were generated from the axial data. MIP post  processing was performed and utilized for the interpretation. Low dose optimization technique was utilized for this CT exam including automated exposure control and adjustment of the mA and/or kV according to patient size. COMPARISON: None FINDINGS: Pulmonary Embolism: There is somewhat suboptimal opacification of the pulmonary arterial structures, there is no definite evidence of large or central pulmonary emboli. Small peripheral emboli cannot be absolutely excluded. . Lungs: Bilateral lower lobe discoid atelectasis.. Pleura: No pleural effusion. Nodes: No enlarged lymph nodes. Additional findings: Unusual curvilinear lucency in the right hepatic lobe measuring about 4 x 1.9 cm diameter..     1.  No definite evidence of pulmonary emboli, within the limitations described above. 2.  Bilateral lower lobe discoid atelectasis. 3.  Unusual curvilinear lucency in the right hepatic lobe which may represent scarring. The appearance is not at least typical for a hepatic solid mass or cyst.     Ec-echocardiogram Complete W/ Cont    Result Date: 8/2/2019  Transthoracic Echo Report Echocardiography Laboratory CONCLUSIONS Prior echocardiogram 6/21/2019.Technically difficult study - adequate information is obtained. Contrast was used to enhance visualization of the endocardial border. Normal left ventricular systolic function. Left ventricular ejection fraction is visually estimated to be 65%. Normal regional wall motion. Right ventricle not well visualized but visually appears Mildly enlarged. The left atrium is normal in size. The mitral valve is not well visualized. No stenosis or regurgitation seen. Structurally normal aortic valve without significant stenosis or regurgitation. Normal pericardium without effusion. JOHNSON PEOPLES Exam Date:         08/02/2019                    19:04 Exam Location:     Inpatient Priority:          Routine Ordering Physician:        NICCI HERNANDEZ Referring Physician: Sonographer:                [facility]:    23     Gender:    M MRN:    [id number] :    1995 BSA:    2.55   Ht (in):    72     Wt (lb):    304 Exam Type:     Complete, Contrast Indications:     Cardiac arrest, cause unspecified ICD Codes:       I46.9 CPT Codes:       28186,  BP:   121    /   84     HR:   140 Technical Quality:       Technically difficult study -                          adequate information is obtained MEASUREMENTS  (Male / Female) Normal Values 2D ECHO LV Diastolic Diameter PLAX        4.2 cm                4.2 - 5.9 / 3.9 - 5.3 cm LV Systolic Diameter PLAX         3.7 cm                2.1 - 4.0 cm IVS Diastolic Thickness           1.3 cm                LVPW Diastolic Thickness          1 cm                  LVOT Diameter                     2.4 cm                Estimated LV Ejection Fraction    65 %                  LV Ejection Fraction MOD BP       52.1 %                >= 55  % LV Ejection Fraction MOD 4C       56.7 %                LV Ejection Fraction MOD 2C       47.6 %                IVC Diameter                      0.79 cm               DOPPLER AV Peak Velocity                  0.94 m/s              AV Peak Gradient                  3.6 mmHg              AV Mean Gradient                  2.2 mmHg              LVOT Peak Velocity                0.89 m/s              AV Area Cont Eq vti               4.1 cm²               MV Velocity Time Integral         11 cm                 Mitral E Point Velocity           0.84 m/s              MV Pressure Half Time             28.8 ms               MV Area PHT                       7.6 cm²               * Indicates values subject to auto-interpretation LV EF:  65    % FINDINGS Left Ventricle Contrast was used to enhance visualization of the endocardial border. 3 mL of contrast was administered. Existing IV was used. Located at the left hand. Normal left ventricular chamber size. Normal left ventricular wall thickness. Normal left ventricular  "systolic function. Left ventricular ejection fraction is visually estimated to be 65%. Normal regional wall motion. Diastolic function is difficult to assess with tachycardia. Right Ventricle Right ventricle not well visualized but visually appears Mildly enlarged. Right Atrium The right atrium is normal in size. Inferior vena cava small in size and collapsed due to patient's fluids being low. Left Atrium The left atrium is normal in size. Left atrial volume index is 8 mL/sq m. Mitral Valve The mitral valve is not well visualized. No stenosis or regurgitation seen. Aortic Valve Structurally normal aortic valve without significant stenosis or regurgitation. Tricuspid Valve Structurally normal tricuspid valve without significant stenosis or regurgitation. Pulmonic Valve Structurally normal pulmonic valve without significant stenosis or regurgitation. Pericardium Normal pericardium without effusion. Fat pad present. Aorta The aortic root is normal. Ascending aorta diameter is 2.8 cm. Serg Lopes M.D. (Electronically Signed) Final Date:     02 August 2019                 22:34      Micro:  Results     Procedure Component Value Units Date/Time    BLOOD CULTURE [[id number]] Collected:  08/03/19 1550    Order Status:  Completed Specimen:  Blood from Peripheral Updated:  08/08/19 1700     Significant Indicator NEG     Source BLD     Site PERIPHERAL     Culture Result No growth after 5 days of incubation.    Narrative:       [id number] RAYA CHRISTIANSEN  Per Hospital Policy: Only change Specimen Src: to \"Line\" if  specified by physician order.  Left Hand    CULTURE WOUND W/ GRAM STAIN [[id number]]  (Abnormal)  (Susceptibility) Collected:  08/05/19 1919    Order Status:  Completed Specimen:  Wound Updated:  08/08/19 0922     Significant Indicator POS     Source WND     Site Back     Culture Result Growth noted after further incubation, see below for  organism identification.       Gram Stain Result Few WBCs.  Few Gram " positive cocci.       Culture Result Methicillin Resistant Staphylococcus aureus  Moderate growth  This isolate is presumed to be clindamycin resistant based on  detection of inducible resistance.  Clindamycin may still  be effective in some patients.      Narrative:       CALL  Silva  171 tel. [phone],  CALLED  171 tel. [phone] 08/07/2019, 10:22, RB PERF. RESULTS CALLED  TO:[phone]  Surgery - swabs received    Susceptibility     Methicillin resistant staphylococcus aureus (1)     Antibiotic Interpretation Microscan Method Status    Clindamycin Resistant <=0.5 mcg/mL PARRISH Final    Daptomycin Sensitive <=0.5 mcg/mL PARRISH Final    Erythromycin Resistant >4 mcg/mL PARRISH Final    Moxifloxacin Resistant >4 mcg/mL PARRISH Final    Ampicillin/sulbactam Resistant 16/8 mcg/mL PARRISH Final    Oxacillin Resistant >2 mcg/mL PARRISH Final    Vancomycin Sensitive 2 mcg/mL PARRISH Final    Penicillin Resistant >8 mcg/mL PARRISH Final    Trimeth/Sulfa Sensitive <=0.5/9.5 mcg/mL PARRISH Final    Tetracycline Sensitive <=4 mcg/mL PARRISH Final                   Anaerobic Culture [[id number]] Collected:  08/05/19 1919    Order Status:  Completed Specimen:  Wound Updated:  08/08/19 0947     Significant Indicator NEG     Source WND     Site Back     Culture Result No Anaerobes isolated.    Narrative:       CALL  Silva  171 tel. [phone],  CALLED  171 tel. [phone] 08/07/2019, 10:22, RB PERF. RESULTS CALLED  TO:[phone]  Surgery - swabs received    GRAM STAIN [[id number]] Collected:  08/05/19 1919    Order Status:  Completed Specimen:  Wound Updated:  08/07/19 1027     Significant Indicator .     Source WND     Site Back     Gram Stain Result Few WBCs.  Few Gram positive cocci.      Narrative:       CALL  Silva  171 tel. [phone],  CALLED  171 tel. [phone] 08/07/2019, 10:22, RB PERF. RESULTS CALLED  TO:[phone]  Surgery - swabs received    BLOOD CULTURE [[id number]] Collected:  08/06/19 0500    Order Status:  Completed Specimen:  Blood from Peripheral Updated:   "08/07/19 0746     Significant Indicator NEG     Source BLD     Site PERIPHERAL     Culture Result No Growth  Note: Blood cultures are incubated for 5 days and  are monitored continuously.Positive blood cultures  are called to the RN and reported as soon as  they are identified.      Narrative:       Contact  Per Hospital Policy: Only change Specimen Src: to \"Line\" if  specified by physician order.  Right Hand    BLOOD CULTURE [[id number]] Collected:  08/06/19 0500    Order Status:  Completed Specimen:  Blood from Peripheral Updated:  08/07/19 0746     Significant Indicator NEG     Source BLD     Site PERIPHERAL     Culture Result No Growth  Note: Blood cultures are incubated for 5 days and  are monitored continuously.Positive blood cultures  are called to the RN and reported as soon as  they are identified.      Narrative:       Contact  Per Hospital Policy: Only change Specimen Src: to \"Line\" if  specified by physician order.  Left Hand    BLOOD CULTURE [[id number]]  (Abnormal)  (Susceptibility) Collected:  08/03/19 1550    Order Status:  Completed Specimen:  Blood from Peripheral Updated:  08/06/19 1028     Significant Indicator POS     Source BLD     Site PERIPHERAL     Culture Result Growth detected by Bactec instrument. 08/04/2019  12:03  Methicillin Resistant Staphylococcus aureus (MRSA)  detected by PCR.        Methicillin Resistant Staphylococcus aureus  This isolate is presumed to be clindamycin resistant based on  detection of inducible resistance.  Clindamycin may still  be effective in some patients.      Narrative:       CALL  Silva  171 tel. [phone],  CALLED  171 tel. [phone] 08/04/2019, 16:21, RB PERF. RESULTS CALLED TO:  [phone]  [id number] RAYA CHRISTIANSEN  Per Hospital Policy: Only change Specimen Src: to \"Line\" if  specified by physician order.  Right Hand    Susceptibility     Methicillin resistant staphylococcus aureus (1)     Antibiotic Interpretation Microscan Method Status    Clindamycin " Resistant <=0.5 mcg/mL PARRISH Final    Daptomycin Sensitive 1 mcg/mL PARRISH Final    Erythromycin Resistant >4 mcg/mL PARRISH Final    Moxifloxacin Resistant >4 mcg/mL PARRISH Final    Ampicillin/sulbactam Resistant 16/8 mcg/mL PARRISH Final    Oxacillin Resistant >2 mcg/mL PARRISH Final    Vancomycin Sensitive 2 mcg/mL PARRISH Final    Penicillin Resistant >8 mcg/mL PARRISH Final    Trimeth/Sulfa Sensitive <=0.5/9.5 mcg/mL PARRISH Final    Tetracycline Sensitive <=4 mcg/mL PARRISH Final                         Assessment:  Active Hospital Problems    Diagnosis   • *Cardiac arrest (HCC) [I46.9]   • Elevated liver enzymes [R74.8]   • Morbidly obese (HCC) [E66.01]   • Infected surgical wound [T81.49XA]   • Other inflammatory polyneuropathies (HCC) [G61.89]   • Wound drainage [T14.8XXA]   • Sinus tachycardia [R00.0]   • Acute bilateral back pain [M54.9]   Staph bacteremia     ASSESSMENT/PLAN:      Sammy BERGMAN Luis Alberto Grey is a 23 y.o.  admitted 7/31/2019. Pt has a past medical history of congenital spinal stenosis and severe L4-L5 stenosis with disc herniation as well as stenosis L2-L3 and L5-S5 with obesity and chronic pain.  The patient had L4, L5 and S1 decompressive laminectomy, bilateral medial facetectomies and bilateral foraminoctomy's with aborted L2-L4 laminectomies due to loss of signal on 6/17/2019.  He had MRI spine on 7/8 with a new 10 x 3 x 1 cm deep subcutaneous fat fluid collection thought to be seroma but superimposed infection not ruled out. He then went back to the OR on 7/12 for L2- S1 laminectomy and L4-L5 microdiscectomy.  Per note there were no complications.       He has leaking wound vac with drainage and clean-out on 7/25.  He presented to the ED on 7/27 again with leaking wound VAC but was not admitted. He again returned on 7/31 due to concerns for ongoing worsening wound drainage and was admitted for further work-up.        Plan    MRSA bacteremia  Blood cultures are positive from 8/3/2019  Patient was started on daptomycin    Repeat blood cultures x2 Are negative from 8/6/2019  Since the PARRISH is 1 to daptomycin add p.o. Bactrim  PICC line  Aim for 6 weeks of antibiotics through 9/20/2019  Orders for home antibiotics were given to the case management  Side effects of PICC line and antibiotics were discussed with the patient    Back infection  Antibiotics as above  Underwent I&D on 8/5/2019  Those OR cultures are now MRSA    Cardiac arrest  On 8/2/2019 patient had developed ventricular tachycardia followed by ventricular fibrillation  He is being followed by cardiology    Increased LFTs  Monitor in a.m.  May be as consequence of above  Improving    Congenital spinal stenosis  Status post surgeries as above

## 2019-08-09 NOTE — DISCHARGE PLANNING
RN CM phoned [facility] , [phone] option #1, to check on status of referral and left a voicemail for a call back at [phone].

## 2019-08-09 NOTE — WOUND TEAM
[facility]  Inpatient Services  Wound and Skin Care Progress Note    Admission Date: 7/31/2019     Consult Date: 07/31/19   HPI, PMH, SH: Reviewed    Unit where seen by Wound Team: [id number]    WOUND CONSULT RELATED TO:  Scheduled npwt dressing change.    Self Report / Pain Level:  Premedicated with IV dilaudid.  Pt reported intermittent pain t/o procedure, states improved compared to last dressing change.        OBJECTIVE:  In low airloss bed, Wound vac dressing in place    WOUND TYPE, LOCATION, CHARACTERISTICS (Pressure Injuries: location, stage, POA or date identified)     Negative Pressure Wound Therapy Back Lower;Mid (Active)   NPWT Pump Mode / Pressure Setting Continuous;125 mmHg 8/9/2019  1:30 PM   Dressing Type Black foam 8/9/2019  1:30 PM   Number of Foam Pieces Used 2 8/9/2019  1:30 PM   Canister Changed No 8/9/2019  1:30 PM   VAC VeraFlo Pressure (mm/Hg) 125 mmHg 8/9/2019  8:00 AM     Wound 07/31/19 Incision Back dehisced incision  (Active)   Wound Image   8/9/2019  1:30 PM   Site Assessment Red 8/9/2019  1:30 PM   Maeve-wound Assessment Intact 8/9/2019  1:30 PM   Margins Attached edges 8/9/2019  1:30 PM   Wound Length (cm) 12 cm 8/9/2019  1:30 PM   Wound Width (cm) 1 cm 8/9/2019  1:30 PM   Wound Depth (cm) 3.5 cm 8/9/2019  1:30 PM   Wound Surface Area (cm^2) 12 cm^2 8/9/2019  1:30 PM   Tunneling 0 cm 8/9/2019  1:30 PM   Undermining 0 cm 8/9/2019  1:30 PM   Closure Secondary intention 8/9/2019  1:30 PM   Drainage Amount Moderate 8/9/2019  1:30 PM   Drainage Description Sanguineous 8/9/2019  1:30 PM   Non-staged Wound Description Full thickness 8/9/2019  1:30 PM   Treatments Cleansed 8/9/2019  1:30 PM   Cleansing Approved Wound Cleanser 8/9/2019  1:30 PM   Periwound Protectant Benzoin;Drape 8/9/2019  1:30 PM   Dressing Options Wound Vac 8/9/2019  1:30 PM   Dressing Cleansing/Solutions Not Applicable 8/9/2019  8:00 AM   Dressing Changed Changed 8/9/2019  1:30 PM   Dressing Status  "Clean;Dry;Intact 8/9/2019  1:30 PM   Dressing Change Frequency Monday, Wednesday, Friday 8/9/2019  1:30 PM   NEXT Dressing Change  08/12/19 8/9/2019  1:30 PM   NEXT Weekly Photo (Inpatient Only) 08/16/19 8/9/2019  1:30 PM   WOUND NURSE ONLY - Odor None 8/9/2019  1:30 PM   WOUND NURSE ONLY - Exposed Structures None visible 8/9/2019  1:30 PM   WOUND NURSE ONLY - Tissue Type and Percentage visible portion: 100% red 8/9/2019  1:30 PM   WOUND NURSE ONLY - Time Spent with Patient (mins) 60 8/9/2019  1:30 PM       Culture:   Obtained 07/31, Positive for Heavy growth of MRSA    MRI:                                        Completed 08/01/2019,   1.  Stable postsurgical changes L3-L5 laminectomies. Interval ill-defined nonenhancing fluid within the laminectomy bed most pronounced at the L4-L5 level. Infection should be excluded clinically.  2.  There is diffuse enhancement of the cauda equina nerve roots which is presumably infectious/inflammatory.  3.  No evidence of discitis osteomyelitis or epidural abscess.  4.  Degenerative changes as detailed above with persistent moderate thecal sac stenosis at L2-L3 and L4-L5.    INTERVENTIONS BY WOUND TEAM:  Chart reviewed.  Prior npwt dressing removed.  Pt tolerated much better than last dressing change.  Stated \"It's already out?\"  Wound was then cleansed with wound cleanser and gauze. Wound fully inspected and no retained foam visible. Measurements and pictures obtained. Maeve-wound prepped with benzoin and drape. Drape continued up to the right side for bridged trac pad. 1 piece of spiraled black foam \"zig zagged\" into the wound bed and then out and tracked out to the right side to bridge trac pad. One piece of halved circular black foam applied to the right side to use as a button for trac pad.  (2 pieces black foam total).  Trac pad applied. Suction resumed at 125mmhg continuous.      Dressing selection:  2 Pieces of Black foam from a medium black foam kit for NPWT at 125mmhg " continuous.        Interdisciplinary consultation: Patient, Bedside RN (Laura)     EVALUATION: patient had surgery with Dr. Ruelas recently, Prevena incisional VAC was placed and patient discharged.  Patient returned to the ER for wound drainage/odor.  Dr. Ruelas was consulted, patient had MRI.  Pt went to surgery for I&D and had wound vac placed. Wound vac will assist in managing exudate while assisting in granular tissue development.     Factors affecting wound healing: infection, obesity   Goals: Steady decrease in wound area and depth weekly.    NURSING PLAN OF CARE ORDERS (X):    Dressing changes: See Dressing Care orders: X  Skin care: See Skin Care orders: patient independent with movement   Rectal tube care: See Rectal Tube Care orders:   Other orders:    WOUND TEAM PLAN OF CARE (X):   NPWT change 3 x week:        Dressing changes by wound team:       Follow up as needed:     Wound team to follow up 3x weekly for dressing changes  Other (explain):     Anticipated discharge plans (X):   SNF:           Home Care: X, pt will require wound vac dressing changes through home health 3x weekly.          Outpatient Wound Center:            Self Care:            Other:

## 2019-08-09 NOTE — PROGRESS NOTES
NAD noted this shift. VSS. Wound vac in place and WNL. No needs at this time. Monitor in place. Bed low and locked with call bell in reach. Will continue to monitor. Will report to oncoming shift.

## 2019-08-09 NOTE — DISCHARGE PLANNING
DME orders received but Home Health requires the [facility] orders as well as information regarding the time of the last dose given, and the discharge date. This referral will be placed on hold until receipt of the above mentioned information.

## 2019-08-09 NOTE — DISCHARGE PLANNING
Bee with [facility] phoned back stating she never received referral. Referral resent via fax to [phone].

## 2019-08-09 NOTE — CARE PLAN
Problem: Safety  Goal: Will remain free from falls  Outcome: PROGRESSING AS EXPECTED     Problem: Infection  Goal: Will remain free from infection  Intervention: Implement standard precautions and perform hand washing before and after patient contact  Note:   Hand hygiene performed before and after contact with patient.        Problem: Venous Thromboembolism (VTW)/Deep Vein Thrombosis (DVT) Prevention:  Goal: Patient will participate in Venous Thrombosis (VTE)/Deep Vein Thrombosis (DVT)Prevention Measures  Description  SCDs in use   Intervention: Assess and monitor for anticoagulation complications  Note:   Pt is on lovenox sq

## 2019-08-10 LAB
ANION GAP SERPL CALC-SCNC: 10 MMOL/L (ref 0–11.9)
BUN SERPL-MCNC: 14 MG/DL (ref 8–22)
CALCIUM SERPL-MCNC: 9.5 MG/DL (ref 8.5–10.5)
CHLORIDE SERPL-SCNC: 102 MMOL/L (ref 96–112)
CO2 SERPL-SCNC: 23 MMOL/L (ref 20–33)
CREAT SERPL-MCNC: 0.71 MG/DL (ref 0.5–1.4)
GLUCOSE SERPL-MCNC: 118 MG/DL (ref 65–99)
POTASSIUM SERPL-SCNC: 3.7 MMOL/L (ref 3.6–5.5)
SODIUM SERPL-SCNC: 135 MMOL/L (ref 135–145)

## 2019-08-10 PROCEDURE — A9270 NON-COVERED ITEM OR SERVICE: HCPCS | Performed by: INTERNAL MEDICINE

## 2019-08-10 PROCEDURE — 700102 HCHG RX REV CODE 250 W/ 637 OVERRIDE(OP): Performed by: HOSPITALIST

## 2019-08-10 PROCEDURE — 700105 HCHG RX REV CODE 258: Performed by: INTERNAL MEDICINE

## 2019-08-10 PROCEDURE — 99232 SBSQ HOSP IP/OBS MODERATE 35: CPT | Performed by: HOSPITALIST

## 2019-08-10 PROCEDURE — 700102 HCHG RX REV CODE 250 W/ 637 OVERRIDE(OP): Performed by: INTERNAL MEDICINE

## 2019-08-10 PROCEDURE — A9270 NON-COVERED ITEM OR SERVICE: HCPCS | Performed by: HOSPITALIST

## 2019-08-10 PROCEDURE — 99232 SBSQ HOSP IP/OBS MODERATE 35: CPT | Performed by: INTERNAL MEDICINE

## 2019-08-10 PROCEDURE — 700105 HCHG RX REV CODE 258

## 2019-08-10 PROCEDURE — 80048 BASIC METABOLIC PNL TOTAL CA: CPT

## 2019-08-10 PROCEDURE — 700111 HCHG RX REV CODE 636 W/ 250 OVERRIDE (IP): Performed by: NURSE PRACTITIONER

## 2019-08-10 PROCEDURE — 700111 HCHG RX REV CODE 636 W/ 250 OVERRIDE (IP): Performed by: INTERNAL MEDICINE

## 2019-08-10 PROCEDURE — 306263 VAC CANNISTER W/GEL 500ML: Performed by: HOSPITALIST

## 2019-08-10 PROCEDURE — 770020 HCHG ROOM/CARE - TELE (206)

## 2019-08-10 RX ORDER — SODIUM CHLORIDE 9 MG/ML
INJECTION, SOLUTION INTRAVENOUS
Status: COMPLETED
Start: 2019-08-10 | End: 2019-08-10

## 2019-08-10 RX ADMIN — ENOXAPARIN SODIUM 40 MG: 100 INJECTION SUBCUTANEOUS at 06:06

## 2019-08-10 RX ADMIN — SENNOSIDES AND DOCUSATE SODIUM 2 TABLET: 8.6; 5 TABLET ORAL at 06:06

## 2019-08-10 RX ADMIN — SULFAMETHOXAZOLE AND TRIMETHOPRIM 1 TABLET: 800; 160 TABLET ORAL at 18:00

## 2019-08-10 RX ADMIN — METHOCARBAMOL TABLETS 1000 MG: 500 TABLET, COATED ORAL at 06:06

## 2019-08-10 RX ADMIN — SODIUM CHLORIDE 250 ML: 9 INJECTION, SOLUTION INTRAVENOUS at 13:55

## 2019-08-10 RX ADMIN — GABAPENTIN 100 MG: 100 CAPSULE ORAL at 18:00

## 2019-08-10 RX ADMIN — METHOCARBAMOL TABLETS 1000 MG: 500 TABLET, COATED ORAL at 18:00

## 2019-08-10 RX ADMIN — METOPROLOL TARTRATE 25 MG: 25 TABLET, FILM COATED ORAL at 06:06

## 2019-08-10 RX ADMIN — SULFAMETHOXAZOLE AND TRIMETHOPRIM 1 TABLET: 800; 160 TABLET ORAL at 06:06

## 2019-08-10 RX ADMIN — GABAPENTIN 100 MG: 100 CAPSULE ORAL at 12:02

## 2019-08-10 RX ADMIN — DAPTOMYCIN 1090 MG: 500 INJECTION, POWDER, LYOPHILIZED, FOR SOLUTION INTRAVENOUS at 13:54

## 2019-08-10 RX ADMIN — SENNOSIDES AND DOCUSATE SODIUM 2 TABLET: 8.6; 5 TABLET ORAL at 18:00

## 2019-08-10 RX ADMIN — GABAPENTIN 100 MG: 100 CAPSULE ORAL at 06:06

## 2019-08-10 RX ADMIN — METOPROLOL TARTRATE 25 MG: 25 TABLET, FILM COATED ORAL at 18:00

## 2019-08-10 RX ADMIN — METHOCARBAMOL TABLETS 1000 MG: 500 TABLET, COATED ORAL at 12:02

## 2019-08-10 ASSESSMENT — ENCOUNTER SYMPTOMS
PALPITATIONS: 0
FOCAL WEAKNESS: 1
DIZZINESS: 0
CONSTIPATION: 0
COUGH: 0
NECK PAIN: 0
VOMITING: 0
ABDOMINAL PAIN: 0
BACK PAIN: 1
SENSORY CHANGE: 0
DIARRHEA: 0
MYALGIAS: 0
SHORTNESS OF BREATH: 0
CHILLS: 0
FEVER: 0
HEADACHES: 0
NAUSEA: 0

## 2019-08-10 NOTE — PROGRESS NOTES
Infectious Disease Progress Note    Author: Opal Dwyer M.D. Date & Time of service: 8/10/2019  3:53 PM    Chief Complaint:  Post laminectomy wound infection, MRSA     Interval History:  2019-no fevers.  WBC is 8.3 and creatinine is 0.52.  Last sed rate is 87 his ALT is up to 2022 and AST is 68.  His blood cultures are positive from 8/3/2019  2019-no fevers.  Continues to complain of back pain and numbness on his feet.  WBC is 8.7 no CMP available today  2019 no fevers.  Ongoing back pain.  WBCs 8.7 tetanus 0.59 LFTs improved  2019-no fevers.  WBC 8.7 denies any increased back pain.  No new issues overnight.  8/10/2019 no fevers.  No new issues overnight.  Awaiting for home antibiotics to be set up  Review of Systems:  Review of Systems   Constitutional: Negative for chills, fever and malaise/fatigue.   Respiratory: Negative for cough and shortness of breath.    Gastrointestinal: Negative for abdominal pain, constipation, diarrhea, nausea and vomiting.   Musculoskeletal: Positive for back pain. Negative for joint pain and myalgias.        After wound VAC change   Skin: Negative for rash.       Hemodynamics:  Temp (24hrs), Av.3 °C (97.4 °F), Min:35.9 °C (96.7 °F), Max:36.6 °C (97.9 °F)  Temperature: 36.2 °C (97.1 °F)  Pulse  Av.4  Min: 69  Max: 149   Blood Pressure: 129/76       Physical Exam:  Physical Exam   Constitutional: He is oriented to person, place, and time. He appears well-developed and well-nourished.   Obese   HENT:   Head: Normocephalic and atraumatic.   Eyes: Pupils are equal, round, and reactive to light. Conjunctivae and EOM are normal.   Cardiovascular: Normal rate, regular rhythm and normal heart sounds.   Pulmonary/Chest: Effort normal. He has no wheezes. He has no rales.   Abdominal: Soft. Bowel sounds are normal. He exhibits no distension. There is no tenderness. There is no rebound and no guarding.   Musculoskeletal: He exhibits no edema.   The back incision  has incisional VAC   Neurological: He is alert and oriented to person, place, and time.   No gross focal logical deficit   Skin: Skin is warm.   Psychiatric: He has a normal mood and affect. His behavior is normal.   Vitals reviewed.      Meds:    Current Facility-Administered Medications:   •  HYDROmorphone  •  enoxaparin (LOVENOX) injection  •  sulfamethoxazole-trimethoprim  •  DAPTOmycin IVPB ([facility] only)  •  metoprolol  •  Respiratory Care per Protocol  •  gabapentin  •  HYDROcodone-acetaminophen  •  methocarbamol  •  senna-docusate **AND** polyethylene glycol/lytes **AND** magnesium hydroxide **AND** bisacodyl  •  acetaminophen  •  ondansetron  •  ondansetron  •  promethazine  •  promethazine  •  prochlorperazine    Labs:  No results for input(s): WBC, RBC, HEMOGLOBIN, HEMATOCRIT, MCV, MCH, RDW, PLATELETCT, MPV, NEUTSPOLYS, LYMPHOCYTES, MONOCYTES, EOSINOPHILS, BASOPHILS, RBCMORPHOLO in the last 72 hours.  Recent Labs     08/08/19  0238 08/09/19  0220 08/10/19  0400   SODIUM 136 137 135   POTASSIUM 4.1 3.9 3.7   CHLORIDE 102 103 102   CO2 23 24 23   GLUCOSE 106* 103* 118*   BUN 13 12 14     Recent Labs     08/08/19  0238 08/09/19  0220 08/10/19  0400   ALBUMIN 3.8  --   --    TBILIRUBIN 0.4  --   --    ALKPHOSPHAT 67  --   --    TOTPROTEIN 7.9  --   --    ALTSGPT 107*  --   --    ASTSGOT 29  --   --    CREATININE 0.62 0.74 0.71       Imaging:  Dx-chest-limited (1 View)    Result Date: 8/2/2019 8/2/2019 5:42 PM HISTORY/REASON FOR EXAM:  Shortness of Breath TECHNIQUE/EXAM DESCRIPTION AND NUMBER OF VIEWS: Single AP view of the chest. COMPARISON: 6/19/2019 FINDINGS: The cardiac silhouette and mediastinal contours are stable. No discrete opacity, pleural fluid, or pneumothorax. The left costophrenic angle is excluded from the image. No suspicious bony lesions.     No evidence of acute cardiopulmonary process.    Dx-spine-any One View    Result Date: 7/12/2019 7/12/2019 12:03 PM HISTORY/REASON FOR EXAM:   Intraoperative images for procedural navigation. TECHNIQUE/EXAM DESCRIPTION AND NUMBER OF VIEWS:  2 view(s) of the lumbosacral spine.     Fluoroscopic image(s) obtained during posterior lumbar laminectomy. Please see the patient's chart for full procedural details. Fluoroscopy time 2 seconds.     Mr-lumbar Spine-with & W/o    Result Date: 8/1/2019 8/1/2019 3:24 PM HISTORY/REASON FOR EXAM:  Recent back surgery with wound drainage and elevated sed rate TECHNIQUE/EXAM DESCRIPTION: MRI of the lumbar spine without and with contrast. The study was performed on a [employer] Signa 1.5 Gena MRI scanner. T1 sagittal, T2 fast spin-echo sagittal, and T2 axial images were obtained of the lumbar spine. T1 postcontrast fat-suppressed sagittal images were obtained. 14 mL Gadavist contrast was administered intravenously. COMPARISON:  7/8/2019 FINDINGS: Redemonstrated are postsurgical changes status post laminectomies from L2 L3-L5 S1. There is extensive enhancement and edema in the midline posterior soft tissues and the lower paraspinal musculature which is similar to prior study and may represent postsurgical changes or infection. The previously seen superficial fluid collection appears slightly decreased in size. There is a new ill-defined rim-enhancing fluid in the deep soft tissues laminectomy bed most conspicuous at the L5 level measuring approximately 3 x 2.1 cm. Developing abscess is not excluded. There is extensive enhancement of the cauda equina nerve roots extending from the visualized conus medullaris through the S1 level which is concerning for infection. Conus medullaris terminates at T12-L1. T12-L2: Canal and foramina are patent. L2-L3: Disc bulge and posterior central disc extrusion again noted resulting in moderate spinal stenosis. L3-L4: Facet degeneration. No significant spinal stenosis. L4-L5: Disc degeneration, disc osteophyte and facet degeneration. There is moderate thecal sac stenosis. L5-S1: Posterior central  left paracentral disc protrusion which flattens the ventral thecal sac. Bilateral facet degeneration. No significant spinal stenosis. Bilateral foraminal stenosis.     1.  Stable postsurgical changes L3-L5 laminectomies. Interval ill-defined nonenhancing fluid within the laminectomy bed most pronounced at the L4-L5 level. Infection should be excluded clinically. 2.  There is diffuse enhancement of the cauda equina nerve roots which is presumably infectious/inflammatory. 3.  No evidence of discitis osteomyelitis or epidural abscess. 4.  Degenerative changes as detailed above with persistent moderate thecal sac stenosis at L2-L3 and L4-L5.    Mr-lumbar Spine-with & W/o    Result Date: 7/8/2019 7/8/2019 11:34 AM HISTORY/REASON FOR EXAM:  Back pain or radiculopathy, > 6 wks; Back pain, cauda equina syndrome suspected Laminectomy June 17. New weakness for 3 days. TECHNIQUE/EXAM DESCRIPTION: MRI of the lumbar spine without and with contrast. The study was performed on a [employer] Signa 1.5 Gena MRI scanner. T1 sagittal, T2 fast spin-echo sagittal, and T2 axial images were obtained of the lumbar spine. T1 postcontrast fat-suppressed sagittal images were obtained. 15 mL Gadavist contrast was administered intravenously. COMPARISON:  6/19/2019 MRI. FINDINGS: Motion degrades the study. Straightening of the normal lordosis is again seen Multiple Schmorl's nodes are again demonstrated No marrow edema is seen No T2 hyperintensity is seen suspicious for discitis Conus terminates normal in position at L1. Visualized nerve roots show no abnormal clumping to suggest arachnoiditis. There is however degenerative disc disease causing central stenosis as discussed below. T2 hyperintense fluid collection is newly identified in the deep subcutaneous fat posteriorly spanning L2/3-L5 measuring 10 x 2.8 x 1.5 cm and there is rim enhancement. No abnormal epidural fluid collection is seen to suggest abscess. L3-L5 laminectomies have been  performed. Findings by level: T12/L1: No stenosis L1/2: No stenosis L2/3: Mild disc height loss, right paracentral protrusion, ligamentum flavum redundancy, facet arthropathy, short pedicles. This results in unchanged moderate to severe central stenosis, especially on the right. There is some epidural enhancement ventrally which is unchanged and likely related to the protrusion. Borderline foraminal stenosis L3/4: Stable facet arthropathy and ligamentum flavum redundancy with a mild disc bulge, short pedicles. This results in persistent moderate central and borderline foraminal stenosis L4/5: Stable right paracentral protrusion, disc bulge. Laminectomy with partial facetectomy. No significant central stenosis but the right greater than left foramina are again moderately narrowed L5/S1: Stable left paracentral protrusion, facet arthropathy, mild-moderate central and mild foraminal stenoses as before     1.  New 10 x 3 x 1 cm deep subcutaneous fat fluid collection differential includes seroma with or without superimposed infection cannot 2.  Otherwise stable lumbar spine MRI with contrast following L3-L5 laminectomies 3.  Moderate multilevel central stenoses greatest at L2/3 are again seen, mostly secondary to facet arthropathy and developmentally short pedicles as specifically detailed above 4.  Lesser foraminal stenoses are unchanged as detailed above    Mr-thoracic Spine-with & W/o    Result Date: 7/8/2019 7/8/2019 11:41 AM HISTORY/REASON FOR EXAM:  Mid-back pain; Bilateral LE weakness TECHNIQUE/EXAM DESCRIPTION: MRI of the thoracic spine without and with contrast. The study was performed on a [employer] 1.5 Gena MRI scanner. T1 sagittal, T2 fast spin-echo sagittal, and T2 axial images were obtained of the thoracic spine. T1 post-contrast fat suppressed sagittal images were obtained. Optional T1 post-contrast axial images may be obtained. 15 mL Gadavist contrast was administered intravenously. COMPARISON:  None.  FINDINGS: Straightening of the normal kyphosis. Several small nonedematous Schmorl's nodes are seen Normal alignment The visualized spinal cord is normal in signal and morphology There are multiple disc protrusions but at no point is central stenosis moderate or greater in severity. Several levels of cord abutment are identified, noted best anteriorly on the left at T9/10. Protrusions are also seen on the right at T2/3, T4/5, left T5/6, left and right at T8/9. Mild right T2/3 foraminal stenosis related to disc protrusion Otherwise patent foramina Paraspinous soft tissues are normal in appearance After contrast is administered no abnormal enhancement is seen No abnormal fluid collection. No epidural abscess. No abnormal disc signal to suggest discitis     Multilevel degenerative disc disease results in cord abutment but no cord compression Straightening of the normal lordosis with multiple chronic Schmorl's nodes Mild right T2/3 foraminal stenosis    Dx-portable Fluoro > 1 Hour    Result Date: 7/15/2019  7/12/2019 12:03 PM HISTORY/REASON FOR EXAM:  Posterior lumbar laminectomy L3-S1, Main OR TECHNIQUE/EXAM DESCRIPTION AND NUMBER OF VIEWS: Portable fluoroscopy for greater than one hour FINDINGS:      The portable fluoroscopy unit was obligated to the procedure for greater than one hour.   Actual fluoro time was 2 seconds.     Portable fluoroscopy utilized for 2 seconds.    Ct-cta Chest Pulmonary Artery W/ Recons    Result Date: 8/3/2019  8/3/2019 9:11 PM HISTORY/REASON FOR EXAM:  Shortness of breath; tachycardia, post-op, high risk for PE. TECHNIQUE/EXAM DESCRIPTION: CT angiogram scan for pulmonary embolism with contrast, with reconstructions. 1.25 mm helical sections were obtained from the lung apices through the lung bases following the rapid bolus administration of 80 mL of Omnipaque 350 nonionic contrast. Thin-section overlapping reconstruction interval was utilized. Coronal reconstructions were generated from the  axial data. MIP post processing was performed and utilized for the interpretation. Low dose optimization technique was utilized for this CT exam including automated exposure control and adjustment of the mA and/or kV according to patient size. COMPARISON: None FINDINGS: Pulmonary Embolism: There is somewhat suboptimal opacification of the pulmonary arterial structures, there is no definite evidence of large or central pulmonary emboli. Small peripheral emboli cannot be absolutely excluded. . Lungs: Bilateral lower lobe discoid atelectasis.. Pleura: No pleural effusion. Nodes: No enlarged lymph nodes. Additional findings: Unusual curvilinear lucency in the right hepatic lobe measuring about 4 x 1.9 cm diameter..     1.  No definite evidence of pulmonary emboli, within the limitations described above. 2.  Bilateral lower lobe discoid atelectasis. 3.  Unusual curvilinear lucency in the right hepatic lobe which may represent scarring. The appearance is not at least typical for a hepatic solid mass or cyst.     Ec-echocardiogram Complete W/ Cont    Result Date: 8/2/2019  Transthoracic Echo Report Echocardiography Laboratory CONCLUSIONS Prior echocardiogram 6/21/2019.Technically difficult study - adequate information is obtained. Contrast was used to enhance visualization of the endocardial border. Normal left ventricular systolic function. Left ventricular ejection fraction is visually estimated to be 65%. Normal regional wall motion. Right ventricle not well visualized but visually appears Mildly enlarged. The left atrium is normal in size. The mitral valve is not well visualized. No stenosis or regurgitation seen. Structurally normal aortic valve without significant stenosis or regurgitation. Normal pericardium without effusion. JOHNSON PEOPLES Exam Date:         08/02/2019                    19:04 Exam Location:     Inpatient Priority:          Routine Ordering Physician:        NICCI HERNANDEZ Referring  Physician: Sonographer:               Candy James RDCS Age:    23     Gender:    M MRN:    [id number] :    1995 BSA:    2.55   Ht (in):    72     Wt (lb):    304 Exam Type:     Complete, Contrast Indications:     Cardiac arrest, cause unspecified ICD Codes:       I46.9 CPT Codes:       15024,  BP:   121    /   84     HR:   140 Technical Quality:       Technically difficult study -                          adequate information is obtained MEASUREMENTS  (Male / Female) Normal Values 2D ECHO LV Diastolic Diameter PLAX        4.2 cm                4.2 - 5.9 / 3.9 - 5.3 cm LV Systolic Diameter PLAX         3.7 cm                2.1 - 4.0 cm IVS Diastolic Thickness           1.3 cm                LVPW Diastolic Thickness          1 cm                  LVOT Diameter                     2.4 cm                Estimated LV Ejection Fraction    65 %                  LV Ejection Fraction MOD BP       52.1 %                >= 55  % LV Ejection Fraction MOD 4C       56.7 %                LV Ejection Fraction MOD 2C       47.6 %                IVC Diameter                      0.79 cm               DOPPLER AV Peak Velocity                  0.94 m/s              AV Peak Gradient                  3.6 mmHg              AV Mean Gradient                  2.2 mmHg              LVOT Peak Velocity                0.89 m/s              AV Area Cont Eq vti               4.1 cm²               MV Velocity Time Integral         11 cm                 Mitral E Point Velocity           0.84 m/s              MV Pressure Half Time             28.8 ms               MV Area PHT                       7.6 cm²               * Indicates values subject to auto-interpretation LV EF:  65    % FINDINGS Left Ventricle Contrast was used to enhance visualization of the endocardial border. 3 mL of contrast was administered. Existing IV was used. Located at the left hand. Normal left ventricular chamber size. Normal left ventricular wall thickness.  "Normal left ventricular systolic function. Left ventricular ejection fraction is visually estimated to be 65%. Normal regional wall motion. Diastolic function is difficult to assess with tachycardia. Right Ventricle Right ventricle not well visualized but visually appears Mildly enlarged. Right Atrium The right atrium is normal in size. Inferior vena cava small in size and collapsed due to patient's fluids being low. Left Atrium The left atrium is normal in size. Left atrial volume index is 8 mL/sq m. Mitral Valve The mitral valve is not well visualized. No stenosis or regurgitation seen. Aortic Valve Structurally normal aortic valve without significant stenosis or regurgitation. Tricuspid Valve Structurally normal tricuspid valve without significant stenosis or regurgitation. Pulmonic Valve Structurally normal pulmonic valve without significant stenosis or regurgitation. Pericardium Normal pericardium without effusion. Fat pad present. Aorta The aortic root is normal. Ascending aorta diameter is 2.8 cm. Serg Lopes M.D. (Electronically Signed) Final Date:     02 August 2019                 22:34      Micro:  Results     Procedure Component Value Units Date/Time    BLOOD CULTURE [[id number]] Collected:  08/03/19 1550    Order Status:  Completed Specimen:  Blood from Peripheral Updated:  08/08/19 1700     Significant Indicator NEG     Source BLD     Site PERIPHERAL     Culture Result No growth after 5 days of incubation.    Narrative:       [id number] RAYA CHRISTIANSEN  Per Hospital Policy: Only change Specimen Src: to \"Line\" if  specified by physician order.  Left Hand    CULTURE WOUND W/ GRAM STAIN [[id number]]  (Abnormal)  (Susceptibility) Collected:  08/05/19 1919    Order Status:  Completed Specimen:  Wound Updated:  08/08/19 0947     Significant Indicator POS     Source WND     Site Back     Culture Result Growth noted after further incubation, see below for  organism identification.       Gram Stain " Result Few WBCs.  Few Gram positive cocci.       Culture Result Methicillin Resistant Staphylococcus aureus  Moderate growth  This isolate is presumed to be clindamycin resistant based on  detection of inducible resistance.  Clindamycin may still  be effective in some patients.      Narrative:       CALL  Silva  171 tel. [phone],  CALLED  171 tel. [phone] 08/07/2019, 10:22, RB PERF. RESULTS CALLED  TO:[phone]  Surgery - swabs received    Susceptibility     Methicillin resistant staphylococcus aureus (1)     Antibiotic Interpretation Microscan Method Status    Clindamycin Resistant <=0.5 mcg/mL PARRISH Final    Daptomycin Sensitive <=0.5 mcg/mL PARRISH Final    Erythromycin Resistant >4 mcg/mL PARRISH Final    Moxifloxacin Resistant >4 mcg/mL PARRISH Final    Ampicillin/sulbactam Resistant 16/8 mcg/mL PARRISH Final    Oxacillin Resistant >2 mcg/mL PARRISH Final    Vancomycin Sensitive 2 mcg/mL PARRISH Final    Penicillin Resistant >8 mcg/mL PARRISH Final    Trimeth/Sulfa Sensitive <=0.5/9.5 mcg/mL PARRISH Final    Tetracycline Sensitive <=4 mcg/mL PARRISH Final                   Anaerobic Culture [[id number]] Collected:  08/05/19 1919    Order Status:  Completed Specimen:  Wound Updated:  08/08/19 0947     Significant Indicator NEG     Source WND     Site Back     Culture Result No Anaerobes isolated.    Narrative:       CALL  Silva  171 tel. [phone],  CALLED  171 tel. [phone] 08/07/2019, 10:22, RB PERF. RESULTS CALLED  TO:[phone]  Surgery - swabs received    GRAM STAIN [[id number]] Collected:  08/05/19 1919    Order Status:  Completed Specimen:  Wound Updated:  08/07/19 1027     Significant Indicator .     Source WND     Site Back     Gram Stain Result Few WBCs.  Few Gram positive cocci.      Narrative:       CALL  Silva  171 tel. [phone],  CALLED  171 tel. [phone] 08/07/2019, 10:22, RB PERF. RESULTS CALLED  TO:[phone]  Surgery - swabs received    BLOOD CULTURE [[id number]] Collected:  08/06/19 0500    Order Status:  Completed Specimen:  Blood from  "Peripheral Updated:  08/07/19 0746     Significant Indicator NEG     Source BLD     Site PERIPHERAL     Culture Result No Growth  Note: Blood cultures are incubated for 5 days and  are monitored continuously.Positive blood cultures  are called to the RN and reported as soon as  they are identified.      Narrative:       Contact  Per Hospital Policy: Only change Specimen Src: to \"Line\" if  specified by physician order.  Right Hand    BLOOD CULTURE [[id number]] Collected:  08/06/19 0500    Order Status:  Completed Specimen:  Blood from Peripheral Updated:  08/07/19 0746     Significant Indicator NEG     Source BLD     Site PERIPHERAL     Culture Result No Growth  Note: Blood cultures are incubated for 5 days and  are monitored continuously.Positive blood cultures  are called to the RN and reported as soon as  they are identified.      Narrative:       Contact  Per Hospital Policy: Only change Specimen Src: to \"Line\" if  specified by physician order.  Left Hand    BLOOD CULTURE [[id number]]  (Abnormal)  (Susceptibility) Collected:  08/03/19 1550    Order Status:  Completed Specimen:  Blood from Peripheral Updated:  08/06/19 1028     Significant Indicator POS     Source BLD     Site PERIPHERAL     Culture Result Growth detected by Bactec instrument. 08/04/2019  12:03  Methicillin Resistant Staphylococcus aureus (MRSA)  detected by PCR.        Methicillin Resistant Staphylococcus aureus  This isolate is presumed to be clindamycin resistant based on  detection of inducible resistance.  Clindamycin may still  be effective in some patients.      Narrative:       CALL  Silva  171 tel. [phone],  CALLED  171 tel. [phone] 08/04/2019, 16:21, RB PERF. RESULTS CALLED TO:  [phone]  [id number] RAYA CHRISTIANSEN  Per Hospital Policy: Only change Specimen Src: to \"Line\" if  specified by physician order.  Right Hand    Susceptibility     Methicillin resistant staphylococcus aureus (1)     Antibiotic Interpretation Microscan Method " Status    Clindamycin Resistant <=0.5 mcg/mL PARRISH Final    Daptomycin Sensitive 1 mcg/mL PARRISH Final    Erythromycin Resistant >4 mcg/mL PARRISH Final    Moxifloxacin Resistant >4 mcg/mL PARRISH Final    Ampicillin/sulbactam Resistant 16/8 mcg/mL PARRISH Final    Oxacillin Resistant >2 mcg/mL PARRISH Final    Vancomycin Sensitive 2 mcg/mL PARRISH Final    Penicillin Resistant >8 mcg/mL PARRISH Final    Trimeth/Sulfa Sensitive <=0.5/9.5 mcg/mL PARRISH Final    Tetracycline Sensitive <=4 mcg/mL PARRISH Final                         Assessment:  Active Hospital Problems    Diagnosis   • *Cardiac arrest (HCC) [I46.9]   • Elevated liver enzymes [R74.8]   • Morbidly obese (HCC) [E66.01]   • Infected surgical wound [T81.49XA]   • Other inflammatory polyneuropathies (HCC) [G61.89]   • Wound drainage [T14.8XXA]   • Sinus tachycardia [R00.0]   • Acute bilateral back pain [M54.9]   Staph bacteremia     ASSESSMENT/PLAN:      Sammy BERGMAN Sahujavier Edmonds is a 23 y.o.  admitted 7/31/2019. Pt has a past medical history of congenital spinal stenosis and severe L4-L5 stenosis with disc herniation as well as stenosis L2-L3 and L5-S5 with obesity and chronic pain.  The patient had L4, L5 and S1 decompressive laminectomy, bilateral medial facetectomies and bilateral foraminoctomy's with aborted L2-L4 laminectomies due to loss of signal on 6/17/2019.  He had MRI spine on 7/8 with a new 10 x 3 x 1 cm deep subcutaneous fat fluid collection thought to be seroma but superimposed infection not ruled out. He then went back to the OR on 7/12 for L2- S1 laminectomy and L4-L5 microdiscectomy.  Per note there were no complications.       He has leaking wound vac with drainage and clean-out on 7/25.  He presented to the ED on 7/27 again with leaking wound VAC but was not admitted. He again returned on 7/31 due to concerns for ongoing worsening wound drainage and was admitted for further work-up.        Plan    MRSA bacteremia  Blood cultures are positive from 8/3/2019  Patient was  started on daptomycin   Repeat blood cultures x2 Are negative from 8/6/2019  Since the PARRISH is 1 to daptomycin add p.o. Bactrim  PICC line  Aim for 6 weeks of antibiotics through 9/20/2019  Orders for home antibiotics were given to the case management on 8/9/2019  Awaiting these orders to be set up    Back infection  Antibiotics as above  Underwent I&D on 8/5/2019  Those OR cultures are now MRSA    Cardiac arrest  On 8/2/2019 patient had developed ventricular tachycardia followed by ventricular fibrillation  He is being followed by cardiology    Increased LFTs  Improved     Congenital spinal stenosis  Status post surgeries as above     ID will sign off.  Please call us as needed.  We will see him as an outpatient.

## 2019-08-10 NOTE — PROGRESS NOTES
Neurosurgery Progress Note    Subjective:  No new radic complaints, has some incisional pain with moving legs. Overall reports improvement.     Exam:  VSS  A&Ox4, NAD  Wound vac in place with serosang drainage in canister, dressing tight, no alarms  Per wound picture: wound bed with good granulation tissue     NM: no change to previous exam with persistent but improved left foot drop at 4 to 4+/5 DF/PF, EHL 4/5  Sensation intact and equal throughout all four extremities.   Abdomen: soft, non-tender  Non-labored breathing on room air, normal respiratory effort  Capillary refill in all four extremities <2 seconds  No LE edema, erythema, cyanosis, clubbing  Calves non-tender to compression bilat  Incision CDI, no halo sign            BP  Min: 108/64  Max: 122/82  Pulse  Av.8  Min: 94  Max: 115  Resp  Av.3  Min: 16  Max: 18  Temp  Av.4 °C (97.5 °F)  Min: 35.9 °C (96.7 °F)  Max: 36.6 °C (97.9 °F)  SpO2  Av.7 %  Min: 93 %  Max: 97 %    No data recorded        Recent Labs     19  0238 19  0220 08/10/19  0400   SODIUM 136 137 135   POTASSIUM 4.1 3.9 3.7   CHLORIDE 102 103 102   CO2 23 24 23   GLUCOSE 106* 103* 118*   BUN 13 12 14   CREATININE 0.62 0.74 0.71   CALCIUM 9.0 9.1 9.5               Intake/Output       19 - 08/10/19 0659 08/10/19 0700 - 19 0659       Total 1900-0659 Total       Intake    P.O.  720  -- 720  --  -- --    P.O. 720 -- 720 -- -- --    Total Intake 720 -- 720 -- -- --       Output    Urine  750  600 1350  250  -- 250    Number of Times Voided 3 x 2 x 5 x -- -- --    Urine Void (mL)  250 -- 250    Total Output  250 -- 250       Net I/O     -30 -600 -630 -250 -- -250            Intake/Output Summary (Last 24 hours) at 8/10/2019 1038  Last data filed at 8/10/2019 0700  Gross per 24 hour   Intake 480 ml   Output 1600 ml   Net -1120 ml            • HYDROmorphone  0.5 mg Q4HRS PRN   • enoxaparin (LOVENOX)  injection  40 mg DAILY   • sulfamethoxazole-trimethoprim  1 Tab Q12HRS   • DAPTOmycin IVPB ([facility] only)  8 mg/kg Q24HR   • metoprolol  25 mg TWICE DAILY   • Respiratory Care per Protocol   Continuous RT   • gabapentin  100 mg TID   • HYDROcodone-acetaminophen  1 Tab Q4HRS PRN   • methocarbamol  1,000 mg TID   • senna-docusate  2 Tab BID    And   • polyethylene glycol/lytes  1 Packet QDAY PRN    And   • magnesium hydroxide  30 mL QDAY PRN    And   • bisacodyl  10 mg QDAY PRN   • acetaminophen  650 mg Q6HRS PRN   • ondansetron  4 mg Q4HRS PRN   • ondansetron  4 mg Q4HRS PRN   • promethazine  12.5-25 mg Q4HRS PRN   • promethazine  12.5-25 mg Q4HRS PRN   • prochlorperazine  5-10 mg Q4HRS PRN       Assessment and Plan:  Hospital day # 11  POD # 5   I & D lumbar spine wound: no gross evidence of infection noted per Dr. Jacob   intraop wound cultures: moderate growth of MRSA     MRSA bacteremia/wound cultures preop: continued ABX per ID, minimum of 6 weeks coverage anticipated per ID note     Prophylactic anticoagulation: yes  from Nsx point   Start date/time: started 8/8/19 per hospitalist      Sp aborted I & D 8/2/19 r/t to cardiac arrest     Need for defibrillator implant after completion of ABX treatment for LS spine wound  Infection      ST per monitor: was placed on beta blocker during previous hospitalization     Post arrest  Anemia: unclear etiology, per hospitalist team, Hgb preop 11.8 , post event 7.9, on 8/6: 9.8, stable, improving     Ho of LS spine surgery x 2: last laminectomy 7/12/19     Patient may be discharged or transferred any time from [facility] as indicated based on needs for ABX coverage and ongoing wound care. Discharge process ongoing per chart review     Will f/u after weekend if still here.   Patient agrees with treatment plan.   Case discussed with Dr. Ruelas.

## 2019-08-10 NOTE — PROGRESS NOTES
Bedside report received from nurse. Assumed care of pt. Pt resting comfortably in bed. A/Ox4, VSS,  All needs met. POC reviewed and white board updated. Tele box on. Call light in reach. Bed locked in lowest position with 2 upper bed rails up.

## 2019-08-10 NOTE — PROGRESS NOTES
[facility] Medicine Daily Progress Note    Date of Service  8/9/2019    Chief Complaint  23 y.o. male admitted 7/31/2019 with drainage from surgical wound    Hospital Course     23 y.o. male who presented 7/31/2019 with infection of lumbar laminectomy.  Patient presented 6/17/2019 with congenital spinal stenosis and severe L4-L5 stenosis with disc herniation, L2-L3 moderate to severe stenosis, and L5-S1 moderate to severe stenosis with morbid obesity and intractable pain.  At that time he had L4 and L5 and S1 decompressive laminectomy, bilateral medial facetectomies and bilateral foraminotomies with aborted L2-L3 and L3-L4 laminectomies due to loss of signal.  He subsequently returned on 7/12 to have L2, L3, L4, L5, S1 laminectomy, and a L4-L5 microdiscectomy and was hospitalized 7/5-22.  Further during that hospitalization he was noted to have persistent sinus tachycardia, had echocardiogram which was unremarkable, and was initiated on metoprolol.       Patient presented to ED on 7/27 after indicating leaking wound VAC for which he had wound drainage and cleanout performed 7/25.  Patient returned again on 7/31 with ongoing concerns of wound drainage and at which point was admitted and found to have ill-defined nonenhancing fluid within the laminectomy bed most pronounced at L4-L5 level as well as diffuse enhancement of the cauda equina nerve roots presumably infectious/inflammatory on MRI.  Wound culture grew MRSA.     Patient went to the OR for I&D and apparently after being intubated yet prior to initiation of surgical procedure patient went into a ventricular tachycardic rhythm followed by reported V. fib, he had a total of 3 shocks before reestablishing sinus rhythm further he also was given 150 mg of amiodarone.  As there was concern of possible hyperkalemia post succinylcholine as paralytic he was given 20 of IV insulin and calcium.  Immediately after returning to sinus rhythm patient was awake agitated and  looking to pull out ET tube.  He was subsequently was extubated and surgery canceled.     Eventually had I&D on 8/5 with cultures sent.         Interval Problem Update  Patient seen and examined, he is doing well, in good spirits. No pain. Discussed with ID again regarding addition of Bactrim. Started on daptomycin 8/5, had I&D 8/5, cultures NGTD. Midline placed 8/7. Will follow up with Cardiology after infection treated. Answered questions today. Home health has been arranged, ID has provided stop dates regarding ABX.     Consultants/Specialty  Neurosurgery  ID    Code Status  full    Disposition  Hopefully home tomorrow     Review of Systems  Review of Systems   Constitutional: Negative for chills and fever.   Cardiovascular: Negative for chest pain and palpitations.   Genitourinary: Negative for dysuria.   Musculoskeletal: Positive for back pain and joint pain. Negative for myalgias and neck pain.   Neurological: Positive for focal weakness. Negative for dizziness, sensory change and headaches.        Some numbness on volar aspect of L foot; present since the surgery and unchanged   All other systems reviewed and are negative.     Physical Exam  Temp:  [35.9 °C (96.7 °F)-36.6 °C (97.9 °F)] 36.4 °C (97.5 °F)  Pulse:  [] 112  Resp:  [17-19] 17  BP: (108-152)/(74-90) 114/79  SpO2:  [94 %-98 %] 95 %    Physical Exam   Constitutional: He is oriented to person, place, and time. He appears well-developed and well-nourished. No distress.   HENT:   Head: Normocephalic and atraumatic.   Nose: Nose normal.   Mouth/Throat: Oropharynx is clear and moist.   Eyes: Conjunctivae and EOM are normal.   Neck: No JVD present.   Cardiovascular: Normal rate. Exam reveals no gallop.   No murmur heard.  Pulmonary/Chest: Effort normal. No stridor. No respiratory distress. He has no wheezes. He has no rales.   Abdominal: Soft. He exhibits no distension. There is no tenderness.   Musculoskeletal: He exhibits no edema.   Neurological:  He is alert and oriented to person, place, and time.   Strength 5/5 B lower Ext  Sensation intact to light touch   Skin: Skin is warm and dry. No rash noted. He is not diaphoretic.   Psychiatric: He has a normal mood and affect. His behavior is normal. Judgment and thought content normal.   Nursing note and vitals reviewed.    PICC to be placed today    Fluids    Intake/Output Summary (Last 24 hours) at 8/9/2019 1915  Last data filed at 8/9/2019 1800  Gross per 24 hour   Intake 720 ml   Output 1600 ml   Net -880 ml       Laboratory      Recent Labs     08/07/19  0248 08/08/19  0238 08/09/19  0220   SODIUM 137 136 137   POTASSIUM 4.0 4.1 3.9   CHLORIDE 101 102 103   CO2 24 23 24   GLUCOSE 94 106* 103*   BUN 11 13 12   CREATININE 0.59 0.62 0.74   CALCIUM 8.9 9.0 9.1                   Imaging  IR-PICC LINE PLACEMENT W/ GUIDANCE > AGE 5   Final Result                  Ultrasound-guided PICC placement performed by qualified nursing staff as    above.          CT-CTA CHEST PULMONARY ARTERY W/ RECONS   Final Result      1.  No definite evidence of pulmonary emboli, within the limitations described above.   2.  Bilateral lower lobe discoid atelectasis.   3.  Unusual curvilinear lucency in the right hepatic lobe which may represent scarring. The appearance is not at least typical for a hepatic solid mass or cyst.            EC-ECHOCARDIOGRAM COMPLETE W/ CONT   Final Result      DX-CHEST-LIMITED (1 VIEW)   Final Result      No evidence of acute cardiopulmonary process.      MR-LUMBAR SPINE-WITH & W/O   Final Result      1.  Stable postsurgical changes L3-L5 laminectomies. Interval ill-defined nonenhancing fluid within the laminectomy bed most pronounced at the L4-L5 level. Infection should be excluded clinically.   2.  There is diffuse enhancement of the cauda equina nerve roots which is presumably infectious/inflammatory.   3.  No evidence of discitis osteomyelitis or epidural abscess.   4.  Degenerative changes as detailed  above with persistent moderate thecal sac stenosis at L2-L3 and L4-L5.           Assessment/Plan  * Cardiac arrest (HCC)  Assessment & Plan  S/p cardiac arrest prior to I&D, 3 shocks, surgery canceled  Cards consulted  EP Recomendations:  They will not place the ICD until after his spinal infection is drained and treated  Follow and maximize K, Mg, Phos    MRSA bacteremia  Assessment & Plan  Blood cultures +, along with wound cultures therefore now started on daptomycin  Adding Bactrim  Repeat bcx negative x48 hours  Id on board  PICC placed today    Elevated liver enzymes  Assessment & Plan  Secondary to VT/VF arrest most likely  Cont to follow, work up further if fail to normalize    Other inflammatory polyneuropathies (HCC)- (present on admission)  Assessment & Plan  Neurontin 100 mg 3 times daily    Infected surgical wound- (present on admission)  Assessment & Plan  Wound culures MSRA, and now even blood cx are +  ID on board, changed abx to Daptomycin and Bactrim  Wound care  Pain management with IV Dilaudid  Mgmt as above    Wound drainage- (present on admission)  Assessment & Plan  Infected with MRSA, now on daptomycin and Bactrim  NSx I&D with cultures 8/5  Will need long term IV abx    Sinus tachycardia- (present on admission)  Assessment & Plan  Continue Beta-blocker    Acute bilateral back pain- (present on admission)  Assessment & Plan  Back pain is intractable  P.o. Vicodin  PRN IV Dilaudid  Improving     VTE prophylaxis: none due to recent surgery    Plan discussed with patient, nursing and ID  PICC placed  ID making recs for stop dates  Needs home health which is being arranged  Anticipate DC in the morning

## 2019-08-10 NOTE — PROGRESS NOTES
[facility] Medicine Daily Progress Note    Date of Service  8/10/2019    Chief Complaint  23 y.o. male admitted 7/31/2019 with drainage from surgical wound    Hospital Course     23 y.o. male who presented 7/31/2019 with infection of lumbar laminectomy.  Patient presented 6/17/2019 with congenital spinal stenosis and severe L4-L5 stenosis with disc herniation, L2-L3 moderate to severe stenosis, and L5-S1 moderate to severe stenosis with morbid obesity and intractable pain.  At that time he had L4 and L5 and S1 decompressive laminectomy, bilateral medial facetectomies and bilateral foraminotomies with aborted L2-L3 and L3-L4 laminectomies due to loss of signal.  He subsequently returned on 7/12 to have L2, L3, L4, L5, S1 laminectomy, and a L4-L5 microdiscectomy and was hospitalized 7/5-22.  Further during that hospitalization he was noted to have persistent sinus tachycardia, had echocardiogram which was unremarkable, and was initiated on metoprolol.       Patient presented to ED on 7/27 after indicating leaking wound VAC for which he had wound drainage and cleanout performed 7/25.  Patient returned again on 7/31 with ongoing concerns of wound drainage and at which point was admitted and found to have ill-defined nonenhancing fluid within the laminectomy bed most pronounced at L4-L5 level as well as diffuse enhancement of the cauda equina nerve roots presumably infectious/inflammatory on MRI.  Wound culture grew MRSA.     Patient went to the OR for I&D and apparently after being intubated yet prior to initiation of surgical procedure patient went into a ventricular tachycardic rhythm followed by reported V. fib, he had a total of 3 shocks before reestablishing sinus rhythm further he also was given 150 mg of amiodarone.  As there was concern of possible hyperkalemia post succinylcholine as paralytic he was given 20 of IV insulin and calcium.  Immediately after returning to sinus rhythm patient was awake agitated and  looking to pull out ET tube.  He was subsequently was extubated and surgery canceled.     Eventually had I&D on 8/5 with cultures sent.         Interval Problem Update  Patient seen and examined, he is doing well, in good spirits. No pain. Midline placed 8/7. Will follow up with Cardiology after infection treated. Answered questions today. Home health has been arranged, ID has provided stop dates regarding ABX. Infusion not able to be arranged, will need to review this on Monday, he can likely dc home then.     Consultants/Specialty  Neurosurgery  ID    Code Status  full    Disposition  Hopefully home Monday    Review of Systems  Review of Systems   Constitutional: Negative for chills and fever.   Cardiovascular: Negative for chest pain and palpitations.   Genitourinary: Negative for dysuria.   Musculoskeletal: Positive for back pain and joint pain. Negative for myalgias and neck pain.   Neurological: Positive for focal weakness. Negative for dizziness, sensory change and headaches.        Some numbness on volar aspect of L foot; present since the surgery and unchanged   All other systems reviewed and are negative.     Physical Exam  Temp:  [35.9 °C (96.7 °F)-36.6 °C (97.9 °F)] 36.6 °C (97.9 °F)  Pulse:  [] 94  Resp:  [16-18] 16  BP: (108-122)/(64-82) 109/79  SpO2:  [93 %-97 %] 94 %    Physical Exam   Constitutional: He is oriented to person, place, and time. He appears well-developed and well-nourished. No distress.   HENT:   Head: Normocephalic and atraumatic.   Nose: Nose normal.   Mouth/Throat: Oropharynx is clear and moist.   Eyes: Conjunctivae and EOM are normal.   Neck: No JVD present.   Cardiovascular: Normal rate. Exam reveals no gallop.   No murmur heard.  Pulmonary/Chest: Effort normal. No stridor. No respiratory distress. He has no wheezes. He has no rales.   Abdominal: Soft. He exhibits no distension. There is no tenderness.   Musculoskeletal: He exhibits no edema.   Neurological: He is alert and  oriented to person, place, and time.   Strength 5/5 B lower Ext  Sensation intact to light touch   Skin: Skin is warm and dry. No rash noted. He is not diaphoretic.   Psychiatric: He has a normal mood and affect. His behavior is normal. Judgment and thought content normal.   Nursing note and vitals reviewed.    PICC to be placed today    Fluids    Intake/Output Summary (Last 24 hours) at 8/10/2019 1236  Last data filed at 8/10/2019 1100  Gross per 24 hour   Intake 600 ml   Output 1800 ml   Net -1200 ml       Laboratory      Recent Labs     08/08/19  0238 08/09/19  0220 08/10/19  0400   SODIUM 136 137 135   POTASSIUM 4.1 3.9 3.7   CHLORIDE 102 103 102   CO2 23 24 23   GLUCOSE 106* 103* 118*   BUN 13 12 14   CREATININE 0.62 0.74 0.71   CALCIUM 9.0 9.1 9.5                   Imaging  IR-PICC LINE PLACEMENT W/ GUIDANCE > AGE 5   Final Result                  Ultrasound-guided PICC placement performed by qualified nursing staff as    above.          CT-CTA CHEST PULMONARY ARTERY W/ RECONS   Final Result      1.  No definite evidence of pulmonary emboli, within the limitations described above.   2.  Bilateral lower lobe discoid atelectasis.   3.  Unusual curvilinear lucency in the right hepatic lobe which may represent scarring. The appearance is not at least typical for a hepatic solid mass or cyst.            EC-ECHOCARDIOGRAM COMPLETE W/ CONT   Final Result      DX-CHEST-LIMITED (1 VIEW)   Final Result      No evidence of acute cardiopulmonary process.      MR-LUMBAR SPINE-WITH & W/O   Final Result      1.  Stable postsurgical changes L3-L5 laminectomies. Interval ill-defined nonenhancing fluid within the laminectomy bed most pronounced at the L4-L5 level. Infection should be excluded clinically.   2.  There is diffuse enhancement of the cauda equina nerve roots which is presumably infectious/inflammatory.   3.  No evidence of discitis osteomyelitis or epidural abscess.   4.  Degenerative changes as detailed above with  persistent moderate thecal sac stenosis at L2-L3 and L4-L5.           Assessment/Plan  * Cardiac arrest (HCC)  Assessment & Plan  S/p cardiac arrest prior to I&D, 3 shocks, surgery canceled  Cards consulted  EP Recomendations:  They will not place the ICD until after his spinal infection is drained and treated  Follow and maximize K, Mg, Phos    MRSA bacteremia  Assessment & Plan  Blood cultures +, along with wound cultures therefore now started on daptomycin  Adding Bactrim  Repeat bcx negative  Id on board  PICC placed 8/9    Elevated liver enzymes  Assessment & Plan  Secondary to VT/VF arrest most likely  Cont to follow, work up further if fail to normalize    Other inflammatory polyneuropathies (HCC)- (present on admission)  Assessment & Plan  Neurontin 100 mg 3 times daily    Infected surgical wound- (present on admission)  Assessment & Plan  Wound culures MSRA, and now even blood cx are +  ID on board, changed abx to Daptomycin and Bactrim  Wound care  Pain management with IV Dilaudid  Mgmt as above    Wound drainage- (present on admission)  Assessment & Plan  Infected with MRSA, now on daptomycin and Bactrim  NSx I&D with cultures 8/5  Will need long term IV abx    Sinus tachycardia- (present on admission)  Assessment & Plan  Continue Beta-blocker    Acute bilateral back pain- (present on admission)  Assessment & Plan  Back pain is intractable  P.o. Vicodin  PRN IV Dilaudid  Improving     VTE prophylaxis: none due to recent surgery    Plan discussed with patient, nursing and ID  PICC placed  ID making recs for stop dates  Needs home health which is being arranged  Anticipate DC Monday  NSG following

## 2019-08-11 LAB
ANION GAP SERPL CALC-SCNC: 10 MMOL/L (ref 0–11.9)
BACTERIA BLD CULT: NORMAL
BACTERIA BLD CULT: NORMAL
BUN SERPL-MCNC: 15 MG/DL (ref 8–22)
CALCIUM SERPL-MCNC: 9.6 MG/DL (ref 8.5–10.5)
CHLORIDE SERPL-SCNC: 104 MMOL/L (ref 96–112)
CO2 SERPL-SCNC: 23 MMOL/L (ref 20–33)
CREAT SERPL-MCNC: 0.73 MG/DL (ref 0.5–1.4)
GLUCOSE SERPL-MCNC: 94 MG/DL (ref 65–99)
POTASSIUM SERPL-SCNC: 4 MMOL/L (ref 3.6–5.5)
SIGNIFICANT IND 70042: NORMAL
SIGNIFICANT IND 70042: NORMAL
SITE SITE: NORMAL
SITE SITE: NORMAL
SODIUM SERPL-SCNC: 137 MMOL/L (ref 135–145)
SOURCE SOURCE: NORMAL
SOURCE SOURCE: NORMAL

## 2019-08-11 PROCEDURE — 700102 HCHG RX REV CODE 250 W/ 637 OVERRIDE(OP): Performed by: HOSPITALIST

## 2019-08-11 PROCEDURE — 700111 HCHG RX REV CODE 636 W/ 250 OVERRIDE (IP): Performed by: INTERNAL MEDICINE

## 2019-08-11 PROCEDURE — A9270 NON-COVERED ITEM OR SERVICE: HCPCS | Performed by: HOSPITALIST

## 2019-08-11 PROCEDURE — 97530 THERAPEUTIC ACTIVITIES: CPT

## 2019-08-11 PROCEDURE — 700111 HCHG RX REV CODE 636 W/ 250 OVERRIDE (IP): Performed by: NURSE PRACTITIONER

## 2019-08-11 PROCEDURE — A9270 NON-COVERED ITEM OR SERVICE: HCPCS | Performed by: INTERNAL MEDICINE

## 2019-08-11 PROCEDURE — 97116 GAIT TRAINING THERAPY: CPT

## 2019-08-11 PROCEDURE — 99232 SBSQ HOSP IP/OBS MODERATE 35: CPT | Performed by: HOSPITALIST

## 2019-08-11 PROCEDURE — 700102 HCHG RX REV CODE 250 W/ 637 OVERRIDE(OP): Performed by: INTERNAL MEDICINE

## 2019-08-11 PROCEDURE — 80048 BASIC METABOLIC PNL TOTAL CA: CPT

## 2019-08-11 PROCEDURE — 700105 HCHG RX REV CODE 258: Performed by: INTERNAL MEDICINE

## 2019-08-11 PROCEDURE — 770020 HCHG ROOM/CARE - TELE (206)

## 2019-08-11 RX ORDER — METOPROLOL TARTRATE 1 MG/ML
5 INJECTION, SOLUTION INTRAVENOUS
Status: DISCONTINUED | OUTPATIENT
Start: 2019-08-11 | End: 2019-08-15

## 2019-08-11 RX ADMIN — GABAPENTIN 100 MG: 100 CAPSULE ORAL at 11:40

## 2019-08-11 RX ADMIN — METHOCARBAMOL TABLETS 1000 MG: 500 TABLET, COATED ORAL at 18:17

## 2019-08-11 RX ADMIN — GABAPENTIN 100 MG: 100 CAPSULE ORAL at 05:05

## 2019-08-11 RX ADMIN — METOPROLOL TARTRATE 25 MG: 25 TABLET, FILM COATED ORAL at 05:06

## 2019-08-11 RX ADMIN — DAPTOMYCIN 1090 MG: 500 INJECTION, POWDER, LYOPHILIZED, FOR SOLUTION INTRAVENOUS at 12:33

## 2019-08-11 RX ADMIN — SULFAMETHOXAZOLE AND TRIMETHOPRIM 1 TABLET: 800; 160 TABLET ORAL at 18:17

## 2019-08-11 RX ADMIN — SULFAMETHOXAZOLE AND TRIMETHOPRIM 1 TABLET: 800; 160 TABLET ORAL at 05:04

## 2019-08-11 RX ADMIN — SENNOSIDES AND DOCUSATE SODIUM 1 TABLET: 8.6; 5 TABLET ORAL at 18:16

## 2019-08-11 RX ADMIN — METOPROLOL TARTRATE 25 MG: 25 TABLET, FILM COATED ORAL at 16:45

## 2019-08-11 RX ADMIN — METHOCARBAMOL TABLETS 1000 MG: 500 TABLET, COATED ORAL at 11:40

## 2019-08-11 RX ADMIN — METHOCARBAMOL TABLETS 1000 MG: 500 TABLET, COATED ORAL at 05:04

## 2019-08-11 RX ADMIN — ENOXAPARIN SODIUM 40 MG: 100 INJECTION SUBCUTANEOUS at 05:05

## 2019-08-11 RX ADMIN — GABAPENTIN 100 MG: 100 CAPSULE ORAL at 18:17

## 2019-08-11 RX ADMIN — SENNOSIDES AND DOCUSATE SODIUM 1 TABLET: 8.6; 5 TABLET ORAL at 05:05

## 2019-08-11 ASSESSMENT — ENCOUNTER SYMPTOMS
MYALGIAS: 0
PALPITATIONS: 0
HEADACHES: 0
SENSORY CHANGE: 0
SHORTNESS OF BREATH: 0
NECK PAIN: 0
CHILLS: 0
FEVER: 0
BACK PAIN: 1
FOCAL WEAKNESS: 0
DIZZINESS: 1

## 2019-08-11 ASSESSMENT — GAIT ASSESSMENTS
DISTANCE (FEET): 20
ASSISTIVE DEVICE: FRONT WHEEL WALKER
GAIT LEVEL OF ASSIST: SUPERVISED

## 2019-08-11 ASSESSMENT — COGNITIVE AND FUNCTIONAL STATUS - GENERAL
SUGGESTED CMS G CODE MODIFIER MOBILITY: CK
CLIMB 3 TO 5 STEPS WITH RAILING: A LITTLE
MOVING TO AND FROM BED TO CHAIR: A LOT
STANDING UP FROM CHAIR USING ARMS: A LITTLE
MOVING FROM LYING ON BACK TO SITTING ON SIDE OF FLAT BED: A LOT
WALKING IN HOSPITAL ROOM: A LITTLE
TURNING FROM BACK TO SIDE WHILE IN FLAT BAD: A LOT
MOBILITY SCORE: 15

## 2019-08-11 NOTE — THERAPY
"Physical Therapy Treatment completed.   Bed Mobility:  Supine to Sit: Supervised  Transfers: Sit to Stand: Supervised  Gait: Level Of Assist: Supervised with Front-Wheel Walker       Plan of Care: Will benefit from Physical Therapy 5 times per week  Discharge Recommendations: Equipment: raised toilet seat with arms. Recommend home health transitional care for continued physical therapy services.     See \"Rehab Therapy-Acute\" Patient Summary Report for complete documentation.     Pt seen for PT treatment. Pt eager to return home and states he will be discharging tomorrow. Pt reports his father will be able to assist with ADLs and IADLs as needed and pt feels confident in returning home. Pt demonstrated all mobility at SPV level today, and negotiated 6 steps with step-to pattern and B rails. Pt was limited by dizziness, resolved with rest. Increased effort for all mobility. Pt demonstrates safe mobility in this environment at this time, however if POC changes, acute PT will continue to see pt. Recommend home health PT to facilitate safe DC and return to PLOF.  "

## 2019-08-11 NOTE — PROGRESS NOTES
Bedside report received from nurse. Assumed care of pt. Pt resting comfortably in bed. A/Ox4 VSS,  All needs met. POC reviewed and white board updated. Tele box on. Call light in reach. Bed locked in lowest position with 2 upper bed rails up.

## 2019-08-11 NOTE — PROGRESS NOTES
[facility] Medicine Daily Progress Note    Date of Service  8/11/2019    Chief Complaint  23 y.o. male admitted 7/31/2019 with drainage from surgical wound    Hospital Course     23 y.o. male who presented 7/31/2019 with infection of lumbar laminectomy.  Patient presented 6/17/2019 with congenital spinal stenosis and severe L4-L5 stenosis with disc herniation, L2-L3 moderate to severe stenosis, and L5-S1 moderate to severe stenosis with morbid obesity and intractable pain.  At that time he had L4 and L5 and S1 decompressive laminectomy, bilateral medial facetectomies and bilateral foraminotomies with aborted L2-L3 and L3-L4 laminectomies due to loss of signal.  He subsequently returned on 7/12 to have L2, L3, L4, L5, S1 laminectomy, and a L4-L5 microdiscectomy and was hospitalized 7/5-22.  Further during that hospitalization he was noted to have persistent sinus tachycardia, had echocardiogram which was unremarkable, and was initiated on metoprolol.       Patient presented to ED on 7/27 after indicating leaking wound VAC for which he had wound drainage and cleanout performed 7/25.  Patient returned again on 7/31 with ongoing concerns of wound drainage and at which point was admitted and found to have ill-defined nonenhancing fluid within the laminectomy bed most pronounced at L4-L5 level as well as diffuse enhancement of the cauda equina nerve roots presumably infectious/inflammatory on MRI.  Wound culture grew MRSA.     Patient went to the OR for I&D and apparently after being intubated yet prior to initiation of surgical procedure patient went into a ventricular tachycardic rhythm followed by reported V. fib, he had a total of 3 shocks before reestablishing sinus rhythm further he also was given 150 mg of amiodarone.  As there was concern of possible hyperkalemia post succinylcholine as paralytic he was given 20 of IV insulin and calcium.  Immediately after returning to sinus rhythm patient was awake agitated and  looking to pull out ET tube.  He was subsequently was extubated and surgery canceled.     Eventually had I&D on 8/5 with cultures sent.         Interval Problem Update  Patient seen and examined, he is doing well, in good spirits. No pain. Midline placed 8/7. Will follow up with Cardiology after infection treated. Home health has been arranged, ID has provided stop dates regarding ABX. Infusion not able to be arranged, will need to review this on Monday, he can likely dc home then. Working with PT today, some lightheadedness after, sustained HR >120s, will give BB early and I have added PRN Metoprolol if needed overnight. Continue tele.     Consultants/Specialty  Neurosurgery  ID    Code Status  full    Disposition  Hopefully home Monday    Review of Systems  Review of Systems   Constitutional: Negative for chills and fever.   Respiratory: Negative for shortness of breath.    Cardiovascular: Negative for chest pain and palpitations.   Genitourinary: Negative for dysuria.   Musculoskeletal: Positive for back pain and joint pain. Negative for myalgias and neck pain.   Neurological: Positive for dizziness. Negative for sensory change, focal weakness and headaches.        Some numbness on volar aspect of L foot; present since the surgery and unchanged   All other systems reviewed and are negative.     Physical Exam  Temp:  [36.3 °C (97.4 °F)-37 °C (98.6 °F)] 36.5 °C (97.7 °F)  Pulse:  [] 116  Resp:  [16-18] 18  BP: (108-131)/(70-87) 131/87  SpO2:  [95 %-98 %] 96 %    Physical Exam   Constitutional: He is oriented to person, place, and time. He appears well-developed and well-nourished. No distress.   HENT:   Head: Normocephalic and atraumatic.   Nose: Nose normal.   Mouth/Throat: Oropharynx is clear and moist.   Eyes: Conjunctivae and EOM are normal.   Neck: No JVD present.   Cardiovascular: Normal rate. Exam reveals no gallop.   No murmur heard.  Pulmonary/Chest: Effort normal. No stridor. No respiratory  distress. He has no wheezes. He has no rales.   Abdominal: Soft. He exhibits no distension. There is no tenderness.   Musculoskeletal: He exhibits no edema.   Neurological: He is alert and oriented to person, place, and time.   Strength 5/5 B lower Ext  Sensation intact to light touch   Skin: Skin is warm and dry. No rash noted. He is not diaphoretic.   Psychiatric: He has a normal mood and affect. His behavior is normal. Judgment and thought content normal.   Nursing note and vitals reviewed.    PICC to be placed today    Fluids    Intake/Output Summary (Last 24 hours) at 8/11/2019 1644  Last data filed at 8/11/2019 1500  Gross per 24 hour   Intake 240 ml   Output 1115 ml   Net -875 ml       Laboratory      Recent Labs     08/09/19  0220 08/10/19  0400 08/11/19  0430   SODIUM 137 135 137   POTASSIUM 3.9 3.7 4.0   CHLORIDE 103 102 104   CO2 24 23 23   GLUCOSE 103* 118* 94   BUN 12 14 15   CREATININE 0.74 0.71 0.73   CALCIUM 9.1 9.5 9.6                   Imaging  IR-PICC LINE PLACEMENT W/ GUIDANCE > AGE 5   Final Result                  Ultrasound-guided PICC placement performed by qualified nursing staff as    above.          CT-CTA CHEST PULMONARY ARTERY W/ RECONS   Final Result      1.  No definite evidence of pulmonary emboli, within the limitations described above.   2.  Bilateral lower lobe discoid atelectasis.   3.  Unusual curvilinear lucency in the right hepatic lobe which may represent scarring. The appearance is not at least typical for a hepatic solid mass or cyst.            EC-ECHOCARDIOGRAM COMPLETE W/ CONT   Final Result      DX-CHEST-LIMITED (1 VIEW)   Final Result      No evidence of acute cardiopulmonary process.      MR-LUMBAR SPINE-WITH & W/O   Final Result      1.  Stable postsurgical changes L3-L5 laminectomies. Interval ill-defined nonenhancing fluid within the laminectomy bed most pronounced at the L4-L5 level. Infection should be excluded clinically.   2.  There is diffuse enhancement of the  cauda equina nerve roots which is presumably infectious/inflammatory.   3.  No evidence of discitis osteomyelitis or epidural abscess.   4.  Degenerative changes as detailed above with persistent moderate thecal sac stenosis at L2-L3 and L4-L5.           Assessment/Plan  * Cardiac arrest (HCC)  Assessment & Plan  S/p cardiac arrest prior to I&D, 3 shocks, surgery canceled  Cards consulted  EP Recomendations:  They will not place the ICD until after his spinal infection is drained and treated  Follow and maximize K, Mg, Phos    MRSA bacteremia  Assessment & Plan  Blood cultures +, along with wound cultures therefore now started on daptomycin  Adding Bactrim  Repeat bcx negative  Id on board  PICC placed 8/9    Elevated liver enzymes  Assessment & Plan  Secondary to VT/VF arrest most likely  Cont to follow, work up further if fail to normalize    Other inflammatory polyneuropathies (HCC)- (present on admission)  Assessment & Plan  Neurontin 100 mg 3 times daily    Infected surgical wound- (present on admission)  Assessment & Plan  Wound culures MSRA, and now even blood cx are +  ID on board, changed abx to Daptomycin and Bactrim  Wound care  Pain management with IV Dilaudid  Mgmt as above    Wound drainage- (present on admission)  Assessment & Plan  Infected with MRSA, now on daptomycin and Bactrim  NSx I&D with cultures 8/5  Will need long term IV abx    Sinus tachycardia- (present on admission)  Assessment & Plan  Continue Beta-blocker    Acute bilateral back pain- (present on admission)  Assessment & Plan  Back pain is intractable  P.o. Vicodin  PRN IV Dilaudid  Improving     VTE prophylaxis: none due to recent surgery    Plan discussed with patient, nursing and ID  PICC placed  ID making recs for stop dates  Needs home health which is being arranged  Anticipate DC Monday  NSG following

## 2019-08-11 NOTE — PROGRESS NOTES
POC reviewed with pt, pt verbalized understanding. VSS on room air. Denies chest pain/SOB. Denies back pain at rest, increases with movement but pt states it is tolerable and does not want PRN pain meds. Pt OOB to bathroom twice today with walker and x1 stand by assistance. No falls or injuries. LBM 8/10/19. Voiding per urinal. Wound vac at 125 mmHg, no one has charted the output from the canister since 8/5/19. Canister has a total of 325 ml of sanguineous fluid. Canister marked this am, no additional output to claim during this shift. Tolerating diet without nausea/emesis. Pt unable to discharge because home IV antbx not set up. Pt also needs home wound vac when discharging.     Tele: Sinus rhythm - sinus tach   Rate:  bpm  Increases to 146-155 when OOB walking to bathroom  No ectopy   0.14 / 0.08 / 0.34

## 2019-08-12 LAB
ANION GAP SERPL CALC-SCNC: 9 MMOL/L (ref 0–11.9)
BUN SERPL-MCNC: 15 MG/DL (ref 8–22)
CALCIUM SERPL-MCNC: 9.5 MG/DL (ref 8.5–10.5)
CHLORIDE SERPL-SCNC: 102 MMOL/L (ref 96–112)
CK SERPL-CCNC: 57 U/L (ref 0–154)
CO2 SERPL-SCNC: 25 MMOL/L (ref 20–33)
CREAT SERPL-MCNC: 0.73 MG/DL (ref 0.5–1.4)
GLUCOSE SERPL-MCNC: 107 MG/DL (ref 65–99)
POTASSIUM SERPL-SCNC: 3.7 MMOL/L (ref 3.6–5.5)
SODIUM SERPL-SCNC: 136 MMOL/L (ref 135–145)

## 2019-08-12 PROCEDURE — 700105 HCHG RX REV CODE 258: Performed by: INTERNAL MEDICINE

## 2019-08-12 PROCEDURE — A9270 NON-COVERED ITEM OR SERVICE: HCPCS | Performed by: HOSPITALIST

## 2019-08-12 PROCEDURE — 99232 SBSQ HOSP IP/OBS MODERATE 35: CPT | Performed by: HOSPITALIST

## 2019-08-12 PROCEDURE — 700102 HCHG RX REV CODE 250 W/ 637 OVERRIDE(OP): Performed by: INTERNAL MEDICINE

## 2019-08-12 PROCEDURE — 82550 ASSAY OF CK (CPK): CPT

## 2019-08-12 PROCEDURE — 306372 DRESSING,VAC SIMPLACE MED: Performed by: NEUROLOGICAL SURGERY

## 2019-08-12 PROCEDURE — 700102 HCHG RX REV CODE 250 W/ 637 OVERRIDE(OP): Performed by: HOSPITALIST

## 2019-08-12 PROCEDURE — 700111 HCHG RX REV CODE 636 W/ 250 OVERRIDE (IP): Performed by: INTERNAL MEDICINE

## 2019-08-12 PROCEDURE — 700111 HCHG RX REV CODE 636 W/ 250 OVERRIDE (IP): Performed by: HOSPITALIST

## 2019-08-12 PROCEDURE — A9270 NON-COVERED ITEM OR SERVICE: HCPCS | Performed by: INTERNAL MEDICINE

## 2019-08-12 PROCEDURE — 770020 HCHG ROOM/CARE - TELE (206)

## 2019-08-12 PROCEDURE — 97605 NEG PRS WND THER DME<=50SQCM: CPT

## 2019-08-12 PROCEDURE — 80048 BASIC METABOLIC PNL TOTAL CA: CPT

## 2019-08-12 PROCEDURE — 700111 HCHG RX REV CODE 636 W/ 250 OVERRIDE (IP): Performed by: NURSE PRACTITIONER

## 2019-08-12 RX ADMIN — SULFAMETHOXAZOLE AND TRIMETHOPRIM 1 TABLET: 800; 160 TABLET ORAL at 17:39

## 2019-08-12 RX ADMIN — METOPROLOL TARTRATE 50 MG: 25 TABLET, FILM COATED ORAL at 17:39

## 2019-08-12 RX ADMIN — METHOCARBAMOL TABLETS 1000 MG: 500 TABLET, COATED ORAL at 05:05

## 2019-08-12 RX ADMIN — GABAPENTIN 100 MG: 100 CAPSULE ORAL at 11:16

## 2019-08-12 RX ADMIN — HYDROCODONE BITARTRATE AND ACETAMINOPHEN 1 TABLET: 5; 325 TABLET ORAL at 09:54

## 2019-08-12 RX ADMIN — GABAPENTIN 100 MG: 100 CAPSULE ORAL at 17:40

## 2019-08-12 RX ADMIN — METOPROLOL TARTRATE 25 MG: 25 TABLET, FILM COATED ORAL at 05:05

## 2019-08-12 RX ADMIN — SENNOSIDES AND DOCUSATE SODIUM 2 TABLET: 8.6; 5 TABLET ORAL at 05:05

## 2019-08-12 RX ADMIN — SENNOSIDES AND DOCUSATE SODIUM 2 TABLET: 8.6; 5 TABLET ORAL at 17:40

## 2019-08-12 RX ADMIN — ENOXAPARIN SODIUM 40 MG: 100 INJECTION SUBCUTANEOUS at 05:05

## 2019-08-12 RX ADMIN — METHOCARBAMOL TABLETS 1000 MG: 500 TABLET, COATED ORAL at 11:16

## 2019-08-12 RX ADMIN — METHOCARBAMOL TABLETS 1000 MG: 500 TABLET, COATED ORAL at 17:39

## 2019-08-12 RX ADMIN — GABAPENTIN 100 MG: 100 CAPSULE ORAL at 05:05

## 2019-08-12 RX ADMIN — DAPTOMYCIN 1050 MG: 500 INJECTION, POWDER, LYOPHILIZED, FOR SOLUTION INTRAVENOUS at 12:27

## 2019-08-12 RX ADMIN — SULFAMETHOXAZOLE AND TRIMETHOPRIM 1 TABLET: 800; 160 TABLET ORAL at 05:05

## 2019-08-12 RX ADMIN — HYDROMORPHONE HYDROCHLORIDE 0.5 MG: 1 INJECTION, SOLUTION INTRAMUSCULAR; INTRAVENOUS; SUBCUTANEOUS at 11:16

## 2019-08-12 ASSESSMENT — ENCOUNTER SYMPTOMS
PALPITATIONS: 0
NECK PAIN: 0
FOCAL WEAKNESS: 0
DIZZINESS: 1
FEVER: 0
HEADACHES: 0
SENSORY CHANGE: 0
CHILLS: 0
BACK PAIN: 1
MYALGIAS: 0

## 2019-08-12 NOTE — DISCHARGE PLANNING
Pt really wants to go home and get back to work. Pt cannot afford down payment for [facility] or the monthly payments thereafter. Referrals sent to [facility] outpatient infusion center and [facility] out patient wound clinic for copay estimates per pt request.

## 2019-08-12 NOTE — DISCHARGE PLANNING
Received [employer] form at 1248  Agency/Facility Name: [facility]  Referral sent per Choice form @ 1134     @[phone]  Agency/Facility Name: [facility]  Spoke To: Talia  Outcome: Infusion canceled.

## 2019-08-12 NOTE — PROGRESS NOTES
Pt heart rate 170s while ambulating stairs. Pt was sweating during therapy. Pt felt heart rate racing but denied palpitations or chest pain. Heart rate dec to 130s while in chair. Po metoprolol given early, prn IV lopressor ordered per Dr. Ni. WV in place, suction maintaining. Denies pain.     Tele: sinus rhythm - sinus tach  Rate:  bpm  No ectopy  0.14 / 0.08 / 0.3

## 2019-08-12 NOTE — DISCHARGE PLANNING
RN ALTON relayed info to pt from [facility]: Pt will need to pay 25% of $3805.00 for down payment, and then he can pay monthly. Pt states he only has $40 to his name and he does not have any resources to borrow from. [facility] to see if [facility] has any programs for pts who cannot afford down payment.

## 2019-08-12 NOTE — PROGRESS NOTES
[facility] Medicine Daily Progress Note    Date of Service  8/12/2019    Chief Complaint  23 y.o. male admitted 7/31/2019 with drainage from surgical wound    Hospital Course     23 y.o. male who presented 7/31/2019 with infection of lumbar laminectomy.  Patient presented 6/17/2019 with congenital spinal stenosis and severe L4-L5 stenosis with disc herniation, L2-L3 moderate to severe stenosis, and L5-S1 moderate to severe stenosis with morbid obesity and intractable pain.  At that time he had L4 and L5 and S1 decompressive laminectomy, bilateral medial facetectomies and bilateral foraminotomies with aborted L2-L3 and L3-L4 laminectomies due to loss of signal.  He subsequently returned on 7/12 to have L2, L3, L4, L5, S1 laminectomy, and a L4-L5 microdiscectomy and was hospitalized 7/5-22.  Further during that hospitalization he was noted to have persistent sinus tachycardia, had echocardiogram which was unremarkable, and was initiated on metoprolol.       Patient presented to ED on 7/27 after indicating leaking wound VAC for which he had wound drainage and cleanout performed 7/25.  Patient returned again on 7/31 with ongoing concerns of wound drainage and at which point was admitted and found to have ill-defined nonenhancing fluid within the laminectomy bed most pronounced at L4-L5 level as well as diffuse enhancement of the cauda equina nerve roots presumably infectious/inflammatory on MRI.  Wound culture grew MRSA.     Patient went to the OR for I&D and apparently after being intubated yet prior to initiation of surgical procedure patient went into a ventricular tachycardic rhythm followed by reported V. fib, he had a total of 3 shocks before reestablishing sinus rhythm further he also was given 150 mg of amiodarone.  As there was concern of possible hyperkalemia post succinylcholine as paralytic he was given 20 of IV insulin and calcium.  Immediately after returning to sinus rhythm patient was awake agitated and  looking to pull out ET tube.  He was subsequently was extubated and surgery canceled.     Eventually had I&D on 8/5 with cultures sent.         Interval Problem Update  Patient seen and examined, stable exam  Initial infusion arrangements too expensive for patient  [facility] referral sent, [facility] support and help  Otherwise no new events, labs stable, will stop daily BMP    Consultants/Specialty  Neurosurgery  ID    Code Status  full    Disposition  DC home when outpt infusion arranged     Review of Systems  Review of Systems   Constitutional: Negative for chills and fever.   Cardiovascular: Negative for chest pain and palpitations.   Musculoskeletal: Positive for back pain. Negative for joint pain, myalgias and neck pain.   Neurological: Positive for dizziness. Negative for sensory change, focal weakness and headaches.   All other systems reviewed and are negative.     Physical Exam  Temp:  [36.5 °C (97.7 °F)-37.4 °C (99.4 °F)] 36.5 °C (97.7 °F)  Pulse:  [103-116] 112  Resp:  [18-20] 20  BP: (110-131)/(70-87) 110/70  SpO2:  [93 %-96 %] 94 %    Physical Exam   Constitutional: He is oriented to person, place, and time. He appears well-developed and well-nourished. No distress.   HENT:   Head: Normocephalic and atraumatic.   Nose: Nose normal.   Mouth/Throat: Oropharynx is clear and moist.   Eyes: Conjunctivae and EOM are normal.   Neck: Normal range of motion. Neck supple.   Cardiovascular: Normal rate.   No murmur heard.  Pulmonary/Chest: Effort normal. No stridor. No respiratory distress. He has no wheezes.   Abdominal: Soft. He exhibits no distension. There is no tenderness.   Musculoskeletal: He exhibits no edema.   Neurological: He is alert and oriented to person, place, and time. No cranial nerve deficit.   Strength 5/5 B lower Ext  Sensation intact to light touch   Skin: Skin is warm and dry. No rash noted. He is not diaphoretic.   Psychiatric: He has a normal mood and affect. His behavior is normal.   Nursing  note and vitals reviewed.    PICC to be placed today    Fluids    Intake/Output Summary (Last 24 hours) at 8/12/2019 1343  Last data filed at 8/12/2019 1300  Gross per 24 hour   Intake 770 ml   Output 915 ml   Net -145 ml       Laboratory      Recent Labs     08/10/19  0400 08/11/19  0430 08/12/19  0500   SODIUM 135 137 136   POTASSIUM 3.7 4.0 3.7   CHLORIDE 102 104 102   CO2 23 23 25   GLUCOSE 118* 94 107*   BUN 14 15 15   CREATININE 0.71 0.73 0.73   CALCIUM 9.5 9.6 9.5                   Imaging  IR-PICC LINE PLACEMENT W/ GUIDANCE > AGE 5   Final Result                  Ultrasound-guided PICC placement performed by qualified nursing staff as    above.          CT-CTA CHEST PULMONARY ARTERY W/ RECONS   Final Result      1.  No definite evidence of pulmonary emboli, within the limitations described above.   2.  Bilateral lower lobe discoid atelectasis.   3.  Unusual curvilinear lucency in the right hepatic lobe which may represent scarring. The appearance is not at least typical for a hepatic solid mass or cyst.            EC-ECHOCARDIOGRAM COMPLETE W/ CONT   Final Result      DX-CHEST-LIMITED (1 VIEW)   Final Result      No evidence of acute cardiopulmonary process.      MR-LUMBAR SPINE-WITH & W/O   Final Result      1.  Stable postsurgical changes L3-L5 laminectomies. Interval ill-defined nonenhancing fluid within the laminectomy bed most pronounced at the L4-L5 level. Infection should be excluded clinically.   2.  There is diffuse enhancement of the cauda equina nerve roots which is presumably infectious/inflammatory.   3.  No evidence of discitis osteomyelitis or epidural abscess.   4.  Degenerative changes as detailed above with persistent moderate thecal sac stenosis at L2-L3 and L4-L5.           Assessment/Plan  * Cardiac arrest (HCC)  Assessment & Plan  S/p cardiac arrest prior to I&D, 3 shocks, surgery canceled  Cards consulted  EP Recomendations:  They will not place the ICD until after his spinal infection  is drained and treated    MRSA bacteremia  Assessment & Plan  Blood cultures +, along with wound cultures therefore now started on daptomycin  Adding Bactrim  Repeat bcx negative  PICC placed 8/9    Elevated liver enzymes  Assessment & Plan  Secondary to VT/VF arrest most likely  Cont to follow, work up further if fail to normalize    Other inflammatory polyneuropathies (HCC)- (present on admission)  Assessment & Plan  Neurontin 100 mg 3 times daily    Infected surgical wound- (present on admission)  Assessment & Plan  Wound culures MSRA, and now even blood cx are +  ID on board, changed abx to Daptomycin and Bactrim  Wound care  Pain management with IV Dilaudid  Mgmt as above    Wound drainage- (present on admission)  Assessment & Plan  Infected with MRSA, now on daptomycin and Bactrim  NSx I&D with cultures 8/5  Will need long term IV abx    Sinus tachycardia- (present on admission)  Assessment & Plan  Continue Beta-blocker    Acute bilateral back pain- (present on admission)  Assessment & Plan  Back pain is intractable  P.o. Vicodin  PRN IV Dilaudid  Improving     VTE prophylaxis: none due to recent surgery    Plan discussed with patient, nursing and ID  PICC placed  ID making recs for stop dates  Needs home health which is being arranged  NSG following

## 2019-08-12 NOTE — PROGRESS NOTES
Assumed care from day shift nurse, Clara RN. Contact precautions observed d/t MRSA in wound. Patient is alertx4, able to make needs known. On RA without SOB. On tele monitor. No complaints of pain or distress. Wound vac and dressing to back intact. Reviewed plan of care, placed call light within reach. Will continue to monitor    0400 Patient slept for majority of shift. All needs tended to. No adverse events

## 2019-08-12 NOTE — PROGRESS NOTES
Per chart review, the patient is going to be discharged today with  nursing and wound care services. Recommend home PT/OT, unless already part of  discharge plan.    If needed, the patient may be placed on ASA 81 mg daily in lieu of Lovenox SQ for DVT prophylaxis.    Patient to f/u at [facility] outpatient clinic in 2 weeks, sooner if need be.

## 2019-08-12 NOTE — WOUND TEAM
[facility]  Inpatient Services  Wound and Skin Care Progress Note    Admission Date: 7/31/2019     Consult Date: 07/31/19   HPI, PMH, SH: Reviewed    Unit where seen by Wound Team: [id number]    WOUND CONSULT RELATED TO:  Scheduled NPWT dressing change    Self Report / Pain Level:  5/10 after PO Oxy and IV Dilaudid         OBJECTIVE:  In low airloss bed, Wound vac dressing in place    WOUND TYPE, LOCATION, CHARACTERISTICS (Pressure Injuries: location, stage, POA or date identified)    Negative Pressure Wound Therapy Back Lower;Mid (Active)   NPWT Pump Mode / Pressure Setting Continuous;125 mmHg    Dressing Type Black foam    Number of Foam Pieces Used 2    Canister Changed Yes    Output (mL) 10 mL    WOUND NURSE ONLY - Time Spent with Patient (mins) 10      Wound 07/31/19 Incision Back dehisced incision  (Active)   Wound Image       Site Assessment Bleeding;Red    Maeve-wound Assessment Clean;Dry;Intact    Margins Attached edges    Wound Length (cm) 12 cm    Wound Width (cm) 0.3 cm    Wound Depth (cm) 4.5 cm    Wound Surface Area (cm^2) 3.6 cm^2    Tunneling 0 cm    Undermining 0 cm    Closure Secondary intention    Drainage Amount Small    Drainage Description Sanguineous    Non-staged Wound Description Full thickness    Treatments Cleansed;Site care    Cleansing Approved Wound Cleanser    Periwound Protectant Benzoin;Drape    Dressing Options Wound Vac    Dressing Cleansing/Solutions Not Applicable    Dressing Changed Changed    Dressing Status Clean;Dry;Intact    Dressing Change Frequency Monday, Wednesday, Friday    NEXT Dressing Change  08/14/19    NEXT Weekly Photo (Inpatient Only) 08/19/19    WOUND NURSE ONLY - Odor None    WOUND NURSE ONLY - Exposed Structures None    WOUND NURSE ONLY - Tissue Type and Percentage 100% Red    WOUND NURSE ONLY - Time Spent with Patient (mins) 60      DELLA:      NA    Culture:   Obtained 07/31, Positive for Heavy growth of MRSA    MRI:                                  "       Completed 08/01/2019,   1.  Stable postsurgical changes L3-L5 laminectomies. Interval ill-defined nonenhancing fluid within the laminectomy bed most pronounced at the L4-L5 level. Infection should be excluded clinically.  2.  There is diffuse enhancement of the cauda equina nerve roots which is presumably infectious/inflammatory.  3.  No evidence of discitis osteomyelitis or epidural abscess.  4.  Degenerative changes as detailed above with persistent moderate thecal sac stenosis at L2-L3 and L4-L5.    INTERVENTIONS BY WOUND TEAM:  Chart reviewed. Wound vac paused. Drape removed, black foam moistened and removed. No retained foam and pt reported he tolerated removal much better than initial change. Wound was then cleansed with wound cleanser and gauze. Measurements and pictures obtained. Maeve-wound prepped with benzoin and drape. Drape continued up to the right side for bridged trac pad. 1 piece of spiraled black foam \"zig zagged\" into the wound bed and then out and tracked out to the right side to bridge trac pad. One piece of halved circular black foam applied to the right side to use as a button for trac pad. Trac pad applied. Suction resumed at 125mmhg continuous. No leaks identified. Canister changed per bedside RN request.    Dressing selection:  2 Pieces of Black foam from a Large black foam kit for NPWT at 125mmhg continuous.        Interdisciplinary consultation: Patient, Bedside RN (Clara)     EVALUATION: patient had surgery with Dr. Ruelas recently, Prevena incisional VAC was placed and patient discharged.  Patient came returned to the ER for wound drainage/odor.  Dr. Ruelas was consulted, patient had MRI.  Pt went to surgery for I&D and had wound vac placed. Wound vac will assist in managing exudate while assisting in granular tissue development. Pt tolerated change much better this time and states he thinks he could do it with only oral pain medications. Currently difficult discharge due to pt " inability to meet copay. Case management looking into LTACH vs. Outpatient infusion and wound clinic.     Factors affecting wound healing: infection, obesity   Goals: Steady decrease in wound area and depth weekly.    NURSING PLAN OF CARE ORDERS (X):    Dressing changes: See Dressing Care orders: X  Skin care: See Skin Care orders: patient independent with movement   Rectal tube care: See Rectal Tube Care orders:   Other orders:    WOUND TEAM PLAN OF CARE (X):   NPWT change 3 x week:        Dressing changes by wound team:       Follow up as needed:     Wound team to follow up 3x weekly for dressing changes  Other (explain):     Anticipated discharge plans (X):   SNF:           Home Care: X, pt will require wound vac dressing changes through LTACH vs. Outpatient wound clinic and infusion center for IV ABX through September 20th       Outpatient Wound Center:            Self Care:            Other:

## 2019-08-12 NOTE — DISCHARGE PLANNING
RN CM gave prices to pt for outpatient wound clinic, $820 per visit, Outpatient infusion, $496 per dose. Pt unable to afford. Dr. Ni notified. ROBERTO CARLOS DANG discussed possible LTACH  vs staying at [facility] until antibiotic course is finished with pt and Dr. Ni. Dr. Ni is going to talk with pt about plan of care moving forward.

## 2019-08-12 NOTE — FACE TO FACE
Face to Face Note  -  Durable Medical Equipment    Mk Ni M.D. - NPI: [id number]  I certify that this patient is under my care and that they had a durable medical equipment(DME)face to face encounter by myself that meets the physician DME face-to-face encounter requirements with this patient on:    Date of encounter:   Patient:                    MRN:                       YOB: 2018  Sammy BERGMAN Luis Alberto Edmonds  [id number]  1995     The encounter with the patient was in whole, or in part, for the following medical condition, which is the primary reason for durable medical equipment:  Other - long term ABX    I certify that, based on my findings, the following durable medical equipment is medically necessary:  Other DME Equipment - [facility] for dapto infusion.    HOME O2 Saturation Measurements:(Values must be present for Home Oxygen orders)         ,     ,         My Clinical findings support the need for the above equipment due to:  Wound/Incision

## 2019-08-12 NOTE — DISCHARGE PLANNING
Agency/Facility Name: [facility]  Spoke To: Bee  Outcome: Patient has not met deductible/out of pocket.  Patient would owe $3,805.00.  Payment arrangements can be made.  25% down and pay remaining amount over 12 months.    @0147  Agency/Facility Name: [facility]  Spoke To: Bee  Outcome: Per Ansley's request, I called [facility] to see if they do jani or approved services.  They do not for antibiotics.

## 2019-08-12 NOTE — DISCHARGE PLANNING
ROBERTO CARLOS DANG updated Evelyn with [facility] for wound vac. Pt previously on a Prevena that surgeon's office placed before admission. Clinicals faxed to Evelyn at [phone]. Insurance auth for [facility], (home iv antibiotics) remains pending.

## 2019-08-13 PROCEDURE — 99232 SBSQ HOSP IP/OBS MODERATE 35: CPT | Performed by: INTERNAL MEDICINE

## 2019-08-13 PROCEDURE — 700105 HCHG RX REV CODE 258: Performed by: INTERNAL MEDICINE

## 2019-08-13 PROCEDURE — 97530 THERAPEUTIC ACTIVITIES: CPT

## 2019-08-13 PROCEDURE — 97116 GAIT TRAINING THERAPY: CPT

## 2019-08-13 PROCEDURE — A9270 NON-COVERED ITEM OR SERVICE: HCPCS | Performed by: HOSPITALIST

## 2019-08-13 PROCEDURE — 700105 HCHG RX REV CODE 258

## 2019-08-13 PROCEDURE — 700111 HCHG RX REV CODE 636 W/ 250 OVERRIDE (IP): Performed by: NURSE PRACTITIONER

## 2019-08-13 PROCEDURE — 700111 HCHG RX REV CODE 636 W/ 250 OVERRIDE (IP): Performed by: INTERNAL MEDICINE

## 2019-08-13 PROCEDURE — A9270 NON-COVERED ITEM OR SERVICE: HCPCS | Performed by: INTERNAL MEDICINE

## 2019-08-13 PROCEDURE — 700102 HCHG RX REV CODE 250 W/ 637 OVERRIDE(OP): Performed by: INTERNAL MEDICINE

## 2019-08-13 PROCEDURE — 700102 HCHG RX REV CODE 250 W/ 637 OVERRIDE(OP): Performed by: HOSPITALIST

## 2019-08-13 PROCEDURE — 770020 HCHG ROOM/CARE - TELE (206)

## 2019-08-13 RX ORDER — SODIUM CHLORIDE 9 MG/ML
INJECTION, SOLUTION INTRAVENOUS
Status: COMPLETED
Start: 2019-08-13 | End: 2019-08-13

## 2019-08-13 RX ADMIN — METHOCARBAMOL TABLETS 1000 MG: 500 TABLET, COATED ORAL at 17:13

## 2019-08-13 RX ADMIN — ENOXAPARIN SODIUM 40 MG: 100 INJECTION SUBCUTANEOUS at 05:08

## 2019-08-13 RX ADMIN — GABAPENTIN 100 MG: 100 CAPSULE ORAL at 05:08

## 2019-08-13 RX ADMIN — METHOCARBAMOL TABLETS 1000 MG: 500 TABLET, COATED ORAL at 12:03

## 2019-08-13 RX ADMIN — SULFAMETHOXAZOLE AND TRIMETHOPRIM 1 TABLET: 800; 160 TABLET ORAL at 17:13

## 2019-08-13 RX ADMIN — METOPROLOL TARTRATE 50 MG: 25 TABLET, FILM COATED ORAL at 05:08

## 2019-08-13 RX ADMIN — METOPROLOL TARTRATE 50 MG: 25 TABLET, FILM COATED ORAL at 17:13

## 2019-08-13 RX ADMIN — METHOCARBAMOL TABLETS 1000 MG: 500 TABLET, COATED ORAL at 05:08

## 2019-08-13 RX ADMIN — SENNOSIDES AND DOCUSATE SODIUM 2 TABLET: 8.6; 5 TABLET ORAL at 05:08

## 2019-08-13 RX ADMIN — DAPTOMYCIN 1050 MG: 500 INJECTION, POWDER, LYOPHILIZED, FOR SOLUTION INTRAVENOUS at 13:03

## 2019-08-13 RX ADMIN — GABAPENTIN 100 MG: 100 CAPSULE ORAL at 17:13

## 2019-08-13 RX ADMIN — GABAPENTIN 100 MG: 100 CAPSULE ORAL at 12:03

## 2019-08-13 RX ADMIN — SODIUM CHLORIDE 250 ML: 9 INJECTION, SOLUTION INTRAVENOUS at 13:03

## 2019-08-13 RX ADMIN — SULFAMETHOXAZOLE AND TRIMETHOPRIM 1 TABLET: 800; 160 TABLET ORAL at 05:08

## 2019-08-13 ASSESSMENT — COGNITIVE AND FUNCTIONAL STATUS - GENERAL
STANDING UP FROM CHAIR USING ARMS: A LITTLE
TURNING FROM BACK TO SIDE WHILE IN FLAT BAD: A LOT
CLIMB 3 TO 5 STEPS WITH RAILING: A LITTLE
DAILY ACTIVITIY SCORE: 21
WALKING IN HOSPITAL ROOM: A LITTLE
WALKING IN HOSPITAL ROOM: A LITTLE
CLIMB 3 TO 5 STEPS WITH RAILING: A LITTLE
MOVING TO AND FROM BED TO CHAIR: A LOT
HELP NEEDED FOR BATHING: A LITTLE
TURNING FROM BACK TO SIDE WHILE IN FLAT BAD: A LOT
TOILETING: A LITTLE
MOVING TO AND FROM BED TO CHAIR: A LOT
SUGGESTED CMS G CODE MODIFIER MOBILITY: CK
DRESSING REGULAR LOWER BODY CLOTHING: A LITTLE
STANDING UP FROM CHAIR USING ARMS: A LITTLE
MOVING FROM LYING ON BACK TO SITTING ON SIDE OF FLAT BED: A LOT
SUGGESTED CMS G CODE MODIFIER MOBILITY: CK
MOBILITY SCORE: 15
MOVING FROM LYING ON BACK TO SITTING ON SIDE OF FLAT BED: A LOT
MOBILITY SCORE: 15
SUGGESTED CMS G CODE MODIFIER DAILY ACTIVITY: CJ

## 2019-08-13 ASSESSMENT — ENCOUNTER SYMPTOMS
PALPITATIONS: 0
ABDOMINAL PAIN: 0
FEVER: 0
VOMITING: 0
MYALGIAS: 0
HEADACHES: 0
BACK PAIN: 1
DIZZINESS: 0
NECK PAIN: 0
CHILLS: 0

## 2019-08-13 ASSESSMENT — GAIT ASSESSMENTS
DISTANCE (FEET): 100
ASSISTIVE DEVICE: FRONT WHEEL WALKER
GAIT LEVEL OF ASSIST: SUPERVISED

## 2019-08-13 NOTE — PROGRESS NOTES
VSS on room air. Metoprolol increased. Pt seemed down about not being able to go home. RN asked if she could get pt something to make him feel better, pt stated his family would bring his Xbox from home. Labs discontinued. Wound vac changed, no output thus far. Denied pain. Pre-medicated for wv change. Voiding adequately. No BM. Did not want to get OOB much today because of the discharge news. Tolerating diet.     Tele: sinus rhythm - sinus tach  Rate:  bpm  No ectopy   0.14 / 0.08 / 0.32

## 2019-08-13 NOTE — DISCHARGE PLANNING
Choice for LTACH received from pt and faxed to [facility] at ext. [phone].

## 2019-08-13 NOTE — PROGRESS NOTES
Assumed care from day shift nurse. Contact precautions maintained for MRSA in wound. Alertx4, able to make needs known. Verbalized disappointed feelings about postponed discharge. Encouraged patient to voice needs, call light placed within reach and hourly rounding initiated. No complaints of pain or distress at this time. Wound vac in place. On RA, no SOB. On tele monitor. Will continue to monitor

## 2019-08-13 NOTE — DISCHARGE PLANNING
Received Choice form at 0900  Agency/Facility Name: [facility]  Referral sent per Choice form @ 0900     @1050  Agency/Facility Name: [facility]  Spoke To: Will  Outcome: Getting auth.

## 2019-08-13 NOTE — THERAPY
"Physical Therapy Treatment completed.   Bed Mobility:  Supine to Sit: Supervised  Transfers: Sit to Stand: Supervised  Gait: Level Of Assist: Supervised with Front-Wheel Walker       Plan of Care: Will benefit from Physical Therapy 4 times per week  Discharge Recommendations: Equipment: No Equipment Needed. Recommend home health transitional care for continued physical therapy services.     See \"Rehab Therapy-Acute\" Patient Summary Report for complete documentation.     Pt motivated to participate in PT. Pt performed all mobility at SPV level. Increased effort to perform. Pt ambulated 100ft with FWW, no LOB, and negotiated full flight of stairs with B rails and step-to pattern. Required two standing rest breaks on stairs, and two seated rest breaks during ambulation. HR reaching 170s during stair negotiation. Pt will continue to benefit from acute PT services to progress mobility and improve functional independence. Anticipate pt will be able to return home with home health PT upon medical DC.   "

## 2019-08-13 NOTE — PROGRESS NOTES
[facility] Medicine Daily Progress Note    Date of Service  8/13/2019    Chief Complaint  23 y.o. male admitted 7/31/2019 with drainage from surgical wound    Hospital Course     23 y.o. male who presented 7/31/2019 with infection of lumbar laminectomy.  Patient presented 6/17/2019 with congenital spinal stenosis and severe L4-L5 stenosis with disc herniation, L2-L3 moderate to severe stenosis, and L5-S1 moderate to severe stenosis with morbid obesity and intractable pain.  At that time he had L4 and L5 and S1 decompressive laminectomy, bilateral medial facetectomies and bilateral foraminotomies with aborted L2-L3 and L3-L4 laminectomies due to loss of signal.  He subsequently returned on 7/12 to have L2, L3, L4, L5, S1 laminectomy, and a L4-L5 microdiscectomy and was hospitalized 7/5-22.  Further during that hospitalization he was noted to have persistent sinus tachycardia, had echocardiogram which was unremarkable, and was initiated on metoprolol.       Patient presented to ED on 7/27 after indicating leaking wound VAC for which he had wound drainage and cleanout performed 7/25.  Patient returned again on 7/31 with ongoing concerns of wound drainage and at which point was admitted and found to have ill-defined nonenhancing fluid within the laminectomy bed most pronounced at L4-L5 level as well as diffuse enhancement of the cauda equina nerve roots presumably infectious/inflammatory on MRI.  Wound culture grew MRSA.     Patient went to the OR for I&D and apparently after being intubated yet prior to initiation of surgical procedure patient went into a ventricular tachycardic rhythm followed by reported V. fib, he had a total of 3 shocks before reestablishing sinus rhythm further he also was given 150 mg of amiodarone.  As there was concern of possible hyperkalemia post succinylcholine as paralytic he was given 20 of IV insulin and calcium.  Immediately after returning to sinus rhythm patient was awake agitated and  looking to pull out ET tube.  He was subsequently was extubated and surgery canceled.     Eventually had I&D on 8/5 with cultures sent.         Interval Problem Update  Back infection-wound vac remains in place. Otherwise doing ok. Tolerating abx's without issue.     ST-remains about the same, upon ambulation goes up to about 160. Tolerating metoprolol dosing.    Consultants/Specialty  Neurosurgery  ID    Code Status  full    Disposition  Transfer to [facility]    Review of Systems  Review of Systems   Constitutional: Negative for chills and fever.   Cardiovascular: Negative for chest pain and palpitations.   Gastrointestinal: Negative for abdominal pain and vomiting.   Musculoskeletal: Positive for back pain. Negative for joint pain, myalgias and neck pain.        Well controlled   Neurological: Negative for dizziness and headaches.   All other systems reviewed and are negative.     Physical Exam  Temp:  [36.2 °C (97.2 °F)-37.1 °C (98.7 °F)] 36.8 °C (98.2 °F)  Pulse:  [] 89  Resp:  [16-20] 20  BP: (101-116)/(68-86) 101/68  SpO2:  [94 %-96 %] 96 %    Physical Exam   Constitutional: He is oriented to person, place, and time. He appears well-developed and well-nourished.   Morbidly obese  Body mass index is 45.9 kg/m².     HENT:   Head: Normocephalic and atraumatic.   Nose: Nose normal.   Mouth/Throat: Oropharynx is clear and moist.   Eyes: Conjunctivae and EOM are normal.   Neck: Normal range of motion. Neck supple.   Cardiovascular: Normal rate.   No murmur heard.  Pulmonary/Chest: Effort normal. No stridor. No respiratory distress.   Abdominal: Soft. There is no tenderness.   Musculoskeletal: He exhibits no tenderness.   Neurological: He is alert and oriented to person, place, and time. No cranial nerve deficit.   Strength 5/5 B lower Ext  Sensation intact to light touch  Wound vac in place-lower lumbar region with no leakage noted   Skin: Skin is warm and dry. No rash noted. He is not diaphoretic.   Psychiatric:  He has a normal mood and affect.   Nursing note and vitals reviewed.      Fluids    Intake/Output Summary (Last 24 hours) at 8/13/2019 1241  Last data filed at 8/13/2019 1200  Gross per 24 hour   Intake 720 ml   Output 1550 ml   Net -830 ml       Laboratory      Recent Labs     08/11/19  0430 08/12/19  0500   SODIUM 137 136   POTASSIUM 4.0 3.7   CHLORIDE 104 102   CO2 23 25   GLUCOSE 94 107*   BUN 15 15   CREATININE 0.73 0.73   CALCIUM 9.6 9.5                   Imaging  IR-PICC LINE PLACEMENT W/ GUIDANCE > AGE 5   Final Result                  Ultrasound-guided PICC placement performed by qualified nursing staff as    above.          CT-CTA CHEST PULMONARY ARTERY W/ RECONS   Final Result      1.  No definite evidence of pulmonary emboli, within the limitations described above.   2.  Bilateral lower lobe discoid atelectasis.   3.  Unusual curvilinear lucency in the right hepatic lobe which may represent scarring. The appearance is not at least typical for a hepatic solid mass or cyst.            EC-ECHOCARDIOGRAM COMPLETE W/ CONT   Final Result      DX-CHEST-LIMITED (1 VIEW)   Final Result      No evidence of acute cardiopulmonary process.      MR-LUMBAR SPINE-WITH & W/O   Final Result      1.  Stable postsurgical changes L3-L5 laminectomies. Interval ill-defined nonenhancing fluid within the laminectomy bed most pronounced at the L4-L5 level. Infection should be excluded clinically.   2.  There is diffuse enhancement of the cauda equina nerve roots which is presumably infectious/inflammatory.   3.  No evidence of discitis osteomyelitis or epidural abscess.   4.  Degenerative changes as detailed above with persistent moderate thecal sac stenosis at L2-L3 and L4-L5.           Assessment/Plan  * Cardiac arrest (HCC)  Assessment & Plan  S/p cardiac arrest prior to I&D, 3 shocks, surgery canceled  Cards consulted  EP Recomendations:  They will not place the ICD until after his spinal infection is drained and  treated    MRSA bacteremia  Assessment & Plan  Blood cultures +, along with wound cultures therefore now started on daptomycin  Adding Bactrim  Repeat bcx negative  PICC placed 8/9, abx's through 9/20/19.    Elevated liver enzymes  Assessment & Plan  Secondary to VT/VF arrest most likely  Cont to follow, work up further if fail to normalize    Other inflammatory polyneuropathies (HCC)- (present on admission)  Assessment & Plan  Neurontin 100 mg 3 times daily    Infected surgical wound- (present on admission)  Assessment & Plan  Wound culures MSRA, and now even blood cx are +  ID on board, changed abx to Daptomycin and Bactrim  Wound care  Pain management with IV Dilaudid  Mgmt as above  Wound vac appears to be working well at this time    Wound drainage- (present on admission)  Assessment & Plan  Infected with MRSA, now on daptomycin and Bactrim  NSx I&D with cultures 8/5  Will need long term IV abx    Sinus tachycardia- (present on admission)  Assessment & Plan  Continue Beta-blocker, 50 mg BID metoprolol, inappropriate, long standing issue  No adjustments needed at this time    Acute bilateral back pain- (present on admission)  Assessment & Plan  Back pain is intractable  P.o. Vicodin  PRN IV Dilaudid  Well controlled today     VTE prophylaxis: none due to recent surgery

## 2019-08-14 PROCEDURE — 700111 HCHG RX REV CODE 636 W/ 250 OVERRIDE (IP): Performed by: INTERNAL MEDICINE

## 2019-08-14 PROCEDURE — 700102 HCHG RX REV CODE 250 W/ 637 OVERRIDE(OP): Performed by: HOSPITALIST

## 2019-08-14 PROCEDURE — A9270 NON-COVERED ITEM OR SERVICE: HCPCS | Performed by: HOSPITALIST

## 2019-08-14 PROCEDURE — 700102 HCHG RX REV CODE 250 W/ 637 OVERRIDE(OP): Performed by: INTERNAL MEDICINE

## 2019-08-14 PROCEDURE — 700105 HCHG RX REV CODE 258: Performed by: INTERNAL MEDICINE

## 2019-08-14 PROCEDURE — A9270 NON-COVERED ITEM OR SERVICE: HCPCS | Performed by: INTERNAL MEDICINE

## 2019-08-14 PROCEDURE — 97605 NEG PRS WND THER DME<=50SQCM: CPT

## 2019-08-14 PROCEDURE — 306372 DRESSING,VAC SIMPLACE MED: Performed by: NEUROLOGICAL SURGERY

## 2019-08-14 PROCEDURE — 700111 HCHG RX REV CODE 636 W/ 250 OVERRIDE (IP): Performed by: HOSPITALIST

## 2019-08-14 PROCEDURE — 700111 HCHG RX REV CODE 636 W/ 250 OVERRIDE (IP): Performed by: NURSE PRACTITIONER

## 2019-08-14 PROCEDURE — 770020 HCHG ROOM/CARE - TELE (206)

## 2019-08-14 PROCEDURE — 99232 SBSQ HOSP IP/OBS MODERATE 35: CPT | Performed by: INTERNAL MEDICINE

## 2019-08-14 RX ADMIN — GABAPENTIN 100 MG: 100 CAPSULE ORAL at 12:15

## 2019-08-14 RX ADMIN — METOPROLOL TARTRATE 50 MG: 25 TABLET, FILM COATED ORAL at 05:46

## 2019-08-14 RX ADMIN — GABAPENTIN 100 MG: 100 CAPSULE ORAL at 17:05

## 2019-08-14 RX ADMIN — ENOXAPARIN SODIUM 40 MG: 100 INJECTION SUBCUTANEOUS at 05:46

## 2019-08-14 RX ADMIN — METHOCARBAMOL TABLETS 1000 MG: 500 TABLET, COATED ORAL at 17:05

## 2019-08-14 RX ADMIN — SULFAMETHOXAZOLE AND TRIMETHOPRIM 1 TABLET: 800; 160 TABLET ORAL at 17:05

## 2019-08-14 RX ADMIN — HYDROMORPHONE HYDROCHLORIDE 0.5 MG: 1 INJECTION, SOLUTION INTRAMUSCULAR; INTRAVENOUS; SUBCUTANEOUS at 11:10

## 2019-08-14 RX ADMIN — METHOCARBAMOL TABLETS 1000 MG: 500 TABLET, COATED ORAL at 12:15

## 2019-08-14 RX ADMIN — METOPROLOL TARTRATE 50 MG: 25 TABLET, FILM COATED ORAL at 17:05

## 2019-08-14 RX ADMIN — HYDROCODONE BITARTRATE AND ACETAMINOPHEN 1 TABLET: 5; 325 TABLET ORAL at 10:07

## 2019-08-14 RX ADMIN — SULFAMETHOXAZOLE AND TRIMETHOPRIM 1 TABLET: 800; 160 TABLET ORAL at 05:46

## 2019-08-14 RX ADMIN — METHOCARBAMOL TABLETS 1000 MG: 500 TABLET, COATED ORAL at 05:46

## 2019-08-14 RX ADMIN — DAPTOMYCIN 1050 MG: 500 INJECTION, POWDER, LYOPHILIZED, FOR SOLUTION INTRAVENOUS at 12:15

## 2019-08-14 RX ADMIN — GABAPENTIN 100 MG: 100 CAPSULE ORAL at 05:46

## 2019-08-14 RX ADMIN — SENNOSIDES AND DOCUSATE SODIUM 2 TABLET: 8.6; 5 TABLET ORAL at 17:05

## 2019-08-14 ASSESSMENT — ENCOUNTER SYMPTOMS
BACK PAIN: 1
SHORTNESS OF BREATH: 0
FEVER: 0
MYALGIAS: 0
PALPITATIONS: 0
NECK PAIN: 0
DIZZINESS: 0
COUGH: 0
VOMITING: 0
NAUSEA: 0
ABDOMINAL PAIN: 0

## 2019-08-14 NOTE — PROGRESS NOTES
Received report on patient. He is resting in bed, has no complaints of pain and no indications of distress. Bed in the lowest position and call light and personal belongings within reach.

## 2019-08-14 NOTE — PROGRESS NOTES
Patient is resting in bed, has no complaints of pain and no indications of distress. Will continue to monitor.

## 2019-08-14 NOTE — PROGRESS NOTES
[facility] Medicine Daily Progress Note    Date of Service  8/14/2019    Chief Complaint  23 y.o. male admitted 7/31/2019 with drainage from surgical wound    Hospital Course     23 y.o. male who presented 7/31/2019 with infection of lumbar laminectomy.  Patient presented 6/17/2019 with congenital spinal stenosis and severe L4-L5 stenosis with disc herniation, L2-L3 moderate to severe stenosis, and L5-S1 moderate to severe stenosis with morbid obesity and intractable pain.  At that time he had L4 and L5 and S1 decompressive laminectomy, bilateral medial facetectomies and bilateral foraminotomies with aborted L2-L3 and L3-L4 laminectomies due to loss of signal.  He subsequently returned on 7/12 to have L2, L3, L4, L5, S1 laminectomy, and a L4-L5 microdiscectomy and was hospitalized 7/5-22.  Further during that hospitalization he was noted to have persistent sinus tachycardia, had echocardiogram which was unremarkable, and was initiated on metoprolol.       Patient presented to ED on 7/27 after indicating leaking wound VAC for which he had wound drainage and cleanout performed 7/25.  Patient returned again on 7/31 with ongoing concerns of wound drainage and at which point was admitted and found to have ill-defined nonenhancing fluid within the laminectomy bed most pronounced at L4-L5 level as well as diffuse enhancement of the cauda equina nerve roots presumably infectious/inflammatory on MRI.  Wound culture grew MRSA.     Patient went to the OR for I&D and apparently after being intubated yet prior to initiation of surgical procedure patient went into a ventricular tachycardic rhythm followed by reported V. fib, he had a total of 3 shocks before reestablishing sinus rhythm further he also was given 150 mg of amiodarone.  As there was concern of possible hyperkalemia post succinylcholine as paralytic he was given 20 of IV insulin and calcium.  Immediately after returning to sinus rhythm patient was awake agitated and  looking to pull out ET tube.  He was subsequently was extubated and surgery canceled.     Eventually had I&D on 8/5 with cultures sent.         Interval Problem Update  Back infection-wound vac remains in place, afebrile, tolerating IV abx's without issue. No new back pain. Ambulating some.     ST-HR is somewhat better today.     Consultants/Specialty  Neurosurgery  ID    Code Status  full    Disposition  Transfer to [facility]    Review of Systems  Review of Systems   Constitutional: Negative for fever.   Respiratory: Negative for cough and shortness of breath.    Cardiovascular: Negative for chest pain, palpitations and leg swelling.   Gastrointestinal: Negative for abdominal pain, nausea and vomiting.   Musculoskeletal: Positive for back pain. Negative for joint pain, myalgias and neck pain.        Well controlled and unchanged today   Neurological: Negative for dizziness.   All other systems reviewed and are negative.     Physical Exam  Temp:  [36.1 °C (97 °F)-37.2 °C (98.9 °F)] 37.1 °C (98.7 °F)  Pulse:  [] 106  Resp:  [16-20] 18  BP: ()/(63-95) 103/66  SpO2:  [94 %-97 %] 96 %    Physical Exam   Constitutional: He is oriented to person, place, and time. He appears well-developed and well-nourished.   Morbidly obese  Body mass index is 45.9 kg/m².     HENT:   Head: Normocephalic and atraumatic.   Nose: Nose normal.   Mouth/Throat: Oropharynx is clear and moist.   Eyes: Conjunctivae and EOM are normal. Right eye exhibits no discharge. Left eye exhibits no discharge.   Neck: Normal range of motion. Neck supple.   Cardiovascular: Regular rhythm.   No murmur heard.  Intermittently tachycardic   Pulmonary/Chest: Effort normal. No stridor. He has no wheezes. He has no rales.   Abdominal: Soft. There is no tenderness.   Musculoskeletal: He exhibits no tenderness.   Neurological: He is alert and oriented to person, place, and time. Coordination normal.   Strength 5/5 B lower Ext  Sensation intact to light  touch  Wound vac in place-lower lumbar region with no leakage noted-no interval clinical changes appreciated today   Skin: Skin is warm and dry. No rash noted. He is not diaphoretic.   Psychiatric: He has a normal mood and affect.   Nursing note and vitals reviewed.      Fluids    Intake/Output Summary (Last 24 hours) at 8/14/2019 1452  Last data filed at 8/14/2019 1400  Gross per 24 hour   Intake 740 ml   Output 325 ml   Net 415 ml       Laboratory      Recent Labs     08/12/19  0500   SODIUM 136   POTASSIUM 3.7   CHLORIDE 102   CO2 25   GLUCOSE 107*   BUN 15   CREATININE 0.73   CALCIUM 9.5                   Imaging  IR-PICC LINE PLACEMENT W/ GUIDANCE > AGE 5   Final Result                  Ultrasound-guided PICC placement performed by qualified nursing staff as    above.          CT-CTA CHEST PULMONARY ARTERY W/ RECONS   Final Result      1.  No definite evidence of pulmonary emboli, within the limitations described above.   2.  Bilateral lower lobe discoid atelectasis.   3.  Unusual curvilinear lucency in the right hepatic lobe which may represent scarring. The appearance is not at least typical for a hepatic solid mass or cyst.            EC-ECHOCARDIOGRAM COMPLETE W/ CONT   Final Result      DX-CHEST-LIMITED (1 VIEW)   Final Result      No evidence of acute cardiopulmonary process.      MR-LUMBAR SPINE-WITH & W/O   Final Result      1.  Stable postsurgical changes L3-L5 laminectomies. Interval ill-defined nonenhancing fluid within the laminectomy bed most pronounced at the L4-L5 level. Infection should be excluded clinically.   2.  There is diffuse enhancement of the cauda equina nerve roots which is presumably infectious/inflammatory.   3.  No evidence of discitis osteomyelitis or epidural abscess.   4.  Degenerative changes as detailed above with persistent moderate thecal sac stenosis at L2-L3 and L4-L5.           Assessment/Plan  * Cardiac arrest (HCC)  Assessment & Plan  S/p cardiac arrest prior to I&D, 3  shocks, surgery canceled  Cards consulted  EP Recomendations:  They will not place the ICD until after his spinal infection is drained and treated    MRSA bacteremia  Assessment & Plan  Blood cultures +, along with wound cultures therefore now started on daptomycin (no e/o muscle aches at this time)  Adding Bactrim-tolerating well, intermittently check Kidney function  Repeat bcx negative  PICC placed 8/9, abx's through 9/20/19.    Elevated liver enzymes  Assessment & Plan  Secondary to VT/VF arrest most likely  Cont to follow, work up further if fail to normalize    Other inflammatory polyneuropathies (HCC)- (present on admission)  Assessment & Plan  Neurontin 100 mg 3 times daily, appears to be working well    Infected surgical wound- (present on admission)  Assessment & Plan  Wound culures MSRA, and now even blood cx are +  ID on board, changed abx to Daptomycin and Bactrim  Wound care  Pain management that is multi-modal  Mgmt as above  Wound vac appears to be working well at this time    Wound drainage- (present on admission)  Assessment & Plan  Infected with MRSA, now on daptomycin and Bactrim  NSx I&D with cultures 8/5  Will need long term IV abx    Sinus tachycardia- (present on admission)  Assessment & Plan  Continue Beta-blocker, 50 mg BID metoprolol, inappropriate, long standing issue  No adjustments needed at this time, better overall HR control achieved today    Acute bilateral back pain- (present on admission)  Assessment & Plan  Back pain is intractable  P.o. Vicodin  PRN IV Dilaudid  Well controlled today     VTE prophylaxis: none due to recent surgery

## 2019-08-14 NOTE — PROGRESS NOTES
Neurosurgery Progress Note    Subjective:  No new c/o, ambulated 100 feet, walked stairs    Exam:  NM: left foot 4+/5, rest 5/5      BP  Min: 92/63  Max: 125/95  Pulse  Av  Min: 64  Max: 129  Resp  Av.8  Min: 16  Max: 20  Temp  Av.7 °C (98 °F)  Min: 36.1 °C (97 °F)  Max: 37.2 °C (98.9 °F)  SpO2  Av.3 %  Min: 94 %  Max: 97 %    No data recorded        Recent Labs     19  0500   SODIUM 136   POTASSIUM 3.7   CHLORIDE 102   CO2 25   GLUCOSE 107*   BUN 15   CREATININE 0.73   CALCIUM 9.5               Intake/Output       19 - 19 - 08/15/19 0659      5822-0760 8622-4541 Total 0970-0431 6972-7391 Total       Intake    P.O.  720  240 960  300  -- 300    P.O. 720 240 960 300 -- 300    IV Piggyback  50  -- 50  --  -- --    Volume (mL) (DAPTOmycin (CUBICIN) 1,050 mg in NS 50 mL IVPB) 50 -- 50 -- -- --    Total Intake  300 -- 300       Output    Urine  300  200 500  --  -- --    Urine Void (mL) 300 200 500 -- -- --    Drains  75  -- 75  --  -- --    Output (mL) (Negative Pressure Wound Therapy Back Lower;Mid) 75 -- 75 -- -- --    Total Output 375 200 575 -- -- --       Net I/O     395 40 435 300 -- 300            Intake/Output Summary (Last 24 hours) at 2019 1134  Last data filed at 2019 1000  Gross per 24 hour   Intake 950 ml   Output 325 ml   Net 625 ml            • metoprolol  50 mg TWICE DAILY   • DAPTOmycin IVPB ([facility] only)  1,050 mg Q24HR   • Metoprolol Tartrate  5 mg Q5 MIN PRN   • HYDROmorphone  0.5 mg Q4HRS PRN   • enoxaparin (LOVENOX) injection  40 mg DAILY   • sulfamethoxazole-trimethoprim  1 Tab Q12HRS   • Respiratory Care per Protocol   Continuous RT   • gabapentin  100 mg TID   • HYDROcodone-acetaminophen  1 Tab Q4HRS PRN   • methocarbamol  1,000 mg TID   • senna-docusate  2 Tab BID    And   • polyethylene glycol/lytes  1 Packet QDAY PRN    And   • magnesium hydroxide  30 mL QDAY PRN    And   • bisacodyl  10 mg QDAY PRN    • acetaminophen  650 mg Q6HRS PRN   • ondansetron  4 mg Q4HRS PRN   • ondansetron  4 mg Q4HRS PRN   • promethazine  12.5-25 mg Q4HRS PRN   • promethazine  12.5-25 mg Q4HRS PRN   • prochlorperazine  5-10 mg Q4HRS PRN       Assessment and Plan:  POD # 9 I & D for MRSA lumbar wound infection.   Prior LS surgeries x 2  On Lovenox DVT prophylaxis    Please see previous notes for further details from Nsx view.  Patient has been stable and his left foot weakness is improving.    Nsx will follow peripherally. Please call with any questions.  Follow up at [facility] when discharged from [facility], SNF or if discharged home f/u in 2-3 weeks.

## 2019-08-14 NOTE — PROGRESS NOTES
Handoff report received. Assumed patient care. PT is finishing dinner in bed, fresh ice water provided, AAOx4. POC discussed with PT and all questions addressed. Safety precautions in place. Call light and personal belongings within reach. Educated to call for assistance if needed.

## 2019-08-14 NOTE — PROGRESS NOTES
VSS on room air. Metoprolol increased, heart rate max 163 when walking and did one fight of stairs with PT. WV changed 8/14/19, 75 ml output so far. Denies pain that is not tolerable, pt does not want pain medications unless wv change or after moving. Voiding adequately. LBM 8/12/19. Tolerating diet. PLAN TO DISCHARGE TO LTAC.     Tele: sinus rhythm - sinus tach  Rate:  bpm  No ectopy   0.12 / 0.08 / 0.36

## 2019-08-14 NOTE — WOUND TEAM
[facility]  Inpatient Services  Wound and Skin Care Progress Note    Admission Date: 7/31/2019     Consult Date: 07/31/19   HPI, PMH, SH: Reviewed    Unit where seen by Wound Team: [id number]    WOUND CONSULT RELATED TO:  Scheduled NPWT dressing change    Self Report / Pain Level:  5/10 after PO Oxy and IV Dilaudid         OBJECTIVE:  In low airloss bed, Wound vac dressing in place    WOUND TYPE, LOCATION, CHARACTERISTICS (Pressure Injuries: location, stage, POA or date identified)    Negative Pressure Wound Therapy Back Lower;Mid (Active)   NPWT Pump Mode / Pressure Setting Continuous;125 mmHg    Dressing Type Black foam    Number of Foam Pieces Used 2    Canister Changed No    Output (mL) 25 mL    VAC VeraFlo Pressure (mm/Hg) 125 mmHg    WOUND NURSE ONLY - Time Spent with Patient (mins) 10      Wound 07/31/19 Incision Back dehisced incision  (Active)   Wound Image       Site Assessment Yellow;Red    Maeve-wound Assessment Clean;Dry;Intact    Margins Attached edges;Defined edges    Wound Length (cm) 11.6 cm    Wound Width (cm) 1 cm    Wound Depth (cm) 4.1 cm    Wound Surface Area (cm^2) 11.6 cm^2    Tunneling 0 cm    Undermining 0 cm    Closure Secondary intention    Drainage Amount Small    Drainage Description Sanguineous    Non-staged Wound Description Full thickness    Treatments Cleansed;Site care    Cleansing Approved Wound Cleanser    Periwound Protectant Benzoin;Drape    Dressing Options Wound Vac    Dressing Cleansing/Solutions Not Applicable    Dressing Changed Changed    Dressing Status Clean;Dry;Intact    Dressing Change Frequency Monday, Wednesday, Friday    NEXT Dressing Change  08/16/19    NEXT Weekly Photo (Inpatient Only) 08/21/19    WOUND NURSE ONLY - Odor None    WOUND NURSE ONLY - Exposed Structures None    WOUND NURSE ONLY - Tissue Type and Percentage 95% Red, 5% Yellow    WOUND NURSE ONLY - Time Spent with Patient (mins) 60      DELLA:      NA    Culture:   Obtained 07/31, Positive  "for Heavy growth of MRSA    MRI:                                        Completed 08/01/2019,   1.  Stable postsurgical changes L3-L5 laminectomies. Interval ill-defined nonenhancing fluid within the laminectomy bed most pronounced at the L4-L5 level. Infection should be excluded clinically.  2.  There is diffuse enhancement of the cauda equina nerve roots which is presumably infectious/inflammatory.  3.  No evidence of discitis osteomyelitis or epidural abscess.  4.  Degenerative changes as detailed above with persistent moderate thecal sac stenosis at L2-L3 and L4-L5.    INTERVENTIONS BY WOUND TEAM:  Chart reviewed. Wound vac paused. Drape removed, black foam removed. No retained foam. Wound was then cleansed with wound cleanser and gauze. Measurements and pictures obtained. Maeve-wound prepped with benzoin and drape. Drape continued up to the right side for bridged trac pad. 1 piece of spiraled black foam \"zig zagged\" into the wound bed and then out and tracked out to the right side to bridge trac pad. One piece of halved circular black foam applied to the right side to use as a button for trac pad. Trac pad applied. Suction resumed at 125mmhg continuous. No leaks identified. May want to bridge out to the left side at next dressing change as skin is beginning to become excoriated.    Dressing selection:  2 Pieces of Black foam from a Medium black foam kit for NPWT at 125mmhg continuous.        Interdisciplinary consultation: Patient, Bedside RN (Clara)     EVALUATION: patient had surgery with Dr. Ruelas recently, Prevena incisional VAC was placed and patient discharged.  Patient came returned to the ER for wound drainage/odor.  Dr. Ruelas was consulted, patient had MRI.  Pt went to surgery for I&D and had wound vac placed. Wound vac will assist in managing exudate while assisting in granular tissue development. Pt tolerated change much better this time and states he thinks he could do it with only oral pain medications. " Currently difficult discharge due to requiring IV ABX outpatient until Sept. 20th pt inability to meet copay. Case management looking into LTACH vs. Outpatient infusion and wound clinic.     Factors affecting wound healing: infection, obesity   Goals: Steady decrease in wound area and depth weekly.    NURSING PLAN OF CARE ORDERS (X):    Dressing changes: See Dressing Care orders: X  Skin care: See Skin Care orders: patient independent with movement   Rectal tube care: See Rectal Tube Care orders:   Other orders:    WOUND TEAM PLAN OF CARE (X):   NPWT change 3 x week:        Dressing changes by wound team:       Follow up as needed:     Wound team to follow up 3x weekly for dressing changes  Other (explain):     Anticipated discharge plans (X):   SNF:           Home Care: X, pt will require wound vac dressing changes through LTACH vs. Outpatient wound clinic and infusion center for IV ABX through September 20th       Outpatient Wound Center:            Self Care:            Other:

## 2019-08-15 LAB
ANION GAP SERPL CALC-SCNC: 11 MMOL/L (ref 0–11.9)
BUN SERPL-MCNC: 14 MG/DL (ref 8–22)
CALCIUM SERPL-MCNC: 9.7 MG/DL (ref 8.5–10.5)
CHLORIDE SERPL-SCNC: 105 MMOL/L (ref 96–112)
CO2 SERPL-SCNC: 23 MMOL/L (ref 20–33)
CREAT SERPL-MCNC: 0.71 MG/DL (ref 0.5–1.4)
GLUCOSE SERPL-MCNC: 94 MG/DL (ref 65–99)
POTASSIUM SERPL-SCNC: 4 MMOL/L (ref 3.6–5.5)
SODIUM SERPL-SCNC: 139 MMOL/L (ref 135–145)

## 2019-08-15 PROCEDURE — 80048 BASIC METABOLIC PNL TOTAL CA: CPT

## 2019-08-15 PROCEDURE — 770021 HCHG ROOM/CARE - ISO PRIVATE

## 2019-08-15 PROCEDURE — 700102 HCHG RX REV CODE 250 W/ 637 OVERRIDE(OP): Performed by: HOSPITALIST

## 2019-08-15 PROCEDURE — A9270 NON-COVERED ITEM OR SERVICE: HCPCS | Performed by: INTERNAL MEDICINE

## 2019-08-15 PROCEDURE — 700102 HCHG RX REV CODE 250 W/ 637 OVERRIDE(OP): Performed by: INTERNAL MEDICINE

## 2019-08-15 PROCEDURE — 700111 HCHG RX REV CODE 636 W/ 250 OVERRIDE (IP): Performed by: NURSE PRACTITIONER

## 2019-08-15 PROCEDURE — 700111 HCHG RX REV CODE 636 W/ 250 OVERRIDE (IP): Performed by: INTERNAL MEDICINE

## 2019-08-15 PROCEDURE — A9270 NON-COVERED ITEM OR SERVICE: HCPCS | Performed by: HOSPITALIST

## 2019-08-15 PROCEDURE — 700105 HCHG RX REV CODE 258: Performed by: INTERNAL MEDICINE

## 2019-08-15 PROCEDURE — 99232 SBSQ HOSP IP/OBS MODERATE 35: CPT | Performed by: INTERNAL MEDICINE

## 2019-08-15 RX ORDER — DIPHENHYDRAMINE HCL 25 MG
25 TABLET ORAL EVERY 6 HOURS PRN
Status: DISCONTINUED | OUTPATIENT
Start: 2019-08-15 | End: 2019-08-16 | Stop reason: HOSPADM

## 2019-08-15 RX ADMIN — GABAPENTIN 100 MG: 100 CAPSULE ORAL at 11:41

## 2019-08-15 RX ADMIN — DIPHENHYDRAMINE HCL 25 MG: 25 TABLET ORAL at 19:22

## 2019-08-15 RX ADMIN — ENOXAPARIN SODIUM 40 MG: 100 INJECTION SUBCUTANEOUS at 05:22

## 2019-08-15 RX ADMIN — METHOCARBAMOL TABLETS 1000 MG: 500 TABLET, COATED ORAL at 05:21

## 2019-08-15 RX ADMIN — METOPROLOL TARTRATE 50 MG: 25 TABLET, FILM COATED ORAL at 05:21

## 2019-08-15 RX ADMIN — METHOCARBAMOL TABLETS 1000 MG: 500 TABLET, COATED ORAL at 11:41

## 2019-08-15 RX ADMIN — GABAPENTIN 100 MG: 100 CAPSULE ORAL at 05:21

## 2019-08-15 RX ADMIN — SULFAMETHOXAZOLE AND TRIMETHOPRIM 1 TABLET: 800; 160 TABLET ORAL at 16:42

## 2019-08-15 RX ADMIN — GABAPENTIN 100 MG: 100 CAPSULE ORAL at 16:42

## 2019-08-15 RX ADMIN — METHOCARBAMOL TABLETS 1000 MG: 500 TABLET, COATED ORAL at 16:42

## 2019-08-15 RX ADMIN — SULFAMETHOXAZOLE AND TRIMETHOPRIM 1 TABLET: 800; 160 TABLET ORAL at 05:21

## 2019-08-15 RX ADMIN — METOPROLOL TARTRATE 50 MG: 25 TABLET, FILM COATED ORAL at 16:43

## 2019-08-15 RX ADMIN — DAPTOMYCIN 1050 MG: 500 INJECTION, POWDER, LYOPHILIZED, FOR SOLUTION INTRAVENOUS at 11:48

## 2019-08-15 ASSESSMENT — ENCOUNTER SYMPTOMS
COUGH: 0
MYALGIAS: 0
NECK PAIN: 0
PALPITATIONS: 1
SHORTNESS OF BREATH: 0
DIZZINESS: 0
DIARRHEA: 0
CHILLS: 0
BACK PAIN: 1

## 2019-08-15 NOTE — DISCHARGE PLANNING
Agency/Facility Name: [facility]  Outcome: Resent referral and last 2 days progress notes.    @1400  Agency/Facility Name: [facility]  Spoke To: Will  Outcome: Still waiting for auth.

## 2019-08-15 NOTE — PROGRESS NOTES
Report given to Mario AZEVEDO to transfer into [place] [id number]. Awaiting transport.     Transport arrived at 1543. Belongings sent with pt.

## 2019-08-15 NOTE — PROGRESS NOTES
Handoff report received. Assumed patient care. PT is sitting up in bed, AAOx4.POC discussed with PT and all questions addressed. Safety precautions in place. Call light and personal belongings within reach. Educated to call for assistance if needed.

## 2019-08-15 NOTE — DISCHARGE PLANNING
Pt states his family has money for [facility] in order for pt to dc to home with home health and [facility]. Christelle with [facility] notified, requesting referral to be resent along with the last two days of progress notes. Requested clinicals will be sent to [facility].

## 2019-08-15 NOTE — CARE PLAN
Pt remains free from falls at this time. Safety precautions in place. Pt educated on calling for assistance when needed.   Educated pt on plan of care. Educated on skin care and wound vac. Dressing reinforced.

## 2019-08-15 NOTE — PROGRESS NOTES
[facility] Medicine Daily Progress Note    Date of Service  8/15/2019    Chief Complaint  23 y.o. male admitted 7/31/2019 with drainage from surgical wound    Hospital Course     23 y.o. male who presented 7/31/2019 with infection of lumbar laminectomy.  Patient presented 6/17/2019 with congenital spinal stenosis and severe L4-L5 stenosis with disc herniation, L2-L3 moderate to severe stenosis, and L5-S1 moderate to severe stenosis with morbid obesity and intractable pain.  At that time he had L4 and L5 and S1 decompressive laminectomy, bilateral medial facetectomies and bilateral foraminotomies with aborted L2-L3 and L3-L4 laminectomies due to loss of signal.  He subsequently returned on 7/12 to have L2, L3, L4, L5, S1 laminectomy, and a L4-L5 microdiscectomy and was hospitalized 7/5-22.  Further during that hospitalization he was noted to have persistent sinus tachycardia, had echocardiogram which was unremarkable, and was initiated on metoprolol.       Patient presented to ED on 7/27 after indicating leaking wound VAC for which he had wound drainage and cleanout performed 7/25.  Patient returned again on 7/31 with ongoing concerns of wound drainage and at which point was admitted and found to have ill-defined nonenhancing fluid within the laminectomy bed most pronounced at L4-L5 level as well as diffuse enhancement of the cauda equina nerve roots presumably infectious/inflammatory on MRI.  Wound culture grew MRSA.     Patient went to the OR for I&D and apparently after being intubated yet prior to initiation of surgical procedure patient went into a ventricular tachycardic rhythm followed by reported V. fib, he had a total of 3 shocks before reestablishing sinus rhythm further he also was given 150 mg of amiodarone.  As there was concern of possible hyperkalemia post succinylcholine as paralytic he was given 20 of IV insulin and calcium.  Immediately after returning to sinus rhythm patient was awake agitated and  looking to pull out ET tube.  He was subsequently was extubated and surgery canceled.     Eventually had I&D on 8/5 with cultures sent.         Interval Problem Update  Back infection-no issues with wound vac. Doing well. Family has money for home IV abx's. He is excited about this. Remains afebrile, denies any new muscle aches.    ST-HR remains in the low 100's. No additional new symptoms today    Consultants/Specialty  Neurosurgery  ID    Code Status  full    Disposition  Transfer to [facility]    Review of Systems  Review of Systems   Constitutional: Negative for chills.   HENT: Negative for hearing loss.    Respiratory: Negative for cough and shortness of breath.    Cardiovascular: Positive for palpitations (mild when ambulating). Negative for chest pain and leg swelling.   Gastrointestinal: Negative for diarrhea.   Musculoskeletal: Positive for back pain. Negative for joint pain, myalgias and neck pain.        Well controlled, no obvious clinical changes today   Skin: Negative for rash.   Neurological: Negative for dizziness.   All other systems reviewed and are negative.     Physical Exam  Temp:  [36.5 °C (97.7 °F)-37.2 °C (99 °F)] 36.9 °C (98.4 °F)  Pulse:  [] 95  Resp:  [18-20] 20  BP: (105-117)/(64-81) 117/69  SpO2:  [94 %-95 %] 95 %    Physical Exam   Constitutional: He is oriented to person, place, and time. He appears well-developed and well-nourished.   Morbidly obese  Body mass index is 45.9 kg/m².     HENT:   Head: Normocephalic and atraumatic.   Nose: Nose normal.   Mouth/Throat: Oropharynx is clear and moist.   Eyes: Conjunctivae and EOM are normal. No scleral icterus.   Neck: Normal range of motion. Neck supple.   Cardiovascular: Regular rhythm. Exam reveals no gallop.   Intermittently tachycardic   Pulmonary/Chest: Effort normal. No stridor. No respiratory distress.   Abdominal: Soft. He exhibits no distension.   Musculoskeletal: He exhibits no edema.   Neurological: He is alert and oriented to  person, place, and time. No cranial nerve deficit.   Strength 5/5 B lower Ext  Sensation intact to light touch  Wound vac in place-lower lumbar region with no leakage noted-no interval clinical changes appreciated today   Skin: Skin is warm and dry. No rash noted. He is not diaphoretic.   Psychiatric: He has a normal mood and affect.   Nursing note and vitals reviewed.      Fluids    Intake/Output Summary (Last 24 hours) at 8/15/2019 1352  Last data filed at 8/15/2019 0000  Gross per 24 hour   Intake 440 ml   Output 1405 ml   Net -965 ml       Laboratory      Recent Labs     08/15/19  0400   SODIUM 139   POTASSIUM 4.0   CHLORIDE 105   CO2 23   GLUCOSE 94   BUN 14   CREATININE 0.71   CALCIUM 9.7                   Imaging  IR-PICC LINE PLACEMENT W/ GUIDANCE > AGE 5   Final Result                  Ultrasound-guided PICC placement performed by qualified nursing staff as    above.          CT-CTA CHEST PULMONARY ARTERY W/ RECONS   Final Result      1.  No definite evidence of pulmonary emboli, within the limitations described above.   2.  Bilateral lower lobe discoid atelectasis.   3.  Unusual curvilinear lucency in the right hepatic lobe which may represent scarring. The appearance is not at least typical for a hepatic solid mass or cyst.            EC-ECHOCARDIOGRAM COMPLETE W/ CONT   Final Result      DX-CHEST-LIMITED (1 VIEW)   Final Result      No evidence of acute cardiopulmonary process.      MR-LUMBAR SPINE-WITH & W/O   Final Result      1.  Stable postsurgical changes L3-L5 laminectomies. Interval ill-defined nonenhancing fluid within the laminectomy bed most pronounced at the L4-L5 level. Infection should be excluded clinically.   2.  There is diffuse enhancement of the cauda equina nerve roots which is presumably infectious/inflammatory.   3.  No evidence of discitis osteomyelitis or epidural abscess.   4.  Degenerative changes as detailed above with persistent moderate thecal sac stenosis at L2-L3 and L4-L5.            Assessment/Plan  * Cardiac arrest (HCC)  Assessment & Plan  S/p cardiac arrest prior to I&D, 3 shocks, surgery canceled  Cards consulted  EP Recomendations:  They will not place the ICD until after his spinal infection is drained and treated    MRSA bacteremia  Assessment & Plan  Blood cultures +, along with wound cultures therefore now started on daptomycin (no e/o muscle aches at this time noted again today)  Adding Bactrim-tolerating well, intermittently check Kidney function  Repeat bcx negative  PICC placed 8/9, abx's through 9/20/19.  Family can afford home IV abx's now, will work with [facility] to facilitate this    Elevated liver enzymes  Assessment & Plan  Secondary to VT/VF arrest most likely  Cont to follow, work up further if fail to normalize    Other inflammatory polyneuropathies (HCC)- (present on admission)  Assessment & Plan  Neurontin 100 mg 3 times daily, appears to be working well    Infected surgical wound- (present on admission)  Assessment & Plan  Wound culures MSRA, and now even blood cx are +  ID on board, changed abx to Daptomycin and Bactrim  Wound care  Pain management that is multi-modal  Mgmt as above  Wound vac appears to be working well at this time    Wound drainage- (present on admission)  Assessment & Plan  Infected with MRSA, now on daptomycin and Bactrim  NSx I&D with cultures 8/5  Will need long term IV abx    Sinus tachycardia- (present on admission)  Assessment & Plan  Continue Beta-blocker, 50 mg BID metoprolol, inappropriate, long standing issue  No adjustments needed at this time, HR remains in the acceptable range at this time    Acute bilateral back pain- (present on admission)  Assessment & Plan  Back pain is intractable  P.o. Vicodin  PRN IV Dilaudid  Well controlled today     VTE prophylaxis: none due to recent surgery

## 2019-08-16 VITALS
RESPIRATION RATE: 18 BRPM | HEART RATE: 113 BPM | TEMPERATURE: 98 F | SYSTOLIC BLOOD PRESSURE: 106 MMHG | OXYGEN SATURATION: 95 % | HEIGHT: 72 IN | BODY MASS INDEX: 41.33 KG/M2 | DIASTOLIC BLOOD PRESSURE: 74 MMHG | WEIGHT: 305.12 LBS

## 2019-08-16 PROCEDURE — 99239 HOSP IP/OBS DSCHRG MGMT >30: CPT | Performed by: INTERNAL MEDICINE

## 2019-08-16 PROCEDURE — A9270 NON-COVERED ITEM OR SERVICE: HCPCS | Performed by: INTERNAL MEDICINE

## 2019-08-16 PROCEDURE — 700105 HCHG RX REV CODE 258: Performed by: INTERNAL MEDICINE

## 2019-08-16 PROCEDURE — 700111 HCHG RX REV CODE 636 W/ 250 OVERRIDE (IP): Performed by: INTERNAL MEDICINE

## 2019-08-16 PROCEDURE — 700102 HCHG RX REV CODE 250 W/ 637 OVERRIDE(OP): Performed by: HOSPITALIST

## 2019-08-16 PROCEDURE — A9270 NON-COVERED ITEM OR SERVICE: HCPCS | Performed by: HOSPITALIST

## 2019-08-16 PROCEDURE — 306372 DRESSING,VAC SIMPLACE MED: Performed by: NEUROLOGICAL SURGERY

## 2019-08-16 PROCEDURE — 700102 HCHG RX REV CODE 250 W/ 637 OVERRIDE(OP): Performed by: INTERNAL MEDICINE

## 2019-08-16 PROCEDURE — 97605 NEG PRS WND THER DME<=50SQCM: CPT

## 2019-08-16 PROCEDURE — 700111 HCHG RX REV CODE 636 W/ 250 OVERRIDE (IP): Performed by: HOSPITALIST

## 2019-08-16 PROCEDURE — 700111 HCHG RX REV CODE 636 W/ 250 OVERRIDE (IP): Performed by: NURSE PRACTITIONER

## 2019-08-16 RX ORDER — METOPROLOL TARTRATE 50 MG/1
50 TABLET, FILM COATED ORAL 2 TIMES DAILY
Qty: 60 TAB | Refills: 0 | Status: SHIPPED | OUTPATIENT
Start: 2019-08-16

## 2019-08-16 RX ORDER — SULFAMETHOXAZOLE AND TRIMETHOPRIM 800; 160 MG/1; MG/1
1 TABLET ORAL EVERY 12 HOURS
Qty: 70 TAB | Refills: 0 | Status: SHIPPED | OUTPATIENT
Start: 2019-08-16 | End: 2019-09-20

## 2019-08-16 RX ORDER — HYDROCODONE BITARTRATE AND ACETAMINOPHEN 5; 325 MG/1; MG/1
1 TABLET ORAL EVERY 6 HOURS PRN
Qty: 16 TAB | Refills: 0 | Status: SHIPPED | OUTPATIENT
Start: 2019-08-16 | End: 2019-08-20

## 2019-08-16 RX ORDER — SODIUM CHLORIDE 9 MG/ML
INJECTION, SOLUTION INTRAVENOUS
Status: DISCONTINUED
Start: 2019-08-16 | End: 2019-08-16 | Stop reason: HOSPADM

## 2019-08-16 RX ORDER — ALBUTEROL SULFATE 90 UG/1
AEROSOL, METERED RESPIRATORY (INHALATION)
Status: DISCONTINUED
Start: 2019-08-16 | End: 2019-08-16

## 2019-08-16 RX ADMIN — DIPHENHYDRAMINE HCL 25 MG: 25 TABLET ORAL at 12:26

## 2019-08-16 RX ADMIN — HYDROCODONE BITARTRATE AND ACETAMINOPHEN 1 TABLET: 5; 325 TABLET ORAL at 13:32

## 2019-08-16 RX ADMIN — METOPROLOL TARTRATE 50 MG: 25 TABLET, FILM COATED ORAL at 17:10

## 2019-08-16 RX ADMIN — GABAPENTIN 100 MG: 100 CAPSULE ORAL at 12:24

## 2019-08-16 RX ADMIN — SULFAMETHOXAZOLE AND TRIMETHOPRIM 1 TABLET: 800; 160 TABLET ORAL at 04:53

## 2019-08-16 RX ADMIN — GABAPENTIN 100 MG: 100 CAPSULE ORAL at 04:53

## 2019-08-16 RX ADMIN — DAPTOMYCIN 1050 MG: 500 INJECTION, POWDER, LYOPHILIZED, FOR SOLUTION INTRAVENOUS at 16:29

## 2019-08-16 RX ADMIN — METHOCARBAMOL TABLETS 1000 MG: 500 TABLET, COATED ORAL at 04:53

## 2019-08-16 RX ADMIN — GABAPENTIN 100 MG: 100 CAPSULE ORAL at 17:11

## 2019-08-16 RX ADMIN — HYDROMORPHONE HYDROCHLORIDE 0.5 MG: 1 INJECTION, SOLUTION INTRAMUSCULAR; INTRAVENOUS; SUBCUTANEOUS at 15:20

## 2019-08-16 RX ADMIN — METOPROLOL TARTRATE 50 MG: 25 TABLET, FILM COATED ORAL at 04:53

## 2019-08-16 RX ADMIN — SULFAMETHOXAZOLE AND TRIMETHOPRIM 1 TABLET: 800; 160 TABLET ORAL at 17:10

## 2019-08-16 RX ADMIN — ENOXAPARIN SODIUM 40 MG: 100 INJECTION SUBCUTANEOUS at 04:54

## 2019-08-16 RX ADMIN — METHOCARBAMOL TABLETS 1000 MG: 500 TABLET, COATED ORAL at 17:11

## 2019-08-16 RX ADMIN — METHOCARBAMOL TABLETS 1000 MG: 500 TABLET, COATED ORAL at 12:24

## 2019-08-16 ASSESSMENT — ENCOUNTER SYMPTOMS
NAUSEA: 0
NECK PAIN: 0
DIZZINESS: 0
PALPITATIONS: 1
COUGH: 0
BACK PAIN: 1
VOMITING: 0
CHILLS: 0
EYE PAIN: 0
DIARRHEA: 0
MYALGIAS: 0
HEADACHES: 0
SHORTNESS OF BREATH: 0

## 2019-08-16 NOTE — PROGRESS NOTES
[facility] Medicine Daily Progress Note    Date of Service  8/16/2019    Chief Complaint  23 y.o. male admitted 7/31/2019 with drainage from surgical wound    Hospital Course     23 y.o. male who presented 7/31/2019 with infection of lumbar laminectomy.  Patient presented 6/17/2019 with congenital spinal stenosis and severe L4-L5 stenosis with disc herniation, L2-L3 moderate to severe stenosis, and L5-S1 moderate to severe stenosis with morbid obesity and intractable pain.  At that time he had L4 and L5 and S1 decompressive laminectomy, bilateral medial facetectomies and bilateral foraminotomies with aborted L2-L3 and L3-L4 laminectomies due to loss of signal.  He subsequently returned on 7/12 to have L2, L3, L4, L5, S1 laminectomy, and a L4-L5 microdiscectomy and was hospitalized 7/5-22.  Further during that hospitalization he was noted to have persistent sinus tachycardia, had echocardiogram which was unremarkable, and was initiated on metoprolol.       Patient presented to ED on 7/27 after indicating leaking wound VAC for which he had wound drainage and cleanout performed 7/25.  Patient returned again on 7/31 with ongoing concerns of wound drainage and at which point was admitted and found to have ill-defined nonenhancing fluid within the laminectomy bed most pronounced at L4-L5 level as well as diffuse enhancement of the cauda equina nerve roots presumably infectious/inflammatory on MRI.  Wound culture grew MRSA.     Patient went to the OR for I&D and apparently after being intubated yet prior to initiation of surgical procedure patient went into a ventricular tachycardic rhythm followed by reported V. fib, he had a total of 3 shocks before reestablishing sinus rhythm further he also was given 150 mg of amiodarone.  As there was concern of possible hyperkalemia post succinylcholine as paralytic he was given 20 of IV insulin and calcium.  Immediately after returning to sinus rhythm patient was awake agitated and  looking to pull out ET tube.  He was subsequently was extubated and surgery canceled.     Eventually had I&D on 8/5 with cultures sent.         Interval Problem Update  Patient seen and examined, afebrile, no overnight events. Denies any acute complains. Back infection-no issues with wound vac.  Pending arrangements for continuation of IV abx outpt infusion vs LTACH    Consultants/Specialty  Neurosurgery  ID    Code Status  full    Disposition  TBD     Review of Systems  Review of Systems   Constitutional: Negative for chills.   HENT: Negative for ear discharge, ear pain and hearing loss.    Eyes: Negative for pain.   Respiratory: Negative for cough and shortness of breath.    Cardiovascular: Positive for palpitations (mild when ambulating). Negative for chest pain and leg swelling.   Gastrointestinal: Negative for diarrhea, nausea and vomiting.   Genitourinary: Negative for dysuria and urgency.   Musculoskeletal: Positive for back pain. Negative for joint pain, myalgias and neck pain.        Well controlled, no obvious clinical changes today   Skin: Negative for rash.   Neurological: Negative for dizziness and headaches.   All other systems reviewed and are negative.     Physical Exam  Temp:  [36.4 °C (97.6 °F)-36.9 °C (98.5 °F)] 36.7 °C (98 °F)  Pulse:  [] 113  Resp:  [16-18] 18  BP: (112-127)/(53-78) 119/78  SpO2:  [90 %-96 %] 95 %    Physical Exam   Constitutional: He is oriented to person, place, and time. He appears well-developed and well-nourished.   Morbidly obese  Body mass index is 45.9 kg/m².     HENT:   Head: Normocephalic and atraumatic.   Nose: Nose normal.   Mouth/Throat: Oropharynx is clear and moist.   Eyes: Conjunctivae and EOM are normal. No scleral icterus.   Neck: Normal range of motion. Neck supple.   Cardiovascular: Regular rhythm. Exam reveals no gallop.   Intermittently tachycardic   Pulmonary/Chest: Effort normal. No stridor. No respiratory distress.   Abdominal: Soft. He exhibits no  distension.   Musculoskeletal: He exhibits no edema.   Neurological: He is alert and oriented to person, place, and time. No cranial nerve deficit.   Strength 5/5 B lower Ext  Sensation intact to light touch  Wound vac in place-lower lumbar region with no leakage noted-no interval clinical changes appreciated today   Skin: Skin is warm and dry. No rash noted. He is not diaphoretic.   Psychiatric: He has a normal mood and affect.   Nursing note and vitals reviewed.      Fluids    Intake/Output Summary (Last 24 hours) at 8/16/2019 1241  Last data filed at 8/16/2019 0600  Gross per 24 hour   Intake 240 ml   Output 600 ml   Net -360 ml       Laboratory      Recent Labs     08/15/19  0400   SODIUM 139   POTASSIUM 4.0   CHLORIDE 105   CO2 23   GLUCOSE 94   BUN 14   CREATININE 0.71   CALCIUM 9.7                   Imaging  IR-PICC LINE PLACEMENT W/ GUIDANCE > AGE 5   Final Result                  Ultrasound-guided PICC placement performed by qualified nursing staff as    above.          CT-CTA CHEST PULMONARY ARTERY W/ RECONS   Final Result      1.  No definite evidence of pulmonary emboli, within the limitations described above.   2.  Bilateral lower lobe discoid atelectasis.   3.  Unusual curvilinear lucency in the right hepatic lobe which may represent scarring. The appearance is not at least typical for a hepatic solid mass or cyst.            EC-ECHOCARDIOGRAM COMPLETE W/ CONT   Final Result      DX-CHEST-LIMITED (1 VIEW)   Final Result      No evidence of acute cardiopulmonary process.      MR-LUMBAR SPINE-WITH & W/O   Final Result      1.  Stable postsurgical changes L3-L5 laminectomies. Interval ill-defined nonenhancing fluid within the laminectomy bed most pronounced at the L4-L5 level. Infection should be excluded clinically.   2.  There is diffuse enhancement of the cauda equina nerve roots which is presumably infectious/inflammatory.   3.  No evidence of discitis osteomyelitis or epidural abscess.   4.   Degenerative changes as detailed above with persistent moderate thecal sac stenosis at L2-L3 and L4-L5.           Assessment/Plan  * Cardiac arrest (HCC)  Assessment & Plan  S/p cardiac arrest prior to I&D, 3 shocks, surgery canceled  Cards consulted  EP Recomendations:  They will not place the ICD until after his spinal infection is drained and treated    MRSA bacteremia  Assessment & Plan  Blood cultures +, along with wound cultures therefore now started on daptomycin (no e/o muscle aches at this time noted again today)  Adding Bactrim-tolerating well, intermittently check Kidney function  Repeat bcx negative  PICC placed 8/9, abx's through 9/20/19.  Family can afford home IV abx's now, will work with [facility] to facilitate this    Elevated liver enzymes  Assessment & Plan  Secondary to VT/VF arrest most likely  Cont to follow, work up further if fail to normalize    Other inflammatory polyneuropathies (HCC)- (present on admission)  Assessment & Plan  Neurontin 100 mg 3 times daily, appears to be working well    Infected surgical wound- (present on admission)  Assessment & Plan  Wound culures MSRA, and now even blood cx are +  ID on board, changed abx to Daptomycin and Bactrim  Wound care  Pain management that is multi-modal  Mgmt as above  Wound vac appears to be working well at this time    Wound drainage- (present on admission)  Assessment & Plan  Infected with MRSA, now on daptomycin and Bactrim  NSx I&D with cultures 8/5  Will need long term IV abx    Sinus tachycardia- (present on admission)  Assessment & Plan  Continue Beta-blocker, 50 mg BID metoprolol, inappropriate, long standing issue  No adjustments needed at this time, HR remains in the acceptable range at this time    Acute bilateral back pain- (present on admission)  Assessment & Plan  Back pain is intractable  P.o. Vicodin  PRN IV Dilaudid  Well controlled today     VTE prophylaxis: none due to recent surgery

## 2019-08-16 NOTE — DISCHARGE INSTRUCTIONS
Discharge Instructions    Discharged to home by car with relative. Discharged via wheelchair, hospital escort: Yes.  Special equipment needed: Not Applicable and Wound VAC    Be sure to schedule a follow-up appointment with your primary care doctor or any specialists as instructed.     Discharge Plan:   Diet Plan: Discussed  Activity Level: Discussed  Confirmed Follow up Appointment: Appointment Scheduled  Confirmed Symptoms Management: Discussed  Medication Reconciliation Updated: Yes  Influenza Vaccine Indication: Not indicated: Previously immunized this influenza season and > 8 years of age    I understand that a diet low in cholesterol, fat, and sodium is recommended for good health. Unless I have been given specific instructions below for another diet, I accept this instruction as my diet prescription.   Other diet: regular      Special Instructions: None    · Is patient discharged on Warfarin / Coumadin?   No   CPR, Adult  Cardiopulmonary resuscitation (CPR) is an emergency procedure to help a person whose breathing or heartbeat has stopped. When the heart stops beating, blood flow to the brain and other vital organs also stops. This can cause brain damage or death if not treated within minutes. CPR can save a life.  Adult CPR guidelines apply to any person who has reached puberty. Signs of puberty in males include facial or underarm hair, and a sign of puberty in females is breast development. Adult CPR is based on the C-A-B sequence:  · C - Chest Compressions  · A - Airway  · B - Breathing  If an automated external defibrillator (AED) is available, this should be used during CPR. An AED delivers an electric shock to try and restart the heart or return the heartbeat to normal. This process is called defibrillation. If an AED becomes available at any time, use it immediately.  The best way to learn CPR is to take a training class. Look for a class in your community. Almost anyone can learn to do CPR.  What  should I do first?  If you see a person who seems to be unconscious and is not breathing or only gasping, take the following steps:  1. Make sure the area is safe   Quickly look around the area where the person is located. Go to help the person only if the area seems to be safe to enter.  2. Check for a response  · Gently tap the person on a shoulder and ask if he or she is okay.  · Watch the person's face and chest for 5-10 seconds to see if he or she is breathing.  · If you are by yourself, shout for help to see if someone is nearby.  3. Call emergency services  · If the person does not respond, and he or she is not breathing or is only gasping, call emergency services and then start CPR.  ¨ If another person is nearby, have them call emergency services (911 in [place] and look for an AED while you start CPR.  · If the person responds and is breathing normally but is ill or injured, call emergency services and wait for help. While you wait, check the person frequently.  ¨ If another person is nearby, have that person call emergency services and look for an AED while you wait with the ill or injured person.  4. Begin CPR  · Start chest compressions immediately if:  ¨ The person does not respond to you and is not breathing.  ¨ The person does not respond to you and you are not sure if they are breathing, or they are only gasping.  · Remember the C-A-B sequence: chest compressions, airway, and breathing.  · If you have an AED, use it right away.  How do I perform CPR?  Position the person   If possible, the person should be lying on a firm, flat surface, facing up. You may need to carefully roll the person into this position.   C - Chest compressions   To perform chest compressions, take the following steps:  1. Kneel next to the person’s chest.  2. Place the heel of one of your hands in the middle of the person’s chest, between the nipples.  3. Place the heel of your other hand on top of the first hand so that your  "hands overlap, or use just one hand if the person is small.  4. Push down until the chest moves down at least 2 inches (5 cm), or one-third the depth of the chest.  5. Let the chest rise up completely to its normal position before the next compression.  6. Do the compressions very quickly, at a rate of 100-120 per minute. Count the compressions while you do them. Focus on doing compressions \"hard and fast.\"  ¨ It may help to do compressions to the beat of the disco song “Stayin’ Alive.” The beat of this song matches the rate of chest compressions.  ¨ Do not lean on the chest between compressions.  ¨ Do the compressions at a consistent rhythm, with no interruptions.   A - Airway   Opening the airway prepares the person to receive rescue breaths. You should only open the airway if you are trained in CPR. Be careful when moving the person's head and neck, especially if you think the person is injured.  Remember, \"head tilt, chin lift:\"  1. Place one hand on the person's forehead and push slightly with your palm to tilt the head back.  2. Place the fingers of your other hand under the bony part of the person’s lower jaw and gently lift the chin.  3. Pinch the person's nose closed.   B - Breathing   If you are trained in CPR, give the person rescue breaths by taking the following steps:  1. Put your mouth over the person's mouth and exhale 2 breaths. Each breath should take about 1 second.  2. Make sure the person’s chest rises when you exhale. If the chest does not rise, reposition the head and try again.  If you are by yourself, open the airway and give 2 rescue breaths after every 30 chest compressions. If there are two people performing CPR, give 2 rescue breaths after every 15 compressions.   Perform defibrillation with an AED   Use an AED as soon as one becomes available. If there are two people performing CPR, one person should continue CPR while the second person prepares to use the AED. To use the AED, take the " "following steps:  1. Turn on the AED and follow its directions. The AED will automatically determine whether you need to give the person a shock or not, and will provide directions.  ¨ If it is not possible to deliver a shock, continue CPR until medical help arrives.  2. Follow directions on the AED showing where to attach the pads.  3. Make sure that no one is touching the person before you give the shock. Right before the shock, loudly say, \"clear.\"  4. The AED will deliver a shock once you complete the steps it tells you to do.  5. After one shock is delivered, continue to perform CPR. The AED will instruct you when to give another shock or check the person’s heart rhythm.  6. Continue CPR and defibrillation until the person starts breathing normally or until medical personnel take over.  Should I wait to perform CPR until a trained professional is available?  · Do not wait until medical professionals arrive. You have a better chance of saving a life if you attempt CPR instead of waiting for medical help to arrive.  · When trained medical professionals arrive, tell them what happened. This is an important part of the overall care provided to the person.  Where to find more information:  To find a CPR course near you, visit the website of the American [employer] Association: www.heart.org/HEARTORG  This information is not intended to replace advice given to you by your health care provider. Make sure you discuss any questions you have with your health care provider.  Document Released: 10/14/2008 Document Revised: 04/27/2017 Document Reviewed: 12/13/2016  Elsevier Interactive Patient Education © 2017 [employer]    Bacteremia  Introduction  Bacteremia is the presence of bacteria in the blood. A small amount of bacteria may not cause any symptoms.  Sometimes, the bacteria spread and cause infection in other parts of the body, such as the heart, joints, bones, or brain. Having a great amount of bacteria can cause a " serious, sometimes life-threatening infection called sepsis.  What are the causes?  This condition is caused by bacteria that get into the blood. Bacteria can enter the blood:  · During a dental or medical procedure.  · After you brush your teeth so hard that the gums bleed.  · Through a scrape or cut on your skin.  More severe types of bacteremia can be caused by:  · A bacterial infection, such as pneumonia, that spreads to the blood.  · Using a dirty needle.  What increases the risk?  This condition is more likely to develop in:  · Children and elderly adults.  · People who have a long-lasting (chronic) disease or medical condition.  · People who have an artificial joint or heart valve.  · People who have heart valve disease.  · People who have a tube, such as a catheter or IV tube, that has been inserted for a medical treatment.  · People who have a weak body defense system (immune system).  · People who use IV drugs.  What are the signs or symptoms?  Usually, this condition does not cause symptoms when it is mild. When it is more serious, it may cause:  · Fever.  · Chills.  · Racing heart.  · Shortness of breath.  · Dizziness.  · Weakness.  · Confusion.  · Nausea or vomiting.  · Diarrhea.  Bacteremia that has spread to other parts of the body may cause symptoms in those areas.  How is this diagnosed?  This condition may be diagnosed with a physical exam and tests, such as:  · A complete blood count (CBC). This test looks for signs of infection.  · Blood cultures. These look for bacteria in your blood.  · Tests of any IV tubes. These look for a source of infection.  · Urine tests.  · Imaging tests, such as an X-ray, CT scan, MRI, or heart ultrasound.  How is this treated?  If the condition is mild, treatment is usually not needed. Usually, the body’s immune system will remove the bacteria. If the condition is more serious, it may be treated with:  · Antibiotic medicines through an IV tube. These may be given for  about 2 weeks. At first, the antibiotic that is given may kill most types of blood bacteria. If your test results show that a certain kind of bacteria is causing problems, the antibiotic may be changed to kill only the bacteria that are causing problems.  · Antibiotics taken by mouth.  · Removing any catheter or IV tube that is a source of infection.  · Blood pressure and breathing support, if needed.  · Surgery to control the source or spread of infection, if needed.  Follow these instructions at home:  · Take over-the-counter and prescription medicines only as told by your health care provider.  · If you were prescribed an antibiotic, take it as told by your health care provider. Do not stop taking the antibiotic even if you start to feel better.  · Rest at home until your condition is under control.  · Drink enough fluid to keep your urine clear or pale yellow.  · Keep all follow-up visits as told by your health care provider. This is important.  How is this prevented?  Take these actions to help prevent future episodes of bacteremia:  · Get all vaccinations as recommended by your health care provider.  · Clean and cover scrapes or cuts.  · Bathe regularly.  · Wash your hands often.  · Before any dental or surgical procedure, ask your health care provider if you should take an antibiotic.  Contact a health care provider if:  · Your symptoms get worse.  · You continue to have symptoms after treatment.  · You develop new symptoms after treatment.  Get help right away if:  · You have chest pain or trouble breathing.  · You develop confusion, dizziness, or weakness.  · You develop pale skin.  This information is not intended to replace advice given to you by your health care provider. Make sure you discuss any questions you have with your health care provider.  Document Released: 10/01/2007 Document Revised: 07/07/2017 Document Reviewed: 02/20/2016  © 2017 Elsevier    MRSA Infection, Adult  MRSA stands for  methicillin-resistant Staphylococcus aureus. This type of infection is caused by Staphylococcus aureus bacteria that are no longer affected by the medicines used to kill them (drug resistant). Staphylococcus (staph) bacteria are normally found on the skin or in the nose of healthy people. In most cases, these bacteria do not cause infection. But if these resistant bacteria enter your body through a cut or sore, they can cause a serious infection on your skin or in other parts of your body. There is a slight chance that the staph on your skin or in your nose is MRSA.  There are two types of MRSA infections:  · Hospital-acquired MRSA is bacteria that you get in the hospital.  · Community-acquired MRSA is bacteria that you get somewhere other than in a hospital.  What increases the risk?  Hospital-acquired MRSA is more common. You could be at risk for this infection if you are in the hospital and you:  · Have surgery or a procedure.  · Have an IV access or a catheter tube placed in your body.  · Have weak resistance to germs (weakened immune system).  · Are elderly.  · Are on kidney dialysis.  You could be at risk for community-acquired MRSA if you have a break in your skin and come into contact with MRSA. This may happen if you:  · Play sports where there is skin-to-skin contact.  · Live in a crowded setting, like a dormitory or a  barracks.  · Share towels, razors, or sports equipment with other people.  What are the signs or symptoms?  Symptoms of hospital-acquired MRSA depend on where MRSA has spread. Symptoms may include:  · Wound infection.  · Skin infection.  · Rash.  · Pneumonia.  · Fever and chills.  · Difficulty breathing.  · Chest pain.  Community-acquired MRSA is most likely to start as a scratch or cut that becomes infected. Symptoms may include:  · A pus-filled pimple.  · A boil on your skin.  · Pus draining from your skin.  · A sore (abscess) under your skin or somewhere in your body.  · Fever  with or without chills.  How is this diagnosed?  The diagnosis of MRSA is made by taking a sample from an infected area and sending it to a lab for testing. A  can grow (culture) MRSA and check it under a microscope. The cultured MRSA can be tested to see which type of antibiotic medicine will work to treat it. Newer tests can identify MRSA more quickly by testing bacteria samples for MRSA genes. Your health care provider can diagnose MRSA using samples from:  · Cuts or wounds in infected areas.  · Nasal swabs.  · Saliva or cough specimens from deep in the lungs (sputum).  · Urine.  · Blood.  You may also have:  · Imaging studies (such as X-ray or MRI) to check if the infection has spread to the lungs, bones, or joints.  · A culture and sensitivity test of blood or fluids from inside the joints.  How is this treated?  Treatment depends on how severe, deep, or extensive the infection is. Very bad infections may require a hospital stay.  · Some skin infections, such as a small boil or sore (abscess), may be treated by draining pus from the site of the infection.  · More extensive surgery to drain pus may be necessary for deeper or more widespread soft tissue infections.  · You may then have to take antibiotic medicine given by mouth or through a vein. You may start antibiotic treatment right away or after testing can be done to see what antibiotic medicine should be used.  Follow these instructions at home:  · Take your antibiotics as directed by your health care provider. Take the medicine as prescribed until it is finished.  · Avoid close contact with those around you as much as possible. Do not use towels, razors, toothbrushes, bedding, or other items that will be used by others.  · Wash your hands frequently for 15 seconds with soap and water. Dry your hands with a clean or disposable towel.  · When you are not able to wash your hands, use hand  that is more than 60 percent alcohol.  · Wash  towels, sheets, or clothes in the washing machine with detergent and hot water. Dry them in a hot dryer.  · Follow your health care provider's instructions for wound care. Wash your hands before and after changing your bandages.  · Always shower after exercising.  · Keep all cuts and scrapes clean and covered with a bandage.  · Be sure to tell all your health care providers that you have MRSA so they are aware of your infection.  Contact a health care provider if:  · You have a cut, scrape, pimple, or boil that becomes red, swollen, or painful or has pus in it.  · You have pus draining from your skin.  · You have an abscess under your skin or somewhere in your body.  Get help right away if:  · You have symptoms of a skin infection with a fever or chills.  · You have trouble breathing.  · You have chest pain.  · You have a skin wound and you become nauseous or start vomiting.  This information is not intended to replace advice given to you by your health care provider. Make sure you discuss any questions you have with your health care provider.  Document Released: 12/18/2006 Document Revised: 05/25/2017 Document Reviewed: 10/10/2014  [employer] Interactive Patient Education © 2017 [employer]      Depression / Suicide Risk    As you are discharged from this [facility] facility, it is important to learn how to keep safe from harming yourself.    Recognize the warning signs:  · Abrupt changes in personality, positive or negative- including increase in energy   · Giving away possessions  · Change in eating patterns- significant weight changes-  positive or negative  · Change in sleeping patterns- unable to sleep or sleeping all the time   · Unwillingness or inability to communicate  · Depression  · Unusual sadness, discouragement and loneliness  · Talk of wanting to die  · Neglect of personal appearance   · Rebelliousness- reckless behavior  · Withdrawal from people/activities they love  · Confusion- inability to  concentrate     If you or a loved one observes any of these behaviors or has concerns about self-harm, here's what you can do:  · Talk about it- your feelings and reasons for harming yourself  · Remove any means that you might use to hurt yourself (examples: pills, rope, extension cords, firearm)  · Get professional help from the community (Mental Health, Substance Abuse, psychological counseling)  · Do not be alone:Call your Safe Contact- someone whom you trust who will be there for you.  · Call your local CRISIS HOTLINE 178-0444 or 644-960-4072  · Call your local Children's Mobile Crisis Response Team [place] (432) 623-9297 or www.Attune  · Call the toll free National Suicide Prevention Hotlines   · National Suicide Prevention Lifeline 521-381-VPPH (2288)  · National Hope Line Network 800-SUICIDE (498-7951)      Alysaa rge Instructions per Marium Plascencia M.D.    Follow up with cardiology, neurosurgery and infection disease as outpt.    ACTIVITY: as tolerated     DIAGNOSIS: infected surgical wound MRSA bacteremia, cardiac arrest     Return to ER if symptoms worsen

## 2019-08-16 NOTE — PROGRESS NOTES
Received report from Tele RN, pt arrived to floor via transport, pt oriented to room and call light system, all questions answered, bed locked and in lowest position, call light within reach. Hourly rounding in place.

## 2019-08-16 NOTE — DISCHARGE PLANNING
Medical Social Work    LSW spoke with [facility]. They are about to do bedside teaching with pt right now. LSW following for needs.

## 2019-08-16 NOTE — DISCHARGE SUMMARY
Discharge Summary    CHIEF COMPLAINT ON ADMISSION  Chief Complaint   Patient presents with   • Post-Op Complications   • Wound Infection   • Sent by MD       Reason for Admission  Wound     Admission Date  7/31/2019    CODE STATUS  Full Code    HPI & HOSPITAL COURSE  This is a  23 y.o. male who presented 7/31/2019 with infection of lumbar laminectomy.  Patient presented 6/17/2019 with congenital spinal stenosis and severe L4-L5 stenosis with disc herniation, L2-L3 moderate to severe stenosis, and L5-S1 moderate to severe stenosis with morbid obesity and intractable pain.  At that time he had L4 and L5 and S1 decompressive laminectomy, bilateral medial facetectomies and bilateral foraminotomies with aborted L2-L3 and L3-L4 laminectomies due to loss of signal.  He subsequently returned on 7/12 to have L2, L3, L4, L5, S1 laminectomy, and a L4-L5 microdiscectomy and was hospitalized 7/5-22.  Further during that hospitalization he was noted to have persistent sinus tachycardia, had echocardiogram which was unremarkable, and was initiated on metoprolol.    Patient returned again on 7/31 with ongoing concerns of wound drainage and at which point was admitted and found to have ill-defined nonenhancing fluid within the laminectomy bed most pronounced at L4-L5 level as well as diffuse enhancement of the cauda equina nerve roots presumably infectious/inflammatory on MRI.  Wound culture grew MRSA.  Patient went to the OR for I&D and apparently after being intubated yet prior to initiation of surgical procedure patient went into a ventricular tachycardic rhythm followed by reported V. fib, he had a total of 3 shocks before reestablishing sinus rhythm further he also was given 150 mg of amiodarone.  As there was concern of possible hyperkalemia post succinylcholine as paralytic he was given 20 of IV insulin and calcium.  Immediately after returning to sinus rhythm patient was awake agitated and looking to pull out ET tube.  He  was subsequently was extubated and surgery canceled.  He was seen by cardiology and will need to follow up as outpt.  Eventually had I&D on 8/5 with cultures growing MRSA, also he now has bacteremia growing MRSA his repeat blood culture from from 8/6/19 are negative , He was seen by infection disease and is now on daptomycin and also on oral bactrim through 9/20/19.  Patient will need to follow up as outpt with neurology,infection disease and also cardiology.    Patient will be discharged home with home infusion of abx also home health for wound care.     The patient met 2-midnight criteria for an inpatient stay at the time of discharge.    Discharge Date  8/16/19    FOLLOW UP ITEMS POST DISCHARGE  Neurosurgery  Infection disease  Cardiology     DISCHARGE DIAGNOSES  Principal Problem:    Cardiac arrest (HCC) POA: Unknown  Active Problems:    Acute bilateral back pain POA: Yes    Sinus tachycardia POA: Yes    Wound drainage POA: Yes    Infected surgical wound POA: Yes    Other inflammatory polyneuropathies (HCC) POA: Yes    Morbidly obese (HCC) POA: Unknown    Elevated liver enzymes POA: Unknown    Acute respiratory failure with hypoxia (HCC) POA: Unknown    MRSA bacteremia POA: Unknown    'light-for-dates' infant with signs of fetal malnutrition POA: Unknown  Resolved Problems:    * No resolved hospital problems. *      FOLLOW UP  Future Appointments   Date Time Provider Department Center   8/17/2019 To Be Determined Luna Michel R.N. [facility] None   9/26/2019  9:55 AM Amaris Zhang M.D. [facility]     Sea Mcghee M.D.  [address]  [place] [address]  [phone]    Call      Opal Dwyer M.D.  [address] [facility] [address]  [address]  [place] [id number] [phone]    Call      Julito Jacob D.O.  [id number]  [place] [address]  [phone]    Call        MEDICATIONS ON DISCHARGE     Medication List      START taking these medications      Instructions   acetaminophen 325 MG Tabs  Commonly  known as:  TYLENOL   Take 2 Tabs by mouth every 6 hours as needed (Mild Pain; (Pain scale 1-3); Temp greater than 100.5 F).  Dose:  650 mg     gabapentin 100 MG Caps  Commonly known as:  NEURONTIN   Take 1 Cap by mouth 3 times a day.  Dose:  100 mg     metoprolol 50 MG Tabs  Commonly known as:  LOPRESSOR   Take 1 Tab by mouth 2 Times a Day.  Dose:  50 mg     * NS SOLN 50 mL with DAPTOmycin 500 MG SOLR 1,050 mg   1,050 mg by Intravenous route every 24 hours.  Dose:  1,050 mg     ondansetron 4 MG Tbdp  Commonly known as:  ZOFRAN ODT   Take 1 Tab by mouth every four hours as needed for Nausea (give PO if no IV route available).  Dose:  4 mg     sulfamethoxazole-trimethoprim 800-160 MG tablet  Commonly known as:  BACTRIM DS   Take 1 Tab by mouth every 12 hours for 35 days.  Dose:  1 Tab         * This list has 1 medication(s) that are the same as other medications prescribed for you. Read the directions carefully, and ask your doctor or other care provider to review them with you.            CHANGE how you take these medications      Instructions   HYDROcodone-acetaminophen 5-325 MG Tabs per tablet  What changed:    · when to take this  · additional instructions  Commonly known as:  NORCO   Take 1 Tab by mouth every 6 hours as needed (pain) for up to 4 days.  Dose:  1 Tab        CONTINUE taking these medications      Instructions   cyclobenzaprine 10 MG Tabs  Commonly known as:  FLEXERIL   Take 10 mg by mouth 3 times a day as needed for Muscle Spasms.  Dose:  10 mg     methocarbamol 500 MG Tabs  Commonly known as:  ROBAXIN   Take 1,000 mg by mouth 3 times a day.  Dose:  1,000 mg     naproxen 500 MG Tabs  Commonly known as:  NAPROSYN   Take 500 mg by mouth every day. Indications: Pain  Dose:  500 mg     senna-docusate 8.6-50 MG Tabs  Commonly known as:  PERICOLACE or SENOKOT S   Take 2 Tabs by mouth 2 Times a Day.  Dose:  2 Tab        ASK your doctor about these medications      Instructions   * NS SOLN 50 mL with  "DAPTOmycin 500 MG SOLR 1,090 mg  Ask about: Should I take this medication?   1,090 mg by Intravenous route every 24 hours for 5 days.  Dose:  8 mg/kg         * This list has 1 medication(s) that are the same as other medications prescribed for you. Read the directions carefully, and ask your doctor or other care provider to review them with you.                Allergies  Allergies   Allergen Reactions   • Other Misc      Cat allergy- \"start sneezing\"  succinylcholine       DIET  Orders Placed This Encounter   Procedures   • Diet Order Regular     Standing Status:   Standing     Number of Occurrences:   1     Order Specific Question:   Diet:     Answer:   Regular [1]       ACTIVITY  As tolerated.  Weight bearing as tolerated    CONSULTATIONS  Neurosurgery   Infection disease  Cardiology     PROCEDURES  1. Wound Incision and drainage  2. Wound VAC placement    LABORATORY  Lab Results   Component Value Date    SODIUM 139 08/15/2019    POTASSIUM 4.0 08/15/2019    CHLORIDE 105 08/15/2019    CO2 23 08/15/2019    GLUCOSE 94 08/15/2019    BUN 14 08/15/2019    CREATININE 0.71 08/15/2019        Lab Results   Component Value Date    WBC 8.7 08/06/2019    HEMOGLOBIN 9.8 (L) 08/06/2019    HEMATOCRIT 31.2 (L) 08/06/2019    PLATELETCT 340 08/06/2019        Total time of the discharge process exceeds 43 minutes.  "

## 2019-08-16 NOTE — DISCHARGE PLANNING
Agency/Facility Name: [facility]  Spoke To: Fazal(p: [phone])  Outcome: Auth obtained. [facility] able to admit patient tomorrow. Nicolle(NILA) notified.

## 2019-08-16 NOTE — DISCHARGE PLANNING
Medical Social Work    LSW confirmed with [facility] that he will be seen tomorrow. [facility] [employer] completed teaching at bedside and pt has all supplies. Wound Vac to be delivered soon and then pt can d/c. MD aware.

## 2019-08-16 NOTE — DISCHARGE PLANNING
Medical Social Work    LSW spoke with Katie from [facility]. They have accepted and everything is done on their end and pt anxious to discharge to home. LSW did chart review- no recent HH order, only referral to [facility].    LSW spoke with [facility]. They did get authorization. LSW informed Will with [facility] that pt is electing for home infusions.     LSW requested outpatient wound care order and [facility] order for dressing changes. Pt does not have PCP for home health.     Wound Vac being delivered by [facility] today.

## 2019-08-16 NOTE — DISCHARGE PLANNING
Agency/Facility Name: [facility]  Spoke To: Will  Outcome: Insurance is generating auth now, they will have it soon.

## 2019-08-16 NOTE — DISCHARGE PLANNING
note:  Kiana from [facility] called in and said that she will deliver the woundvac today. Her contact number is [phone]

## 2019-08-17 ENCOUNTER — HOME CARE VISIT (OUTPATIENT)
Dept: HOME HEALTH SERVICES | Facility: HOME HEALTHCARE | Age: 24
End: 2019-08-17
Payer: COMMERCIAL

## 2019-08-17 VITALS
TEMPERATURE: 97.7 F | HEIGHT: 72 IN | RESPIRATION RATE: 16 BRPM | HEART RATE: 98 BPM | DIASTOLIC BLOOD PRESSURE: 78 MMHG | SYSTOLIC BLOOD PRESSURE: 118 MMHG | OXYGEN SATURATION: 98 % | WEIGHT: 304 LBS | BODY MASS INDEX: 41.17 KG/M2

## 2019-08-17 PROCEDURE — G0493 RN CARE EA 15 MIN HH/HOSPICE: HCPCS

## 2019-08-17 ASSESSMENT — ENCOUNTER SYMPTOMS
VOMITING: DENIES
NAUSEA: DENIES
MENTAL STATUS CHANGE: 0

## 2019-08-17 ASSESSMENT — ACTIVITIES OF DAILY LIVING (ADL): OASIS_M1830: 03

## 2019-08-17 NOTE — WOUND TEAM
[facility]  Inpatient Services  Wound and Skin Care Progress Note    Admission Date:  7/31/2019   HPI, PMH, SH: Reviewed  Unit where seen by Wound Team: S166/[id number]    WOUND TEAM FOLLOW UP:  VAC change     SUBJECTIVE:  Area under drape itches. Tries not to scratch      Self Report / Pain Level:  Premedicated orally and with IV.reports pain when foam removed.      OBJECTIVE:      WOUND TYPE, LOCATION, CHARACTERISTICS (Pressure ulcers: location, stage, POA or date identified)   Negative Pressure Wound Therapy Back Lower;Mid (Active)   NPWT Pump Mode / Pressure Setting 125 mmHg;Continuous    Dressing Type Medium;Black foam    Number of Foam Pieces Used 3    Canister Changed No    VAC VeraFlo Pressure (mm/Hg) 125 mmHg          Wound 07/31/19 Full Thickness Wound Back open surgical (Active)   Wound Image       Site Assessment Red    Maeve-wound Assessment Intact    Margins Attached edges    Wound Length (cm) 11.6 cm Measured 8/14/2019   Wound Width (cm) 1 cm    Wound Depth (cm) 4.1 cm    Wound Surface Area (cm^2) 11.6 cm^2    Closure Secondary intention    Drainage Amount Small    Drainage Description Sanguineous    Non-staged Wound Description Full thickness    Treatments Cleansed    Cleansing Approved Wound Cleanser    Periwound Protectant Skin Protectant wipes to Periwound;Drape    Dressing Options Wound Vac    Dressing Changed Changed    Dressing Change Frequency Monday, Wednesday, Friday    NEXT Dressing Change  08/19/19    NEXT Weekly Photo (Inpatient Only) 08/21/19    WOUND NURSE ONLY - Odor None    WOUND NURSE ONLY - Exposed Structures None    WOUND NURSE ONLY - Tissue Type and Percentage red 99 yellow 1         Lab Values:    WBC:     @LABLAST(WBC)@  AIC:    No results found for: HBA1C      Culture:   Completed 8/5/19    INTERVENTIONS BY WOUND TEAM:  Patient turned to L side. Removed dressing. Cleaned wound with wound cleanser, and periwound skin. Applied skin prep to periwound skin, and drape. Placed  one strip of black foam as track to R side, second piece into wound, and third piece for TRAC pad. Resumed VAC at 125 mmHg continuous    Dressing selection:  VAC         Interdisciplinary consultation:  RN, patient     EVALUATION:  Red granular tissue. Skin under previous drape was red, and itching. Applied skin prep over this, and moved new track superior to this area.      Factors affecting wound healing:  Obesity, spinal stenosis, MRSA  Goals:  Steady decrease in wound area and depth weekly     NURSING PLAN OF CARE ORDERS (X):    Dressing changes: See Dressing Care orders:  X   Skin care: See Skin Care orders:   Rectal tube care: See Rectal Tube Care orders:   Other orders:      WOUND TEAM PLAN OF CARE (X):   NPWT change 3 x week:   X     Dressing changes by wound team:       Follow up as needed:       Other (explain):    Anticipated discharge plans (X):  SNF:           Home Care:     X      Outpatient Wound Center:            Self Care:            Other:

## 2019-08-17 NOTE — PROGRESS NOTES
Pt Given discharge instructions, verbal understanding given. All questions answered. During pt discharge, pt's new wound Vac started to loose pressure, dressing Reinforced and wound vac was working properly again. Pt left via WC with hospital escort. All belongings and supplies and prescriptions taken.

## 2019-08-19 ENCOUNTER — ANTICOAGULATION MONITORING (OUTPATIENT)
Dept: VASCULAR LAB | Facility: MEDICAL CENTER | Age: 24
End: 2019-08-19

## 2019-08-19 ENCOUNTER — HOME CARE VISIT (OUTPATIENT)
Dept: HOME HEALTH SERVICES | Facility: HOME HEALTHCARE | Age: 24
End: 2019-08-19
Payer: COMMERCIAL

## 2019-08-19 VITALS
DIASTOLIC BLOOD PRESSURE: 70 MMHG | TEMPERATURE: 98.3 F | HEART RATE: 90 BPM | OXYGEN SATURATION: 98 % | RESPIRATION RATE: 16 BRPM | SYSTOLIC BLOOD PRESSURE: 119 MMHG

## 2019-08-19 PROCEDURE — G0299 HHS/HOSPICE OF RN EA 15 MIN: HCPCS

## 2019-08-19 ASSESSMENT — ENCOUNTER SYMPTOMS
SHORTNESS OF BREATH: T
NAUSEA: DENIES
VOMITING: DENIES

## 2019-08-19 NOTE — PROGRESS NOTES
Received referral from [facility]. Medications reviewed. No clinically significant interactions noted.

## 2019-08-21 ENCOUNTER — HOSPITAL ENCOUNTER (OUTPATIENT)
Facility: MEDICAL CENTER | Age: 24
End: 2019-08-21
Attending: INTERNAL MEDICINE
Payer: COMMERCIAL

## 2019-08-21 ENCOUNTER — HOME CARE VISIT (OUTPATIENT)
Dept: HOME HEALTH SERVICES | Facility: HOME HEALTHCARE | Age: 24
End: 2019-08-21
Payer: COMMERCIAL

## 2019-08-21 VITALS
TEMPERATURE: 98.3 F | RESPIRATION RATE: 18 BRPM | DIASTOLIC BLOOD PRESSURE: 60 MMHG | HEART RATE: 89 BPM | SYSTOLIC BLOOD PRESSURE: 105 MMHG | OXYGEN SATURATION: 98 %

## 2019-08-21 LAB
ALBUMIN SERPL BCP-MCNC: 3.7 G/DL (ref 3.2–4.9)
ALBUMIN/GLOB SERPL: 1.1 G/DL
ALP SERPL-CCNC: 77 U/L (ref 30–99)
ALT SERPL-CCNC: 53 U/L (ref 2–50)
ANION GAP SERPL CALC-SCNC: 12 MMOL/L (ref 0–11.9)
AST SERPL-CCNC: 32 U/L (ref 12–45)
BASOPHILS # BLD AUTO: 0.5 % (ref 0–1.8)
BASOPHILS # BLD: 0.05 K/UL (ref 0–0.12)
BILIRUB SERPL-MCNC: 0.3 MG/DL (ref 0.1–1.5)
BUN SERPL-MCNC: 8 MG/DL (ref 8–22)
CALCIUM SERPL-MCNC: 9 MG/DL (ref 8.5–10.5)
CHLORIDE SERPL-SCNC: 102 MMOL/L (ref 96–112)
CK SERPL-CCNC: 254 U/L (ref 0–154)
CO2 SERPL-SCNC: 24 MMOL/L (ref 20–33)
CREAT SERPL-MCNC: 0.54 MG/DL (ref 0.5–1.4)
CRP SERPL HS-MCNC: 2.69 MG/DL (ref 0–0.75)
EOSINOPHIL # BLD AUTO: 0.64 K/UL (ref 0–0.51)
EOSINOPHIL NFR BLD: 6.9 % (ref 0–6.9)
ERYTHROCYTE [DISTWIDTH] IN BLOOD BY AUTOMATED COUNT: 47.8 FL (ref 35.9–50)
ERYTHROCYTE [SEDIMENTATION RATE] IN BLOOD BY WESTERGREN METHOD: 51 MM/HOUR (ref 0–15)
FASTING STATUS PATIENT QL REPORTED: NORMAL
GLOBULIN SER CALC-MCNC: 3.3 G/DL (ref 1.9–3.5)
GLUCOSE SERPL-MCNC: 61 MG/DL (ref 65–99)
HCT VFR BLD AUTO: 38.8 % (ref 42–52)
HGB BLD-MCNC: 11.9 G/DL (ref 14–18)
IMM GRANULOCYTES # BLD AUTO: 0.02 K/UL (ref 0–0.11)
IMM GRANULOCYTES NFR BLD AUTO: 0.2 % (ref 0–0.9)
LYMPHOCYTES # BLD AUTO: 2.59 K/UL (ref 1–4.8)
LYMPHOCYTES NFR BLD: 27.9 % (ref 22–41)
MCH RBC QN AUTO: 26.7 PG (ref 27–33)
MCHC RBC AUTO-ENTMCNC: 30.7 G/DL (ref 33.7–35.3)
MCV RBC AUTO: 87 FL (ref 81.4–97.8)
MONOCYTES # BLD AUTO: 0.58 K/UL (ref 0–0.85)
MONOCYTES NFR BLD AUTO: 6.3 % (ref 0–13.4)
NEUTROPHILS # BLD AUTO: 5.4 K/UL (ref 1.82–7.42)
NEUTROPHILS NFR BLD: 58.2 % (ref 44–72)
NRBC # BLD AUTO: 0 K/UL
NRBC BLD-RTO: 0 /100 WBC
PLATELET # BLD AUTO: 431 K/UL (ref 164–446)
PMV BLD AUTO: 9.1 FL (ref 9–12.9)
POTASSIUM SERPL-SCNC: 4 MMOL/L (ref 3.6–5.5)
PROT SERPL-MCNC: 7 G/DL (ref 6–8.2)
RBC # BLD AUTO: 4.46 M/UL (ref 4.7–6.1)
SODIUM SERPL-SCNC: 138 MMOL/L (ref 135–145)
WBC # BLD AUTO: 9.3 K/UL (ref 4.8–10.8)

## 2019-08-21 PROCEDURE — 80053 COMPREHEN METABOLIC PANEL: CPT

## 2019-08-21 PROCEDURE — G0299 HHS/HOSPICE OF RN EA 15 MIN: HCPCS

## 2019-08-21 PROCEDURE — 82550 ASSAY OF CK (CPK): CPT

## 2019-08-21 PROCEDURE — 85025 COMPLETE CBC W/AUTO DIFF WBC: CPT

## 2019-08-21 PROCEDURE — 86140 C-REACTIVE PROTEIN: CPT

## 2019-08-21 PROCEDURE — 85652 RBC SED RATE AUTOMATED: CPT

## 2019-08-22 ENCOUNTER — HOME CARE VISIT (OUTPATIENT)
Dept: HOME HEALTH SERVICES | Facility: HOME HEALTHCARE | Age: 24
End: 2019-08-22
Payer: COMMERCIAL

## 2019-08-22 PROCEDURE — G0152 HHCP-SERV OF OT,EA 15 MIN: HCPCS

## 2019-08-22 ASSESSMENT — ENCOUNTER SYMPTOMS
NAUSEA: DENIES
VOMITING: DENIES

## 2019-08-23 ENCOUNTER — HOME CARE VISIT (OUTPATIENT)
Dept: HOME HEALTH SERVICES | Facility: HOME HEALTHCARE | Age: 24
End: 2019-08-23
Payer: COMMERCIAL

## 2019-08-23 VITALS
OXYGEN SATURATION: 98 % | HEART RATE: 90 BPM | SYSTOLIC BLOOD PRESSURE: 102 MMHG | TEMPERATURE: 98.4 F | DIASTOLIC BLOOD PRESSURE: 70 MMHG

## 2019-08-23 VITALS
HEART RATE: 103 BPM | DIASTOLIC BLOOD PRESSURE: 60 MMHG | SYSTOLIC BLOOD PRESSURE: 105 MMHG | OXYGEN SATURATION: 98 % | RESPIRATION RATE: 18 BRPM | TEMPERATURE: 98.4 F

## 2019-08-23 VITALS
SYSTOLIC BLOOD PRESSURE: 104 MMHG | TEMPERATURE: 98.1 F | HEART RATE: 94 BPM | OXYGEN SATURATION: 96 % | DIASTOLIC BLOOD PRESSURE: 76 MMHG | RESPIRATION RATE: 16 BRPM

## 2019-08-23 PROCEDURE — G0299 HHS/HOSPICE OF RN EA 15 MIN: HCPCS

## 2019-08-23 PROCEDURE — G0151 HHCP-SERV OF PT,EA 15 MIN: HCPCS

## 2019-08-23 ASSESSMENT — ACTIVITIES OF DAILY LIVING (ADL)
DRESSING_LB_ASSISTANCE: 1
ADLS_COMMENTS: <!--EPICS-->SEE OT ASSESSMENT<!--EPICE-->
TOILETING_EQUIPMENT_USED: LOW TOILET SEAT
EATING_ASSISTANCE: 1
TOILETING_ASSISTANCE: 0
BATHING_ASSISTANCE: 1
DRESSING_UB_ASSISTANCE: 1
ORAL_CARE_ASSISTANCE: 0
GROOMING_ASSISTANCE: 0
BATHING_ASSISTIVE_EQUIPMENT_USED: SHOWER CHAIR, HANDHELD SHOWER
TOILETING_EQUIPMENT_NEEDED: TOILET RISER

## 2019-08-25 ASSESSMENT — ENCOUNTER SYMPTOMS
NAUSEA: DENIES
DEBILITATING PAIN: 1
VOMITING: DENIES

## 2019-08-26 ENCOUNTER — HOME CARE VISIT (OUTPATIENT)
Dept: HOME HEALTH SERVICES | Facility: HOME HEALTHCARE | Age: 24
End: 2019-08-26
Payer: COMMERCIAL

## 2019-08-26 PROCEDURE — G0299 HHS/HOSPICE OF RN EA 15 MIN: HCPCS

## 2019-08-26 ASSESSMENT — ACTIVITIES OF DAILY LIVING (ADL)
LAUNDRY_ASSISTANCE: 6
MEAL_PREP_ASSISTANCE: 6
SHOPPING_ASSISTANCE: 6
TRANSPORTATION_ASSISTANCE: 6
TELEPHONE_ASSISTANCE: 0
HOUSEKEEPING_ASSISTANCE: 6

## 2019-08-26 ASSESSMENT — ENCOUNTER SYMPTOMS: DEBILITATING PAIN: 1

## 2019-08-27 VITALS
OXYGEN SATURATION: 98 % | SYSTOLIC BLOOD PRESSURE: 120 MMHG | RESPIRATION RATE: 18 BRPM | TEMPERATURE: 98.5 F | HEART RATE: 93 BPM | DIASTOLIC BLOOD PRESSURE: 70 MMHG

## 2019-08-27 ASSESSMENT — ENCOUNTER SYMPTOMS
VOMITING: DENIES
NAUSEA: DENIES
MENTAL STATUS CHANGE: 0

## 2019-08-28 ENCOUNTER — HOME CARE VISIT (OUTPATIENT)
Dept: HOME HEALTH SERVICES | Facility: HOME HEALTHCARE | Age: 24
End: 2019-08-28
Payer: COMMERCIAL

## 2019-08-28 ENCOUNTER — HOSPITAL ENCOUNTER (OUTPATIENT)
Facility: MEDICAL CENTER | Age: 24
End: 2019-08-28
Attending: INTERNAL MEDICINE
Payer: COMMERCIAL

## 2019-08-28 LAB
ALBUMIN SERPL BCP-MCNC: 3.6 G/DL (ref 3.2–4.9)
ALBUMIN/GLOB SERPL: 1.2 G/DL
ALP SERPL-CCNC: 62 U/L (ref 30–99)
ALT SERPL-CCNC: 49 U/L (ref 2–50)
ANION GAP SERPL CALC-SCNC: 9 MMOL/L (ref 0–11.9)
AST SERPL-CCNC: 35 U/L (ref 12–45)
BASOPHILS # BLD AUTO: 0.6 % (ref 0–1.8)
BASOPHILS # BLD: 0.05 K/UL (ref 0–0.12)
BILIRUB SERPL-MCNC: 0.3 MG/DL (ref 0.1–1.5)
BUN SERPL-MCNC: 10 MG/DL (ref 8–22)
CALCIUM SERPL-MCNC: 8.7 MG/DL (ref 8.5–10.5)
CHLORIDE SERPL-SCNC: 100 MMOL/L (ref 96–112)
CK SERPL-CCNC: 432 U/L (ref 0–154)
CO2 SERPL-SCNC: 23 MMOL/L (ref 20–33)
CREAT SERPL-MCNC: 0.55 MG/DL (ref 0.5–1.4)
EOSINOPHIL # BLD AUTO: 0.34 K/UL (ref 0–0.51)
EOSINOPHIL NFR BLD: 4.1 % (ref 0–6.9)
ERYTHROCYTE [DISTWIDTH] IN BLOOD BY AUTOMATED COUNT: 45 FL (ref 35.9–50)
GLOBULIN SER CALC-MCNC: 3.1 G/DL (ref 1.9–3.5)
GLUCOSE SERPL-MCNC: 77 MG/DL (ref 65–99)
HCT VFR BLD AUTO: 37.9 % (ref 42–52)
HGB BLD-MCNC: 11.8 G/DL (ref 14–18)
IMM GRANULOCYTES # BLD AUTO: 0.02 K/UL (ref 0–0.11)
IMM GRANULOCYTES NFR BLD AUTO: 0.2 % (ref 0–0.9)
LYMPHOCYTES # BLD AUTO: 2.93 K/UL (ref 1–4.8)
LYMPHOCYTES NFR BLD: 35.2 % (ref 22–41)
MCH RBC QN AUTO: 26.4 PG (ref 27–33)
MCHC RBC AUTO-ENTMCNC: 31.1 G/DL (ref 33.7–35.3)
MCV RBC AUTO: 84.8 FL (ref 81.4–97.8)
MONOCYTES # BLD AUTO: 0.51 K/UL (ref 0–0.85)
MONOCYTES NFR BLD AUTO: 6.1 % (ref 0–13.4)
NEUTROPHILS # BLD AUTO: 4.47 K/UL (ref 1.82–7.42)
NEUTROPHILS NFR BLD: 53.8 % (ref 44–72)
NRBC # BLD AUTO: 0 K/UL
NRBC BLD-RTO: 0 /100 WBC
PLATELET # BLD AUTO: 349 K/UL (ref 164–446)
PMV BLD AUTO: 9.3 FL (ref 9–12.9)
POTASSIUM SERPL-SCNC: 4.2 MMOL/L (ref 3.6–5.5)
PROT SERPL-MCNC: 6.7 G/DL (ref 6–8.2)
RBC # BLD AUTO: 4.47 M/UL (ref 4.7–6.1)
SODIUM SERPL-SCNC: 132 MMOL/L (ref 135–145)
WBC # BLD AUTO: 8.3 K/UL (ref 4.8–10.8)

## 2019-08-28 PROCEDURE — G0299 HHS/HOSPICE OF RN EA 15 MIN: HCPCS

## 2019-08-28 PROCEDURE — 85025 COMPLETE CBC W/AUTO DIFF WBC: CPT

## 2019-08-28 PROCEDURE — 80053 COMPREHEN METABOLIC PANEL: CPT

## 2019-08-28 PROCEDURE — 82550 ASSAY OF CK (CPK): CPT

## 2019-08-30 ENCOUNTER — HOME CARE VISIT (OUTPATIENT)
Dept: HOME HEALTH SERVICES | Facility: HOME HEALTHCARE | Age: 24
End: 2019-08-30
Payer: COMMERCIAL

## 2019-08-30 PROCEDURE — G0299 HHS/HOSPICE OF RN EA 15 MIN: HCPCS

## 2019-09-01 VITALS
OXYGEN SATURATION: 98 % | SYSTOLIC BLOOD PRESSURE: 100 MMHG | HEART RATE: 92 BPM | TEMPERATURE: 97.8 F | DIASTOLIC BLOOD PRESSURE: 70 MMHG | RESPIRATION RATE: 18 BRPM

## 2019-09-01 VITALS
SYSTOLIC BLOOD PRESSURE: 110 MMHG | HEART RATE: 97 BPM | DIASTOLIC BLOOD PRESSURE: 70 MMHG | TEMPERATURE: 97.4 F | RESPIRATION RATE: 18 BRPM | OXYGEN SATURATION: 98 %

## 2019-09-01 ASSESSMENT — ENCOUNTER SYMPTOMS
DEBILITATING PAIN: 1
DEBILITATING PAIN: 1

## 2019-09-02 ENCOUNTER — HOSPITAL ENCOUNTER (OUTPATIENT)
Facility: MEDICAL CENTER | Age: 24
End: 2019-09-02
Attending: INTERNAL MEDICINE
Payer: COMMERCIAL

## 2019-09-02 ENCOUNTER — HOME CARE VISIT (OUTPATIENT)
Dept: HOME HEALTH SERVICES | Facility: HOME HEALTHCARE | Age: 24
End: 2019-09-02
Payer: COMMERCIAL

## 2019-09-02 VITALS
DIASTOLIC BLOOD PRESSURE: 80 MMHG | OXYGEN SATURATION: 98 % | TEMPERATURE: 97.6 F | HEART RATE: 80 BPM | SYSTOLIC BLOOD PRESSURE: 115 MMHG

## 2019-09-02 LAB
ALBUMIN SERPL BCP-MCNC: 3.7 G/DL (ref 3.2–4.9)
ALBUMIN/GLOB SERPL: 1.2 G/DL
ALP SERPL-CCNC: 61 U/L (ref 30–99)
ALT SERPL-CCNC: 44 U/L (ref 2–50)
ANION GAP SERPL CALC-SCNC: 11 MMOL/L (ref 0–11.9)
AST SERPL-CCNC: 23 U/L (ref 12–45)
BASOPHILS # BLD AUTO: 0.4 % (ref 0–1.8)
BASOPHILS # BLD: 0.03 K/UL (ref 0–0.12)
BILIRUB SERPL-MCNC: 0.3 MG/DL (ref 0.1–1.5)
BUN SERPL-MCNC: 8 MG/DL (ref 8–22)
CALCIUM SERPL-MCNC: 9 MG/DL (ref 8.5–10.5)
CHLORIDE SERPL-SCNC: 103 MMOL/L (ref 96–112)
CK SERPL-CCNC: 274 U/L (ref 0–154)
CO2 SERPL-SCNC: 23 MMOL/L (ref 20–33)
CREAT SERPL-MCNC: 0.52 MG/DL (ref 0.5–1.4)
CRP SERPL HS-MCNC: 1.35 MG/DL (ref 0–0.75)
EOSINOPHIL # BLD AUTO: 0.17 K/UL (ref 0–0.51)
EOSINOPHIL NFR BLD: 2.4 % (ref 0–6.9)
ERYTHROCYTE [DISTWIDTH] IN BLOOD BY AUTOMATED COUNT: 45.2 FL (ref 35.9–50)
ERYTHROCYTE [SEDIMENTATION RATE] IN BLOOD BY WESTERGREN METHOD: 38 MM/HOUR (ref 0–15)
GLOBULIN SER CALC-MCNC: 3.1 G/DL (ref 1.9–3.5)
GLUCOSE SERPL-MCNC: 131 MG/DL (ref 65–99)
HCT VFR BLD AUTO: 39 % (ref 42–52)
HGB BLD-MCNC: 11.9 G/DL (ref 14–18)
IMM GRANULOCYTES # BLD AUTO: 0.01 K/UL (ref 0–0.11)
IMM GRANULOCYTES NFR BLD AUTO: 0.1 % (ref 0–0.9)
LYMPHOCYTES # BLD AUTO: 2.38 K/UL (ref 1–4.8)
LYMPHOCYTES NFR BLD: 33.8 % (ref 22–41)
MCH RBC QN AUTO: 25.6 PG (ref 27–33)
MCHC RBC AUTO-ENTMCNC: 30.5 G/DL (ref 33.7–35.3)
MCV RBC AUTO: 84.1 FL (ref 81.4–97.8)
MONOCYTES # BLD AUTO: 0.26 K/UL (ref 0–0.85)
MONOCYTES NFR BLD AUTO: 3.7 % (ref 0–13.4)
NEUTROPHILS # BLD AUTO: 4.19 K/UL (ref 1.82–7.42)
NEUTROPHILS NFR BLD: 59.6 % (ref 44–72)
NRBC # BLD AUTO: 0 K/UL
NRBC BLD-RTO: 0 /100 WBC
PLATELET # BLD AUTO: 324 K/UL (ref 164–446)
PMV BLD AUTO: 8.8 FL (ref 9–12.9)
POTASSIUM SERPL-SCNC: 3.9 MMOL/L (ref 3.6–5.5)
PROT SERPL-MCNC: 6.8 G/DL (ref 6–8.2)
RBC # BLD AUTO: 4.64 M/UL (ref 4.7–6.1)
SODIUM SERPL-SCNC: 137 MMOL/L (ref 135–145)
WBC # BLD AUTO: 7 K/UL (ref 4.8–10.8)

## 2019-09-02 PROCEDURE — 85025 COMPLETE CBC W/AUTO DIFF WBC: CPT

## 2019-09-02 PROCEDURE — 85652 RBC SED RATE AUTOMATED: CPT

## 2019-09-02 PROCEDURE — 80053 COMPREHEN METABOLIC PANEL: CPT

## 2019-09-02 PROCEDURE — 86140 C-REACTIVE PROTEIN: CPT

## 2019-09-02 PROCEDURE — 82550 ASSAY OF CK (CPK): CPT

## 2019-09-02 PROCEDURE — G0299 HHS/HOSPICE OF RN EA 15 MIN: HCPCS

## 2019-09-03 ASSESSMENT — ENCOUNTER SYMPTOMS
DEBILITATING PAIN: 1
NAUSEA: DENIES
VOMITING: DENIES

## 2019-09-04 ENCOUNTER — HOME CARE VISIT (OUTPATIENT)
Dept: HOME HEALTH SERVICES | Facility: HOME HEALTHCARE | Age: 24
End: 2019-09-04
Payer: COMMERCIAL

## 2019-09-04 VITALS
DIASTOLIC BLOOD PRESSURE: 50 MMHG | TEMPERATURE: 97.7 F | OXYGEN SATURATION: 98 % | RESPIRATION RATE: 18 BRPM | HEART RATE: 82 BPM | SYSTOLIC BLOOD PRESSURE: 100 MMHG

## 2019-09-04 PROCEDURE — A6235 HYDROCOLLD DRG >16<=48 W/O B: HCPCS

## 2019-09-04 PROCEDURE — G0299 HHS/HOSPICE OF RN EA 15 MIN: HCPCS

## 2019-09-04 PROCEDURE — A4930 STERILE, GLOVES PER PAIR: HCPCS

## 2019-09-04 PROCEDURE — A5120 SKIN BARRIER, WIPE OR SWAB: HCPCS

## 2019-09-04 PROCEDURE — A6209 FOAM DRSG <=16 SQ IN W/O BDR: HCPCS

## 2019-09-04 ASSESSMENT — ENCOUNTER SYMPTOMS
VOMITING: NO
NAUSEA: NO

## 2019-09-06 ENCOUNTER — HOME CARE VISIT (OUTPATIENT)
Dept: HOME HEALTH SERVICES | Facility: HOME HEALTHCARE | Age: 24
End: 2019-09-06
Payer: COMMERCIAL

## 2019-09-06 PROCEDURE — G0299 HHS/HOSPICE OF RN EA 15 MIN: HCPCS

## 2019-09-07 VITALS
DIASTOLIC BLOOD PRESSURE: 65 MMHG | TEMPERATURE: 99.3 F | HEART RATE: 81 BPM | RESPIRATION RATE: 18 BRPM | OXYGEN SATURATION: 98 % | SYSTOLIC BLOOD PRESSURE: 90 MMHG

## 2019-09-09 ENCOUNTER — HOME CARE VISIT (OUTPATIENT)
Dept: HOME HEALTH SERVICES | Facility: HOME HEALTHCARE | Age: 24
End: 2019-09-09
Payer: COMMERCIAL

## 2019-09-09 ENCOUNTER — HOSPITAL ENCOUNTER (OUTPATIENT)
Facility: MEDICAL CENTER | Age: 24
End: 2019-09-09
Attending: INTERNAL MEDICINE
Payer: COMMERCIAL

## 2019-09-09 VITALS
SYSTOLIC BLOOD PRESSURE: 110 MMHG | DIASTOLIC BLOOD PRESSURE: 80 MMHG | TEMPERATURE: 97.7 F | OXYGEN SATURATION: 97 % | HEART RATE: 90 BPM | RESPIRATION RATE: 18 BRPM

## 2019-09-09 LAB
ALBUMIN SERPL BCP-MCNC: 4.2 G/DL (ref 3.2–4.9)
ALBUMIN/GLOB SERPL: 1.2 G/DL
ALP SERPL-CCNC: 71 U/L (ref 30–99)
ALT SERPL-CCNC: 50 U/L (ref 2–50)
ANION GAP SERPL CALC-SCNC: 15 MMOL/L (ref 0–11.9)
AST SERPL-CCNC: 33 U/L (ref 12–45)
BASOPHILS # BLD AUTO: 0.5 % (ref 0–1.8)
BASOPHILS # BLD: 0.05 K/UL (ref 0–0.12)
BILIRUB SERPL-MCNC: 0.4 MG/DL (ref 0.1–1.5)
BUN SERPL-MCNC: 10 MG/DL (ref 8–22)
CALCIUM SERPL-MCNC: 9.3 MG/DL (ref 8.5–10.5)
CHLORIDE SERPL-SCNC: 100 MMOL/L (ref 96–112)
CK SERPL-CCNC: 315 U/L (ref 0–154)
CO2 SERPL-SCNC: 22 MMOL/L (ref 20–33)
CREAT SERPL-MCNC: 0.56 MG/DL (ref 0.5–1.4)
EOSINOPHIL # BLD AUTO: 0.21 K/UL (ref 0–0.51)
EOSINOPHIL NFR BLD: 2.3 % (ref 0–6.9)
ERYTHROCYTE [DISTWIDTH] IN BLOOD BY AUTOMATED COUNT: 46.9 FL (ref 35.9–50)
GLOBULIN SER CALC-MCNC: 3.6 G/DL (ref 1.9–3.5)
GLUCOSE SERPL-MCNC: 82 MG/DL (ref 65–99)
HCT VFR BLD AUTO: 44.4 % (ref 42–52)
HGB BLD-MCNC: 13.4 G/DL (ref 14–18)
IMM GRANULOCYTES # BLD AUTO: 0.05 K/UL (ref 0–0.11)
IMM GRANULOCYTES NFR BLD AUTO: 0.5 % (ref 0–0.9)
LYMPHOCYTES # BLD AUTO: 3.59 K/UL (ref 1–4.8)
LYMPHOCYTES NFR BLD: 39.5 % (ref 22–41)
MCH RBC QN AUTO: 25.7 PG (ref 27–33)
MCHC RBC AUTO-ENTMCNC: 30.2 G/DL (ref 33.7–35.3)
MCV RBC AUTO: 85.2 FL (ref 81.4–97.8)
MONOCYTES # BLD AUTO: 0.53 K/UL (ref 0–0.85)
MONOCYTES NFR BLD AUTO: 5.8 % (ref 0–13.4)
NEUTROPHILS # BLD AUTO: 4.67 K/UL (ref 1.82–7.42)
NEUTROPHILS NFR BLD: 51.4 % (ref 44–72)
NRBC # BLD AUTO: 0 K/UL
NRBC BLD-RTO: 0 /100 WBC
PLATELET # BLD AUTO: 455 K/UL (ref 164–446)
PMV BLD AUTO: 8.8 FL (ref 9–12.9)
POTASSIUM SERPL-SCNC: 4.1 MMOL/L (ref 3.6–5.5)
PROT SERPL-MCNC: 7.8 G/DL (ref 6–8.2)
RBC # BLD AUTO: 5.21 M/UL (ref 4.7–6.1)
SODIUM SERPL-SCNC: 137 MMOL/L (ref 135–145)
WBC # BLD AUTO: 9.1 K/UL (ref 4.8–10.8)

## 2019-09-09 PROCEDURE — G0299 HHS/HOSPICE OF RN EA 15 MIN: HCPCS

## 2019-09-09 PROCEDURE — 80053 COMPREHEN METABOLIC PANEL: CPT

## 2019-09-09 PROCEDURE — 82550 ASSAY OF CK (CPK): CPT

## 2019-09-09 PROCEDURE — 85025 COMPLETE CBC W/AUTO DIFF WBC: CPT

## 2019-09-09 ASSESSMENT — ENCOUNTER SYMPTOMS
NAUSEA: NO
NAUSEA: DENIES
VOMITING: DENIES
VOMITING: NO

## 2019-09-11 ENCOUNTER — HOME CARE VISIT (OUTPATIENT)
Dept: HOME HEALTH SERVICES | Facility: HOME HEALTHCARE | Age: 24
End: 2019-09-11
Payer: COMMERCIAL

## 2019-09-11 PROCEDURE — G0299 HHS/HOSPICE OF RN EA 15 MIN: HCPCS

## 2019-09-12 ENCOUNTER — TELEPHONE (OUTPATIENT)
Dept: INFECTIOUS DISEASES | Facility: MEDICAL CENTER | Age: 24
End: 2019-09-12

## 2019-09-12 NOTE — TELEPHONE ENCOUNTER
Pt was unaware of appointment. He finished antibiotic tomorrow. He states he is doing well with the antibiotic. Pt will call to schedule an appointment if he declines off the antibiotic. GILDARDO

## 2019-09-13 ENCOUNTER — HOME CARE VISIT (OUTPATIENT)
Dept: HOME HEALTH SERVICES | Facility: HOME HEALTHCARE | Age: 24
End: 2019-09-13
Payer: COMMERCIAL

## 2019-09-13 VITALS
SYSTOLIC BLOOD PRESSURE: 110 MMHG | DIASTOLIC BLOOD PRESSURE: 80 MMHG | TEMPERATURE: 97 F | HEART RATE: 80 BPM | RESPIRATION RATE: 18 BRPM | OXYGEN SATURATION: 93 %

## 2019-09-13 PROCEDURE — G0299 HHS/HOSPICE OF RN EA 15 MIN: HCPCS

## 2019-09-14 VITALS
RESPIRATION RATE: 18 BRPM | SYSTOLIC BLOOD PRESSURE: 119 MMHG | OXYGEN SATURATION: 100 % | HEART RATE: 83 BPM | DIASTOLIC BLOOD PRESSURE: 70 MMHG | TEMPERATURE: 97.9 F

## 2019-09-14 ASSESSMENT — ENCOUNTER SYMPTOMS
VOMITING: DENIES
NAUSEA: DENIES

## 2019-09-16 ENCOUNTER — HOSPITAL ENCOUNTER (OUTPATIENT)
Facility: MEDICAL CENTER | Age: 24
End: 2019-09-16
Attending: INTERNAL MEDICINE
Payer: COMMERCIAL

## 2019-09-16 ENCOUNTER — HOME CARE VISIT (OUTPATIENT)
Dept: HOME HEALTH SERVICES | Facility: HOME HEALTHCARE | Age: 24
End: 2019-09-16
Payer: COMMERCIAL

## 2019-09-16 VITALS
SYSTOLIC BLOOD PRESSURE: 110 MMHG | HEART RATE: 100 BPM | TEMPERATURE: 96.9 F | OXYGEN SATURATION: 100 % | RESPIRATION RATE: 18 BRPM | DIASTOLIC BLOOD PRESSURE: 80 MMHG

## 2019-09-16 LAB
ALBUMIN SERPL BCP-MCNC: 4.2 G/DL (ref 3.2–4.9)
ALBUMIN/GLOB SERPL: 1.3 G/DL
ALP SERPL-CCNC: 64 U/L (ref 30–99)
ALT SERPL-CCNC: 43 U/L (ref 2–50)
ANION GAP SERPL CALC-SCNC: 9 MMOL/L (ref 0–11.9)
AST SERPL-CCNC: 25 U/L (ref 12–45)
BASOPHILS # BLD AUTO: 0.5 % (ref 0–1.8)
BASOPHILS # BLD: 0.04 K/UL (ref 0–0.12)
BILIRUB SERPL-MCNC: 0.3 MG/DL (ref 0.1–1.5)
BUN SERPL-MCNC: 11 MG/DL (ref 8–22)
CALCIUM SERPL-MCNC: 9.2 MG/DL (ref 8.5–10.5)
CHLORIDE SERPL-SCNC: 108 MMOL/L (ref 96–112)
CK SERPL-CCNC: 205 U/L (ref 0–154)
CO2 SERPL-SCNC: 25 MMOL/L (ref 20–33)
CREAT SERPL-MCNC: 0.58 MG/DL (ref 0.5–1.4)
CRP SERPL HS-MCNC: 15.4 MG/L (ref 0–7.5)
EOSINOPHIL # BLD AUTO: 0.46 K/UL (ref 0–0.51)
EOSINOPHIL NFR BLD: 5.7 % (ref 0–6.9)
ERYTHROCYTE [DISTWIDTH] IN BLOOD BY AUTOMATED COUNT: 45.9 FL (ref 35.9–50)
ERYTHROCYTE [SEDIMENTATION RATE] IN BLOOD BY WESTERGREN METHOD: 39 MM/HOUR (ref 0–15)
FASTING STATUS PATIENT QL REPORTED: NORMAL
GLOBULIN SER CALC-MCNC: 3.2 G/DL (ref 1.9–3.5)
GLUCOSE SERPL-MCNC: 93 MG/DL (ref 65–99)
HCT VFR BLD AUTO: 44.4 % (ref 42–52)
HGB BLD-MCNC: 13.4 G/DL (ref 14–18)
IMM GRANULOCYTES # BLD AUTO: 0.02 K/UL (ref 0–0.11)
IMM GRANULOCYTES NFR BLD AUTO: 0.2 % (ref 0–0.9)
LYMPHOCYTES # BLD AUTO: 2.77 K/UL (ref 1–4.8)
LYMPHOCYTES NFR BLD: 34.5 % (ref 22–41)
MCH RBC QN AUTO: 25.8 PG (ref 27–33)
MCHC RBC AUTO-ENTMCNC: 30.2 G/DL (ref 33.7–35.3)
MCV RBC AUTO: 85.5 FL (ref 81.4–97.8)
MONOCYTES # BLD AUTO: 0.5 K/UL (ref 0–0.85)
MONOCYTES NFR BLD AUTO: 6.2 % (ref 0–13.4)
NEUTROPHILS # BLD AUTO: 4.24 K/UL (ref 1.82–7.42)
NEUTROPHILS NFR BLD: 52.9 % (ref 44–72)
NRBC # BLD AUTO: 0 K/UL
NRBC BLD-RTO: 0 /100 WBC
PLATELET # BLD AUTO: 358 K/UL (ref 164–446)
PMV BLD AUTO: 9.2 FL (ref 9–12.9)
POTASSIUM SERPL-SCNC: 4 MMOL/L (ref 3.6–5.5)
PROT SERPL-MCNC: 7.4 G/DL (ref 6–8.2)
RBC # BLD AUTO: 5.19 M/UL (ref 4.7–6.1)
SODIUM SERPL-SCNC: 142 MMOL/L (ref 135–145)
WBC # BLD AUTO: 8 K/UL (ref 4.8–10.8)

## 2019-09-16 PROCEDURE — A6210 FOAM DRG >16<=48 SQ IN W/O B: HCPCS

## 2019-09-16 PROCEDURE — 80053 COMPREHEN METABOLIC PANEL: CPT

## 2019-09-16 PROCEDURE — A6234 HYDROCOLLD DRG <=16 W/O BDR: HCPCS

## 2019-09-16 PROCEDURE — 82550 ASSAY OF CK (CPK): CPT

## 2019-09-16 PROCEDURE — A6235 HYDROCOLLD DRG >16<=48 W/O B: HCPCS

## 2019-09-16 PROCEDURE — G0299 HHS/HOSPICE OF RN EA 15 MIN: HCPCS

## 2019-09-16 PROCEDURE — 85025 COMPLETE CBC W/AUTO DIFF WBC: CPT

## 2019-09-16 PROCEDURE — 86141 C-REACTIVE PROTEIN HS: CPT

## 2019-09-16 PROCEDURE — A6252 ABSORPT DRG >16 <=48 W/O BDR: HCPCS

## 2019-09-16 PROCEDURE — A5120 SKIN BARRIER, WIPE OR SWAB: HCPCS

## 2019-09-16 PROCEDURE — A4930 STERILE, GLOVES PER PAIR: HCPCS

## 2019-09-16 PROCEDURE — 85652 RBC SED RATE AUTOMATED: CPT

## 2019-09-16 ASSESSMENT — ENCOUNTER SYMPTOMS
VOMITING: NO
NAUSEA: NO

## 2019-09-17 ENCOUNTER — OFFICE VISIT (OUTPATIENT)
Dept: INFECTIOUS DISEASES | Facility: MEDICAL CENTER | Age: 24
End: 2019-09-17
Payer: COMMERCIAL

## 2019-09-17 VITALS
BODY MASS INDEX: 39.96 KG/M2 | TEMPERATURE: 96 F | DIASTOLIC BLOOD PRESSURE: 80 MMHG | SYSTOLIC BLOOD PRESSURE: 110 MMHG | WEIGHT: 295 LBS | HEIGHT: 72 IN | OXYGEN SATURATION: 94 % | HEART RATE: 120 BPM

## 2019-09-17 DIAGNOSIS — B95.62 MRSA BACTEREMIA: ICD-10-CM

## 2019-09-17 DIAGNOSIS — R78.81 MRSA BACTEREMIA: ICD-10-CM

## 2019-09-17 DIAGNOSIS — R74.8 ELEVATED LIVER ENZYMES: ICD-10-CM

## 2019-09-17 DIAGNOSIS — T81.49XA INFECTED SURGICAL WOUND: ICD-10-CM

## 2019-09-17 DIAGNOSIS — L24.A9 WOUND DRAINAGE: ICD-10-CM

## 2019-09-17 PROCEDURE — 99214 OFFICE O/P EST MOD 30 MIN: CPT | Performed by: NURSE PRACTITIONER

## 2019-09-17 NOTE — PROGRESS NOTES
[facility]    Subjective:     Chief Complaint   Patient presents with   • Hospital Follow-up     MRSA bacteremia     This is my first time meeting Mr. Edmonds.      Interval History: 23-year-old male with a PMH of congenital spinal stenosis, severe L4-L5 stenosis with disc herniation as well as stenosis at L2-L3 and L5-S1, obesity and chronic pain.  Patient underwent L4, L5 and S1 decompressive laminectomy, bilateral medial fasciotomies and bilateral foraminotomies with aborted L2-L4 laminectomies due to loss of signal on 6/17/2019 with Dr. Ruelas.  MRI on 7/8 showed a new 10 x 2.8 x 1.5 cm deep subcutaneous fat collection thought to be a seroma.  He returned to the OR on 7/12 for L2-S1 laminectomy and L4-L5 microdiscectomy.  Hospitalized from 7/31-8/16/2019 admitted due to concerns for ongoing worsening wound drainage.  Wound culture of the back on 7/31+ MRSA.  MRI on 8/1 showed stable postsurgical changes at L3 L5 laminectomies.  Interval ill-defined nonenhancing fluid within the laminectomy bed most pronounced at L4-L5 level.  Developed ventricular tachycardia on 8/2, followed by ventricular fibrillation, was given 3 shocks before establishing sinus rhythm.  Blood culture on 8/3+ MRSA, repeat blood culture on 8/6 negative.  On 8/5, patient underwent I&D of wound dehiscence at the posterior lumbar site with Dr. Jacob.  OR culture on 8/5+ MRSA.  Discharged home on IV daptomycin (PARRISH 1) and p.o. Bactrim x6 weeks, estimated end date 9/20/2019.      Hospital records reviewed    Today, 9/17/2019: Patient reports feeling well and has been tolerating the IV daptomycin with minimal adverse effect.  States he had been tolerating the p.o. Bactrim, but stopped taking that last week because he did not feel like he needed it.  Notes that he does get a little dizzy when he injects the daptomycin too fast or if he stands up too fast.  Continues to have some shortness of breath, but notes that this is with  exertion and improves at rest.  States that he has not had any back spasms in over a month.  Notes that he had had a difficult time walking previous to surgery, but that is no longer an issue.  Denies any new or worsening muscle aches or joint pain.  Denies feeling generally ill, fevers/chills, general malaise, headache, n/v/d, abdominal pain, chest pain or shortness of breath.  Has a follow-up with the surgeon on .  States that home health is no longer using the wound VAC, it was stopped last week.  He states there is been minimal yellow drainage, no odor, no redness and no pain.  He does have some itching around the PICC line from the tape.    Review of Systems   Constitutional: Negative for chills, fever and malaise/fatigue.   HENT: Negative for congestion and sore throat.    Respiratory: Negative for shortness of breath.    Cardiovascular: Negative for chest pain and leg swelling.   Gastrointestinal: Negative for abdominal pain, constipation, diarrhea, nausea and vomiting.   Genitourinary: Negative for dysuria.   Musculoskeletal: Negative for back pain, joint pain and myalgias.   Skin: Positive for itching (Around PICC line dressing). Negative for rash.   Neurological: Positive for dizziness (If he stands up too quickly or inject the Dapto too fast). Negative for sensory change and headaches.   Psychiatric/Behavioral: The patient is not nervous/anxious.          Past Medical History:   Diagnosis Date   • Morbid obesity (HCC)    • Tachycardia        History reviewed. No pertinent family history.    Social History     Tobacco Use   • Smoking status: Former Smoker     Packs/day: 0.25     Types: Cigarettes     Last attempt to quit: 2018     Years since quittin.7   • Smokeless tobacco: Never Used   Substance Use Topics   • Alcohol use: Yes     Comment: twice weekly   • Drug use: Yes     Types: Inhaled     Comment: occasional marijuana       Allergies: Other misc    Pt's medication and problem list  reviewed.     Objective:     PE:  /80 (BP Location: Left arm, Patient Position: Sitting, BP Cuff Size: Large adult)   Pulse (!) 120   Temp (!) 35.6 °C (96 °F) (Temporal)   Ht 1.829 m (6')   Wt (!) 133.8 kg (295 lb)   SpO2 94%   BMI 40.01 kg/m²     Physical Exam   Constitutional: He is oriented to person, place, and time and well-developed, well-nourished, and in no distress. No distress.   Morbidly obese   HENT:   Head: Normocephalic and atraumatic.   Mouth/Throat: Oropharynx is clear and moist. No oropharyngeal exudate.   Eyes: Pupils are equal, round, and reactive to light. Conjunctivae and EOM are normal. No scleral icterus.   Neck: Normal range of motion. Neck supple. No tracheal deviation present.   Cardiovascular: Regular rhythm and normal heart sounds. Tachycardia present.   No murmur heard.  Pulmonary/Chest: Effort normal and breath sounds normal. No respiratory distress. He has no wheezes. He has no rales.   Abdominal: Soft. Bowel sounds are normal. He exhibits no distension. There is no tenderness. There is no rebound.   Protuberant   Musculoskeletal: He exhibits no edema or tenderness.   RUE PICC- CDI, non tender, no erythema.    Dressing in place, pictures from 9/16/2019 reviewed:  Lumbar spine-7.5 x 1 x 0.5 cm, wound bed pink, no maceration, no erythema surrounding tissue.   Neurological: He is alert and oriented to person, place, and time. No cranial nerve deficit.   Steady shuffling gait.   Skin: Skin is warm and dry. No rash noted. He is not diaphoretic. No erythema.   Psychiatric: Mood, memory, affect and judgment normal.   Vitals reviewed.      Labs:  WBC   Date/Time Value Ref Range Status   09/16/2019 09:15 AM 8.0 4.8 - 10.8 K/uL Final     RBC   Date/Time Value Ref Range Status   09/16/2019 09:15 AM 5.19 4.70 - 6.10 M/uL Final     Hemoglobin   Date/Time Value Ref Range Status   09/16/2019 09:15 AM 13.4 (L) 14.0 - 18.0 g/dL Final     Hematocrit   Date/Time Value Ref Range Status    09/16/2019 09:15 AM 44.4 42.0 - 52.0 % Final     MCV   Date/Time Value Ref Range Status   09/16/2019 09:15 AM 85.5 81.4 - 97.8 fL Final     MCH   Date/Time Value Ref Range Status   09/16/2019 09:15 AM 25.8 (L) 27.0 - 33.0 pg Final     MCHC   Date/Time Value Ref Range Status   09/16/2019 09:15 AM 30.2 (L) 33.7 - 35.3 g/dL Final     MPV   Date/Time Value Ref Range Status   09/16/2019 09:15 AM 9.2 9.0 - 12.9 fL Final        Sodium   Date/Time Value Ref Range Status   09/16/2019 09:15  135 - 145 mmol/L Final     Potassium   Date/Time Value Ref Range Status   09/16/2019 09:15 AM 4.0 3.6 - 5.5 mmol/L Final     Chloride   Date/Time Value Ref Range Status   09/16/2019 09:15  96 - 112 mmol/L Final     Co2   Date/Time Value Ref Range Status   09/16/2019 09:15 AM 25 20 - 33 mmol/L Final     Glucose   Date/Time Value Ref Range Status   09/16/2019 09:15 AM 93 65 - 99 mg/dL Final     Bun   Date/Time Value Ref Range Status   09/16/2019 09:15 AM 11 8 - 22 mg/dL Final     Creatinine   Date/Time Value Ref Range Status   09/16/2019 09:15 AM 0.58 0.50 - 1.40 mg/dL Final       Alkaline Phosphatase   Date/Time Value Ref Range Status   09/16/2019 09:15 AM 64 30 - 99 U/L Final     AST(SGOT)   Date/Time Value Ref Range Status   09/16/2019 09:15 AM 25 12 - 45 U/L Final     ALT(SGPT)   Date/Time Value Ref Range Status   09/16/2019 09:15 AM 43 2 - 50 U/L Final     Total Bilirubin   Date/Time Value Ref Range Status   09/16/2019 09:15 AM 0.3 0.1 - 1.5 mg/dL Final        CPK Total   Date/Time Value Ref Range Status   09/16/2019 09:15  (H) 0 - 154 U/L Final      ESR 39    Assessment and Plan:   The following treatment plan was discussed with patient at length:    1. MRSA bacteremia      -Finish IV daptomycin on 9/20/2019.  DC PICC line after last infusion dose.  -Back incision healing; however, will have patient complete the last 10 days of p.o. Bactrim as he stopped early, to resume the Bactrim on 9/21.  -Monitor for s/sx of  worsening transitioning from IV to PO abx: increased redness, pain, swelling, drainage, breakdown of surgical site, fevers, chills, general malaise, etc.  Notify ID or go to ER should these s/sx occur.   2. Infected surgical wound      -Antibiotics as above.  -Follow-up with Dr. Ruelas/ Dr. Jacob as directed.  -Wound care as directed.   3. Wound drainage      -Resolving, wound nearly healed.  -Antibiotics as above.  -Wound care as directed.   4. Elevated liver enzymes      Resolved     Follow up: 2 weeks, RTC sooner if needed. FU with PCP for ongoing chronic medical conditions.     MANUEL Mills.       Please note that this dictation was created using voice recognition software. I have  worked with technical experts from [facility] to optimize the interface.  I have made every reasonable attempt to correct obvious errors, but there may be errors of grammar and possibly content that I did not discover before finalizing the note.

## 2019-09-18 ENCOUNTER — HOME CARE VISIT (OUTPATIENT)
Dept: HOME HEALTH SERVICES | Facility: HOME HEALTHCARE | Age: 24
End: 2019-09-18
Payer: COMMERCIAL

## 2019-09-18 VITALS
OXYGEN SATURATION: 98 % | HEART RATE: 105 BPM | TEMPERATURE: 97.4 F | RESPIRATION RATE: 18 BRPM | DIASTOLIC BLOOD PRESSURE: 80 MMHG | SYSTOLIC BLOOD PRESSURE: 120 MMHG

## 2019-09-18 PROCEDURE — G0299 HHS/HOSPICE OF RN EA 15 MIN: HCPCS

## 2019-09-19 ASSESSMENT — ENCOUNTER SYMPTOMS
VOMITING: DENIES
NAUSEA: DENIES

## 2019-09-20 ENCOUNTER — HOME CARE VISIT (OUTPATIENT)
Dept: HOME HEALTH SERVICES | Facility: HOME HEALTHCARE | Age: 24
End: 2019-09-20
Payer: COMMERCIAL

## 2019-09-20 PROCEDURE — G0493 RN CARE EA 15 MIN HH/HOSPICE: HCPCS

## 2019-09-20 ASSESSMENT — ENCOUNTER SYMPTOMS
NAUSEA: 0
SORE THROAT: 0
SHORTNESS OF BREATH: 0
CHILLS: 0
SENSORY CHANGE: 0
ABDOMINAL PAIN: 0
DIZZINESS: 1
MYALGIAS: 0
CONSTIPATION: 0
VOMITING: 0
HEADACHES: 0
DIARRHEA: 0
FEVER: 0
NERVOUS/ANXIOUS: 0
BACK PAIN: 0

## 2019-09-22 VITALS
SYSTOLIC BLOOD PRESSURE: 138 MMHG | OXYGEN SATURATION: 98 % | RESPIRATION RATE: 20 BRPM | HEART RATE: 92 BPM | DIASTOLIC BLOOD PRESSURE: 82 MMHG | TEMPERATURE: 98.7 F

## 2019-09-22 ASSESSMENT — ENCOUNTER SYMPTOMS: DEBILITATING PAIN: 1

## 2019-09-22 ASSESSMENT — ACTIVITIES OF DAILY LIVING (ADL)
OASIS_M1830: 00
HOME_HEALTH_OASIS: 00

## 2019-09-22 ASSESSMENT — PATIENT HEALTH QUESTIONNAIRE - PHQ9: CLINICAL INTERPRETATION OF PHQ2 SCORE: 0

## 2019-10-01 ENCOUNTER — DOCUMENTATION (OUTPATIENT)
Dept: INFECTIOUS DISEASES | Facility: MEDICAL CENTER | Age: 24
End: 2019-10-01

## 2019-10-01 ENCOUNTER — TELEPHONE (OUTPATIENT)
Dept: INFECTIOUS DISEASES | Facility: MEDICAL CENTER | Age: 24
End: 2019-10-01

## 2019-10-01 NOTE — TELEPHONE ENCOUNTER
Pt lost his insurance. He will call when it gets reinstated and make a follow up appointment. GILDARDO

## 2020-07-17 ENCOUNTER — TELEPHONE (OUTPATIENT)
Dept: CARDIOLOGY | Facility: MEDICAL CENTER | Age: 25
End: 2020-07-17

## 2020-07-17 NOTE — TELEPHONE ENCOUNTER
Called patient to see if he has followed with a cardiologist in the past to get records prior to new patient appt with [facility]. Unable to reach patient, left voicemail for call back.

## 2022-04-25 NOTE — CARE PLAN
Problem: Safety  Goal: Will remain free from injury  Outcome: PROGRESSING AS EXPECTED  Pt states general weakness but less so than during admission. Pt ambulates steady with SBA. Educated to call for assitance, verbalized understanding. Fall precautions in place    Problem: Pain Management  Goal: Pain level will decrease to patient's comfort goal  Outcome: PROGRESSING AS EXPECTED  2/10 leg pain, pt states only with movement, no tx at this time, will monitor       per md

## 2023-10-17 NOTE — PROGRESS NOTES
Subjective:     Sammy Edmonds is a 23 y.o. male who presents for Hospital Follow-up.  Chart and discharge summary reviewed.   Date of discharge 7/22/2019.  48- hour post discharge RN call completed on 7/24/2019 and documented in the medical record by Sophie Barbour RN:  POST DISCHARGE CALL:  Discharge Date:7/22/2019   Date of Outreach Call: 7/24/2019  9:33 AM  Now that you're home, how are you doing? Good  Comment:Pt reports he is only having minimal pain.  States he is using walker to assist with mobility. Has appt  with neurosurgeon on 7/25  Do you have questions about your medications? No    Did you fill your medications? Yes    Do you have a follow-up appointment scheduled?Yes  Comment:Post discharge clinic 7/25    Discharging Department: Neurosurgery    Number of Attempts: 1  Current or previous attempts completed within two business days of discharge? Yes  Provided education regarding treatment plan, medication, self-management, ADLs? Yes  Has patient completed Advance Directive? If yes, advise them to bring to appointment. No  Care Manager phone number provided? Yes  Is there anything else I can help you with? No         HPI: Recently hospitalized for bilateral leg numbness.  He was status post lumbar laminectomy on 6/17/2019 which was aborted intraoperatively due to loss of neuro signaling.  He underwent an L2, L3, L4, L5, S1 laminectomy, and a L4-L5 microdiscectomy.  Patient was noted to have persistent sinus tachycardia.  Echocardiogram was normal.  He was started on metoprolol SR 25 mg daily.  Thyroid function tests were normal    Since returning home, patient reports feeling better every day.  He is using a walker for ambulation.    The patient has questions regarding hospitalization or discharge: All of his questions were answered.  The patient has some postop weakness; denies difficulty taking care of self at home.  Patient reports taking medications as instructed.    Current Outpatient  Prescriptions   Medication Sig Dispense Refill   • HYDROcodone-acetaminophen (NORCO) 5-325 MG Tab per tablet Take 1 Tab by mouth every four hours as needed for up to 3 days. 15 Tab 0   • methocarbamol (ROBAXIN) 500 MG Tab Take 2 Tabs by mouth 3 times a day. 120 Tab 0   • senna-docusate (PERICOLACE OR SENOKOT S) 8.6-50 MG Tab Take 2 Tabs by mouth 2 Times a Day. 30 Tab 0   • cyclobenzaprine (FLEXERIL) 10 MG Tab Take 10 mg by mouth 3 times a day as needed.     • metoprolol SR (TOPROL XL) 25 MG TABLET SR 24 HR Take 1 Tab by mouth every day for 30 days. 30 Tab 0     No current facility-administered medications for this visit.        Allergies:   Other misc    Social History:  Social History   Substance Use Topics   • Smoking status: Former Smoker     Packs/day: 0.25     Types: Cigarettes     Quit date: 12/28/2018   • Smokeless tobacco: Never Used   • Alcohol use Yes      Comment: twice weekly        Family History:  History reviewed. No pertinent family history.    Past Medical History:   Diagnosis Date   • Morbid obesity (HCC)    • Tachycardia        Surgical History  Past Surgical History:   Procedure Laterality Date   • LUMBAR LAMINECTOMY DISKECTOMY  7/12/2019    Procedure: LAMINECTOMY, SPINE, LUMBAR, WITH DISCECTOMY - POSTERIOR L2-S1 WITH RE-DO L4-5 LAMINECTOMY;  Surgeon: Samir Ruelas M.D.;  Location: SURGERY [facility];  Service: Neurosurgery   • LUMBAR LAMINECTOMY DISKECTOMY N/A 6/17/2019    Procedure: LUMBAR 2 - SACRAL 1 LAMINECTOMY AND DISCECTOMY;  Surgeon: Samir Ruelas M.D.;  Location: SURGERY [facility];  Service: Neurosurgery       ROS:    Constitutional:  Denies fever, chills, night sweats  HENT: Denies head congestion, ear pain or drainage, decreased hearing, sore throat  Eyes: Denies visual changes, eye drainage or redness, eye pain  Neck: Denies pain, swollen glands, decreased range of motion  Lungs: Denies shortness of breath, wheezing, cough  Cardiovascular: Denies chest pain, orthopnea,  lower extremity edema, palpitations  Abdominal: Denies abdominal pain, change in bowel or bladder habits, nausea or vomiting  Musculoskeletal: He has mild postop back pain.  This morning he noticed clear drainage from his wound.  Endocrine: Denies unexplained weight changes, heat or cold intolerance, hair loss, polyuria or polydipsia  Neurological: Denies dizziness, headache, confusion, focal weakness or numbness, memory loss  Psychiatric: Denies depression, anxiety, insomnia       Objective:     /60 (BP Location: Right arm, Patient Position: Sitting, BP Cuff Size: Adult)   Pulse (!) 120   Temp 35.9 °C (96.6 °F) (Temporal)   Resp 18   Ht 1.829 m (6')   Wt (!) 144.7 kg (319 lb)   SpO2 94%      Physical Exam:    GEN:  Alert, oriented, in no distress  HEENT:  PERRLA, EOMI,   NECK;  Supple without cervical adenopathy   LUNGS:  Clear to auscultation without rales, rhonci, or wheezes.  CV:  Heart tachycardic with regular rhythm without murmurs or S3 or S4  EXT:  Without cyanosis, clubbing, or edema.  NEURO:  Cranial nerves II through XII intact.  Motor function and sensation grossly intact.  SKIN: Large dressing over lower back surgical wound.  PSYCH:  Behavior is appropriate.      Assessment and Plan:     1. Hospital follow-up:   Hospitalization and results reviewed with patient. High risk conditions requiring teaching or care coordination were identified and addressed.The patient demonstrate understanding of admission and underlying conditions. The patient understands discharge instructions and when to seek medical attention. Medications reviewed including instructions regarding high risk medications, dosing and side effects.    The patient is able to safely adhere to ADL/IADL, treatment and medication regimen, self-manage of high-risk conditions? Yes  The patient requires physical therapy/home health/DME referral? Home Health ordered   The patient requires referral to care coordination/behavioral  health/social work?  No   Patient requires referral for pharmacy consult? No     2. S/P lumbar laminectomy  -He has a follow-up appointment with Dr. Ruelas, surgeon, this afternoon.  -Home health with physical therapy ordered.    3. Chronic tachycardia  -Looking back in his sparse record, this is not a new issue.  Unknown etiology and patient is asymptomatic.  - REFERRAL TO CARDIOLOGY    4. Morbid obesity with BMI of 40.0-44.9, adult (HCC)  -Patient is counseled regarding healthy eating habits and lifestyle modification.        Medication Reconciliation  Medication list at end of encounter:   Current Outpatient Prescriptions   Medication Sig Dispense Refill   • HYDROcodone-acetaminophen (NORCO) 5-325 MG Tab per tablet Take 1 Tab by mouth every four hours as needed for up to 3 days. 15 Tab 0   • methocarbamol (ROBAXIN) 500 MG Tab Take 2 Tabs by mouth 3 times a day. 120 Tab 0   • senna-docusate (PERICOLACE OR SENOKOT S) 8.6-50 MG Tab Take 2 Tabs by mouth 2 Times a Day. 30 Tab 0   • cyclobenzaprine (FLEXERIL) 10 MG Tab Take 10 mg by mouth 3 times a day as needed.     • metoprolol SR (TOPROL XL) 25 MG TABLET SR 24 HR Take 1 Tab by mouth every day for 30 days. 30 Tab 0     No current facility-administered medications for this visit.        Primary care follow-up:    Recommended followup:  With new Pcp   Future Appointments       Provider Department Center    9/26/2019 9:55 AM Amaris Zhang M.D. [facility]          Patient Instruction  Patient provided with educational material on discharge diagnosis and management of symptoms/red flags. Patient instructed to keep follow-up appointments and to bring written questions and and actual medications to each office visit. Patient instructed to call PCP/specialist with any problems/questions/concerns. Patient verbalizes understanding and has no further questions at this time.         Yes...

## (undated) DEVICE — TUBING CLEARLINK DUO-VENT - C-FLO (48EA/CA)

## (undated) DEVICE — SUTURE 3-0 VICRYL PLUS RB-1 - 8 X 18 INCH (12/BX)

## (undated) DEVICE — SUTURE 2-0 VICRYL PLUS CT-1 - 8 X 18 INCH(12/BX)

## (undated) DEVICE — HEMOSTAT SURG ABSORBABLE - 2 X 3 IN SURGICEL (24EA/CA)

## (undated) DEVICE — SENSOR SPO2 NEO LNCS ADHESIVE (20/BX) SEE USER NOTES

## (undated) DEVICE — KIT ANESTHESIA W/CIRCUIT & 3/LT BAG W/FILTER (20EA/CA)

## (undated) DEVICE — BOVIE  BLADE 6 EXTENDED - (50/PK)

## (undated) DEVICE — CANISTER SUCTION 3000ML MECHANICAL FILTER AUTO SHUTOFF MEDI-VAC NONSTERILE LF DISP  (40EA/CA)

## (undated) DEVICE — BOVIE BLADE COATED &INSULATED - 25/PK

## (undated) DEVICE — TIP INTPLS HFLO ML ORFC BTRY - (12/CS)  FOR SURGILAV

## (undated) DEVICE — SET EXTENSION WITH 2 PORTS (48EA/CA) ***PART #2C8610 IS A SUBSTITUTE*****

## (undated) DEVICE — GOWN SURGEONS X-LARGE - DISP. (30/CA)

## (undated) DEVICE — LACTATED RINGERS INJ 1000 ML - (14EA/CA 60CA/PF)

## (undated) DEVICE — GLOVE, BIOGEL ECLIPSE, SZ 7.0, PF LTX (50/BX)

## (undated) DEVICE — DRAPE LARGE 3 QUARTER - (20/CA)

## (undated) DEVICE — SURGIFOAM (SIZE 100) - (6EA/CA)

## (undated) DEVICE — CHLORAPREP 26 ML APPLICATOR - ORANGE TINT(25/CA)

## (undated) DEVICE — GLOVE BIOGEL ECLIPSE  PF LATEX SIZE 6.5 (50PR/BX)

## (undated) DEVICE — PACK NEURO - (2EA/CA)

## (undated) DEVICE — MASK ANESTHESIA ADULT  - (100/CA)

## (undated) DEVICE — CLOSURE SKIN STRIP 1/2 X 4 IN - (STERI STRIP) (50/BX 4BX/CA)

## (undated) DEVICE — DRAPE STRLE REG TOWEL 18X24 - (10/BX 4BX/CA)"

## (undated) DEVICE — SEALER BIPOLAR 2.3 AQUAMANTYS

## (undated) DEVICE — SET LEADWIRE 5 LEAD BEDSIDE DISPOSABLE ECG (1SET OF 5/EA)

## (undated) DEVICE — DRESSING ABDOMINAL PAD STERILE 8 X 10" (360EA/CA)"

## (undated) DEVICE — VAC CANISTER W/GEL 500ML - FITS NEW MACHINES (10EA/CA)

## (undated) DEVICE — SODIUM CHL IRRIGATION 0.9% 1000ML (12EA/CA)

## (undated) DEVICE — TUBING C&T SET FLYING LEADS DRAIN TUBING (10EA/BX)

## (undated) DEVICE — DETERGENT RENUZYME PLUS 10 OZ PACKET (50/BX)

## (undated) DEVICE — SUCTION TIP STRAIGHT ARGYLE - 50EA/CA

## (undated) DEVICE — CORDS BIPOLAR COAGULATION - 12FT STERILE DISP. (10EA/BX)

## (undated) DEVICE — GLOVE BIOGEL SZ 6.5 SURGICAL PF LTX (50PR/BX 4BX/CA)

## (undated) DEVICE — SUTURE GENERAL

## (undated) DEVICE — SLEEVE, VASO, THIGH, MED

## (undated) DEVICE — HEADREST PRONEVIEW LARGE - (10/CA)

## (undated) DEVICE — DRESSING VAC SIMPLACE MED - (10EA/CA)

## (undated) DEVICE — PATTIES SURG X-RAYCOTTONOID - 1/2 X 3 IN (200/CA)

## (undated) DEVICE — DRAPE LAPAROTOMY T SHEET - (12EA/CA)

## (undated) DEVICE — ARMREST CRADLE FOAM - (2PR/PK 12PR/CA)

## (undated) DEVICE — SPONGE GAUZESTER 4 X 4 4PLY - (128PK/CA)

## (undated) DEVICE — PROTECTOR ULNA NERVE - (36PR/CA)

## (undated) DEVICE — GLOVE BIOGEL SZ 8 SURGICAL PF LTX - (50PR/BX 4BX/CA)

## (undated) DEVICE — GLOVE BIOGEL SZ 7 SURGICAL PF LTX - (50PR/BX 4BX/CA)

## (undated) DEVICE — PACK JACKSON TABLE KIT W/OUT - HR (6EA/CA)

## (undated) DEVICE — SUTURE 4-0 MONOCRYL PLUS PS-1 - 27 INCH (36/BX)

## (undated) DEVICE — DRESSING XEROFORM 1X8 - (50/BX 4BX/CA)

## (undated) DEVICE — ELECTRODE 850 FOAM ADHESIVE - HYDROGEL RADIOTRNSPRNT (50/PK)

## (undated) DEVICE — BLANKET WARMING LOWER BODY - (10/CA) INACTIVE USE #8585

## (undated) DEVICE — TRAY SRGPRP PVP IOD WT PRP - (20/CA)

## (undated) DEVICE — HEMOSTAT SURG ABSORBABLE - 4 X 8 IN SURGICEL (24EA/CA)

## (undated) DEVICE — SURGIFOAM (12X7) - (12EA/CA)

## (undated) DEVICE — TOWELS CLOTH SURGICAL - (4/PK 20PK/CA)

## (undated) DEVICE — BLADE SURGICAL CLIPPER - (50EA/CA)

## (undated) DEVICE — DEVICE MONOPOLAR RF PEAK PLASMABLADE 3.0S

## (undated) DEVICE — KIT GEL-FLOW NT ABORBABLE GELATIN (6EA/BX)

## (undated) DEVICE — SYRINGE SAFETY 10 ML 18 GA X 1 1/2 BLUNT LL (100/BX 4BX/CA)

## (undated) DEVICE — NEPTUNE 4 PORT MANIFOLD - (20/PK)

## (undated) DEVICE — MIDAS LUBRICATOR DIFFUSER PACK (4EA/CA)

## (undated) DEVICE — TUBE E-T HI-LO CUFF 8.5MM (10EA/PK)

## (undated) DEVICE — BOVIE BLADE COATED - (50/PK)

## (undated) DEVICE — NEEDLE NON SAFETY HYPO 22 GA X 1 1/2 IN (100/BX)

## (undated) DEVICE — SUTURE 0 VICRYL PLUS CT-1 - 8 X 18 INCH (12/BX)

## (undated) DEVICE — GLOVE BIOGEL PI INDICATOR SZ 8.0 SURGICAL PF LF -(50/BX 4BX/CA)

## (undated) DEVICE — SYRINGE SAFETY 3 ML 18 GA X 1 1/2 BLUNT LL (100/BX 8BX/CA)

## (undated) DEVICE — SUCTION INSTRUMENT YANKAUER BULBOUS TIP W/O VENT (50EA/CA)

## (undated) DEVICE — SUTURE 3-0 MONOCRYL PLUS PS-1 - 27 INCH (36/BX)

## (undated) DEVICE — SHEET PEDIATRIC LAPAROTOMY - (10/CA)

## (undated) DEVICE — KIT EVACUATER 3 SPRING PVC LF 1/8 DRAIN SIZE (10EA/CA)"

## (undated) DEVICE — SODIUM CHL. INJ. 0.9% 500ML (24EA/CA 50CA/PF)

## (undated) DEVICE — ELECTRODE NEEDLE NON-SAFETY 2.8 IN (150EA/CT)

## (undated) DEVICE — GOWN SURGEONS LARGE - (32/CA)

## (undated) DEVICE — GLOVE BIOGEL INDICATOR SZ 6.5 SURGICAL PF LTX - (50PR/BX 4BX/CA)

## (undated) DEVICE — KIT ROOM DECONTAMINATION

## (undated) DEVICE — GOWN WARMING STANDARD FLEX - (30/CA)

## (undated) DEVICE — GLOVE BIOGEL PI ORTHO SZ 6 1/2 SURGICAL PF LF (40PR/BX)

## (undated) DEVICE — ELECTRODE DUAL RETURN W/ CORD - (50/PK)

## (undated) DEVICE — TOOL DISSECT MATCH HEAD

## (undated) DEVICE — STERI STRIP COMPOUND BENZOIN - TINCTURE 0.6ML WITH APPLICATOR (40EA/BX)

## (undated) DEVICE — SUTURE 0 VICRYL PLUS CT-1 - 36 INCH (36/BX)

## (undated) DEVICE — INTRAOP NEURO IN OR 1:1 PER 15 MIN

## (undated) DEVICE — WATER IRRIG. STER. 1500 ML - (9/CA)

## (undated) DEVICE — GLOVE BIOGEL SZ 8.5 SURGICAL PF LTX - (50PR/BX 4BX/CA)

## (undated) DEVICE — STAPLER SKIN DISP - (6/BX 10BX/CA) VISISTAT

## (undated) DEVICE — APPLICATOR COTTON TIP 6 IN - STERILE (2EA/PK 100PK/BX)

## (undated) DEVICE — LACTATED RINGERS INJ. 500 ML - (24EA/CA)

## (undated) DEVICE — GLOVE SZ 6.5 BIOGEL PI MICRO - PF LF (50PR/BX)

## (undated) DEVICE — BLANKET WARMING UPPER BODY - (10/CA)

## (undated) DEVICE — GLOVE BIOGEL SZ 6 PF LATEX - (50EA/BX 4BX/CA)

## (undated) DEVICE — DRAPE MICROSCOPE X-LONG (10EA/CA)

## (undated) DEVICE — TUBE E-T HI-LO CUFF 8.0MM (10EA/PK)

## (undated) DEVICE — GLOVE BIOGEL ECLIPSE PF LATEX SIZE 9.0

## (undated) DEVICE — GLOVE BIOGEL PI INDICATOR SZ 7.0 SURGICAL PF LF - (50/BX 4BX/CA)

## (undated) DEVICE — HANDPIECE 10FT INTPLS SCT PLS IRRIGATION HAND CONTROL SET (6/PK)

## (undated) DEVICE — GLOVE BIOGEL INDICATOR SZ 7SURGICAL PF LTX - (50/BX 4BX/CA)

## (undated) DEVICE — SUTURE 4-0 PROLENE PS-2 18 (36PK/BX)"